# Patient Record
Sex: FEMALE | Race: WHITE | NOT HISPANIC OR LATINO | URBAN - METROPOLITAN AREA
[De-identification: names, ages, dates, MRNs, and addresses within clinical notes are randomized per-mention and may not be internally consistent; named-entity substitution may affect disease eponyms.]

---

## 2021-09-07 ENCOUNTER — OFFICE (OUTPATIENT)
Dept: URBAN - METROPOLITAN AREA CLINIC 64 | Facility: CLINIC | Age: 86
End: 2021-09-07

## 2021-09-07 VITALS
HEIGHT: 60 IN | DIASTOLIC BLOOD PRESSURE: 89 MMHG | HEART RATE: 64 BPM | WEIGHT: 158 LBS | SYSTOLIC BLOOD PRESSURE: 168 MMHG

## 2021-09-07 DIAGNOSIS — K52.831 COLLAGENOUS COLITIS: ICD-10-CM

## 2021-09-07 DIAGNOSIS — R19.7 DIARRHEA, UNSPECIFIED: ICD-10-CM

## 2021-09-07 DIAGNOSIS — K64.9 UNSPECIFIED HEMORRHOIDS: ICD-10-CM

## 2021-09-07 PROCEDURE — 99203 OFFICE O/P NEW LOW 30 MIN: CPT | Performed by: NURSE PRACTITIONER

## 2021-09-07 RX ORDER — HYDROCORTISONE ACETATE AND PRAMOXINE HYDROCHLORIDE 25; 10 MG/G; MG/G
2.5 CREAM TOPICAL
Qty: 4 | Refills: 6 | Status: COMPLETED
Start: 2021-09-07 | End: 2022-09-09

## 2022-09-09 ENCOUNTER — OFFICE (OUTPATIENT)
Dept: URBAN - METROPOLITAN AREA CLINIC 64 | Facility: CLINIC | Age: 87
End: 2022-09-09

## 2022-09-09 VITALS
SYSTOLIC BLOOD PRESSURE: 127 MMHG | HEIGHT: 60 IN | HEART RATE: 74 BPM | DIASTOLIC BLOOD PRESSURE: 71 MMHG | WEIGHT: 146 LBS

## 2022-09-09 DIAGNOSIS — R19.7 DIARRHEA, UNSPECIFIED: ICD-10-CM

## 2022-09-09 PROCEDURE — 99213 OFFICE O/P EST LOW 20 MIN: CPT | Performed by: NURSE PRACTITIONER

## 2022-09-09 RX ORDER — MESALAMINE 1.2 G/1
TABLET, DELAYED RELEASE ORAL
Qty: 90 | Refills: 5 | Status: COMPLETED
End: 2022-09-14

## 2022-11-11 ENCOUNTER — TRANSCRIBE ORDERS (OUTPATIENT)
Dept: ADMINISTRATIVE | Facility: HOSPITAL | Age: 87
End: 2022-11-11

## 2022-11-11 DIAGNOSIS — I35.0 AORTIC VALVE STENOSIS, UNSPECIFIED ETIOLOGY: Primary | ICD-10-CM

## 2022-11-18 ENCOUNTER — HOSPITAL ENCOUNTER (OUTPATIENT)
Dept: CARDIOLOGY | Facility: HOSPITAL | Age: 87
Discharge: HOME | End: 2022-11-18
Admitting: INTERNAL MEDICINE

## 2022-11-18 DIAGNOSIS — I35.0 AORTIC VALVE STENOSIS, UNSPECIFIED ETIOLOGY: ICD-10-CM

## 2022-11-18 PROCEDURE — 93306 TTE W/DOPPLER COMPLETE: CPT | Performed by: INTERNAL MEDICINE

## 2022-11-18 PROCEDURE — 25010000002 SULFUR HEXAFLUORIDE MICROSPH 60.7-25 MG RECONSTITUTED SUSPENSION: Performed by: INTERNAL MEDICINE

## 2022-11-18 PROCEDURE — 93306 TTE W/DOPPLER COMPLETE: CPT

## 2022-11-18 RX ADMIN — SULFUR HEXAFLUORIDE 3 ML: KIT at 17:06

## 2022-11-20 LAB
BH CV ECHO MEAS - ACS: 1.32 CM
BH CV ECHO MEAS - AI P1/2T: 547.7 MSEC
BH CV ECHO MEAS - AO MAX PG: 21.8 MMHG
BH CV ECHO MEAS - AO MEAN PG: 10.6 MMHG
BH CV ECHO MEAS - AO ROOT DIAM: 3.2 CM
BH CV ECHO MEAS - AO V2 MAX: 233.5 CM/SEC
BH CV ECHO MEAS - AO V2 VTI: 48 CM
BH CV ECHO MEAS - AVA(I,D): 1.06 CM2
BH CV ECHO MEAS - EDV(CUBED): 16.8 ML
BH CV ECHO MEAS - EDV(MOD-SP4): 65 ML
BH CV ECHO MEAS - EF(MOD-BP): 58 %
BH CV ECHO MEAS - EF(MOD-SP4): 57.6 %
BH CV ECHO MEAS - ESV(CUBED): 9.5 ML
BH CV ECHO MEAS - ESV(MOD-SP4): 27.6 ML
BH CV ECHO MEAS - FS: 17.2 %
BH CV ECHO MEAS - IVS/LVPW: 1.46 CM
BH CV ECHO MEAS - IVSD: 1.42 CM
BH CV ECHO MEAS - LA DIMENSION: 3.5 CM
BH CV ECHO MEAS - LV DIASTOLIC VOL/BSA (35-75): 40.9 CM2
BH CV ECHO MEAS - LV MASS(C)D: 87.3 GRAMS
BH CV ECHO MEAS - LV MAX PG: 5.9 MMHG
BH CV ECHO MEAS - LV MEAN PG: 3.1 MMHG
BH CV ECHO MEAS - LV SYSTOLIC VOL/BSA (12-30): 17.4 CM2
BH CV ECHO MEAS - LV V1 MAX: 121.8 CM/SEC
BH CV ECHO MEAS - LV V1 VTI: 25.9 CM
BH CV ECHO MEAS - LVIDD: 2.6 CM
BH CV ECHO MEAS - LVIDS: 2.12 CM
BH CV ECHO MEAS - LVOT AREA: 1.97 CM2
BH CV ECHO MEAS - LVOT DIAM: 1.59 CM
BH CV ECHO MEAS - LVPWD: 0.97 CM
BH CV ECHO MEAS - MV A MAX VEL: 148.4 CM/SEC
BH CV ECHO MEAS - MV DEC SLOPE: 351.5 CM/SEC2
BH CV ECHO MEAS - MV DEC TIME: 0.31 MSEC
BH CV ECHO MEAS - MV E MAX VEL: 108.5 CM/SEC
BH CV ECHO MEAS - MV E/A: 0.73
BH CV ECHO MEAS - MV MAX PG: 10.8 MMHG
BH CV ECHO MEAS - MV MEAN PG: 4.6 MMHG
BH CV ECHO MEAS - MV V2 VTI: 36.3 CM
BH CV ECHO MEAS - MVA(VTI): 1.41 CM2
BH CV ECHO MEAS - PA ACC TIME: 0.08 SEC
BH CV ECHO MEAS - PA PR(ACCEL): 44.7 MMHG
BH CV ECHO MEAS - PA V2 MAX: 123.2 CM/SEC
BH CV ECHO MEAS - PULM A REVS DUR: 0.19 SEC
BH CV ECHO MEAS - PULM A REVS VEL: 25 CM/SEC
BH CV ECHO MEAS - PULM DIAS VEL: 30.7 CM/SEC
BH CV ECHO MEAS - PULM S/D: 1.37
BH CV ECHO MEAS - PULM SYS VEL: 42.1 CM/SEC
BH CV ECHO MEAS - RAP SYSTOLE: 12 MMHG
BH CV ECHO MEAS - RV MAX PG: 3.8 MMHG
BH CV ECHO MEAS - RV V1 MAX: 98 CM/SEC
BH CV ECHO MEAS - RV V1 VTI: 18.7 CM
BH CV ECHO MEAS - RVDD: 3.8 CM
BH CV ECHO MEAS - SI(MOD-SP4): 23.5 ML/M2
BH CV ECHO MEAS - SV(LVOT): 51.1 ML
BH CV ECHO MEAS - SV(MOD-SP4): 37.4 ML
MAXIMAL PREDICTED HEART RATE: 124 BPM
STRESS TARGET HR: 105 BPM

## 2022-12-09 ENCOUNTER — TRANSCRIBE ORDERS (OUTPATIENT)
Dept: ADMINISTRATIVE | Facility: HOSPITAL | Age: 87
End: 2022-12-09

## 2022-12-09 DIAGNOSIS — I63.9 CEREBROVASCULAR ACCIDENT (CVA), UNSPECIFIED MECHANISM: Primary | ICD-10-CM

## 2022-12-12 ENCOUNTER — TRANSCRIBE ORDERS (OUTPATIENT)
Dept: ADMINISTRATIVE | Facility: HOSPITAL | Age: 87
End: 2022-12-12

## 2022-12-12 DIAGNOSIS — R20.9 CVA, OLD, ALTERATIONS OF SENSATIONS: Primary | ICD-10-CM

## 2022-12-12 DIAGNOSIS — I69.398 CVA, OLD, ALTERATIONS OF SENSATIONS: Primary | ICD-10-CM

## 2022-12-14 ENCOUNTER — OFFICE (OUTPATIENT)
Dept: URBAN - METROPOLITAN AREA CLINIC 64 | Facility: CLINIC | Age: 87
End: 2022-12-14

## 2022-12-14 VITALS
WEIGHT: 141 LBS | DIASTOLIC BLOOD PRESSURE: 89 MMHG | HEART RATE: 71 BPM | SYSTOLIC BLOOD PRESSURE: 159 MMHG | HEIGHT: 60 IN

## 2022-12-14 DIAGNOSIS — R19.7 DIARRHEA, UNSPECIFIED: ICD-10-CM

## 2022-12-14 DIAGNOSIS — K52.831 COLLAGENOUS COLITIS: ICD-10-CM

## 2022-12-14 PROCEDURE — 99213 OFFICE O/P EST LOW 20 MIN: CPT | Performed by: NURSE PRACTITIONER

## 2022-12-14 RX ORDER — BALSALAZIDE DISODIUM 750 MG/1
CAPSULE ORAL
Qty: 120 | Refills: 7 | Status: COMPLETED
End: 2023-06-21

## 2022-12-16 ENCOUNTER — APPOINTMENT (OUTPATIENT)
Dept: RESPIRATORY THERAPY | Facility: HOSPITAL | Age: 87
End: 2022-12-16

## 2023-01-09 ENCOUNTER — HOSPITAL ENCOUNTER (OUTPATIENT)
Dept: MRI IMAGING | Facility: HOSPITAL | Age: 88
Discharge: HOME | End: 2023-01-09
Payer: MEDICARE

## 2023-01-09 ENCOUNTER — HOSPITAL ENCOUNTER (OUTPATIENT)
Dept: CARDIOLOGY | Facility: HOSPITAL | Age: 88
Discharge: HOME | End: 2023-01-09
Payer: MEDICARE

## 2023-01-09 DIAGNOSIS — I63.9 CEREBROVASCULAR ACCIDENT (CVA), UNSPECIFIED MECHANISM: ICD-10-CM

## 2023-01-09 LAB
CREAT BLDA-MCNC: 0.8 MG/DL (ref 0.6–1.3)
EGFRCR SERPLBLD CKD-EPI 2021: 67.5 ML/MIN/1.73

## 2023-01-09 PROCEDURE — A9579 GAD-BASE MR CONTRAST NOS,1ML: HCPCS | Performed by: INTERNAL MEDICINE

## 2023-01-09 PROCEDURE — 82565 ASSAY OF CREATININE: CPT

## 2023-01-09 PROCEDURE — 25010000002 GADOTERIDOL PER 1 ML: Performed by: INTERNAL MEDICINE

## 2023-01-09 PROCEDURE — 70553 MRI BRAIN STEM W/O & W/DYE: CPT

## 2023-01-09 RX ADMIN — GADOTERIDOL 13 ML: 279.3 INJECTION, SOLUTION INTRAVENOUS at 16:49

## 2023-01-11 ENCOUNTER — HOSPITAL ENCOUNTER (OUTPATIENT)
Dept: RESPIRATORY THERAPY | Facility: HOSPITAL | Age: 88
Discharge: HOME | End: 2023-01-11
Admitting: INTERNAL MEDICINE
Payer: MEDICARE

## 2023-01-11 DIAGNOSIS — I69.398 CVA, OLD, ALTERATIONS OF SENSATIONS: ICD-10-CM

## 2023-01-11 DIAGNOSIS — R20.9 CVA, OLD, ALTERATIONS OF SENSATIONS: ICD-10-CM

## 2023-01-11 PROCEDURE — 93270 REMOTE 30 DAY ECG REV/REPORT: CPT

## 2023-02-14 PROCEDURE — 93272 ECG/REVIEW INTERPRET ONLY: CPT | Performed by: INTERNAL MEDICINE

## 2023-04-13 ENCOUNTER — LAB (OUTPATIENT)
Dept: LAB | Facility: HOSPITAL | Age: 88
End: 2023-04-13
Payer: MEDICARE

## 2023-04-13 ENCOUNTER — TRANSCRIBE ORDERS (OUTPATIENT)
Dept: ADMINISTRATIVE | Facility: HOSPITAL | Age: 88
End: 2023-04-13
Payer: MEDICARE

## 2023-04-13 DIAGNOSIS — Z01.818 PRE-OP TESTING: Primary | ICD-10-CM

## 2023-04-13 DIAGNOSIS — Z01.818 PRE-OP TESTING: ICD-10-CM

## 2023-04-13 LAB
CREAT SERPL-MCNC: 0.65 MG/DL (ref 0.57–1)
EGFRCR SERPLBLD CKD-EPI 2021: 80.2 ML/MIN/1.73

## 2023-04-13 PROCEDURE — 36415 COLL VENOUS BLD VENIPUNCTURE: CPT

## 2023-04-13 PROCEDURE — 82565 ASSAY OF CREATININE: CPT

## 2023-04-14 ENCOUNTER — TRANSCRIBE ORDERS (OUTPATIENT)
Dept: ADMINISTRATIVE | Facility: HOSPITAL | Age: 88
End: 2023-04-14
Payer: MEDICARE

## 2023-04-14 DIAGNOSIS — H49.02 THIRD NERVE PALSY OF LEFT EYE: Primary | ICD-10-CM

## 2023-06-21 ENCOUNTER — OFFICE (OUTPATIENT)
Dept: URBAN - METROPOLITAN AREA CLINIC 64 | Facility: CLINIC | Age: 88
End: 2023-06-21

## 2023-06-21 VITALS — DIASTOLIC BLOOD PRESSURE: 94 MMHG | HEIGHT: 60 IN | WEIGHT: 129 LBS | SYSTOLIC BLOOD PRESSURE: 161 MMHG

## 2023-06-21 DIAGNOSIS — R19.7 DIARRHEA, UNSPECIFIED: ICD-10-CM

## 2023-06-21 DIAGNOSIS — K52.89 OTHER SPECIFIED NONINFECTIVE GASTROENTERITIS AND COLITIS: ICD-10-CM

## 2023-06-21 DIAGNOSIS — K21.9 GASTRO-ESOPHAGEAL REFLUX DISEASE WITHOUT ESOPHAGITIS: ICD-10-CM

## 2023-06-21 DIAGNOSIS — R13.10 DYSPHAGIA, UNSPECIFIED: ICD-10-CM

## 2023-06-21 PROCEDURE — 99213 OFFICE O/P EST LOW 20 MIN: CPT | Performed by: NURSE PRACTITIONER

## 2023-06-21 RX ORDER — BUDESONIDE 3 MG/1
CAPSULE ORAL
Qty: 90 | Refills: 6 | Status: COMPLETED
End: 2023-11-03

## 2023-06-21 RX ORDER — FAMOTIDINE 40 MG/1
TABLET, FILM COATED ORAL
Qty: 30 | Refills: 12 | Status: ACTIVE

## 2023-06-21 RX ORDER — BALSALAZIDE DISODIUM 750 MG/1
3000 CAPSULE ORAL
Qty: 360 | Refills: 3 | Status: ACTIVE
Start: 2023-06-21

## 2023-08-03 ENCOUNTER — OFFICE (OUTPATIENT)
Dept: URBAN - METROPOLITAN AREA CLINIC 64 | Facility: CLINIC | Age: 88
End: 2023-08-03

## 2023-08-03 VITALS
WEIGHT: 128 LBS | HEART RATE: 64 BPM | DIASTOLIC BLOOD PRESSURE: 86 MMHG | HEIGHT: 60 IN | SYSTOLIC BLOOD PRESSURE: 152 MMHG

## 2023-08-03 DIAGNOSIS — K52.831 COLLAGENOUS COLITIS: ICD-10-CM

## 2023-08-03 DIAGNOSIS — R13.10 DYSPHAGIA, UNSPECIFIED: ICD-10-CM

## 2023-08-03 DIAGNOSIS — R19.7 DIARRHEA, UNSPECIFIED: ICD-10-CM

## 2023-08-03 PROCEDURE — 99213 OFFICE O/P EST LOW 20 MIN: CPT | Performed by: NURSE PRACTITIONER

## 2023-11-03 ENCOUNTER — OFFICE (OUTPATIENT)
Dept: URBAN - METROPOLITAN AREA CLINIC 64 | Facility: CLINIC | Age: 88
End: 2023-11-03

## 2023-11-03 VITALS
SYSTOLIC BLOOD PRESSURE: 114 MMHG | HEIGHT: 60 IN | WEIGHT: 125 LBS | HEART RATE: 74 BPM | DIASTOLIC BLOOD PRESSURE: 63 MMHG

## 2023-11-03 DIAGNOSIS — R19.7 DIARRHEA, UNSPECIFIED: ICD-10-CM

## 2023-11-03 DIAGNOSIS — R13.10 DYSPHAGIA, UNSPECIFIED: ICD-10-CM

## 2023-11-03 DIAGNOSIS — R63.4 ABNORMAL WEIGHT LOSS: ICD-10-CM

## 2023-11-03 PROCEDURE — 99213 OFFICE O/P EST LOW 20 MIN: CPT | Performed by: NURSE PRACTITIONER

## 2023-12-06 ENCOUNTER — TRANSCRIBE ORDERS (OUTPATIENT)
Dept: ADMINISTRATIVE | Facility: HOSPITAL | Age: 88
End: 2023-12-06
Payer: MEDICARE

## 2023-12-06 DIAGNOSIS — R13.10 DYSPHAGIA, UNSPECIFIED TYPE: Primary | ICD-10-CM

## 2024-04-03 ENCOUNTER — OFFICE (OUTPATIENT)
Dept: URBAN - METROPOLITAN AREA CLINIC 64 | Facility: CLINIC | Age: 89
End: 2024-04-03

## 2024-04-03 VITALS
DIASTOLIC BLOOD PRESSURE: 85 MMHG | SYSTOLIC BLOOD PRESSURE: 149 MMHG | SYSTOLIC BLOOD PRESSURE: 156 MMHG | WEIGHT: 118.2 LBS | HEART RATE: 74 BPM | DIASTOLIC BLOOD PRESSURE: 87 MMHG | HEIGHT: 60 IN

## 2024-04-03 DIAGNOSIS — R13.10 DYSPHAGIA, UNSPECIFIED: ICD-10-CM

## 2024-04-03 DIAGNOSIS — N25.9 DISORDER RESULTING FROM IMPAIRED RENAL TUBULAR FUNCTION, UNS: ICD-10-CM

## 2024-04-03 PROCEDURE — 99213 OFFICE O/P EST LOW 20 MIN: CPT | Performed by: NURSE PRACTITIONER

## 2024-04-05 ENCOUNTER — TRANSCRIBE ORDERS (OUTPATIENT)
Dept: ADMINISTRATIVE | Facility: HOSPITAL | Age: 89
End: 2024-04-05
Payer: MEDICARE

## 2024-04-05 DIAGNOSIS — N25.9 DISORDER RESULTING FROM IMPAIRED RENAL FUNCTION: Primary | ICD-10-CM

## 2024-04-05 DIAGNOSIS — R13.10 DYSPHAGIA, UNSPECIFIED TYPE: ICD-10-CM

## 2024-04-23 ENCOUNTER — TRANSCRIBE ORDERS (OUTPATIENT)
Dept: ADMINISTRATIVE | Facility: HOSPITAL | Age: 89
End: 2024-04-23
Payer: MEDICARE

## 2024-04-23 DIAGNOSIS — R63.4 WEIGHT LOSS, UNINTENTIONAL: Primary | ICD-10-CM

## 2024-05-21 ENCOUNTER — HOSPITAL ENCOUNTER (OUTPATIENT)
Dept: CT IMAGING | Facility: HOSPITAL | Age: 89
Discharge: HOME | End: 2024-05-21
Admitting: INTERNAL MEDICINE
Payer: MEDICARE

## 2024-05-21 DIAGNOSIS — R63.4 WEIGHT LOSS, UNINTENTIONAL: ICD-10-CM

## 2024-05-21 PROCEDURE — 74176 CT ABD & PELVIS W/O CONTRAST: CPT

## 2024-06-05 ENCOUNTER — HOSPITAL ENCOUNTER (OUTPATIENT)
Dept: GENERAL RADIOLOGY | Facility: HOSPITAL | Age: 89
Discharge: HOME | End: 2024-06-05
Payer: MEDICARE

## 2024-06-05 DIAGNOSIS — R13.10 DYSPHAGIA, UNSPECIFIED TYPE: ICD-10-CM

## 2024-06-05 DIAGNOSIS — N25.9 DISORDER RESULTING FROM IMPAIRED RENAL FUNCTION: ICD-10-CM

## 2024-06-05 PROCEDURE — 74230 X-RAY XM SWLNG FUNCJ C+: CPT

## 2024-06-05 PROCEDURE — 63710000001 BARIUM SULFATE 40 % SUSPENSION: Performed by: NURSE PRACTITIONER

## 2024-06-05 PROCEDURE — A9270 NON-COVERED ITEM OR SERVICE: HCPCS | Performed by: NURSE PRACTITIONER

## 2024-06-05 PROCEDURE — 92611 MOTION FLUOROSCOPY/SWALLOW: CPT

## 2024-06-05 RX ADMIN — BARIUM SULFATE 50 ML: 400 SUSPENSION ORAL at 11:12

## 2024-06-05 NOTE — MBS/VFSS/FEES
Outpatient Speech Language Pathology   Adult Swallow Initial Evaluation  [facility]     Patient Name: Alexey Trinidad  : 3/21/1926  MRN: [id number]  Today's Date: 2024         Visit Date: 2024     Visit Dx:     ICD-10-CM ICD-9-CM   1. 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 Dysphagia, unspecified type  R13.10 787.20        SLP Adult Swallow Evaluation       Row Name 24 1030       Rehab Evaluation    Document Type evaluation  -PF    Subjective Information no complaints  -PF    Patient Observations alert;cooperative  -PF    Patient Effort good  -PF    Symptoms Noted During/After Treatment none  -PF       General Information    Patient Profile Reviewed yes  -PF    Current Method of Nutrition thin liquids;soft to chew textures  -PF    Precautions/Limitations, Vision WFL;for purposes of eval  -PF    Precautions/Limitations, Hearing WFL;for purposes of eval  -PF    Prior Level of Function-Swallowing no diet consistency restrictions  -PF    Plans/Goals Discussed with patient;family  -PF    Barriers to Rehab none identified  -PF       Oral Motor Structure and Function    Dentition Assessment upper dentures/partial in place;lower dentures/partial in place  -PF    Secretion Management WNL/WFL  -PF    Mucosal Quality moist, healthy  -PF       Oral Musculature and Cranial Nerve Assessment    Oral Motor General Assessment generalized oral motor weakness  -PF       General Eating/Swallowing Observations    Respiratory Support Currently in Use room air  -PF    Eating/Swallowing Skills self-fed  -PF    Positioning During Eating upright 90 degree;upright in chair  -PF    Utensils Used spoon;cup;straw  -PF       MBS/VFSS    Utensils Used spoon;cup;straw  -PF    Consistencies Trialed regular textures;soft to chew textures;mechanical ground textures;mixed consistency;pureed;thin liquids  -PF       MBS/VFSS Interpretation    VFSS Summary VFSS completed this date. The patient was seated up at a 90  degree angle and viewed in the lateral projection. Pt self-fed all trials as provided by SLP respectively: thin barium sequential sip by cup x1, thin barium by cup x 1, applesauce coated in barium x 2, peaches coated in barium x 2, cracker x 1, and thin barium liquid wash, and esophageal scan to complete the study.     Across all trials and consistencies, pt had premature spillage to the valleculae at the initiation of swallow.  A-P transit adequate on thin and puree, and mildly delayed on peaches and cracker  Mastication slow on peaches and more extended on cracker, but functional.  Upon initiation of the swallow she presents with adequate laryngeal elevation, epiglottic deflection on all trials except on thin liquid, and reduced forward hyoid movement.  BOT retraction mildly reduced resulting in trace to minimal residue remains in oral cavity and in the valleculae after the swallow of applesauce, peaches, and cracker.  Transient penetration on thin by cup sequential sip and single sip by cup during the swallow.  Penetrated material ejected out of the airway after the swallow.  Material enters the airway, remains above the vocal folds, and is ejected from the airway (a 2 on the Rosenbeck Penetration-Aspiration Scale).  No penetration or aspiration observed on thin by straw, applesauce, peaches, and regular;  (a 1 on the Rosenbeck Penetration-Aspiration Scale).  Dry swallow was effective in clearing majority of vallecular and residue.  Esophageal scan reveals slow clearing below EUS.      Education provided to patient and family regarding the results and recommendations from this study.  They verbalized understanding.    RECOMMENDATIONS:  It is recommended that the patient continue soft diet w/ thin liquids and utilize extra sauces and gravies. Safe swallow compensations: small bites, small sips, utilize dry/double swallow after a bite of solid, alternate liquid and solid.  She should be up at a full 90 degree angle  for all meals/po.  -PF       SLP Evaluation Clinical Impression    SLP Swallowing Diagnosis mild;oral dysphagia;functional pharyngeal phase  -PF    Functional Impact risk of aspiration/pneumonia  -PF    Rehab Potential/Prognosis, Swallowing good, to achieve stated therapy goals  -PF    Swallow Criteria for Skilled Therapeutic Interventions Met baseline status  -PF       Recommendations    Therapy Frequency (Swallow) evaluation only  -PF    SLP Diet Recommendation soft to chew textures;thin liquids  -PF    Recommended Diagnostics No further SLP services recommended  -PF    Recommended Precautions and Strategies upright posture during/after eating;small bites of food and sips of liquid;multiple swallows per bite of food;multiple swallows per sip of liquid;alternate between small bites of food and sips of liquid  -PF    Oral Care Recommendations Oral Care BID/PRN;Oral Care before breakfast, after meals and PRN  -PF    SLP Rec. for Method of Medication Administration meds whole;with thin liquids;with puree;as tolerated  -PF    Monitor for Signs of Aspiration yes;notify SLP if any concerns  -PF              User Key  (r) = Recorded By, (t) = Taken By, (c) = Cosigned By      Initials Name Provider Type    PF Fakto, [place], SLP Speech and Language Pathologist               CAMRON Biswas  6/5/2024

## 2024-10-03 ENCOUNTER — OFFICE (OUTPATIENT)
Age: 89
End: 2024-10-03

## 2024-10-03 ENCOUNTER — OFFICE (OUTPATIENT)
Dept: URBAN - METROPOLITAN AREA CLINIC 64 | Facility: CLINIC | Age: 89
End: 2024-10-03

## 2024-10-03 VITALS
DIASTOLIC BLOOD PRESSURE: 86 MMHG | SYSTOLIC BLOOD PRESSURE: 144 MMHG | HEART RATE: 63 BPM | SYSTOLIC BLOOD PRESSURE: 146 MMHG | DIASTOLIC BLOOD PRESSURE: 84 MMHG | SYSTOLIC BLOOD PRESSURE: 146 MMHG | SYSTOLIC BLOOD PRESSURE: 144 MMHG | SYSTOLIC BLOOD PRESSURE: 146 MMHG | WEIGHT: 108 LBS | HEIGHT: 60 IN | HEIGHT: 60 IN | WEIGHT: 108 LBS | DIASTOLIC BLOOD PRESSURE: 84 MMHG | DIASTOLIC BLOOD PRESSURE: 84 MMHG | DIASTOLIC BLOOD PRESSURE: 86 MMHG | SYSTOLIC BLOOD PRESSURE: 146 MMHG | DIASTOLIC BLOOD PRESSURE: 84 MMHG | DIASTOLIC BLOOD PRESSURE: 86 MMHG | SYSTOLIC BLOOD PRESSURE: 146 MMHG | SYSTOLIC BLOOD PRESSURE: 144 MMHG | SYSTOLIC BLOOD PRESSURE: 144 MMHG | HEIGHT: 60 IN | HEIGHT: 60 IN | SYSTOLIC BLOOD PRESSURE: 144 MMHG | HEART RATE: 63 BPM | WEIGHT: 108 LBS | WEIGHT: 108 LBS | SYSTOLIC BLOOD PRESSURE: 146 MMHG | SYSTOLIC BLOOD PRESSURE: 144 MMHG | WEIGHT: 108 LBS | HEART RATE: 63 BPM | HEART RATE: 63 BPM | HEART RATE: 63 BPM | DIASTOLIC BLOOD PRESSURE: 86 MMHG | HEIGHT: 60 IN | HEART RATE: 63 BPM | SYSTOLIC BLOOD PRESSURE: 144 MMHG | DIASTOLIC BLOOD PRESSURE: 84 MMHG | SYSTOLIC BLOOD PRESSURE: 146 MMHG | WEIGHT: 108 LBS | DIASTOLIC BLOOD PRESSURE: 86 MMHG | DIASTOLIC BLOOD PRESSURE: 84 MMHG | HEIGHT: 60 IN | DIASTOLIC BLOOD PRESSURE: 86 MMHG | DIASTOLIC BLOOD PRESSURE: 86 MMHG | HEART RATE: 63 BPM | HEIGHT: 60 IN | WEIGHT: 108 LBS | DIASTOLIC BLOOD PRESSURE: 84 MMHG

## 2024-10-03 DIAGNOSIS — N25.9 DISORDER RESULTING FROM IMPAIRED RENAL TUBULAR FUNCTION, UNS: ICD-10-CM

## 2024-10-03 DIAGNOSIS — R19.7 DIARRHEA, UNSPECIFIED: ICD-10-CM

## 2024-10-03 DIAGNOSIS — R13.10 DYSPHAGIA, UNSPECIFIED: ICD-10-CM

## 2024-10-03 PROCEDURE — 99213 OFFICE O/P EST LOW 20 MIN: CPT | Performed by: NURSE PRACTITIONER

## 2025-01-24 ENCOUNTER — HOSPITAL ENCOUNTER (INPATIENT)
Facility: HOSPITAL | Age: OVER 89
LOS: 12 days | Discharge: NURSING, REHAB OR LONG-TERM CARE | End: 2025-02-05
Attending: EMERGENCY MEDICINE
Payer: MEDICARE

## 2025-01-24 ENCOUNTER — APPOINTMENT (OUTPATIENT)
Dept: CT IMAGING | Facility: HOSPITAL | Age: OVER 89
End: 2025-01-24
Payer: MEDICARE

## 2025-01-24 DIAGNOSIS — N39.0 URINARY TRACT INFECTION WITHOUT HEMATURIA, SITE UNSPECIFIED: Primary | ICD-10-CM

## 2025-01-24 DIAGNOSIS — R11.10 VOMITING, UNSPECIFIED VOMITING TYPE, UNSPECIFIED WHETHER NAUSEA PRESENT: ICD-10-CM

## 2025-01-24 DIAGNOSIS — R53.1 GENERAL WEAKNESS: ICD-10-CM

## 2025-01-24 PROBLEM — R10.9 ABDOMINAL PAIN: Status: ACTIVE | Noted: 2025-01-24

## 2025-01-24 LAB
ALBUMIN SERPL-MCNC: 3.9 G/DL (ref 3.5–5.2)
ALBUMIN/GLOB SERPL: 1.6 G/DL
ALP SERPL-CCNC: 42 U/L (ref 39–117)
ALT SERPL W P-5'-P-CCNC: 13 U/L (ref 1–33)
ANION GAP SERPL CALCULATED.3IONS-SCNC: 10.1 MMOL/L (ref 5–15)
AST SERPL-CCNC: 29 U/L (ref 1–32)
BACTERIA UR QL AUTO: ABNORMAL /HPF
BASOPHILS # BLD AUTO: 0.04 10*3/MM3 (ref 0–0.2)
BASOPHILS NFR BLD AUTO: 0.3 % (ref 0–1.5)
BILIRUB SERPL-MCNC: 0.3 MG/DL (ref 0–1.2)
BILIRUB UR QL STRIP: NEGATIVE
BUN SERPL-MCNC: 22 MG/DL (ref 8–23)
BUN/CREAT SERPL: 39.3 (ref 7–25)
CALCIUM SPEC-SCNC: 8.4 MG/DL (ref 8.2–9.6)
CHLORIDE SERPL-SCNC: 108 MMOL/L (ref 98–107)
CLARITY UR: CLEAR
CO2 SERPL-SCNC: 28.9 MMOL/L (ref 22–29)
COLOR UR: ABNORMAL
CREAT SERPL-MCNC: 0.56 MG/DL (ref 0.57–1)
D-LACTATE SERPL-SCNC: 0.7 MMOL/L (ref 0.3–2)
DEPRECATED RDW RBC AUTO: 58.1 FL (ref 37–54)
EGFRCR SERPLBLD CKD-EPI 2021: 82.6 ML/MIN/1.73
EOSINOPHIL # BLD AUTO: 0.05 10*3/MM3 (ref 0–0.4)
EOSINOPHIL NFR BLD AUTO: 0.4 % (ref 0.3–6.2)
ERYTHROCYTE [DISTWIDTH] IN BLOOD BY AUTOMATED COUNT: 15.4 % (ref 12.3–15.4)
GLOBULIN UR ELPH-MCNC: 2.5 GM/DL
GLUCOSE SERPL-MCNC: 129 MG/DL (ref 65–99)
GLUCOSE UR STRIP-MCNC: NEGATIVE MG/DL
HCT VFR BLD AUTO: 44.1 % (ref 34–46.6)
HGB BLD-MCNC: 13.5 G/DL (ref 12–15.9)
HGB UR QL STRIP.AUTO: NEGATIVE
HOLD SPECIMEN: NORMAL
HOLD SPECIMEN: NORMAL
HYALINE CASTS UR QL AUTO: ABNORMAL /LPF
IMM GRANULOCYTES # BLD AUTO: 0.07 10*3/MM3 (ref 0–0.05)
IMM GRANULOCYTES NFR BLD AUTO: 0.5 % (ref 0–0.5)
KETONES UR QL STRIP: ABNORMAL
LEUKOCYTE ESTERASE UR QL STRIP.AUTO: ABNORMAL
LIPASE SERPL-CCNC: 29 U/L (ref 13–60)
LYMPHOCYTES # BLD AUTO: 0.28 10*3/MM3 (ref 0.7–3.1)
LYMPHOCYTES NFR BLD AUTO: 2.1 % (ref 19.6–45.3)
MCH RBC QN AUTO: 31.5 PG (ref 26.6–33)
MCHC RBC AUTO-ENTMCNC: 30.6 G/DL (ref 31.5–35.7)
MCV RBC AUTO: 102.8 FL (ref 79–97)
MONOCYTES # BLD AUTO: 0.46 10*3/MM3 (ref 0.1–0.9)
MONOCYTES NFR BLD AUTO: 3.4 % (ref 5–12)
MUCOUS THREADS URNS QL MICRO: ABNORMAL /HPF
NEUTROPHILS NFR BLD AUTO: 12.63 10*3/MM3 (ref 1.7–7)
NEUTROPHILS NFR BLD AUTO: 93.3 % (ref 42.7–76)
NITRITE UR QL STRIP: NEGATIVE
NRBC BLD AUTO-RTO: 0 /100 WBC (ref 0–0.2)
PH UR STRIP.AUTO: <=5 [PH] (ref 5–8)
PLATELET # BLD AUTO: 310 10*3/MM3 (ref 140–450)
PMV BLD AUTO: 8.7 FL (ref 6–12)
POTASSIUM SERPL-SCNC: 3.7 MMOL/L (ref 3.5–5.2)
PROT SERPL-MCNC: 6.4 G/DL (ref 6–8.5)
PROT UR QL STRIP: ABNORMAL
RBC # BLD AUTO: 4.29 10*6/MM3 (ref 3.77–5.28)
RBC # UR STRIP: ABNORMAL /HPF
REF LAB TEST METHOD: ABNORMAL
SODIUM SERPL-SCNC: 147 MMOL/L (ref 136–145)
SP GR UR STRIP: 1.03 (ref 1–1.03)
SQUAMOUS #/AREA URNS HPF: ABNORMAL /HPF
TRANS CELLS #/AREA URNS HPF: ABNORMAL /HPF
UROBILINOGEN UR QL STRIP: ABNORMAL
WBC # UR STRIP: ABNORMAL /HPF
WBC NRBC COR # BLD AUTO: 13.53 10*3/MM3 (ref 3.4–10.8)
WHOLE BLOOD HOLD COAG: NORMAL
WHOLE BLOOD HOLD SPECIMEN: NORMAL

## 2025-01-24 PROCEDURE — 80053 COMPREHEN METABOLIC PANEL: CPT | Performed by: EMERGENCY MEDICINE

## 2025-01-24 PROCEDURE — P9612 CATHETERIZE FOR URINE SPEC: HCPCS

## 2025-01-24 PROCEDURE — 83605 ASSAY OF LACTIC ACID: CPT | Performed by: EMERGENCY MEDICINE

## 2025-01-24 PROCEDURE — 85025 COMPLETE CBC W/AUTO DIFF WBC: CPT | Performed by: EMERGENCY MEDICINE

## 2025-01-24 PROCEDURE — 25010000002 CEFTRIAXONE PER 250 MG: Performed by: EMERGENCY MEDICINE

## 2025-01-24 PROCEDURE — 87077 CULTURE AEROBIC IDENTIFY: CPT | Performed by: EMERGENCY MEDICINE

## 2025-01-24 PROCEDURE — 25510000001 IOPAMIDOL PER 1 ML: Performed by: EMERGENCY MEDICINE

## 2025-01-24 PROCEDURE — 74177 CT ABD & PELVIS W/CONTRAST: CPT

## 2025-01-24 PROCEDURE — 82746 ASSAY OF FOLIC ACID SERUM: CPT | Performed by: STUDENT IN AN ORGANIZED HEALTH CARE EDUCATION/TRAINING PROGRAM

## 2025-01-24 PROCEDURE — 99285 EMERGENCY DEPT VISIT HI MDM: CPT

## 2025-01-24 PROCEDURE — 82607 VITAMIN B-12: CPT | Performed by: STUDENT IN AN ORGANIZED HEALTH CARE EDUCATION/TRAINING PROGRAM

## 2025-01-24 PROCEDURE — 87086 URINE CULTURE/COLONY COUNT: CPT | Performed by: EMERGENCY MEDICINE

## 2025-01-24 PROCEDURE — 87040 BLOOD CULTURE FOR BACTERIA: CPT | Performed by: EMERGENCY MEDICINE

## 2025-01-24 PROCEDURE — 36415 COLL VENOUS BLD VENIPUNCTURE: CPT

## 2025-01-24 PROCEDURE — 87186 SC STD MICRODIL/AGAR DIL: CPT | Performed by: EMERGENCY MEDICINE

## 2025-01-24 PROCEDURE — 81001 URINALYSIS AUTO W/SCOPE: CPT | Performed by: EMERGENCY MEDICINE

## 2025-01-24 PROCEDURE — 83690 ASSAY OF LIPASE: CPT | Performed by: EMERGENCY MEDICINE

## 2025-01-24 RX ORDER — LEVOTHYROXINE SODIUM 75 UG/1
75 TABLET ORAL
COMMUNITY

## 2025-01-24 RX ORDER — SODIUM CHLORIDE 0.9 % (FLUSH) 0.9 %
10 SYRINGE (ML) INJECTION AS NEEDED
Status: DISCONTINUED | OUTPATIENT
Start: 2025-01-24 | End: 2025-02-05 | Stop reason: HOSPADM

## 2025-01-24 RX ORDER — BALSALAZIDE DISODIUM 750 MG/1
1 CAPSULE ORAL NIGHTLY
COMMUNITY

## 2025-01-24 RX ORDER — BUDESONIDE 3 MG/1
3 CAPSULE, COATED PELLETS ORAL 2 TIMES DAILY
COMMUNITY
End: 2025-02-05 | Stop reason: HOSPADM

## 2025-01-24 RX ORDER — SODIUM CHLORIDE 9 MG/ML
40 INJECTION, SOLUTION INTRAVENOUS AS NEEDED
Status: DISCONTINUED | OUTPATIENT
Start: 2025-01-24 | End: 2025-02-05 | Stop reason: HOSPADM

## 2025-01-24 RX ORDER — IOPAMIDOL 755 MG/ML
100 INJECTION, SOLUTION INTRAVASCULAR
Status: COMPLETED | OUTPATIENT
Start: 2025-01-24 | End: 2025-01-24

## 2025-01-24 RX ORDER — NITROGLYCERIN 0.4 MG/1
0.4 TABLET SUBLINGUAL
Status: DISCONTINUED | OUTPATIENT
Start: 2025-01-24 | End: 2025-01-31

## 2025-01-24 RX ORDER — LOSARTAN POTASSIUM 50 MG/1
50 TABLET ORAL DAILY
COMMUNITY
End: 2025-02-05 | Stop reason: HOSPADM

## 2025-01-24 RX ORDER — ACETAMINOPHEN 650 MG/1
650 SUPPOSITORY RECTAL EVERY 4 HOURS PRN
Status: DISCONTINUED | OUTPATIENT
Start: 2025-01-24 | End: 2025-02-05 | Stop reason: HOSPADM

## 2025-01-24 RX ORDER — ENOXAPARIN SODIUM 100 MG/ML
30 INJECTION SUBCUTANEOUS DAILY
Status: DISCONTINUED | OUTPATIENT
Start: 2025-01-25 | End: 2025-02-05 | Stop reason: HOSPADM

## 2025-01-24 RX ORDER — ACETAMINOPHEN 500 MG
500 TABLET ORAL EVERY MORNING
COMMUNITY

## 2025-01-24 RX ORDER — NALOXONE HCL 0.4 MG/ML
0.4 VIAL (ML) INJECTION
Status: DISCONTINUED | OUTPATIENT
Start: 2025-01-24 | End: 2025-01-29

## 2025-01-24 RX ORDER — MORPHINE SULFATE 2 MG/ML
1 INJECTION, SOLUTION INTRAMUSCULAR; INTRAVENOUS EVERY 4 HOURS PRN
Status: DISCONTINUED | OUTPATIENT
Start: 2025-01-24 | End: 2025-01-29

## 2025-01-24 RX ORDER — AMOXICILLIN 250 MG
2 CAPSULE ORAL 2 TIMES DAILY PRN
Status: DISCONTINUED | OUTPATIENT
Start: 2025-01-24 | End: 2025-02-05 | Stop reason: HOSPADM

## 2025-01-24 RX ORDER — POLYETHYLENE GLYCOL 3350 17 G/17G
17 POWDER, FOR SOLUTION ORAL DAILY PRN
Status: DISCONTINUED | OUTPATIENT
Start: 2025-01-24 | End: 2025-02-05 | Stop reason: HOSPADM

## 2025-01-24 RX ORDER — BISACODYL 5 MG/1
5 TABLET, DELAYED RELEASE ORAL DAILY PRN
Status: DISCONTINUED | OUTPATIENT
Start: 2025-01-24 | End: 2025-02-05 | Stop reason: HOSPADM

## 2025-01-24 RX ORDER — BISACODYL 10 MG
10 SUPPOSITORY, RECTAL RECTAL DAILY PRN
Status: DISCONTINUED | OUTPATIENT
Start: 2025-01-24 | End: 2025-02-05 | Stop reason: HOSPADM

## 2025-01-24 RX ORDER — ACETAMINOPHEN 160 MG/5ML
650 SOLUTION ORAL EVERY 4 HOURS PRN
Status: DISCONTINUED | OUTPATIENT
Start: 2025-01-24 | End: 2025-02-05 | Stop reason: HOSPADM

## 2025-01-24 RX ORDER — ANTIOX #8/OM3/DHA/EPA/LUT/ZEAX 250-2.5 MG
1 CAPSULE ORAL 2 TIMES DAILY
COMMUNITY

## 2025-01-24 RX ORDER — SODIUM CHLORIDE 0.9 % (FLUSH) 0.9 %
10 SYRINGE (ML) INJECTION EVERY 12 HOURS SCHEDULED
Status: DISCONTINUED | OUTPATIENT
Start: 2025-01-25 | End: 2025-02-05 | Stop reason: HOSPADM

## 2025-01-24 RX ORDER — ACETAMINOPHEN 325 MG/1
650 TABLET ORAL EVERY 4 HOURS PRN
Status: DISCONTINUED | OUTPATIENT
Start: 2025-01-24 | End: 2025-02-05 | Stop reason: HOSPADM

## 2025-01-24 RX ADMIN — CEFTRIAXONE SODIUM 1000 MG: 1 INJECTION, POWDER, FOR SOLUTION INTRAMUSCULAR; INTRAVENOUS at 18:23

## 2025-01-24 RX ADMIN — IOPAMIDOL 100 ML: 755 INJECTION, SOLUTION INTRAVENOUS at 17:12

## 2025-01-24 RX ADMIN — Medication 10 ML: at 23:57

## 2025-01-24 NOTE — ED PROVIDER NOTES
"Subjective   History of Present Illness  Chief complaint: General Weakness    89-year-old female presents with general weakness.  Family states symptoms started yesterday and have gotten progressively worse.  She has also developed nausea, vomiting, diarrhea.  She reports some abdominal discomfort.  No documented fever but patient states she is staying chilled.  No known sick contacts.    History provided by:  Patient and relative      Review of Systems   Constitutional:  Positive for chills. Negative for fever.   HENT:  Negative for congestion.    Respiratory:  Negative for cough and shortness of breath.    Cardiovascular:  Negative for chest pain.   Gastrointestinal:  Positive for abdominal pain, diarrhea, nausea and vomiting.   Musculoskeletal:  Negative for back pain.   Neurological:  Negative for headaches.   Psychiatric/Behavioral:  Negative for confusion.        No past medical history on file.    No Known Allergies    No past surgical history on file.    No family history on file.    Social History     Socioeconomic History    Marital status:        /66   Pulse 92   Temp 98.1 °F (36.7 °C) (Oral)   Resp 18   Ht 147.3 cm (58\")   Wt 49.4 kg (109 lb)   SpO2 97%   BMI 22.78 kg/m²       Objective   Physical Exam  Vitals and nursing note reviewed.   Constitutional:       Appearance: Normal appearance.   HENT:      Head: Normocephalic and atraumatic.      Mouth/Throat:      Mouth: Mucous membranes are moist.   Cardiovascular:      Rate and Rhythm: Normal rate and regular rhythm.      Heart sounds: Normal heart sounds.   Pulmonary:      Effort: Pulmonary effort is normal. No respiratory distress.      Breath sounds: Normal breath sounds.   Abdominal:      General: Bowel sounds are normal.      Palpations: Abdomen is soft.      Tenderness: There is generalized abdominal tenderness. There is no guarding or rebound.   Skin:     General: Skin is warm and dry.   Neurological:      Mental Status: She is " alert and oriented to person, place, and time.      Comments: General Weakness with no focal motor or sensory deficit appreciated         Procedures           ED Course      Results for orders placed or performed during the hospital encounter of 01/24/25   Comprehensive Metabolic Panel    Collection Time: 01/24/25  4:09 PM    Specimen: Blood   Result Value Ref Range    Glucose 129 (H) 65 - 99 mg/dL    BUN 22 8 - 23 mg/dL    Creatinine 0.56 (L) 0.57 - 1.00 mg/dL    Sodium 147 (H) 136 - 145 mmol/L    Potassium 3.7 3.5 - 5.2 mmol/L    Chloride 108 (H) 98 - 107 mmol/L    CO2 28.9 22.0 - 29.0 mmol/L    Calcium 8.4 8.2 - 9.6 mg/dL    Total Protein 6.4 6.0 - 8.5 g/dL    Albumin 3.9 3.5 - 5.2 g/dL    ALT (SGPT) 13 1 - 33 U/L    AST (SGOT) 29 1 - 32 U/L    Alkaline Phosphatase 42 39 - 117 U/L    Total Bilirubin 0.3 0.0 - 1.2 mg/dL    Globulin 2.5 gm/dL    A/G Ratio 1.6 g/dL    BUN/Creatinine Ratio 39.3 (H) 7.0 - 25.0    Anion Gap 10.1 5.0 - 15.0 mmol/L    eGFR 82.6 >60.0 mL/min/1.73   Lipase    Collection Time: 01/24/25  4:09 PM    Specimen: Blood   Result Value Ref Range    Lipase 29 13 - 60 U/L   CBC Auto Differential    Collection Time: 01/24/25  4:09 PM    Specimen: Blood   Result Value Ref Range    WBC 13.53 (H) 3.40 - 10.80 10*3/mm3    RBC 4.29 3.77 - 5.28 10*6/mm3    Hemoglobin 13.5 12.0 - 15.9 g/dL    Hematocrit 44.1 34.0 - 46.6 %    .8 (H) 79.0 - 97.0 fL    MCH 31.5 26.6 - 33.0 pg    MCHC 30.6 (L) 31.5 - 35.7 g/dL    RDW 15.4 12.3 - 15.4 %    RDW-SD 58.1 (H) 37.0 - 54.0 fl    MPV 8.7 6.0 - 12.0 fL    Platelets 310 140 - 450 10*3/mm3    Neutrophil % 93.3 (H) 42.7 - 76.0 %    Lymphocyte % 2.1 (L) 19.6 - 45.3 %    Monocyte % 3.4 (L) 5.0 - 12.0 %    Eosinophil % 0.4 0.3 - 6.2 %    Basophil % 0.3 0.0 - 1.5 %    Immature Grans % 0.5 0.0 - 0.5 %    Neutrophils, Absolute 12.63 (H) 1.70 - 7.00 10*3/mm3    Lymphocytes, Absolute 0.28 (L) 0.70 - 3.10 10*3/mm3    Monocytes, Absolute 0.46 0.10 - 0.90 10*3/mm3     Eosinophils, Absolute 0.05 0.00 - 0.40 10*3/mm3    Basophils, Absolute 0.04 0.00 - 0.20 10*3/mm3    Immature Grans, Absolute 0.07 (H) 0.00 - 0.05 10*3/mm3    nRBC 0.0 0.0 - 0.2 /100 WBC   Green Top (Gel)    Collection Time: 01/24/25  4:09 PM   Result Value Ref Range    Extra Tube Hold for add-ons.    Lavender Top    Collection Time: 01/24/25  4:09 PM   Result Value Ref Range    Extra Tube hold for add-on    Gold Top - SST    Collection Time: 01/24/25  4:09 PM   Result Value Ref Range    Extra Tube Hold for add-ons.    Light Blue Top    Collection Time: 01/24/25  4:09 PM   Result Value Ref Range    Extra Tube Hold for add-ons.    Urinalysis With Culture If Indicated - Straight Cath    Collection Time: 01/24/25  4:22 PM    Specimen: Straight Cath; Urine   Result Value Ref Range    Color, UA Dark Yellow (A) Yellow, Straw    Appearance, UA Clear Clear    pH, UA <=5.0 5.0 - 8.0    Specific Gravity, UA 1.026 1.005 - 1.030    Glucose, UA Negative Negative    Ketones, UA 15 mg/dL (1+) (A) Negative    Bilirubin, UA Negative Negative    Blood, UA Negative Negative    Protein, UA 30 mg/dL (1+) (A) Negative    Leuk Esterase, UA Small (1+) (A) Negative    Nitrite, UA Negative Negative    Urobilinogen, UA 0.2 E.U./dL 0.2 - 1.0 E.U./dL   Urinalysis, Microscopic Only - Straight Cath    Collection Time: 01/24/25  4:22 PM    Specimen: Straight Cath; Urine   Result Value Ref Range    RBC, UA None Seen None Seen, 0-2 /HPF    WBC, UA 6-10 (A) None Seen, 0-2 /HPF    Bacteria, UA 1+ (A) None Seen /HPF    Squamous Epithelial Cells, UA 0-2 None Seen, 0-2 /HPF    Transitional Epithelial Cells, UA 0-2 0 - 2 /HPF    Hyaline Casts, UA None Seen None Seen /LPF    Mucus, UA Moderate/2+ (A) None Seen, Trace /HPF    Methodology Manual Light Microscopy    POC Lactate    Collection Time: 01/24/25  5:54 PM    Specimen: Blood   Result Value Ref Range    Lactate 0.7 0.3 - 2.0 mmol/L     CT Abdomen Pelvis With Contrast    Result Date: 1/24/2025  1.Mild  intra and extrahepatic biliary ductal dilatation. 0.4 cm calcification at the tip of the Ampulla of Vater appears new compared with 2024. This may represent a stone within the ampulla. Consider ERCP or MRCP. 2.Colonic diverticulosis without evidence of diverticulitis. 3.Stable left adrenal nodule. 4.Urinary bladder wall thickening suggesting cystitis. Electronically Signed: Jonnathan Kovacs MD  1/24/2025 5:26 PM EST  Workstation ID: [id number]                                                    Medical Decision Making    Patient had the above evaluation.  Results were discussed with the patient and family.  White blood cell count was mildly elevated at 13.53.  Lactic acid was normal.  Blood cultures were obtained.  CMP and lipase are unremarkable.  Urinalysis does show evidence of urinary tract infection with small leukocyte esterase, 6-10 white blood cells, 1+ bacteria.  CT of the abdomen and pelvis shows mild intra and extrahepatic biliary ductal dilatation as well as a small calcification at the tip of the ampulla of vater which could represent a stone.  Patient has remained hemodynamically stable in the emergency room.  She was started on Rocephin for UTI.  I discussed with the provider on-call for the hospitalist and the patient will be admitted for further evaluation and management.      Final diagnoses:   Urinary tract infection without hematuria, site unspecified   General weakness   Vomiting, unspecified vomiting type, unspecified whether nausea present       ED Disposition  ED Disposition       ED Disposition   Decision to Admit    Condition   --    Comment   Level of Care: Med/Surg [1]   Admitting Physician: KATE ALEJANDRE [[id number]]   Attending Physician: KATE ALEJANDRE [[id number]]                 No follow-up provider specified.       Medication List      No changes were made to your prescriptions during this visit.            David Mcgill MD  01/24/25 2103

## 2025-01-25 LAB
ADV 40+41 DNA STL QL NAA+NON-PROBE: NOT DETECTED
ALBUMIN SERPL-MCNC: 3.9 G/DL (ref 3.5–5.2)
ALBUMIN/GLOB SERPL: 1.6 G/DL
ALP SERPL-CCNC: 41 U/L (ref 39–117)
ALT SERPL W P-5'-P-CCNC: 12 U/L (ref 1–33)
ANION GAP SERPL CALCULATED.3IONS-SCNC: 9.9 MMOL/L (ref 5–15)
AST SERPL-CCNC: 22 U/L (ref 1–32)
ASTRO TYP 1-8 RNA STL QL NAA+NON-PROBE: NOT DETECTED
BASOPHILS # BLD AUTO: 0.04 10*3/MM3 (ref 0–0.2)
BASOPHILS NFR BLD AUTO: 0.4 % (ref 0–1.5)
BILIRUB SERPL-MCNC: 0.2 MG/DL (ref 0–1.2)
BUN SERPL-MCNC: 20 MG/DL (ref 8–23)
BUN/CREAT SERPL: 39.2 (ref 7–25)
C CAYETANENSIS DNA STL QL NAA+NON-PROBE: NOT DETECTED
C COLI+JEJ+UPSA DNA STL QL NAA+NON-PROBE: NOT DETECTED
CALCIUM SPEC-SCNC: 8.8 MG/DL (ref 8.2–9.6)
CHLORIDE SERPL-SCNC: 104 MMOL/L (ref 98–107)
CO2 SERPL-SCNC: 33.1 MMOL/L (ref 22–29)
CREAT SERPL-MCNC: 0.51 MG/DL (ref 0.57–1)
CRYPTOSP DNA STL QL NAA+NON-PROBE: NOT DETECTED
DEPRECATED RDW RBC AUTO: 59.5 FL (ref 37–54)
E HISTOLYT DNA STL QL NAA+NON-PROBE: NOT DETECTED
EAEC PAA PLAS AGGR+AATA ST NAA+NON-PRB: NOT DETECTED
EC STX1+STX2 GENES STL QL NAA+NON-PROBE: NOT DETECTED
EGFRCR SERPLBLD CKD-EPI 2021: 84.5 ML/MIN/1.73
EOSINOPHIL # BLD AUTO: 0.02 10*3/MM3 (ref 0–0.4)
EOSINOPHIL NFR BLD AUTO: 0.2 % (ref 0.3–6.2)
EPEC EAE GENE STL QL NAA+NON-PROBE: NOT DETECTED
ERYTHROCYTE [DISTWIDTH] IN BLOOD BY AUTOMATED COUNT: 15.7 % (ref 12.3–15.4)
ETEC LTA+ST1A+ST1B TOX ST NAA+NON-PROBE: NOT DETECTED
G LAMBLIA DNA STL QL NAA+NON-PROBE: NOT DETECTED
GLOBULIN UR ELPH-MCNC: 2.5 GM/DL
GLUCOSE SERPL-MCNC: 119 MG/DL (ref 65–99)
HCT VFR BLD AUTO: 43.5 % (ref 34–46.6)
HGB BLD-MCNC: 13 G/DL (ref 12–15.9)
IMM GRANULOCYTES # BLD AUTO: 0.04 10*3/MM3 (ref 0–0.05)
IMM GRANULOCYTES NFR BLD AUTO: 0.4 % (ref 0–0.5)
LYMPHOCYTES # BLD AUTO: 0.24 10*3/MM3 (ref 0.7–3.1)
LYMPHOCYTES NFR BLD AUTO: 2.4 % (ref 19.6–45.3)
MAGNESIUM SERPL-MCNC: 2.2 MG/DL (ref 1.7–2.3)
MCH RBC QN AUTO: 30.5 PG (ref 26.6–33)
MCHC RBC AUTO-ENTMCNC: 29.9 G/DL (ref 31.5–35.7)
MCV RBC AUTO: 102.1 FL (ref 79–97)
MONOCYTES # BLD AUTO: 0.43 10*3/MM3 (ref 0.1–0.9)
MONOCYTES NFR BLD AUTO: 4.2 % (ref 5–12)
NEUTROPHILS NFR BLD AUTO: 9.39 10*3/MM3 (ref 1.7–7)
NEUTROPHILS NFR BLD AUTO: 92.4 % (ref 42.7–76)
NOROVIRUS GI+II RNA STL QL NAA+NON-PROBE: DETECTED
NRBC BLD AUTO-RTO: 0 /100 WBC (ref 0–0.2)
P SHIGELLOIDES DNA STL QL NAA+NON-PROBE: NOT DETECTED
PHOSPHATE SERPL-MCNC: 4 MG/DL (ref 2.5–4.5)
PLATELET # BLD AUTO: 298 10*3/MM3 (ref 140–450)
PMV BLD AUTO: 9 FL (ref 6–12)
POTASSIUM SERPL-SCNC: 3.4 MMOL/L (ref 3.5–5.2)
POTASSIUM SERPL-SCNC: 3.9 MMOL/L (ref 3.5–5.2)
PROT SERPL-MCNC: 6.4 G/DL (ref 6–8.5)
RBC # BLD AUTO: 4.26 10*6/MM3 (ref 3.77–5.28)
RVA RNA STL QL NAA+NON-PROBE: NOT DETECTED
S ENT+BONG DNA STL QL NAA+NON-PROBE: NOT DETECTED
SAPO I+II+IV+V RNA STL QL NAA+NON-PROBE: NOT DETECTED
SHIGELLA SP+EIEC IPAH ST NAA+NON-PROBE: NOT DETECTED
SODIUM SERPL-SCNC: 147 MMOL/L (ref 136–145)
V CHOL+PARA+VUL DNA STL QL NAA+NON-PROBE: NOT DETECTED
V CHOLERAE DNA STL QL NAA+NON-PROBE: NOT DETECTED
WBC NRBC COR # BLD AUTO: 10.16 10*3/MM3 (ref 3.4–10.8)
Y ENTEROCOL DNA STL QL NAA+NON-PROBE: NOT DETECTED

## 2025-01-25 PROCEDURE — 25810000003 SODIUM CHLORIDE 0.9 % SOLUTION

## 2025-01-25 PROCEDURE — 87324 CLOSTRIDIUM AG IA: CPT | Performed by: STUDENT IN AN ORGANIZED HEALTH CARE EDUCATION/TRAINING PROGRAM

## 2025-01-25 PROCEDURE — 84132 ASSAY OF SERUM POTASSIUM: CPT | Performed by: INTERNAL MEDICINE

## 2025-01-25 PROCEDURE — 84100 ASSAY OF PHOSPHORUS: CPT

## 2025-01-25 PROCEDURE — 87449 NOS EACH ORGANISM AG IA: CPT | Performed by: STUDENT IN AN ORGANIZED HEALTH CARE EDUCATION/TRAINING PROGRAM

## 2025-01-25 PROCEDURE — 80053 COMPREHEN METABOLIC PANEL: CPT

## 2025-01-25 PROCEDURE — 87507 IADNA-DNA/RNA PROBE TQ 12-25: CPT | Performed by: STUDENT IN AN ORGANIZED HEALTH CARE EDUCATION/TRAINING PROGRAM

## 2025-01-25 PROCEDURE — 85025 COMPLETE CBC W/AUTO DIFF WBC: CPT

## 2025-01-25 PROCEDURE — 83735 ASSAY OF MAGNESIUM: CPT

## 2025-01-25 PROCEDURE — 25010000002 AMPICILLIN-SULBACTAM PER 1.5 G

## 2025-01-25 RX ORDER — LEVOTHYROXINE SODIUM 75 UG/1
75 TABLET ORAL
Status: DISCONTINUED | OUTPATIENT
Start: 2025-01-26 | End: 2025-02-05 | Stop reason: HOSPADM

## 2025-01-25 RX ORDER — IBUPROFEN 600 MG/1
1 TABLET ORAL
Status: DISCONTINUED | OUTPATIENT
Start: 2025-01-25 | End: 2025-02-05 | Stop reason: HOSPADM

## 2025-01-25 RX ORDER — DEXTROSE MONOHYDRATE 25 G/50ML
25 INJECTION, SOLUTION INTRAVENOUS
Status: DISCONTINUED | OUTPATIENT
Start: 2025-01-25 | End: 2025-02-05 | Stop reason: HOSPADM

## 2025-01-25 RX ORDER — NICOTINE POLACRILEX 4 MG
15 LOZENGE BUCCAL
Status: DISCONTINUED | OUTPATIENT
Start: 2025-01-25 | End: 2025-02-05 | Stop reason: HOSPADM

## 2025-01-25 RX ORDER — SODIUM CHLORIDE 9 MG/ML
100 INJECTION, SOLUTION INTRAVENOUS CONTINUOUS
Status: DISCONTINUED | OUTPATIENT
Start: 2025-01-25 | End: 2025-01-26

## 2025-01-25 RX ORDER — POTASSIUM CHLORIDE 1500 MG/1
40 TABLET, EXTENDED RELEASE ORAL EVERY 4 HOURS
Status: COMPLETED | OUTPATIENT
Start: 2025-01-25 | End: 2025-01-25

## 2025-01-25 RX ADMIN — AMPICILLIN SODIUM AND SULBACTAM SODIUM 1.5 G: 1; .5 INJECTION, POWDER, FOR SOLUTION INTRAMUSCULAR; INTRAVENOUS at 07:33

## 2025-01-25 RX ADMIN — POTASSIUM CHLORIDE 40 MEQ: 1500 TABLET, EXTENDED RELEASE ORAL at 03:55

## 2025-01-25 RX ADMIN — AMPICILLIN SODIUM AND SULBACTAM SODIUM 1.5 G: 1; .5 INJECTION, POWDER, FOR SOLUTION INTRAMUSCULAR; INTRAVENOUS at 14:08

## 2025-01-25 RX ADMIN — AMPICILLIN SODIUM AND SULBACTAM SODIUM 1.5 G: 1; .5 INJECTION, POWDER, FOR SOLUTION INTRAMUSCULAR; INTRAVENOUS at 01:08

## 2025-01-25 RX ADMIN — AMPICILLIN SODIUM AND SULBACTAM SODIUM 1.5 G: 1; .5 INJECTION, POWDER, FOR SOLUTION INTRAMUSCULAR; INTRAVENOUS at 19:56

## 2025-01-25 RX ADMIN — SODIUM CHLORIDE 100 ML/HR: 9 INJECTION, SOLUTION INTRAVENOUS at 18:05

## 2025-01-25 RX ADMIN — POTASSIUM CHLORIDE 40 MEQ: 1500 TABLET, EXTENDED RELEASE ORAL at 09:34

## 2025-01-25 RX ADMIN — Medication 10 ML: at 19:56

## 2025-01-25 RX ADMIN — SODIUM CHLORIDE 100 ML/HR: 9 INJECTION, SOLUTION INTRAVENOUS at 05:51

## 2025-01-25 RX ADMIN — Medication 10 ML: at 07:41

## 2025-01-25 NOTE — ED NOTES
Nursing report ED to floor  Alexey Trinidad  89 y.o.  female    HPI:   Chief Complaint   Patient presents with    Weakness - Generalized       Admitting doctor:   Isa Talbot MD    Admitting diagnosis:   The primary encounter diagnosis was Urinary tract infection without hematuria, site unspecified. Diagnoses of General weakness and Vomiting, unspecified vomiting type, unspecified whether nausea present were also pertinent to this visit.    Code status:   Current Code Status       Date Active Code Status Order ID Comments User Context       Not on file            Allergies:   Patient has no known allergies.    Isolation:  No active isolations     Fall Risk:  Fall Risk Assessment was completed, and patient is at moderate risk for falls.   Predictive Model Details         9 (Low) Factor Value    Calculated 1/24/2025 19:21 Age 89    Risk of Fall Model Active Peripheral IV Present     Imaging order in this encounter Present     Magnesium not on file     Diastolic BP 63     Number of Distinct Medication Classes administered 3     Drug Use Not Asked     Dima Scale not on file     Calcium 8.4 mg/dL     Chloride 108 mmol/L     Albumin 3.9 g/dL     Creatinine 0.56 mg/dL     Days after Admission 0.148     Tobacco Use Not Asked     Total Bilirubin 0.3 mg/dL     Respiratory Rate 15     Potassium 3.7 mmol/L     ALT 13 U/L         Weight:       01/24/25  1614   Weight: 49.4 kg (109 lb)       Intake and Output    Intake/Output Summary (Last 24 hours) at 1/24/2025 1921  Last data filed at 1/24/2025 1854  Gross per 24 hour   Intake 100 ml   Output --   Net 100 ml       Diet:        Most recent vitals:   Vitals:    01/24/25 1800 01/24/25 1830 01/24/25 1845 01/24/25 1900   BP: 120/66 117/61 108/64 118/63   BP Location:       Patient Position:       Pulse: 92 89 88 89   Resp: 18 18  15   Temp:       TempSrc:       SpO2: 97% 96% 98% 97%   Weight:       Height:           Active LDAs/IV Access:   Lines, Drains & Airways       Active LDAs        Name Placement date Placement time Site Days    Peripheral IV 01/24/25 1605 Left Antecubital 01/24/25  1605  Antecubital  less than 1    Peripheral IV 01/24/25 1745 Posterior;Right Forearm 01/24/25  1745  Forearm  less than 1    External Urinary Catheter 01/24/25  1622  --  less than 1                    Skin Condition:   Skin Assessments (last day)       None             Labs (abnormal labs have a star):   Labs Reviewed   COMPREHENSIVE METABOLIC PANEL - Abnormal; Notable for the following components:       Result Value    Glucose 129 (*)     Creatinine 0.56 (*)     Sodium 147 (*)     Chloride 108 (*)     BUN/Creatinine Ratio 39.3 (*)     All other components within normal limits    Narrative:     GFR Categories in Chronic Kidney Disease (CKD)      GFR Category          GFR (mL/min/1.73)    Interpretation  G1                     90 or greater         Normal or high (1)  G2                      60-89                Mild decrease (1)  G3a                   45-59                Mild to moderate decrease  G3b                   30-44                Moderate to severe decrease  G4                    15-29                Severe decrease  G5                    14 or less           Kidney failure          (1)In the absence of evidence of kidney disease, neither GFR category G1 or G2 fulfill the criteria for CKD.    eGFR calculation 2021 CKD-EPI creatinine equation, which does not include race as a factor   URINALYSIS W/ CULTURE IF INDICATED - Abnormal; Notable for the following components:    Color, UA Dark Yellow (*)     Ketones, UA 15 mg/dL (1+) (*)     Protein, UA 30 mg/dL (1+) (*)     Leuk Esterase, UA Small (1+) (*)     All other components within normal limits    Narrative:     In absence of clinical symptoms, the presence of pyuria, bacteria, and/or nitrites on the urinalysis result does not correlate with infection.   CBC WITH AUTO DIFFERENTIAL - Abnormal; Notable for the following components:    WBC 13.53 (*)      .8 (*)     MCHC 30.6 (*)     RDW-SD 58.1 (*)     Neutrophil % 93.3 (*)     Lymphocyte % 2.1 (*)     Monocyte % 3.4 (*)     Neutrophils, Absolute 12.63 (*)     Lymphocytes, Absolute 0.28 (*)     Immature Grans, Absolute 0.07 (*)     All other components within normal limits   URINALYSIS, MICROSCOPIC ONLY - Abnormal; Notable for the following components:    WBC, UA 6-10 (*)     Bacteria, UA 1+ (*)     Mucus, UA Moderate/2+ (*)     All other components within normal limits   LIPASE - Normal   POC LACTATE - Normal   URINE CULTURE   BLOOD CULTURE   BLOOD CULTURE   RAINBOW DRAW    Narrative:     The following orders were created for panel order [facility]e Draw.  Procedure                               Abnormality         Status                     ---------                               -----------         ------                     Green Top (Gel)[[id number]]                                  Final result               Lavender Top[[id number]]                                     Final result               Gold Top - SST[[id number]]                                   Final result               Light Blue Top[[id number]]                                   Final result                 Please view results for these tests on the individual orders.   GREEN TOP   LAVENDER TOP   GOLD TOP - SST   LIGHT BLUE TOP   CBC AND DIFFERENTIAL    Narrative:     The following orders were created for panel order CBC & Differential.  Procedure                               Abnormality         Status                     ---------                               -----------         ------                     CBC Auto Differential[[id number]]        Abnormal            Final result                 Please view results for these tests on the individual orders.       LOC: Person and Place    Telemetry:  Telemetry    Cardiac Monitoring Ordered: no    EKG:   No orders to display       Medications Given in the ED:   Medications   sodium chloride 0.9 %  flush 10 mL (has no administration in time range)   iopamidol (ISOVUE-370) 76 % injection 100 mL (100 mL Intravenous Given 1/24/25 1712)   cefTRIAXone (ROCEPHIN) 1,000 mg in sodium chloride 0.9 % 100 mL MBP (0 mg Intravenous Stopped 1/24/25 1544)       Imaging results:  CT Abdomen Pelvis With Contrast    Result Date: 1/24/2025  1.Mild intra and extrahepatic biliary ductal dilatation. 0.4 cm calcification at the tip of the Ampulla of Vater appears new compared with 2024. This may represent a stone within the ampulla. Consider ERCP or MRCP. 2.Colonic diverticulosis without evidence of diverticulitis. 3.Stable left adrenal nodule. 4.Urinary bladder wall thickening suggesting cystitis. Electronically Signed: Jonnathan Kovacs MD  1/24/2025 5:26 PM EST  Workstation ID: [id number]     Social issues:   Social History     Socioeconomic History    Marital status:        NIH Stroke Scale:  Interval: (not recorded)  1a. Level of Consciousness: (not recorded)  1b. LOC Questions: (not recorded)  1c. LOC Commands: (not recorded)  2. Best Gaze: (not recorded)  3. Visual: (not recorded)  4. Facial Palsy: (not recorded)  5a. Motor Arm, Left: (not recorded)  5b. Motor Arm, Right: (not recorded)  6a. Motor Leg, Left: (not recorded)  6b. Motor Leg, Right: (not recorded)  7. Limb Ataxia: (not recorded)  8. Sensory: (not recorded)  9. Best Language: (not recorded)  10. Dysarthria: (not recorded)  11. Extinction and Inattention (formerly Neglect): (not recorded)    Total (NIH Stroke Scale): (not recorded)     Additional notable assessment information:       Nursing report ED to floor:  Veronika Gant RN   01/24/25 19:21 EST

## 2025-01-25 NOTE — PLAN OF CARE
Continue to monitor and assess pain.  Patient is resting in bed and did not sleep much overnight.  Problem: Adult Inpatient Plan of Care  Goal: Plan of Care Review  Outcome: Progressing  Flowsheets (Taken 1/25/2025 0709)  Progress: improving  Plan of Care Reviewed With: patient  Goal: Patient-Specific Goal (Individualized)  Outcome: Progressing  Goal: Absence of Hospital-Acquired Illness or Injury  Outcome: Progressing  Intervention: Identify and Manage Fall Risk  Flowsheets (Taken 1/25/2025 0709)  Safety Promotion/Fall Prevention:   assistive device/personal items within reach   clutter free environment maintained   fall prevention program maintained   safety round/check completed  Intervention: Prevent Skin Injury  Flowsheets  Taken 1/25/2025 0709 by Lia Vasquez RN  Body Position:   position changed independently   neutral body alignment  Taken 1/24/2025 1954 by Ashlee Butler, RN  Skin Protection:   incontinence pads utilized   silicone foam dressing in place  Intervention: Prevent and Manage VTE (Venous Thromboembolism) Risk  Flowsheets (Taken 1/25/2025 0709)  VTE Prevention/Management:   patient refused intervention   SCDs (sequential compression devices) off  Intervention: Prevent Infection  Flowsheets (Taken 1/25/2025 0709)  Infection Prevention:   hand hygiene promoted   personal protective equipment utilized   rest/sleep promoted   single patient room provided  Goal: Optimal Comfort and Wellbeing  Outcome: Progressing  Intervention: Monitor Pain and Promote Comfort  Flowsheets (Taken 1/25/2025 0709)  Pain Management Interventions:   relaxation techniques promoted   quiet environment facilitated  Intervention: Provide Person-Centered Care  Flowsheets (Taken 1/24/2025 1954 by Ashlee Butler RN)  Trust Relationship/Rapport:   care explained   reassurance provided  Goal: Readiness for Transition of Care  Outcome: Progressing  Intervention: Mutually Develop Transition Plan  Flowsheets  Taken 1/24/2025 1953  by Ashlee Butler RN  Transportation Anticipated: family or friend will provide  Patient/Family Anticipated Services at Transition: none  Patient/Family Anticipates Transition to: (Lives with grandson at the moment) home with family  Taken 1/24/2025 1949 by Ashlee Butler, RN  Equipment Currently Used at Home:   cane, straight   shower chair     Problem: UTI (Urinary Tract Infection)  Goal: Improved Infection Symptoms  Outcome: Progressing  Intervention: Facilitate Optimal Urinary Elimination  Flowsheets (Taken 1/25/2025 0709)  Urinary Elimination Promotion:   toileting scheduled   voiding relaxation promoted     Problem: Violence Risk or Actual  Goal: Anger and Impulse Control  Outcome: Progressing  Intervention: Minimize Safety Risk  Flowsheets (Taken 1/25/2025 0600 by Natalia Mojica, RN)  Enhanced Safety Measures: bed alarm set  Intervention: Promote Self-Control  Flowsheets (Taken 1/25/2025 0709)  Supportive Measures: active listening utilized  Environmental Support: calm environment promoted     Problem: Fall Injury Risk  Goal: Absence of Fall and Fall-Related Injury  Outcome: Progressing  Intervention: Identify and Manage Contributors  Flowsheets (Taken 1/25/2025 0709)  Medication Review/Management: medications reviewed  Intervention: Promote Injury-Free Environment  Flowsheets (Taken 1/25/2025 0709)  Safety Promotion/Fall Prevention:   assistive device/personal items within reach   clutter free environment maintained   fall prevention program maintained   safety round/check completed     Problem: Comorbidity Management  Goal: Blood Pressure in Desired Range  Outcome: Progressing  Intervention: Maintain Blood Pressure Management  Flowsheets (Taken 1/25/2025 0709)  Medication Review/Management: medications reviewed     Problem: Sepsis/Septic Shock  Goal: Optimal Coping  Outcome: Progressing  Intervention: Support Patient and Family Response  Flowsheets (Taken 1/25/2025 0709)  Supportive Measures: active  listening utilized  Goal: Absence of Bleeding  Outcome: Progressing  Goal: Blood Glucose Level Within Target Range  Outcome: Progressing  Goal: Absence of Infection Signs and Symptoms  Outcome: Progressing  Intervention: Initiate Sepsis Management  Flowsheets (Taken 1/25/2025 0709)  Infection Prevention:   hand hygiene promoted   personal protective equipment utilized   rest/sleep promoted   single patient room provided  Intervention: Promote Recovery  Flowsheets (Taken 1/24/2025 1954 by Ashlee Butler, RN)  Activity Management: activity encouraged  Goal: Optimal Nutrition Delivery  Outcome: Progressing   Goal Outcome Evaluation:  Plan of Care Reviewed With: patient        Progress: improving

## 2025-01-25 NOTE — CONSULTS
GI CONSULT  NOTE:    Referring Provider:  Dr Brunner     Chief complaint: weakness    Subjective .     History of present illness: Alexey Trinidad is a 89 y.o. female with history of hypothyroidism, COPD, hysterectomy, cholecystectomy, collagenous colitis presented to the hospital with complaints of weakness.  She is alert and oriented, but somewhat of a poor historian.  Bedside RN reports that she has been very sleepy.  Reports that family states she gets confused at nighttime.  She denies abdominal pain.  She does complain of soreness during abdominal exam.  She states her appetite has been decreased.    Endo History:  5/2010 colonoscopy (Dr. Saul) - diverticulosis, grade 2 internal and external hemorrhoids, random biopsies positive for collagenous colitis    Past Medical History:  Past Medical History:   Diagnosis Date    Disease of thyroid gland     Hypertension        Past Surgical History:  Past Surgical History:   Procedure Laterality Date    BACK SURGERY      kyphoplasty    BREAST SURGERY      benign cyst removed    HYSTERECTOMY      LAPAROSCOPIC CHOLECYSTECTOMY         Social History:  Social History     Tobacco Use    Smoking status: Never    Smokeless tobacco: Never   Vaping Use    Vaping status: Never Used   Substance Use Topics    Alcohol use: Never    Drug use: Never       Family History:  History reviewed. No pertinent family history.    Medications:  Medications Prior to Admission   Medication Sig Dispense Refill Last Dose/Taking    acetaminophen (TYLENOL) 500 MG tablet Take 1 tablet by mouth Every Morning.   Taking    Bacillus Coagulans-Inulin (ALIGN PREBIOTIC-PROBIOTIC PO) Take 1 capsule by mouth Daily.   Taking    balsalazide (COLAZAL) 750 MG capsule Take 1 capsule by mouth Every Night.   Taking    Budesonide (ENTOCORT EC) 3 MG 24 hr capsule Take 1 capsule by mouth 2 (Two) Times a Day.   Taking    Cyanocobalamin (VITAMIN B-12 PO) Take 1 tablet by mouth Daily.   Taking    levothyroxine  (SYNTHROID, LEVOTHROID) 75 MCG tablet Take 1 tablet by mouth Every Morning.   Taking    losartan (COZAAR) 50 MG tablet Take 1 tablet by mouth Daily.   Taking    multivitamins-minerals (PRESERVISION AREDS 2) capsule capsule Take 1 capsule by mouth 2 (Two) Times a Day.   Taking    Wheat Dextrin (BENEFIBER DRINK MIX PO) Take 1 package by mouth Daily.   Taking       Scheduled Meds:ampicillin-sulbactam, 1.5 g, Intravenous, Q6H  enoxaparin, 30 mg, Subcutaneous, Daily  sodium chloride, 10 mL, Intravenous, Q12H      Continuous Infusions:sodium chloride, 100 mL/hr, Last Rate: 100 mL/hr (01/25/25 0575)      PRN Meds:.  acetaminophen **OR** acetaminophen **OR** acetaminophen    senna-docusate sodium **AND** polyethylene glycol **AND** bisacodyl **AND** bisacodyl    Calcium Replacement - Follow Nurse / BPA Driven Protocol    dextrose    dextrose    glucagon (human recombinant)    Magnesium Standard Dose Replacement - Follow Nurse / BPA Driven Protocol    Morphine **AND** naloxone    nitroglycerin    Phosphorus Replacement - Follow Nurse / BPA Driven Protocol    Potassium Replacement - Follow Nurse / BPA Driven Protocol    [COMPLETED] Insert Peripheral IV **AND** sodium chloride    sodium chloride    sodium chloride    ALLERGIES:  Contrast dye (echo or unknown ct/mr) and Sulfa antibiotics    ROS:  The following systems were reviewed and negative;   Constitution:  No fevers, chills, no unintentional weight loss  Skin: no rash, no jaundice  Eyes:  No blurry vision, no eye pain  HENT:  No change in hearing or smell  Resp:  No dyspnea or cough  CV:  No chest pain or palpitations  :  No dysuria, hematuria  Musculoskeletal:  No leg cramps or arthralgias  Neuro:  No tremor, no numbness  Psych:  No depression or confusion    Objective     Vital Signs:   Vitals:    01/24/25 2006 01/25/25 0440 01/25/25 0720 01/25/25 1122   BP: 118/61 118/56 124/59 103/56   BP Location: Right arm Right arm Right arm Left arm   Patient Position: Lying  "Lying Lying Lying   Pulse: 89 75 80 75   Resp: 18 16 17 16   Temp: 98.7 °F (37.1 °C) 98 °F (36.7 °C) 98.2 °F (36.8 °C) 98.5 °F (36.9 °C)   TempSrc: Oral Axillary Axillary Axillary   SpO2: 93% 100% 100% 99%   Weight: 47.4 kg (104 lb 8 oz)      Height: 147.3 cm (58\")          Physical Exam:     General Appearance:    Awake and alert, in no acute distress   Head:    Normocephalic, without obvious abnormality, atraumatic   Throat:   No oral lesions, no thrush, oral mucosa moist   Lungs:     Respirations regular, even and unlabored   Chest Wall:    No abnormalities observed   Abdomen:     Soft, mild discomfort with epigastric palpation no rebound or guarding, non-distended, no hepatosplenomegaly   Rectal:     Deferred   Extremities:   Moves all extremities, no edema, no cyanosis   Pulses:   Pulses palpable and equal bilaterally   Skin:   No rash, no jaundice, normal palpation       Neurologic:   Cranial nerves 2 - 12 grossly intact, no asterixis       Results Review:   I reviewed the patient's labs and imaging.  Results from last 7 days   Lab Units 01/25/25  0104 01/24/25  1609   WBC 10*3/mm3 10.16 13.53*   HEMOGLOBIN g/dL 13.0 13.5   PLATELETS 10*3/mm3 298 310     Results from last 7 days   Lab Units 01/25/25  0104 01/24/25  1609   SODIUM mmol/L 147* 147*   POTASSIUM mmol/L 3.4* 3.7   CHLORIDE mmol/L 104 108*   CO2 mmol/L 33.1* 28.9   BUN mg/dL 20 22   CREATININE mg/dL 0.51* 0.56*   GLUCOSE mg/dL 119* 129*   ALBUMIN g/dL 3.9 3.9   BILIRUBIN mg/dL 0.2 0.3   ALK PHOS U/L 41 42   AST (SGOT) U/L 22 29   ALT (SGPT) U/L 12 13   MAGNESIUM mg/dL 2.2  --    PHOSPHORUS mg/dL 4.0  --    LIPASE U/L  --  29     Estimated Creatinine Clearance: 46.1 mL/min (A) (by C-G formula based on SCr of 0.51 mg/dL (L)).  No results found for: \"HGBA1C\"      Infection     UA    Results from last 7 days   Lab Units 01/24/25  1622   NITRITE UA  Negative   WBC UA /HPF 6-10*   BACTERIA UA /HPF 1+*   SQUAM EPITHEL UA /HPF 0-2     Microbiology Results " (last 10 days)       ** No results found for the last 240 hours. **          Imaging Results (Last 72 Hours)       Procedure Component Value Units Date/Time    CT Abdomen Pelvis With Contrast [[id number]] Collected: 01/24/25 1716     Updated: 01/24/25 1728    Narrative:      CT ABDOMEN PELVIS W CONTRAST    Date of Exam: 1/24/2025 4:46 PM EST    Indication: abdominal pain, vomiting, diarrhea.    Comparison: 5/21/2024 .    Technique: Axial CT images were obtained of the abdomen and pelvis following the uneventful intravenous administration of iodinated contrast. Sagittal and coronal reconstructions were performed.  Automated exposure control and iterative reconstruction   methods were used.    FINDINGS:    Lung bases: Atheromatous disease of the coronary vessels and aortic arch. Mild bibasilar atelectatic changes.    Liver: Mild intra and extrahepatic biliary ductal dilatation. 0.7 cm subcapsular left hepatic cyst.    Spleen:No masses. No perisplenic hematoma.    Pancreas:No pancreatic masses. No evidence of pancreatitis.    Gallbladder and common bile duct: Intra and extrahepatic biliary ductal dilatation. 0.4 cm calcification at the tip of the ampulla of Vater appears new compared with 2024.    Adrenal glands: Stable left adrenal nodule.    Kidneys and ureters:No kidney stones. 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    Small bowel:Normal caliber small bowel. Noninflamed duodenal diverticulum.    Large bowel: Colonic diverticulosis without evidence of diverticulitis.    Appendix: Not seen however there is no evidence of appendicitis.    GENITOURINARY: Prior hysterectomy.    Ascites or pneumoperitoneum:None.    Adenopathy:None present    Osseous structures: The proximal femurs are intact. The pubic bones are intact. Degenerative changes of the hips. The sacrum and sacroiliac joints are normal. Degenerative changes of the lumbar spine. No rib  fractures.    Other findings: Atheromatous disease of the abdominal aorta and visualized branches.      Impression:      1.Mild intra and extrahepatic biliary ductal dilatation. 0.4 cm calcification at the tip of the Ampulla of Vater appears new compared with 2024. This may represent a stone within the ampulla. Consider ERCP or MRCP.  2.Colonic diverticulosis without evidence of diverticulitis.  3.Stable left adrenal nodule.  4.Urinary bladder wall thickening suggesting cystitis.        Electronically Signed: Jonnathan Kovacs MD    1/24/2025 5:26 PM EST    Workstation ID: [id number]            ASSESSMENT AND PLAN:    89-year-old female presents to the hospital with generalized weakness.  Found to have a UTI and CT showing intra/extrahepatic biliary dilatation with a new 0.4 cm calcification at the tip of the ampulla.    -Abnormal CT with biliary dilatation and calcification near the ampulla  -Generalized weakness  -Acute UTI  -COPD  -Hypothyroidism  -History of collagenous colitis  -History of cholecystectomy, appendectomy    Plan  Patient is not complaining of much abdominal pain and her liver enzymes are completely normal.  Therefore, we will plan to advance her diet with close monitoring of liver enzymes.  If they elevate, we will consider MRCP.  She is high risk for sedation due to her age.  Continue antibiotics for UTI.  Encourage nutrition.  Continue supportive care.    I discussed the patients findings and my recommendations with the patient.    We appreciate the referral    Electronically signed by MAXIMINO Farah, 01/25/25, 12:56 PM EST.

## 2025-01-25 NOTE — ED NOTES
Family reports that pt has difficulty with swallowing pills. Pt also does not have dentures in at this time. Pt does okay with soft foods without dentures in.

## 2025-01-25 NOTE — PROGRESS NOTES
[facility] MEDICINE SERVICE  DAILY PROGRESS NOTE    NAME: Alexey Trinidad  : 3/21/1926  MRN: [id number]      LOS: 1 day     PROVIDER OF SERVICE: Chidi Brunner MD    Chief Complaint: UTI (urinary tract infection)    Subjective:     Interval History:  History taken from: patient  No acute events overnight, patient is awake, alert but confused.     Review of Systems:   Review of Systems    Objective:     Vital Signs  Temp:  [98 °F (36.7 °C)-98.7 °F (37.1 °C)] 98.4 °F (36.9 °C)  Heart Rate:  [75-89] 83  Resp:  [15-18] 16  BP: (103-124)/(56-64) 109/59  Flow (L/min) (Oxygen Therapy):  [2-3] 3   Body mass index is 21.84 kg/m².    Physical Exam  General Appearance:  Awake, disoriented   Head:  Atraumatic normocephalic   Eyes:        No sclera icterus   Neck: Normal rom   Pulm: Clear to auscultation   Cardio: Systolic murmur, HR normal, rhythm regular   Extremities: No remarkable edema on ble   Abdomen: Soft, non distended                           Scheduled Meds   ampicillin-sulbactam, 1.5 g, Intravenous, Q6H  enoxaparin, 30 mg, Subcutaneous, Daily  [START ON 2025] levothyroxine, 75 mcg, Oral, Q AM  sodium chloride, 10 mL, Intravenous, Q12H       PRN Meds     acetaminophen **OR** acetaminophen **OR** acetaminophen    senna-docusate sodium **AND** polyethylene glycol **AND** bisacodyl **AND** bisacodyl    Calcium Replacement - Follow Nurse / BPA Driven Protocol    dextrose    dextrose    glucagon (human recombinant)    Magnesium Standard Dose Replacement - Follow Nurse / BPA Driven Protocol    Morphine **AND** naloxone    nitroglycerin    Phosphorus Replacement - Follow Nurse / BPA Driven Protocol    Potassium Replacement - Follow Nurse / BPA Driven Protocol    [COMPLETED] Insert Peripheral IV **AND** sodium chloride    sodium chloride    sodium chloride   Infusions  sodium chloride, 100 mL/hr, Last Rate: 100 mL/hr (25 1805)          Diagnostic Data    Results from last 7 days   Lab Units  01/25/25  1229 01/25/25  0104   WBC 10*3/mm3  --  10.16   HEMOGLOBIN g/dL  --  13.0   HEMATOCRIT %  --  43.5   PLATELETS 10*3/mm3  --  298   GLUCOSE mg/dL  --  119*   CREATININE mg/dL  --  0.51*   BUN mg/dL  --  20   SODIUM mmol/L  --  147*   POTASSIUM mmol/L 3.9 3.4*   AST (SGOT) U/L  --  22   ALT (SGPT) U/L  --  12   ALK PHOS U/L  --  41   BILIRUBIN mg/dL  --  0.2   ANION GAP mmol/L  --  9.9       CT Abdomen Pelvis With Contrast    Result Date: 1/24/2025  1.Mild intra and extrahepatic biliary ductal dilatation. 0.4 cm calcification at the tip of the Ampulla of Vater appears new compared with 2024. This may represent a stone within the ampulla. Consider ERCP or MRCP. 2.Colonic diverticulosis without evidence of diverticulitis. 3.Stable left adrenal nodule. 4.Urinary bladder wall thickening suggesting cystitis. Electronically Signed: Jonnathan Kovacs MD  1/24/2025 5:26 PM EST  Workstation ID: [id number]       I reviewed the patient's new clinical results.    Assessment/Plan:   Alexey Trinidad is a 89 y.o. female with a CMH of hypertension, hypothyroidism, COPD, who presented to [facility] on 1/24/2025 with generalized weakness.  Patient is alert and oriented x 2 at this time therefore collateral information was obtained from chart checks.  Per chart, family reported patient having generalized weakness and progressively got worse in the past 2 days.  Associated with abdominal pain, chills, nausea vomiting and diarrhea.  No reported fever.  In the ED, sodium 147, WBC 13.5 K, .  LFTs with unremarkable urinalysis suggestive of UTI, CT abdomen pelvis with contrast was notable for a mild intra and extrahepatic biliary ductal dilation, a 0.4 cm calcification of the tip of the ampulla of Vater which may represent a stone.  Recommends ERCP or MRCP.  Stable left adrenal nodule, urinary bladder wall thickening suggestive of cystitis.     Abdominal pain, nausea, vomiting likely due to biliary dilatation/Stone at the  ampulla of vater  -CT abdomen pelvis reviewed, recommends follow-up with ERCP or MRCP  -NS at 100 cc/h  -Received Rocephin in the ED  -Start on Unasyn  -Clear liquid diet  -Hypoglycemia protocol  -GI consult    ?Diarrhea  -Obtain stool PCR, c.diff r/o , will restart her meds after gi infection ruled out        UTI  Leukocytosis likely secondary to above  Altered mental status likely secondary to infection versus chronic  -Start on on Unasyn  -NS at 100 cc/h  -Follow urine and blood cultures  -Uncertain about patient's baseline if confusion is chronic or acute  -Reassess in the a.m. follow-up with family  -CBC daily     Physical deconditioning  -PT OT eval  -Falls precaution     Mild hypernatremia due to dehydration  -NS at 100 cc/h and reassess in the a.m.     Left adrenal nodule  -Stable     Hypertension  -Losartan 50 mg daily     Hypothyroidism  -Levothyroxine 75 mcg daily     COPD (not in exacerbation)  -Bronchodilators standing and as needed    Check       VTE Prophylaxis:  Pharmacologic VTE prophylaxis orders are present.             Code status is   Code Status and Medical Interventions: CPR (Attempt to Resuscitate); Full Support   Ordered at: 01/24/25 5903     Code Status (Patient has no pulse and is not breathing):    CPR (Attempt to Resuscitate)     Medical Interventions (Patient has pulse or is breathing):    Full Support       Plan for disposition:GI reccs    Time: 30 minutes    Part of this note may be an electronic transcription/translation of spoken language to printed text using the Dragon Dictation System.    Signature: Electronically signed by Chidi Brunner MD, 01/25/25, 18:09 EST.  [facility]ist Team

## 2025-01-25 NOTE — H&P
[facility] Medicine Services  History & Physical    Patient Name: Alexey Trinidad  : 3/21/1926  MRN: [id number]  Primary Care Physician:  Rui Cole MD  Date of admission: 2025  Date and Time of Service: 2025 at 2100    Subjective      Chief Complaint: Generalized weakness, abdominal pain    History of Present Illness: Alexey Trinidad is a 89 y.o. female with a CMH of hypertension, hypothyroidism, COPD, who presented to [facility] on 2025 with generalized weakness.  Patient is alert and oriented x 2 at this time therefore collateral information was obtained from chart checks.  Per chart, family reported patient having generalized weakness and progressively got worse in the past 2 days.  Associated with abdominal pain, chills, nausea vomiting and diarrhea.  No reported fever.  In the ED, sodium 147, WBC 13.5 K, .  LFTs with unremarkable urinalysis suggestive of UTI, CT abdomen pelvis with contrast was notable for a mild intra and extrahepatic biliary ductal dilation, a 0.4 cm calcification of the tip of the ampulla of Vater which may represent a stone.  Recommends ERCP or MRCP.  Stable left adrenal nodule, urinary bladder wall thickening suggestive of cystitis.      Review of Systems  Limited as patient is alert and oriented x 2    Personal History     Past Medical History:   Diagnosis Date    Disease of thyroid gland     Hypertension        Past Surgical History:   Procedure Laterality Date    BACK SURGERY      kyphoplasty    BREAST SURGERY      benign cyst removed    HYSTERECTOMY      LAPAROSCOPIC CHOLECYSTECTOMY         Family History: family history is not on file. Otherwise pertinent FHx was reviewed and not pertinent to current issue.    Social History:  reports that she has never smoked. She has never used smokeless tobacco. She reports that she does not drink alcohol and does not use drugs.    Home Medications:  Prior to Admission Medications        Prescriptions Last Dose Informant Patient Reported? Taking?    acetaminophen (TYLENOL) 500 MG tablet   Yes Yes    Take 1 tablet by mouth Every Morning.    Bacillus Coagulans-Inulin (ALIGN PREBIOTIC-PROBIOTIC PO)   Yes Yes    Take 1 capsule by mouth Daily.    balsalazide (COLAZAL) 750 MG capsule   Yes Yes    Take 1 capsule by mouth Every Night.    Budesonide (ENTOCORT EC) 3 MG 24 hr capsule   Yes Yes    Take 1 capsule by mouth 2 (Two) Times a Day.    Cyanocobalamin (VITAMIN B-12 PO)   Yes Yes    Take 1 tablet by mouth Daily.    levothyroxine (SYNTHROID, LEVOTHROID) 75 MCG tablet   Yes Yes    Take 1 tablet by mouth Every Morning.    losartan (COZAAR) 50 MG tablet   Yes Yes    Take 1 tablet by mouth Daily.    multivitamins-minerals (PRESERVISION AREDS 2) capsule capsule   Yes Yes    Take 1 capsule by mouth 2 (Two) Times a Day.    Wheat Dextrin (BENEFIBER DRINK MIX PO)   Yes Yes    Take 1 package by mouth Daily.              Allergies:  Allergies   Allergen Reactions    Contrast Dye (Echo Or Unknown Ct/Mr) Unknown - High Severity    Sulfa Antibiotics Unknown - High Severity       Objective      Vitals:   Temp:  [98.1 °F (36.7 °C)-98.7 °F (37.1 °C)] 98.7 °F (37.1 °C)  Heart Rate:  [84-93] 89  Resp:  [15-19] 18  BP: (108-136)/() 118/61  Body mass index is 21.84 kg/m².  Physical Exam  Constitutional:       Appearance: Normal appearance.   Cardiovascular:      Rate and Rhythm: Normal rate and regular rhythm.      Pulses: Normal pulses.      Heart sounds: Normal heart sounds. No murmur heard.  Pulmonary:      Effort: Pulmonary effort is normal. No respiratory distress.      Breath sounds: Normal breath sounds. No wheezing or rales.   Abdominal:      General: Bowel sounds are normal. There is no distension.      Tenderness: There is abdominal tenderness. There is no guarding or rebound.   Musculoskeletal:         General: No swelling, tenderness, deformity or signs of injury.      Cervical back: Normal range of motion.    Neurological:      General: No focal deficit present.      Mental Status: She is alert and oriented to person, place, and time. Mental status is at baseline.      Cranial Nerves: No cranial nerve deficit.      Sensory: No sensory deficit.      Motor: No weakness.      Coordination: Coordination normal.         Diagnostic Data:  Lab Results (last 24 hours)       Procedure Component Value Units Date/Time    Blood Culture - Blood, Arm, Right [[id number]] Collected: 01/24/25 1810    Specimen: Blood from Arm, Right Updated: 01/24/25 1832    Blood Culture - Blood, Arm, Right [[id number]] Collected: 01/24/25 1802    Specimen: Blood from Arm, Right Updated: 01/24/25 1807    POC Lactate [[id number]]  (Normal) Collected: 01/24/25 1754    Specimen: Blood Updated: 01/24/25 1757     Lactate 0.7 mmol/L      Comment: Serial Number: [id number]:  [id number]       Urinalysis, Microscopic Only - Straight Cath [[id number]]  (Abnormal) Collected: 01/24/25 1622    Specimen: Urine from Straight Cath Updated: 01/24/25 1643     RBC, UA None Seen /HPF      WBC, UA 6-10 /HPF      Bacteria, UA 1+ /HPF      Squamous Epithelial Cells, UA 0-2 /HPF      Transitional Epithelial Cells, UA 0-2 /HPF      Hyaline Casts, UA None Seen /LPF      Mucus, UA Moderate/2+ /HPF      Methodology Manual Light Microscopy    Urine Culture - Urine, Straight Cath [[id number]] Collected: 01/24/25 1622    Specimen: Urine from Straight Cath Updated: 01/24/25 1643    Comprehensive Metabolic Panel [[id number]]  (Abnormal) Collected: 01/24/25 1609    Specimen: Blood Updated: 01/24/25 1634     Glucose 129 mg/dL      BUN 22 mg/dL      Creatinine 0.56 mg/dL      Sodium 147 mmol/L      Potassium 3.7 mmol/L      Comment: Slight hemolysis detected by analyzer. Result may be falsely elevated.        Chloride 108 mmol/L      CO2 28.9 mmol/L      Calcium 8.4 mg/dL      Total Protein 6.4 g/dL      Albumin 3.9 g/dL      ALT (SGPT) 13 U/L      AST (SGOT) 29 U/L      Comment: Slight  hemolysis detected by analyzer. Result may be falsely elevated.        Alkaline Phosphatase 42 U/L      Total Bilirubin 0.3 mg/dL      Globulin 2.5 gm/dL      A/G Ratio 1.6 g/dL      BUN/Creatinine Ratio 39.3     Anion Gap 10.1 mmol/L      eGFR 82.6 mL/min/1.73     Narrative:      GFR Categories in Chronic Kidney Disease (CKD)      GFR Category          GFR (mL/min/1.73)    Interpretation  G1                     90 or greater         Normal or high (1)  G2                      60-89                Mild decrease (1)  G3a                   45-59                Mild to moderate decrease  G3b                   30-44                Moderate to severe decrease  G4                    15-29                Severe decrease  G5                    14 or less           Kidney failure          (1)In the absence of evidence of kidney disease, neither GFR category G1 or G2 fulfill the criteria for CKD.    eGFR calculation 2021 CKD-EPI creatinine equation, which does not include race as a factor    Lipase [[id number]]  (Normal) Collected: 01/24/25 1609    Specimen: Blood Updated: 01/24/25 1634     Lipase 29 U/L     Urinalysis With Culture If Indicated - Straight Cath [[id number]]  (Abnormal) Collected: 01/24/25 1622    Specimen: Urine from Straight Cath Updated: 01/24/25 1632     Color, UA Dark Yellow     Appearance, UA Clear     pH, UA <=5.0     Specific Gravity, UA 1.026     Glucose, UA Negative     Ketones, UA 15 mg/dL (1+)     Bilirubin, UA Negative     Blood, UA Negative     Protein, UA 30 mg/dL (1+)     Leuk Esterase, UA Small (1+)     Nitrite, UA Negative     Urobilinogen, UA 0.2 E.U./dL    Narrative:      In absence of clinical symptoms, the presence of pyuria, bacteria, and/or nitrites on the urinalysis result does not correlate with infection.    CBC & Differential [[id number]]  (Abnormal) Collected: 01/24/25 1609    Specimen: Blood Updated: 01/24/25 1615    Narrative:      The following orders were created for panel order  CBC & Differential.  Procedure                               Abnormality         Status                     ---------                               -----------         ------                     CBC Auto Differential[[id number]]        Abnormal            Final result                 Please view results for these tests on the individual orders.    CBC Auto Differential [[id number]]  (Abnormal) Collected: 01/24/25 1609    Specimen: Blood Updated: 01/24/25 1615     WBC 13.53 10*3/mm3      RBC 4.29 10*6/mm3      Hemoglobin 13.5 g/dL      Hematocrit 44.1 %      .8 fL      MCH 31.5 pg      MCHC 30.6 g/dL      RDW 15.4 %      RDW-SD 58.1 fl      MPV 8.7 fL      Platelets 310 10*3/mm3      Neutrophil % 93.3 %      Lymphocyte % 2.1 %      Monocyte % 3.4 %      Eosinophil % 0.4 %      Basophil % 0.3 %      Immature Grans % 0.5 %      Neutrophils, Absolute 12.63 10*3/mm3      Lymphocytes, Absolute 0.28 10*3/mm3      Monocytes, Absolute 0.46 10*3/mm3      Eosinophils, Absolute 0.05 10*3/mm3      Basophils, Absolute 0.04 10*3/mm3      Immature Grans, Absolute 0.07 10*3/mm3      nRBC 0.0 /100 WBC     [place] Draw [[id number]] Collected: 01/24/25 1609    Specimen: Blood Updated: 01/24/25 1615    Narrative:      The following orders were created for panel order [place] Draw.  Procedure                               Abnormality         Status                     ---------                               -----------         ------                     Green Top (Gel)[[id number]]                                  Final result               Lavender Top[[id number]]                                     Final result               Gold Top - SST[[id number]]                                   Final result               Light Blue Top[[id number]]                                   Final result                 Please view results for these tests on the individual orders.    Green Top (Gel) [[id number]] Collected: 01/24/25 1609    Specimen: Blood  Updated: 01/24/25 1615     Extra Tube Hold for add-ons.     Comment: Auto resulted.       Lavender Top [[id number]] Collected: 01/24/25 1609    Specimen: Blood Updated: 01/24/25 1615     Extra Tube hold for add-on     Comment: Auto resulted       Gold Top - SST [[id number]] Collected: 01/24/25 1609    Specimen: Blood Updated: 01/24/25 1615     Extra Tube Hold for add-ons.     Comment: Auto resulted.       Light Blue Top [[id number]] Collected: 01/24/25 1609    Specimen: Blood Updated: 01/24/25 1615     Extra Tube Hold for add-ons.     Comment: Auto resulted                Imaging Results (Last 24 Hours)       Procedure Component Value Units Date/Time    CT Abdomen Pelvis With Contrast [[id number]] Collected: 01/24/25 1716     Updated: 01/24/25 1728    Narrative:      CT ABDOMEN PELVIS W CONTRAST    Date of Exam: 1/24/2025 4:46 PM EST    Indication: abdominal pain, vomiting, diarrhea.    Comparison: 5/21/2024 .    Technique: Axial CT images were obtained of the abdomen and pelvis following the uneventful intravenous administration of iodinated contrast. Sagittal and coronal reconstructions were performed.  Automated exposure control and iterative reconstruction   methods were used.    FINDINGS:    Lung bases: Atheromatous disease of the coronary vessels and aortic arch. Mild bibasilar atelectatic changes.    Liver: Mild intra and extrahepatic biliary ductal dilatation. 0.7 cm subcapsular left hepatic cyst.    Spleen:No masses. No perisplenic hematoma.    Pancreas:No pancreatic masses. No evidence of pancreatitis.    Gallbladder and common bile duct: Intra and extrahepatic biliary ductal dilatation. 0.4 cm calcification at the tip of the ampulla of Vater appears new compared with 2024.    Adrenal glands: Stable left adrenal nodule.    Kidneys and ureters:No kidney stones. 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    Small bowel:Normal caliber small  bowel. Noninflamed duodenal diverticulum.    Large bowel: Colonic diverticulosis without evidence of diverticulitis.    Appendix: Not seen however there is no evidence of appendicitis.    GENITOURINARY: Prior hysterectomy.    Ascites or pneumoperitoneum:None.    Adenopathy:None present    Osseous structures: The proximal femurs are intact. The pubic bones are intact. Degenerative changes of the hips. The sacrum and sacroiliac joints are normal. Degenerative changes of the lumbar spine. No rib fractures.    Other findings: Atheromatous disease of the abdominal aorta and visualized branches.      Impression:      1.Mild intra and extrahepatic biliary ductal dilatation. 0.4 cm calcification at the tip of the Ampulla of Vater appears new compared with 2024. This may represent a stone within the ampulla. Consider ERCP or MRCP.  2.Colonic diverticulosis without evidence of diverticulitis.  3.Stable left adrenal nodule.  4.Urinary bladder wall thickening suggesting cystitis.        Electronically Signed: Jonnathan Kovacs MD    1/24/2025 5:26 PM EST    Workstation ID: [id number]              Assessment & Plan        This is a 89 y.o. female with:    Active and Resolved Problems  Active Hospital Problems    Diagnosis  POA    **UTI (urinary tract infection) [N39.0]  Yes    Abdominal pain [R10.9]  Yes      Resolved Hospital Problems   No resolved problems to display.       Abdominal pain, nausea, vomiting likely due to biliary dilatation/Stone at the ampulla of vater  -CT abdomen pelvis reviewed, recommends follow-up with ERCP or MRCP  -NS at 100 cc/h  -Received Rocephin in the ED  -Start on Unasyn  -Clear liquid diet  -Hypoglycemia protocol  -GI consult      UTI  Leukocytosis likely secondary to above  Altered mental status likely secondary to infection versus chronic  -Start on on Unasyn  -NS at 100 cc/h  -Follow urine and blood cultures  -Uncertain about patient's baseline if confusion is chronic or acute  -Reassess in the a.m.  follow-up with family  -CBC daily    Physical deconditioning  -PT OT eval  -Falls precaution    Mild hypernatremia due to dehydration  -NS at 100 cc/h and reassess in the a.m.    Left adrenal nodule  -Stable    Hypertension  -Losartan 50 mg daily    Hypothyroidism  -Levothyroxine 75 mcg daily    COPD (not in exacerbation)  -Bronchodilators standing and as needed    Full code  DVT prophylaxis with Lovenox  Dispo: Pending clinical course    VTE Prophylaxis:  Pharmacologic VTE prophylaxis orders are present.        The patient desires to be as follows:    CODE STATUS:    Code Status (Patient has no pulse and is not breathing): CPR (Attempt to Resuscitate)  Medical Interventions (Patient has pulse or is breathing): Full Support        Butch Aislinn, who can be contacted at [phone], is the designated person to make medical decisions on the patient's behalf if She is incapable of doing so. This was clarified with patient and/or next of kin on 1/24/2025 during the course of this H&P.    Admission Status:  I believe this patient meets inpatient status.    Expected Length of Stay: greater than 2 nights    PDMP and Medication Dispenses via Sidebar reviewed and consistent with patient reported medications.    I discussed the patient's findings and my recommendations with patient.      Signature:     This document has been electronically signed by Gray Yee MD on January 25, 2025 00:01 [facility] Hospitalist Team

## 2025-01-25 NOTE — PLAN OF CARE
Goal Outcome Evaluation:         Potassium 3.4, replacement protocol initiate. Patient without c/o pain since arriving to unit. Patient stable at this time.

## 2025-01-26 ENCOUNTER — APPOINTMENT (OUTPATIENT)
Dept: GENERAL RADIOLOGY | Facility: HOSPITAL | Age: OVER 89
End: 2025-01-26
Payer: MEDICARE

## 2025-01-26 ENCOUNTER — INPATIENT HOSPITAL (OUTPATIENT)
Dept: URBAN - METROPOLITAN AREA HOSPITAL 84 | Facility: HOSPITAL | Age: OVER 89
End: 2025-01-26
Payer: MEDICARE

## 2025-01-26 DIAGNOSIS — Z90.49 ACQUIRED ABSENCE OF OTHER SPECIFIED PARTS OF DIGESTIVE TRACT: ICD-10-CM

## 2025-01-26 DIAGNOSIS — R19.7 DIARRHEA, UNSPECIFIED: ICD-10-CM

## 2025-01-26 DIAGNOSIS — Z87.19 PERSONAL HISTORY OF OTHER DISEASES OF THE DIGESTIVE SYSTEM: ICD-10-CM

## 2025-01-26 DIAGNOSIS — R93.2 ABNORMAL FINDINGS ON DIAGNOSTIC IMAGING OF LIVER AND BILIARY: ICD-10-CM

## 2025-01-26 DIAGNOSIS — K83.8 OTHER SPECIFIED DISEASES OF BILIARY TRACT: ICD-10-CM

## 2025-01-26 LAB
ALBUMIN SERPL-MCNC: 3.4 G/DL (ref 3.5–5.2)
ALBUMIN/GLOB SERPL: 1.5 G/DL
ALP SERPL-CCNC: 35 U/L (ref 39–117)
ALT SERPL W P-5'-P-CCNC: 11 U/L (ref 1–33)
AMMONIA BLD-SCNC: 17 UMOL/L (ref 11–51)
ANION GAP SERPL CALCULATED.3IONS-SCNC: 6.6 MMOL/L (ref 5–15)
AST SERPL-CCNC: 23 U/L (ref 1–32)
BACTERIA SPEC AEROBE CULT: ABNORMAL
BASOPHILS # BLD AUTO: 0.02 10*3/MM3 (ref 0–0.2)
BASOPHILS NFR BLD AUTO: 0.3 % (ref 0–1.5)
BILIRUB SERPL-MCNC: <0.2 MG/DL (ref 0–1.2)
BUN SERPL-MCNC: 13 MG/DL (ref 8–23)
BUN/CREAT SERPL: 29.5 (ref 7–25)
C DIFF GDH + TOXINS A+B STL QL IA.RAPID: NEGATIVE
C DIFF GDH + TOXINS A+B STL QL IA.RAPID: NEGATIVE
CALCIUM SPEC-SCNC: 8 MG/DL (ref 8.2–9.6)
CHLORIDE SERPL-SCNC: 110 MMOL/L (ref 98–107)
CO2 SERPL-SCNC: 29.4 MMOL/L (ref 22–29)
CREAT SERPL-MCNC: 0.44 MG/DL (ref 0.57–1)
DEPRECATED RDW RBC AUTO: 60.9 FL (ref 37–54)
EGFRCR SERPLBLD CKD-EPI 2021: 87.6 ML/MIN/1.73
EOSINOPHIL # BLD AUTO: 0.17 10*3/MM3 (ref 0–0.4)
EOSINOPHIL NFR BLD AUTO: 2.8 % (ref 0.3–6.2)
ERYTHROCYTE [DISTWIDTH] IN BLOOD BY AUTOMATED COUNT: 15.7 % (ref 12.3–15.4)
FOLATE SERPL-MCNC: >20 NG/ML (ref 4.78–24.2)
GLOBULIN UR ELPH-MCNC: 2.3 GM/DL
GLUCOSE SERPL-MCNC: 103 MG/DL (ref 65–99)
HCT VFR BLD AUTO: 39.8 % (ref 34–46.6)
HGB BLD-MCNC: 11.9 G/DL (ref 12–15.9)
IMM GRANULOCYTES # BLD AUTO: 0.02 10*3/MM3 (ref 0–0.05)
IMM GRANULOCYTES NFR BLD AUTO: 0.3 % (ref 0–0.5)
LYMPHOCYTES # BLD AUTO: 0.77 10*3/MM3 (ref 0.7–3.1)
LYMPHOCYTES NFR BLD AUTO: 12.8 % (ref 19.6–45.3)
MAGNESIUM SERPL-MCNC: 2 MG/DL (ref 1.7–2.3)
MCH RBC QN AUTO: 31.4 PG (ref 26.6–33)
MCHC RBC AUTO-ENTMCNC: 29.9 G/DL (ref 31.5–35.7)
MCV RBC AUTO: 105 FL (ref 79–97)
MONOCYTES # BLD AUTO: 0.51 10*3/MM3 (ref 0.1–0.9)
MONOCYTES NFR BLD AUTO: 8.5 % (ref 5–12)
NEUTROPHILS NFR BLD AUTO: 4.54 10*3/MM3 (ref 1.7–7)
NEUTROPHILS NFR BLD AUTO: 75.3 % (ref 42.7–76)
NRBC BLD AUTO-RTO: 0 /100 WBC (ref 0–0.2)
PHOSPHATE SERPL-MCNC: 2 MG/DL (ref 2.5–4.5)
PHOSPHATE SERPL-MCNC: 2.3 MG/DL (ref 2.5–4.5)
PLATELET # BLD AUTO: 205 10*3/MM3 (ref 140–450)
PMV BLD AUTO: 9.2 FL (ref 6–12)
POTASSIUM SERPL-SCNC: 4.1 MMOL/L (ref 3.5–5.2)
PROT SERPL-MCNC: 5.7 G/DL (ref 6–8.5)
RBC # BLD AUTO: 3.79 10*6/MM3 (ref 3.77–5.28)
SODIUM SERPL-SCNC: 146 MMOL/L (ref 136–145)
VIT B12 BLD-MCNC: 667 PG/ML (ref 211–946)
WBC NRBC COR # BLD AUTO: 6.03 10*3/MM3 (ref 3.4–10.8)

## 2025-01-26 PROCEDURE — 85025 COMPLETE CBC W/AUTO DIFF WBC: CPT

## 2025-01-26 PROCEDURE — 80053 COMPREHEN METABOLIC PANEL: CPT

## 2025-01-26 PROCEDURE — 25010000002 ONDANSETRON PER 1 MG: Performed by: NURSE PRACTITIONER

## 2025-01-26 PROCEDURE — 25810000003 LACTATED RINGERS PER 1000 ML: Performed by: STUDENT IN AN ORGANIZED HEALTH CARE EDUCATION/TRAINING PROGRAM

## 2025-01-26 PROCEDURE — 84100 ASSAY OF PHOSPHORUS: CPT | Performed by: STUDENT IN AN ORGANIZED HEALTH CARE EDUCATION/TRAINING PROGRAM

## 2025-01-26 PROCEDURE — 25810000003 SODIUM CHLORIDE 0.9 % SOLUTION: Performed by: STUDENT IN AN ORGANIZED HEALTH CARE EDUCATION/TRAINING PROGRAM

## 2025-01-26 PROCEDURE — 83735 ASSAY OF MAGNESIUM: CPT | Performed by: STUDENT IN AN ORGANIZED HEALTH CARE EDUCATION/TRAINING PROGRAM

## 2025-01-26 PROCEDURE — 82140 ASSAY OF AMMONIA: CPT | Performed by: STUDENT IN AN ORGANIZED HEALTH CARE EDUCATION/TRAINING PROGRAM

## 2025-01-26 PROCEDURE — 25010000002 AMPICILLIN-SULBACTAM PER 1.5 G

## 2025-01-26 PROCEDURE — 97162 PT EVAL MOD COMPLEX 30 MIN: CPT

## 2025-01-26 PROCEDURE — 71045 X-RAY EXAM CHEST 1 VIEW: CPT

## 2025-01-26 PROCEDURE — 99232 SBSQ HOSP IP/OBS MODERATE 35: CPT

## 2025-01-26 RX ORDER — MULTIVITAMIN WITH IRON
1000 TABLET ORAL DAILY
Status: DISCONTINUED | OUTPATIENT
Start: 2025-01-26 | End: 2025-02-05 | Stop reason: HOSPADM

## 2025-01-26 RX ORDER — MULTIPLE VITAMINS W/ MINERALS TAB 9MG-400MCG
1 TAB ORAL DAILY
Status: DISCONTINUED | OUTPATIENT
Start: 2025-01-26 | End: 2025-02-05 | Stop reason: HOSPADM

## 2025-01-26 RX ORDER — ONDANSETRON 2 MG/ML
4 INJECTION INTRAMUSCULAR; INTRAVENOUS EVERY 6 HOURS PRN
Status: DISCONTINUED | OUTPATIENT
Start: 2025-01-26 | End: 2025-02-05 | Stop reason: HOSPADM

## 2025-01-26 RX ORDER — BUDESONIDE 3 MG/1
3 CAPSULE, COATED PELLETS ORAL 2 TIMES DAILY
Status: DISCONTINUED | OUTPATIENT
Start: 2025-01-26 | End: 2025-02-05 | Stop reason: HOSPADM

## 2025-01-26 RX ORDER — SODIUM CHLORIDE, SODIUM LACTATE, POTASSIUM CHLORIDE, CALCIUM CHLORIDE 600; 310; 30; 20 MG/100ML; MG/100ML; MG/100ML; MG/100ML
75 INJECTION, SOLUTION INTRAVENOUS CONTINUOUS
Status: DISPENSED | OUTPATIENT
Start: 2025-01-26 | End: 2025-01-26

## 2025-01-26 RX ORDER — BALSALAZIDE DISODIUM 750 MG/1
750 CAPSULE ORAL NIGHTLY
Status: DISCONTINUED | OUTPATIENT
Start: 2025-01-26 | End: 2025-02-05 | Stop reason: HOSPADM

## 2025-01-26 RX ADMIN — AMPICILLIN SODIUM AND SULBACTAM SODIUM 1.5 G: 1; .5 INJECTION, POWDER, FOR SOLUTION INTRAMUSCULAR; INTRAVENOUS at 01:40

## 2025-01-26 RX ADMIN — AMPICILLIN SODIUM AND SULBACTAM SODIUM 1.5 G: 1; .5 INJECTION, POWDER, FOR SOLUTION INTRAMUSCULAR; INTRAVENOUS at 13:31

## 2025-01-26 RX ADMIN — LEVOTHYROXINE SODIUM 75 MCG: 0.07 TABLET ORAL at 05:23

## 2025-01-26 RX ADMIN — BUDESONIDE 3 MG: 3 CAPSULE ORAL at 13:31

## 2025-01-26 RX ADMIN — SODIUM CHLORIDE, POTASSIUM CHLORIDE, SODIUM LACTATE AND CALCIUM CHLORIDE 75 ML/HR: 600; 310; 30; 20 INJECTION, SOLUTION INTRAVENOUS at 11:46

## 2025-01-26 RX ADMIN — BUDESONIDE 3 MG: 3 CAPSULE ORAL at 20:03

## 2025-01-26 RX ADMIN — Medication 1000 MCG: at 13:31

## 2025-01-26 RX ADMIN — SODIUM CHLORIDE 15 MMOL: 9 INJECTION, SOLUTION INTRAVENOUS at 05:23

## 2025-01-26 RX ADMIN — BALSALAZIDE DISODIUM 750 MG: 750 CAPSULE ORAL at 20:03

## 2025-01-26 RX ADMIN — ONDANSETRON 4 MG: 2 INJECTION INTRAMUSCULAR; INTRAVENOUS at 22:05

## 2025-01-26 RX ADMIN — Medication 1 TABLET: at 13:31

## 2025-01-26 RX ADMIN — AMPICILLIN SODIUM AND SULBACTAM SODIUM 1.5 G: 1; .5 INJECTION, POWDER, FOR SOLUTION INTRAMUSCULAR; INTRAVENOUS at 07:53

## 2025-01-26 RX ADMIN — AMPICILLIN SODIUM AND SULBACTAM SODIUM 1.5 G: 1; .5 INJECTION, POWDER, FOR SOLUTION INTRAMUSCULAR; INTRAVENOUS at 20:03

## 2025-01-26 RX ADMIN — Medication 10 ML: at 07:56

## 2025-01-26 NOTE — PROGRESS NOTES
" LOS: 2 days   Patient Care Team:  Rui Cole MD as PCP - General (Internal Medicine)      Subjective     Interval History:     Subjective: Bedside RN reports continued diarrhea.  She takes balsalazide and 3 mg budesonide daily at home for collagenous colitis and would like to resume these.  She feels very tired.  She states she feels \"rotten.\"  She denies abdominal pain.      ROS:   No chest pain, shortness of breath, or cough.        Medication Review:     Current Facility-Administered Medications:     acetaminophen (TYLENOL) tablet 650 mg, 650 mg, Oral, Q4H PRN **OR** acetaminophen (TYLENOL) 160 MG/5ML oral solution 650 mg, 650 mg, Oral, Q4H PRN **OR** acetaminophen (TYLENOL) suppository 650 mg, 650 mg, Rectal, Q4H PRN, Gray Yee MD    ampicillin-sulbactam (UNASYN) 1.5 g in sodium chloride 0.9 % 100 mL MBP, 1.5 g, Intravenous, Q6H, Gray Yee MD, 1.5 g at 01/26/25 0753    balsalazide (COLAZAL) capsule 750 mg, 750 mg, Oral, Nightly, Chidi Brunner MD    sennosides-docusate (PERICOLACE) 8.6-50 MG per tablet 2 tablet, 2 tablet, Oral, BID PRN **AND** polyethylene glycol (MIRALAX) packet 17 g, 17 g, Oral, Daily PRN **AND** bisacodyl (DULCOLAX) EC tablet 5 mg, 5 mg, Oral, Daily PRN **AND** bisacodyl (DULCOLAX) suppository 10 mg, 10 mg, Rectal, Daily PRN, Gray Yee MD    Budesonide (ENTOCORT EC) 24 hr capsule 3 mg, 3 mg, Oral, BID, Chidi Brunner MD    Calcium Replacement - Follow Nurse / BPA Driven Protocol, , Not Applicable, PRN, Gray Yee MD    dextrose (D50W) (25 g/50 mL) IV injection 25 g, 25 g, Intravenous, Q15 Min PRN, Gray Yee MD    dextrose (GLUTOSE) oral gel 15 g, 15 g, Oral, Q15 Min PRN, Gray Yee MD    Enoxaparin Sodium (LOVENOX) syringe 30 mg, 30 mg, Subcutaneous, Daily, Gray Yee MD    glucagon (GLUCAGEN) injection 1 mg, 1 mg, Subcutaneous, Q15 Min PRN, Gray Yee MD    lactated ringers infusion, 75 mL/hr, " Intravenous, Continuous, Chidi Brunner MD, Last Rate: 75 mL/hr at 01/26/25 1146, 75 mL/hr at 01/26/25 1146    levothyroxine (SYNTHROID, LEVOTHROID) tablet 75 mcg, 75 mcg, Oral, Q AM, Chidi Brunner MD, 75 mcg at 01/26/25 0523    Magnesium Standard Dose Replacement - Follow Nurse / BPA Driven Protocol, , Not Applicable, PRN, Gray Yee MD    morphine injection 1 mg, 1 mg, Intravenous, Q4H PRN **AND** naloxone (NARCAN) injection 0.4 mg, 0.4 mg, Intravenous, Q5 Min PRN, Gray Yee MD    multivitamin with minerals 1 tablet, 1 tablet, Oral, Daily, Chidi Brunner MD    nitroglycerin (NITROSTAT) SL tablet 0.4 mg, 0.4 mg, Sublingual, Q5 Min PRN, Gray Yee MD    Phosphorus Replacement - Follow Nurse / BPA Driven Protocol, , Not Applicable, PRN, Gray Yee MD    Potassium Replacement - Follow Nurse / BPA Driven Protocol, , Not Applicable, PRN, Gray Yee MD    [COMPLETED] Insert Peripheral IV, , , Once **AND** sodium chloride 0.9 % flush 10 mL, 10 mL, Intravenous, PRN, David Mcgill MD    sodium chloride 0.9 % flush 10 mL, 10 mL, Intravenous, Q12H, Gray Yee MD, 10 mL at 01/26/25 0756    sodium chloride 0.9 % flush 10 mL, 10 mL, Intravenous, PRN, Gray Yee MD    sodium chloride 0.9 % infusion 40 mL, 40 mL, Intravenous, PRN, Gray Yee MD    vitamin B-12 (CYANOCOBALAMIN) tablet 1,000 mcg, 1,000 mcg, Oral, Daily, Chidi Brunner MD      Objective     Vital Signs  Vitals:    01/26/25 0026 01/26/25 0400 01/26/25 0921 01/26/25 1222   BP: 114/63 131/66 140/72 128/64   BP Location: Right arm Left arm Right arm Left arm   Patient Position: Lying Lying Lying Lying   Pulse: 82 82 77    Resp: 18 23 13 20   Temp: 98.5 °F (36.9 °C) 99.2 °F (37.3 °C) 98.3 °F (36.8 °C) 98.1 °F (36.7 °C)   TempSrc: Oral Oral Oral Oral   SpO2: 97% 99% 98%    Weight:       Height:           Physical Exam:     General Appearance:    Asleep, easy to arouse, in no  "acute distress   Head:    Normocephalic, without obvious abnormality   Eyes:          Conjunctivae normal, anicteric sclera   Throat:   No oral lesions, no thrush, oral mucosa moist   Neck:   No adenopathy, supple, no JVD   Lungs:     respirations regular, even and unlabored   Abdomen:     Soft, non-tender, no rebound or guarding, non-distended, no hepatosplenomegaly   Rectal:     Deferred   Extremities:   No edema, no cyanosis   Skin:   No bruising or rash, no jaundice        Results Review:    Results from last 7 days   Lab Units 01/26/25  0237 01/25/25  0104 01/24/25  1609   WBC 10*3/mm3 6.03 10.16 13.53*   HEMOGLOBIN g/dL 11.9* 13.0 13.5   PLATELETS 10*3/mm3 205 298 310     Results from last 7 days   Lab Units 01/26/25  1145 01/26/25  0237 01/25/25  1229 01/25/25  0104 01/24/25  1609   SODIUM mmol/L  --  146*  --  147* 147*   POTASSIUM mmol/L  --  4.1 3.9 3.4* 3.7   CHLORIDE mmol/L  --  110*  --  104 108*   CO2 mmol/L  --  29.4*  --  33.1* 28.9   BUN mg/dL  --  13  --  20 22   CREATININE mg/dL  --  0.44*  --  0.51* 0.56*   GLUCOSE mg/dL  --  103*  --  119* 129*   ALBUMIN g/dL  --  3.4*  --  3.9 3.9   BILIRUBIN mg/dL  --  <0.2  --  0.2 0.3   ALK PHOS U/L  --  35*  --  41 42   AST (SGOT) U/L  --  23  --  22 29   ALT (SGPT) U/L  --  11  --  12 13   MAGNESIUM mg/dL  --  2.0  --  2.2  --    PHOSPHORUS mg/dL  --  2.0*  --  4.0  --    LIPASE U/L  --   --   --   --  29   AMMONIA umol/L 17  --   --   --   --      Estimated Creatinine Clearance: 53.4 mL/min (A) (by C-G formula based on SCr of 0.44 mg/dL (L)).  No results found for: \"HGBA1C\"      Infection   Results from last 7 days   Lab Units 01/24/25  1810 01/24/25  1802 01/24/25  1622   BLOODCX  No growth at 24 hours No growth at 24 hours  --    URINECX   --   --  >100,000 CFU/mL Proteus mirabilis*     UA    Results from last 7 days   Lab Units 01/24/25  1622   NITRITE UA  Negative   WBC UA /HPF 6-10*   BACTERIA UA /HPF 1+*   SQUAM EPITHEL UA /HPF 0-2   URINECX  " >100,000 CFU/mL Proteus mirabilis*     Microbiology Results (last 10 days)       Procedure Component Value - Date/Time    Clostridioides difficile EIA - Stool, Per Rectum [[id number]]  (Normal) Collected: 01/25/25 1818    Lab Status: Final result Specimen: Stool from Per Rectum Updated: 01/26/25 0725     C Diff GDH Ag Negative     C.diff Toxin Ag Negative    Narrative:      The result indicates the absence of toxigenic C.difficile from stool specimen.    Gastrointestinal Panel, PCR - Stool, Per Rectum [[id number]]  (Abnormal) Collected: 01/25/25 1818    Lab Status: Final result Specimen: Stool from Per Rectum Updated: 01/25/25 2048     Campylobacter Not Detected     Plesiomonas shigelloides Not Detected     Salmonella Not Detected     Vibrio Not Detected     Vibrio cholerae Not Detected     Yersinia enterocolitica Not Detected     Enteroaggregative E. coli (EAEC) Not Detected     Enteropathogenic E. coli (EPEC) Not Detected     Enterotoxigenic E. coli (ETEC) lt/st Not Detected     Shiga-like toxin-producing E. coli (STEC) stx1/stx2 Not Detected     Shigella/Enteroinvasive E. coli (EIEC) Not Detected     Cryptosporidium Not Detected     Cyclospora cayetanensis Not Detected     Entamoeba histolytica Not Detected     Giardia lamblia Not Detected     Adenovirus F40/41 Not Detected     Astrovirus Not Detected     Norovirus GI/GII Detected     Comment: If a positive Norovirus result is inconsistent with clinical presentation, the positive Norovirus result should be confirmed using another method.        Rotavirus A Not Detected     Sapovirus (I, II, IV or V) Not Detected    Blood Culture - Blood, Arm, Right [[id number]]  (Normal) Collected: 01/24/25 1810    Lab Status: Preliminary result Specimen: Blood from Arm, Right Updated: 01/25/25 1845     Blood Culture No growth at 24 hours    Narrative:      Less than seven (7) mL's of blood was collected.  Insufficient quantity may yield false negative results.    Blood Culture -  Blood, Arm, Right [[id number]]  (Normal) Collected: 01/24/25 1802    Lab Status: Preliminary result Specimen: Blood from Arm, Right Updated: 01/25/25 1815     Blood Culture No growth at 24 hours    Narrative:      Less than seven (7) mL's of blood was collected.  Insufficient quantity may yield false negative results.    Urine Culture - Urine, Straight Cath [[id number]]  (Abnormal)  (Susceptibility) Collected: 01/24/25 1622    Lab Status: Final result Specimen: Urine from Straight Cath Updated: 01/26/25 1037     Urine Culture >100,000 CFU/mL Proteus mirabilis    Narrative:      Colonization of the urinary tract without infection is common. Treatment is discouraged unless the patient is symptomatic, pregnant, or undergoing an invasive urologic procedure.    Susceptibility        Proteus mirabilis      NAILA      Amoxicillin + Clavulanate Susceptible      Ampicillin Susceptible      Ampicillin + Sulbactam Susceptible      Cefazolin (Urine) Susceptible      Cefepime Susceptible      Ceftazidime Susceptible      Ceftriaxone Susceptible      Gentamicin Susceptible      Levofloxacin Susceptible      Nitrofurantoin Resistant      Piperacillin + Tazobactam Susceptible      Trimethoprim + Sulfamethoxazole Susceptible                                 Imaging Results (Last 72 Hours)       Procedure Component Value Units Date/Time    XR Chest 1 View [[id number]] Collected: 01/26/25 1221     Updated: 01/26/25 1224    Narrative:      XR CHEST 1 VW    Date of Exam: 1/26/2025 12:16 PM EST    Indication: ON 3L nc, Altered mental status on admission, r/o pna, or acute process    Comparison: None available.    Findings:  Heart size is at the upper limits of normal. The there is pulmonary vascular congestion. There is hazy bilateral airspace disease favored to be secondary to pulmonary edema. Pneumonia could potentially give this appearance. There are small bilateral   pleural effusions. No pneumothorax. Bony structures are unremarkable.       Impression:      Impression:    1. Cardiomegaly and pulmonary vascular congestion.  2. Hazy bilateral airspace disease favored to be secondary to pulmonary edema.      Electronically Signed: Mathew Dillard MD    1/26/2025 12:22 PM EST    Workstation ID: [id number]    CT Abdomen Pelvis With Contrast [[id number]] Collected: 01/24/25 1716     Updated: 01/24/25 1728    Narrative:      CT ABDOMEN PELVIS W CONTRAST    Date of Exam: 1/24/2025 4:46 PM EST    Indication: abdominal pain, vomiting, diarrhea.    Comparison: 5/21/2024 .    Technique: Axial CT images were obtained of the abdomen and pelvis following the uneventful intravenous administration of iodinated contrast. Sagittal and coronal reconstructions were performed.  Automated exposure control and iterative reconstruction   methods were used.    FINDINGS:    Lung bases: Atheromatous disease of the coronary vessels and aortic arch. Mild bibasilar atelectatic changes.    Liver: Mild intra and extrahepatic biliary ductal dilatation. 0.7 cm subcapsular left hepatic cyst.    Spleen:No masses. No perisplenic hematoma.    Pancreas:No pancreatic masses. No evidence of pancreatitis.    Gallbladder and common bile duct: Intra and extrahepatic biliary ductal dilatation. 0.4 cm calcification at the tip of the ampulla of Vater appears new compared with 2024.    Adrenal glands: Stable left adrenal nodule.    Kidneys and ureters:No kidney stones. 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    Small bowel:Normal caliber small bowel. Noninflamed duodenal diverticulum.    Large bowel: Colonic diverticulosis without evidence of diverticulitis.    Appendix: Not seen however there is no evidence of appendicitis.    GENITOURINARY: Prior hysterectomy.    Ascites or pneumoperitoneum:None.    Adenopathy:None present    Osseous structures: The proximal femurs are intact. The pubic bones are intact. Degenerative changes  of the hips. The sacrum and sacroiliac joints are normal. Degenerative changes of the lumbar spine. No rib fractures.    Other findings: Atheromatous disease of the abdominal aorta and visualized branches.      Impression:      1.Mild intra and extrahepatic biliary ductal dilatation. 0.4 cm calcification at the tip of the Ampulla of Vater appears new compared with 2024. This may represent a stone within the ampulla. Consider ERCP or MRCP.  2.Colonic diverticulosis without evidence of diverticulitis.  3.Stable left adrenal nodule.  4.Urinary bladder wall thickening suggesting cystitis.        Electronically Signed: Jonnathan Kovacs MD    1/24/2025 5:26 PM EST    Workstation ID: [id number]            Assessment & Plan       89-year-old female presents to the hospital with generalized weakness.  Found to have a UTI and CT showing intra/extrahepatic biliary dilatation with a new 0.4 cm calcification at the tip of the ampulla.     -Abnormal CT with biliary dilatation and calcification near the ampulla  -Generalized weakness  -Acute UTI  -Norovirus  -Diarrhea  -COPD  -Hypothyroidism  -History of collagenous colitis  -History of cholecystectomy, appendectomy     Plan  Patient is not complaining of much abdominal pain and her liver enzymes are completely normal.  Therefore, we will not plan for intervention with ERCP at this time.  Continue to trend labs.  She is high risk for sedation due to her age.  Continue antibiotics for UTI.  She tested positive for norovirus.  She also has a history of collagenous colitis maintained on balsalazide and budesonide at home.  Okay to resume home medications.  Encourage nutrition.  Continue supportive care.     I discussed the patients findings and my recommendations with the patient.     We appreciate the referral    Electronically signed by MAXIMINO Farah, 01/26/25, 1:17 PM EST.

## 2025-01-26 NOTE — PROGRESS NOTES
[facility] MEDICINE SERVICE  DAILY PROGRESS NOTE    NAME: Alexey Trinidad  : 3/21/1926  MRN: [id number]      LOS: 2 days     PROVIDER OF SERVICE: Chidi Brunner MD    Chief Complaint: UTI (urinary tract infection)    Subjective:     Interval History:  History taken from: patient  No acute events overnight, patient is awake, alert but confused.     Review of Systems:   Review of Systems    Objective:     Vital Signs  Temp:  [97.9 °F (36.6 °C)-99.2 °F (37.3 °C)] 98.1 °F (36.7 °C)  Heart Rate:  [74-83] 77  Resp:  [13-23] 20  BP: (109-140)/(59-72) 128/64  Flow (L/min) (Oxygen Therapy):  [2-3] 3   Body mass index is 21.84 kg/m².    Physical Exam  General Appearance:  Awake, disoriented   Head:  Atraumatic normocephalic   Eyes:        No sclera icterus   Neck: Normal rom   Pulm: Clear to auscultation   Cardio: Systolic murmur, HR normal, rhythm regular   Extremities: No remarkable edema on ble   Abdomen: Soft, non distended                           Scheduled Meds   ampicillin-sulbactam, 1.5 g, Intravenous, Q6H  balsalazide, 750 mg, Oral, Nightly  Budesonide, 3 mg, Oral, BID  enoxaparin, 30 mg, Subcutaneous, Daily  levothyroxine, 75 mcg, Oral, Q AM  multivitamin with minerals, 1 tablet, Oral, Daily  sodium chloride, 10 mL, Intravenous, Q12H  vitamin B-12, 1,000 mcg, Oral, Daily       PRN Meds     acetaminophen **OR** acetaminophen **OR** acetaminophen    senna-docusate sodium **AND** polyethylene glycol **AND** bisacodyl **AND** bisacodyl    Calcium Replacement - Follow Nurse / BPA Driven Protocol    dextrose    dextrose    glucagon (human recombinant)    Magnesium Standard Dose Replacement - Follow Nurse / BPA Driven Protocol    Morphine **AND** naloxone    nitroglycerin    Phosphorus Replacement - Follow Nurse / BPA Driven Protocol    Potassium Replacement - Follow Nurse / BPA Driven Protocol    [COMPLETED] Insert Peripheral IV **AND** sodium chloride    sodium chloride    sodium chloride    Infusions  lactated ringers, 75 mL/hr, Last Rate: 75 mL/hr (01/26/25 1146)          Diagnostic Data    Results from last 7 days   Lab Units 01/26/25  0237   WBC 10*3/mm3 6.03   HEMOGLOBIN g/dL 11.9*   HEMATOCRIT % 39.8   PLATELETS 10*3/mm3 205   GLUCOSE mg/dL 103*   CREATININE mg/dL 0.44*   BUN mg/dL 13   SODIUM mmol/L 146*   POTASSIUM mmol/L 4.1   AST (SGOT) U/L 23   ALT (SGPT) U/L 11   ALK PHOS U/L 35*   BILIRUBIN mg/dL <0.2   ANION GAP mmol/L 6.6       XR Chest 1 View    Result Date: 1/26/2025  Impression: 1. Cardiomegaly and pulmonary vascular congestion. 2. Hazy bilateral airspace disease favored to be secondary to pulmonary edema. Electronically Signed: Mathew Dillard MD  1/26/2025 12:22 PM EST  Workstation ID: [id number]    CT Abdomen Pelvis With Contrast    Result Date: 1/24/2025  1.Mild intra and extrahepatic biliary ductal dilatation. 0.4 cm calcification at the tip of the Ampulla of Vater appears new compared with 2024. This may represent a stone within the ampulla. Consider ERCP or MRCP. 2.Colonic diverticulosis without evidence of diverticulitis. 3.Stable left adrenal nodule. 4.Urinary bladder wall thickening suggesting cystitis. Electronically Signed: Jonnathan Kovacs MD  1/24/2025 5:26 PM EST  Workstation ID: [id number]       I reviewed the patient's new clinical results.    Assessment/Plan:   Alexey Trinidad is a 89 y.o. female with a CMH of hypertension, hypothyroidism, COPD, who presented to [facility] on 1/24/2025 with generalized weakness.  Patient is alert and oriented x 2 at this time therefore collateral information was obtained from chart checks.  Per chart, family reported patient having generalized weakness and progressively got worse in the past 2 days.  Associated with abdominal pain, chills, nausea vomiting and diarrhea.  No reported fever.  In the ED, sodium 147, WBC 13.5 K, .  LFTs with unremarkable urinalysis suggestive of UTI, CT abdomen pelvis with contrast was notable for  a mild intra and extrahepatic biliary ductal dilation, a 0.4 cm calcification of the tip of the ampulla of Vater which may represent a stone.  Recommends ERCP or MRCP.  Stable left adrenal nodule, urinary bladder wall thickening suggestive of cystitis.     Abdominal pain, nausea, vomiting likely due to biliary dilatation/Stone at the ampulla of vater  -CT abdomen pelvis reviewed, recommends follow-up with ERCP or MRCP  -NS at 100 cc/h  -Received Rocephin in the ED  -Start on Unasyn  -Clear liquid diet  -Hypoglycemia protocol  -GI consult    ?Diarrhea  -Obtain stool PCR, c.diff r/o , will restart her meds after gi infection ruled out        UTI  Leukocytosis likely secondary to above  Altered mental status likely secondary to infection versus chronic  -Start on on Unasyn  -NS at 100 cc/h  -Follow urine and blood cultures  -Uncertain about patient's baseline if confusion is chronic or acute  -Reassess in the a.m. follow-up with family  -CBC daily     Physical deconditioning  -PT OT eval  -Falls precaution     Mild hypernatremia due to dehydration  -NS at 100 cc/h and reassess in the a.m.     Left adrenal nodule  -Stable     Hypertension  -Losartan 50 mg daily     Hypothyroidism  -Levothyroxine 75 mcg daily     COPD (not in exacerbation)  -Bronchodilators standing and as needed        1/26:  -Stool PCR shows norovirus positive, continue iv fluids   -Per GI ok to resume her meds for collagenous colitis  -AM labs      VTE Prophylaxis:  Pharmacologic VTE prophylaxis orders are present.             Code status is   Code Status and Medical Interventions: CPR (Attempt to Resuscitate); Full Support   Ordered at: 01/24/25 6363     Code Status (Patient has no pulse and is not breathing):    CPR (Attempt to Resuscitate)     Medical Interventions (Patient has pulse or is breathing):    Full Support       Plan for disposition:GI reccs    Time: 30 minutes    Part of this note may be an electronic transcription/translation of spoken  language to printed text using the Dragon Dictation System.    Signature: Electronically signed by Chidi Brunner MD, 01/26/25, 14:46 EST.  [facility]ist Team

## 2025-01-26 NOTE — THERAPY EVALUATION
Patient Name: Alexey Trinidad  : 3/21/1926    MRN: [id number]                              Today's Date: 2025       Admit Date: 2025    Visit Dx:     ICD-10-CM ICD-9-CM   1. Urinary tract infection without hematuria, site unspecified  N39.0 599.0   2. General weakness  R53.1 780.79   3. Vomiting, unspecified vomiting type, unspecified whether nausea present  R11.10 787.03     Patient Active Problem List   Diagnosis    UTI (urinary tract infection)    Abdominal pain     Past Medical History:   Diagnosis Date    Disease of thyroid gland     Hypertension      Past Surgical History:   Procedure Laterality Date    BACK SURGERY      kyphoplasty    BREAST SURGERY      benign cyst removed    HYSTERECTOMY      LAPAROSCOPIC CHOLECYSTECTOMY        General Information       Row Name 25          Physical Therapy Time and Intention    Document Type evaluation  -EL     Mode of Treatment individual therapy;physical therapy  -       Row Name 25          General Information    Prior Level of Function independent:;all household mobility;ADL's  -       Row Name 25          Living Environment    People in Home other (see comments)  Pt unable to provide accurate PLOF, nursing states she lives with grandchildren  -       Row Name 25          Cognition    Orientation Status (Cognition) oriented to;person;disoriented to;place;situation;time  -       Row Name 25          Safety Issues/Impairments Affecting Functional Mobility    Impairments Affecting Function (Mobility) balance;strength;endurance/activity tolerance  -EL               User Key  (r) = Recorded By, (t) = Taken By, (c) = Cosigned By      Initials Name Provider Type    Juan Harmon, PT Physical Therapist                   Mobility       Row Name 25          Bed Mobility    Bed Mobility supine-sit  -EL     Supine-Sit [place] (Bed Mobility) minimum assist (75% patient effort)  -       Row  Name 01/26/25 1846          Bed-Chair Transfer    Bed-Chair [place] (Transfers) contact guard  -EL     Comment, (Bed-Chair Transfer) HHA  -EL       Row Name 01/26/25 1846          Sit-Stand Transfer    Sit-Stand [place] (Transfers) contact guard  -EL     Comment, (Sit-Stand Transfer) HHA  -EL       Row Name 01/26/25 1846          Gait/Stairs (Locomotion)    [place] Level (Gait) contact guard  -EL     Assistive Device (Gait) --  HHA  -EL     Patient was able to Ambulate yes  -EL     Distance in Feet (Gait) 45  -EL     Comment, (Gait/Stairs) utilizes cane at baseline, didn't have a cane available so provided single HHA  -EL               User Key  (r) = Recorded By, (t) = Taken By, (c) = Cosigned By      Initials Name Provider Type    Juan Harmon, PT Physical Therapist                   Obj/Interventions       Row Name 01/26/25 1847          Range of Motion Comprehensive    General Range of Motion bilateral lower extremity ROM WFL  -EL       Row Name 01/26/25 1847          Strength Comprehensive (MMT)    General Manual Muscle Testing (MMT) Assessment lower extremity strength deficits identified  -EL     Comment, General Manual Muscle Testing (MMT) Assessment BLE 3+/5 gross  -EL       Row Name 01/26/25 1847          Sensory Assessment (Somatosensory)    Sensory Assessment (Somatosensory) sensation intact  -EL               User Key  (r) = Recorded By, (t) = Taken By, (c) = Cosigned By      Initials Name Provider Type    Juan Harmon, PT Physical Therapist                   Goals/Plan       Row Name 01/26/25 1852          Bed Mobility Goal 1 (PT)    Activity/Assistive Device (Bed Mobility Goal 1, PT) bed mobility activities, all  -EL     [place] Level/Cues Needed (Bed Mobility Goal 1, PT) modified independence  -EL     Time Frame (Bed Mobility Goal 1, PT) long term goal (LTG);2 weeks  -EL       Row Name 01/26/25 1852          Transfer Goal 1 (PT)    Activity/Assistive Device (Transfer Goal 1, PT)  transfers, all;cane, straight  -EL     [place] Level/Cues Needed (Transfer Goal 1, PT) modified independence  -EL     Time Frame (Transfer Goal 1, PT) long term goal (LTG);2 weeks  -EL       Row Name 01/26/25 1852          Gait Training Goal 1 (PT)    Activity/Assistive Device (Gait Training Goal 1, PT) gait (walking locomotion);cane, straight  -EL     [place] Level (Gait Training Goal 1, PT) modified independence  -EL     Distance (Gait Training Goal 1, PT) 100  -EL     Time Frame (Gait Training Goal 1, PT) long term goal (LTG);2 weeks  -EL       Row Name 01/26/25 1852          Therapy Assessment/Plan (PT)    Planned Therapy Interventions (PT) neuromuscular re-education;balance training;bed mobility training;transfer training;gait training;patient/family education;strengthening  -EL               User Key  (r) = Recorded By, (t) = Taken By, (c) = Cosigned By      Initials Name Provider Type    EL Juan Bundy, PT Physical Therapist                   Clinical Impression       Row Name 01/26/25 1849          Pain    Pretreatment Pain Rating 0/10 - no pain  -EL     Posttreatment Pain Rating 0/10 - no pain  -EL       Row Name 01/26/25 1849          Plan of Care Review    Plan of Care Reviewed With patient  -EL     Outcome Evaluation Pt is a 89 YO F admitted with UTI, general weakness. Pt with mild confusion this date, able to provide some appropriate answers to questioning, such as AD used but unable to state who she lives with. Per nursing pt lives wtih grandchildren who are currently sick as well. Pt is typically independent with ADLs, and ambualtes with SPC. No cane available this date, so PT provided HHA. Pt demonstrates good mobiltiy, requriing CGA for transfers and ambualtion. Pt appears near functional mobitliy baseline adn recommendation is return home with family assist. Pt may benefit from HHPT at d/c pending progress. PT to follow while admitted.  -EL       Row Name 01/26/25 1849          Therapy  Assessment/Plan (PT)    Rehab Potential (PT) good  -EL     Criteria for Skilled Interventions Met (PT) yes  -EL     Therapy Frequency (PT) 5 times/wk  -EL       Row Name 01/26/25 1849          Vital Signs    O2 Delivery Pre Treatment supplemental O2  -EL     O2 Delivery Intra Treatment supplemental O2  -EL     O2 Delivery Post Treatment supplemental O2  -EL     Pre Patient Position Supine  -EL     Intra Patient Position Standing  -EL     Post Patient Position Sitting  -EL       Row Name 01/26/25 1849          Positioning and Restraints    Pre-Treatment Position in bed  -EL     Post Treatment Position chair  -EL     In Chair notified nsg;reclined;encouraged to call for assist;exit alarm on;with family/caregiver  -EL               User Key  (r) = Recorded By, (t) = Taken By, (c) = Cosigned By      Initials Name Provider Type    Juan Harmon, PAVAN Physical Therapist                   Outcome Measures       Row Name 01/26/25 1853 01/26/25 0814       How much help from another person do you currently need...    Turning from your back to your side while in flat bed without using bedrails? 4  -EL 4  -KI    Moving from lying on back to sitting on the side of a flat bed without bedrails? 3  -EL 4  -KI    Moving to and from a bed to a chair (including a wheelchair)? 3  -EL 3  -KI    Standing up from a chair using your arms (e.g., wheelchair, bedside chair)? 3  -EL 3  -KI    Climbing 3-5 steps with a railing? 2  -EL 3  -KI    To walk in hospital room? 3  -EL 2  -KI    AM-PAC 6 Clicks Score (PT) 18  -EL 19  -KI    Highest Level of Mobility Goal 6 --> Walk 10 steps or more  -EL 6 --> Walk 10 steps or more  -KI      Row Name 01/26/25 1853          Functional Assessment    Outcome Measure Options AM-PAC 6 Clicks Basic Mobility (PT)  -EL               User Key  (r) = Recorded By, (t) = Taken By, (c) = Cosigned By      Initials Name Provider Type    Juan Harmon, PAVAN Physical Therapist    Lia Baron RN Registered Nurse                                  Physical Therapy Education       Title: PT OT SLP Therapies (Done)       Topic: Physical Therapy (Done)       Point: Mobility training (Done)       Learning Progress Summary            Patient Acceptance, E,TB, VU by  at 1/26/2025 1853                      Point: Precautions (Done)       Learning Progress Summary            Patient Acceptance, E,TB, VU by  at 1/26/2025 1853                                      User Key       Initials Effective Dates Name Provider Type Discipline     06/23/20 -  Juan Bundy, PT Physical Therapist PT                  PT Recommendation and Plan  Planned Therapy Interventions (PT): neuromuscular re-education, balance training, bed mobility training, transfer training, gait training, patient/family education, strengthening  Outcome Evaluation: Pt is a 89 YO F admitted with UTI, general weakness. Pt with mild confusion this date, able to provide some appropriate answers to questioning, such as AD used but unable to state who she lives with. Per nursing pt lives wtih grandchildren who are currently sick as well. Pt is typically independent with ADLs, and ambualtes with SPC. No cane available this date, so PT provided HHA. Pt demonstrates good mobiltiy, requriing CGA for transfers and ambualtion. Pt appears near functional mobitliy baseline adn recommendation is return home with family assist. Pt may benefit from HHPT at d/c pending progress. PT to follow while admitted.     Time Calculation:   PT Evaluation Complexity  History, PT Evaluation Complexity: 1-2 personal factors and/or comorbidities  Examination of Body Systems (PT Eval Complexity): total of 3 or more elements  Clinical Presentation (PT Evaluation Complexity): evolving  Clinical Decision Making (PT Evaluation Complexity): moderate complexity  Overall Complexity (PT Evaluation Complexity): moderate complexity     PT Charges       Row Name 01/26/25 1853             Time Calculation    Start Time  1257  -EL      Stop Time 1313  -EL      Time Calculation (min) 16 min  -EL      PT Received On 01/26/25  -EL      PT - Next Appointment 01/27/25  -EL      PT Goal Re-Cert Due Date 02/09/25  -EL                User Key  (r) = Recorded By, (t) = Taken By, (c) = Cosigned By      Initials Name Provider Type    Juan Harmon, PT Physical Therapist                  Therapy Charges for Today       Code Description Service Date Service Provider Modifiers Qty    32725875064 HC PT EVAL MOD COMPLEXITY 3 1/26/2025 Juan Bundy, PT GP 1            PT G-Codes  Outcome Measure Options: AM-PAC 6 Clicks Basic Mobility (PT)  AM-PAC 6 Clicks Score (PT): 18  PT Discharge Summary  Anticipated Discharge Disposition (PT): home with home health, home with assist    Juan Bundy PT  1/26/2025

## 2025-01-26 NOTE — PLAN OF CARE
Continue to monitor and assess pain.  Patient is alert with some confusion at times which is normal for patient.  Problem: Adult Inpatient Plan of Care  Goal: Plan of Care Review  Outcome: Progressing  Flowsheets (Taken 1/26/2025 0722)  Progress: improving  Plan of Care Reviewed With:   patient   caregiver   grandchild(mahnaz)  Goal: Patient-Specific Goal (Individualized)  Outcome: Progressing  Goal: Absence of Hospital-Acquired Illness or Injury  Outcome: Progressing  Intervention: Identify and Manage Fall Risk  Flowsheets  Taken 1/26/2025 0722  Safety Promotion/Fall Prevention:   assistive device/personal items within reach   clutter free environment maintained   fall prevention program maintained   safety round/check completed   gait belt   nonskid shoes/slippers when out of bed  Taken 1/25/2025 1801  Safety Promotion/Fall Prevention:   assistive device/personal items within reach   clutter free environment maintained   safety round/check completed   fall prevention program maintained  Taken 1/25/2025 1758  Safety Promotion/Fall Prevention:   clutter free environment maintained   assistive device/personal items within reach   safety round/check completed   fall prevention program maintained  Intervention: Prevent Skin Injury  Flowsheets  Taken 1/26/2025 0722 by Lia Vasquez RN  Body Position: turned  Taken 1/25/2025 2000 by Natalia Mojica, RN  Skin Protection: incontinence pads utilized  Taken 1/25/2025 1801 by Lia Vasquez RN  Body Position:   turned   upper extremity elevated   neutral body alignment   position changed independently  Intervention: Prevent and Manage VTE (Venous Thromboembolism) Risk  Flowsheets (Taken 1/25/2025 0742)  VTE Prevention/Management:   patient refused intervention   SCDs (sequential compression devices) off  Intervention: Prevent Infection  Flowsheets (Taken 1/26/2025 0722)  Infection Prevention:   hand hygiene promoted   personal protective equipment utilized   rest/sleep  promoted   single patient room provided  Goal: Optimal Comfort and Wellbeing  Outcome: Progressing  Intervention: Monitor Pain and Promote Comfort  Flowsheets (Taken 1/25/2025 0709)  Pain Management Interventions:   relaxation techniques promoted   quiet environment facilitated  Intervention: Provide Person-Centered Care  Flowsheets (Taken 1/25/2025 2000 by Natalia Mojica RN)  Trust Relationship/Rapport:   care explained   choices provided  Goal: Readiness for Transition of Care  Outcome: Progressing  Intervention: Mutually Develop Transition Plan  Flowsheets  Taken 1/24/2025 1953 by Ashlee Butler RN  Transportation Anticipated: family or friend will provide  Patient/Family Anticipated Services at Transition: none  Patient/Family Anticipates Transition to: (Lives with grandson at the moment) home with family  Taken 1/24/2025 1949 by Ashlee Butler RN  Equipment Currently Used at Home:   cane, straight   shower chair     Problem: UTI (Urinary Tract Infection)  Goal: Improved Infection Symptoms  Outcome: Progressing  Intervention: Facilitate Optimal Urinary Elimination  Flowsheets (Taken 1/25/2025 2000 by Natalia Mojica RN)  Urinary Elimination Promotion: absorbent pad/diaper use encouraged     Problem: Violence Risk or Actual  Goal: Anger and Impulse Control  Outcome: Progressing  Intervention: Minimize Safety Risk  Flowsheets  Taken 1/26/2025 0600 by Natalia Mojica RN  Enhanced Safety Measures: bed alarm set  Taken 1/25/2025 1801 by Lia Vasquez RN  Enhanced Safety Measures: bed alarm set  Taken 1/25/2025 0742 by Lia Vasquez RN  Sensory Stimulation Regulation:   lighting decreased   quiet environment promoted  Intervention: Promote Self-Control  Flowsheets (Taken 1/25/2025 0709)  Supportive Measures: active listening utilized  Environmental Support: calm environment promoted     Problem: Fall Injury Risk  Goal: Absence of Fall and Fall-Related Injury  Outcome: Progressing  Intervention: Identify  and Manage Contributors  Flowsheets (Taken 1/25/2025 2000 by Natalia Mojica, RN)  Medication Review/Management: medications reviewed  Intervention: Promote Injury-Free Environment  Flowsheets  Taken 1/26/2025 0722  Safety Promotion/Fall Prevention:   assistive device/personal items within reach   clutter free environment maintained   fall prevention program maintained   safety round/check completed   gait belt   nonskid shoes/slippers when out of bed  Taken 1/25/2025 1801  Safety Promotion/Fall Prevention:   assistive device/personal items within reach   clutter free environment maintained   safety round/check completed   fall prevention program maintained  Taken 1/25/2025 1758  Safety Promotion/Fall Prevention:   clutter free environment maintained   assistive device/personal items within reach   safety round/check completed   fall prevention program maintained     Problem: Comorbidity Management  Goal: Blood Pressure in Desired Range  Outcome: Progressing  Intervention: Maintain Blood Pressure Management  Flowsheets (Taken 1/25/2025 2000 by Natalia Mojica, RN)  Medication Review/Management: medications reviewed     Problem: Sepsis/Septic Shock  Goal: Optimal Coping  Outcome: Progressing  Intervention: Support Patient and Family Response  Flowsheets (Taken 1/25/2025 0709)  Supportive Measures: active listening utilized  Goal: Absence of Bleeding  Outcome: Progressing  Goal: Blood Glucose Level Within Target Range  Outcome: Progressing  Goal: Absence of Infection Signs and Symptoms  Outcome: Progressing  Intervention: Initiate Sepsis Management  Flowsheets  Taken 1/26/2025 0722 by Lia Vasquez RN  Infection Prevention:   hand hygiene promoted   personal protective equipment utilized   rest/sleep promoted   single patient room provided  Taken 1/25/2025 2000 by Natalia Mojica, RN  Isolation Precautions: precautions maintained  Taken 1/25/2025 1758 by Lia Vasquez RN  Isolation Precautions:    precautions initiated   contact   spore  Intervention: Promote Recovery  Flowsheets  Taken 1/25/2025 2223 by Ashlee Butler RN  Activity Management: up to bedside commode  Taken 1/25/2025 0742 by Lia Vasquez RN  Airway/Ventilation Management: airway patency maintained  Goal: Optimal Nutrition Delivery  Outcome: Progressing     Problem: Skin Injury Risk Increased  Goal: Skin Health and Integrity  Outcome: Progressing  Intervention: Optimize Skin Protection  Flowsheets  Taken 1/25/2025 2223 by Ashlee Butler RN  Activity Management: up to bedside commode  Taken 1/25/2025 2000 by Natalia Mojica RN  Pressure Reduction Techniques: frequent weight shift encouraged  Pressure Reduction Devices:   pressure-redistributing mattress utilized   alternating pressure pump (MOIZ)  Skin Protection: incontinence pads utilized  Taken 1/25/2025 1954 by Sophia Thurman PCT  Head of Bed (HOB) Positioning: HOB at 20-30 degrees  Taken 1/25/2025 1801 by Lia Vasquez RN  Head of Bed (HOB) Positioning: HOB at 20-30 degrees   Goal Outcome Evaluation:  Plan of Care Reviewed With: patient, caregiver, grandchild(mahnaz)        Progress: improving

## 2025-01-26 NOTE — PLAN OF CARE
Goal Outcome Evaluation:            GI panel resulted positive for Norovirus, provider notified, no new orders. Patient remains on 2L NC. Patient stable at this time.

## 2025-01-26 NOTE — PLAN OF CARE
Goal Outcome Evaluation:  Plan of Care Reviewed With: patient           Outcome Evaluation: Pt is a 89 YO F admitted with UTI, general weakness. Pt with mild confusion this date, able to provide some appropriate answers to questioning, such as AD used but unable to state who she lives with. Per nursing pt lives wtih grandchildren who are currently sick as well. Pt is typically independent with ADLs, and ambualtes with SPC. No cane available this date, so PT provided HHA. Pt demonstrates good mobiltiy, requriing CGA for transfers and ambualtion. Pt appears near functional mobitliy baseline adn recommendation is return home with family assist. Pt may benefit from HHPT at d/c pending progress. PT to follow while admitted.    Anticipated Discharge Disposition (PT): home with home health, home with assist

## 2025-01-27 ENCOUNTER — APPOINTMENT (OUTPATIENT)
Dept: CARDIOLOGY | Facility: HOSPITAL | Age: OVER 89
End: 2025-01-27
Payer: MEDICARE

## 2025-01-27 ENCOUNTER — APPOINTMENT (OUTPATIENT)
Dept: GENERAL RADIOLOGY | Facility: HOSPITAL | Age: OVER 89
End: 2025-01-27
Payer: MEDICARE

## 2025-01-27 LAB
ALBUMIN SERPL-MCNC: 3.7 G/DL (ref 3.5–5.2)
ALBUMIN/GLOB SERPL: 1.4 G/DL
ALP SERPL-CCNC: 39 U/L (ref 39–117)
ALT SERPL W P-5'-P-CCNC: 14 U/L (ref 1–33)
ANION GAP SERPL CALCULATED.3IONS-SCNC: 7.7 MMOL/L (ref 5–15)
ARTERIAL PATENCY WRIST A: POSITIVE
ARTERIAL PATENCY WRIST A: POSITIVE
AST SERPL-CCNC: 22 U/L (ref 1–32)
ATMOSPHERIC PRESS: ABNORMAL MM[HG]
ATMOSPHERIC PRESS: ABNORMAL MM[HG]
BASE EXCESS BLDA CALC-SCNC: 8.7 MMOL/L (ref 0–3)
BASE EXCESS BLDA CALC-SCNC: 9.3 MMOL/L (ref 0–3)
BASOPHILS # BLD AUTO: 0.03 10*3/MM3 (ref 0–0.2)
BASOPHILS NFR BLD AUTO: 0.3 % (ref 0–1.5)
BDY SITE: ABNORMAL
BDY SITE: ABNORMAL
BILIRUB SERPL-MCNC: 0.2 MG/DL (ref 0–1.2)
BUN SERPL-MCNC: 7 MG/DL (ref 8–23)
BUN/CREAT SERPL: 16.7 (ref 7–25)
CALCIUM SPEC-SCNC: 8.3 MG/DL (ref 8.2–9.6)
CHLORIDE SERPL-SCNC: 103 MMOL/L (ref 98–107)
CO2 BLDA-SCNC: 41.4 MMOL/L (ref 22–29)
CO2 BLDA-SCNC: 43.3 MMOL/L (ref 22–29)
CO2 SERPL-SCNC: 32.3 MMOL/L (ref 22–29)
CREAT SERPL-MCNC: 0.42 MG/DL (ref 0.57–1)
DEPRECATED RDW RBC AUTO: 57.8 FL (ref 37–54)
EGFRCR SERPLBLD CKD-EPI 2021: 88.5 ML/MIN/1.73
EOSINOPHIL # BLD AUTO: 0.04 10*3/MM3 (ref 0–0.4)
EOSINOPHIL NFR BLD AUTO: 0.4 % (ref 0.3–6.2)
ERYTHROCYTE [DISTWIDTH] IN BLOOD BY AUTOMATED COUNT: 15.1 % (ref 12.3–15.4)
GLOBULIN UR ELPH-MCNC: 2.6 GM/DL
GLUCOSE SERPL-MCNC: 130 MG/DL (ref 65–99)
HCO3 BLDA-SCNC: 39 MMOL/L (ref 21–28)
HCO3 BLDA-SCNC: 40.5 MMOL/L (ref 21–28)
HCT VFR BLD AUTO: 44.7 % (ref 34–46.6)
HEMODILUTION: NO
HEMODILUTION: NO
HGB BLD-MCNC: 13.1 G/DL (ref 12–15.9)
IMM GRANULOCYTES # BLD AUTO: 0.05 10*3/MM3 (ref 0–0.05)
IMM GRANULOCYTES NFR BLD AUTO: 0.4 % (ref 0–0.5)
INHALED O2 CONCENTRATION: 100 %
INHALED O2 CONCENTRATION: 36 %
LYMPHOCYTES # BLD AUTO: 1.41 10*3/MM3 (ref 0.7–3.1)
LYMPHOCYTES NFR BLD AUTO: 12.6 % (ref 19.6–45.3)
Lab: ABNORMAL
Lab: ABNORMAL
MAGNESIUM SERPL-MCNC: 2 MG/DL (ref 1.7–2.3)
MCH RBC QN AUTO: 30.5 PG (ref 26.6–33)
MCHC RBC AUTO-ENTMCNC: 29.3 G/DL (ref 31.5–35.7)
MCV RBC AUTO: 104 FL (ref 79–97)
MODALITY: ABNORMAL
MODALITY: ABNORMAL
MONOCYTES # BLD AUTO: 1.12 10*3/MM3 (ref 0.1–0.9)
MONOCYTES NFR BLD AUTO: 10 % (ref 5–12)
MRSA DNA SPEC QL NAA+PROBE: NORMAL
NEUTROPHILS NFR BLD AUTO: 76.3 % (ref 42.7–76)
NEUTROPHILS NFR BLD AUTO: 8.56 10*3/MM3 (ref 1.7–7)
NOTIFIED WHO: ABNORMAL
NOTIFIED WHO: ABNORMAL
NRBC BLD AUTO-RTO: 0 /100 WBC (ref 0–0.2)
PCO2 BLDA: 78.5 MM HG (ref 35–48)
PCO2 BLDA: 93.7 MM HG (ref 35–48)
PH BLDA: 7.24 PH UNITS (ref 7.35–7.45)
PH BLDA: 7.3 PH UNITS (ref 7.35–7.45)
PHOSPHATE SERPL-MCNC: 2.7 MG/DL (ref 2.5–4.5)
PLATELET # BLD AUTO: 235 10*3/MM3 (ref 140–450)
PMV BLD AUTO: 9.2 FL (ref 6–12)
PO2 BLD: 104 MM[HG] (ref 0–500)
PO2 BLD: 169 MM[HG] (ref 0–500)
PO2 BLDA: 103.5 MM HG (ref 83–108)
PO2 BLDA: 60.8 MM HG (ref 83–108)
POTASSIUM SERPL-SCNC: 3.2 MMOL/L (ref 3.5–5.2)
POTASSIUM SERPL-SCNC: 4 MMOL/L (ref 3.5–5.2)
PROT SERPL-MCNC: 6.3 G/DL (ref 6–8.5)
RBC # BLD AUTO: 4.3 10*6/MM3 (ref 3.77–5.28)
READ BACK: ABNORMAL
READ BACK: YES
SAO2 % BLDCOA: 86.7 % (ref 94–98)
SAO2 % BLDCOA: 96.2 % (ref 94–98)
SODIUM SERPL-SCNC: 143 MMOL/L (ref 136–145)
WBC NRBC COR # BLD AUTO: 11.21 10*3/MM3 (ref 3.4–10.8)

## 2025-01-27 PROCEDURE — 82803 BLOOD GASES ANY COMBINATION: CPT | Performed by: STUDENT IN AN ORGANIZED HEALTH CARE EDUCATION/TRAINING PROGRAM

## 2025-01-27 PROCEDURE — 87641 MR-STAPH DNA AMP PROBE: CPT | Performed by: STUDENT IN AN ORGANIZED HEALTH CARE EDUCATION/TRAINING PROGRAM

## 2025-01-27 PROCEDURE — 25010000002 MORPHINE PER 10 MG

## 2025-01-27 PROCEDURE — 25010000002 METHYLPREDNISOLONE PER 40 MG: Performed by: STUDENT IN AN ORGANIZED HEALTH CARE EDUCATION/TRAINING PROGRAM

## 2025-01-27 PROCEDURE — 25010000002 ENOXAPARIN PER 10 MG

## 2025-01-27 PROCEDURE — 83735 ASSAY OF MAGNESIUM: CPT | Performed by: STUDENT IN AN ORGANIZED HEALTH CARE EDUCATION/TRAINING PROGRAM

## 2025-01-27 PROCEDURE — 25010000002 FUROSEMIDE PER 20 MG: Performed by: STUDENT IN AN ORGANIZED HEALTH CARE EDUCATION/TRAINING PROGRAM

## 2025-01-27 PROCEDURE — 71045 X-RAY EXAM CHEST 1 VIEW: CPT

## 2025-01-27 PROCEDURE — 84100 ASSAY OF PHOSPHORUS: CPT | Performed by: STUDENT IN AN ORGANIZED HEALTH CARE EDUCATION/TRAINING PROGRAM

## 2025-01-27 PROCEDURE — 80053 COMPREHEN METABOLIC PANEL: CPT

## 2025-01-27 PROCEDURE — 25010000002 PIPERACILLIN SOD-TAZOBACTAM PER 1 G: Performed by: STUDENT IN AN ORGANIZED HEALTH CARE EDUCATION/TRAINING PROGRAM

## 2025-01-27 PROCEDURE — 94640 AIRWAY INHALATION TREATMENT: CPT

## 2025-01-27 PROCEDURE — 87040 BLOOD CULTURE FOR BACTERIA: CPT | Performed by: STUDENT IN AN ORGANIZED HEALTH CARE EDUCATION/TRAINING PROGRAM

## 2025-01-27 PROCEDURE — 94761 N-INVAS EAR/PLS OXIMETRY MLT: CPT

## 2025-01-27 PROCEDURE — 94799 UNLISTED PULMONARY SVC/PX: CPT

## 2025-01-27 PROCEDURE — 84132 ASSAY OF SERUM POTASSIUM: CPT | Performed by: STUDENT IN AN ORGANIZED HEALTH CARE EDUCATION/TRAINING PROGRAM

## 2025-01-27 PROCEDURE — 93306 TTE W/DOPPLER COMPLETE: CPT

## 2025-01-27 PROCEDURE — 25010000002 AMPICILLIN-SULBACTAM PER 1.5 G

## 2025-01-27 PROCEDURE — 85025 COMPLETE CBC W/AUTO DIFF WBC: CPT

## 2025-01-27 PROCEDURE — 92610 EVALUATE SWALLOWING FUNCTION: CPT

## 2025-01-27 PROCEDURE — 36600 WITHDRAWAL OF ARTERIAL BLOOD: CPT | Performed by: STUDENT IN AN ORGANIZED HEALTH CARE EDUCATION/TRAINING PROGRAM

## 2025-01-27 PROCEDURE — 93306 TTE W/DOPPLER COMPLETE: CPT | Performed by: INTERNAL MEDICINE

## 2025-01-27 RX ORDER — METHYLPREDNISOLONE SODIUM SUCCINATE 40 MG/ML
40 INJECTION, POWDER, LYOPHILIZED, FOR SOLUTION INTRAMUSCULAR; INTRAVENOUS EVERY 12 HOURS SCHEDULED
Status: DISCONTINUED | OUTPATIENT
Start: 2025-01-27 | End: 2025-01-31

## 2025-01-27 RX ORDER — FUROSEMIDE 10 MG/ML
80 INJECTION INTRAMUSCULAR; INTRAVENOUS ONCE
Status: COMPLETED | OUTPATIENT
Start: 2025-01-27 | End: 2025-01-27

## 2025-01-27 RX ORDER — IPRATROPIUM BROMIDE AND ALBUTEROL SULFATE 2.5; .5 MG/3ML; MG/3ML
3 SOLUTION RESPIRATORY (INHALATION)
Status: DISCONTINUED | OUTPATIENT
Start: 2025-01-27 | End: 2025-02-04

## 2025-01-27 RX ORDER — POTASSIUM CHLORIDE 1500 MG/1
40 TABLET, EXTENDED RELEASE ORAL EVERY 4 HOURS
Status: COMPLETED | OUTPATIENT
Start: 2025-01-27 | End: 2025-01-27

## 2025-01-27 RX ORDER — BUDESONIDE 0.5 MG/2ML
0.5 INHALANT ORAL
Status: DISCONTINUED | OUTPATIENT
Start: 2025-01-27 | End: 2025-02-05 | Stop reason: HOSPADM

## 2025-01-27 RX ORDER — FUROSEMIDE 10 MG/ML
40 INJECTION INTRAMUSCULAR; INTRAVENOUS EVERY 12 HOURS
Status: DISCONTINUED | OUTPATIENT
Start: 2025-01-28 | End: 2025-01-30

## 2025-01-27 RX ADMIN — AMOXICILLIN AND CLAVULANATE POTASSIUM 1 TABLET: 875; 125 TABLET, FILM COATED ORAL at 12:02

## 2025-01-27 RX ADMIN — ENOXAPARIN SODIUM 30 MG: 100 INJECTION SUBCUTANEOUS at 17:53

## 2025-01-27 RX ADMIN — PIPERACILLIN AND TAZOBACTAM 3.38 G: 3; .375 INJECTION, POWDER, FOR SOLUTION INTRAVENOUS at 17:52

## 2025-01-27 RX ADMIN — PIPERACILLIN AND TAZOBACTAM 3.38 G: 3; .375 INJECTION, POWDER, LYOPHILIZED, FOR SOLUTION INTRAVENOUS at 23:05

## 2025-01-27 RX ADMIN — Medication 10 ML: at 08:41

## 2025-01-27 RX ADMIN — POTASSIUM CHLORIDE 40 MEQ: 1500 TABLET, EXTENDED RELEASE ORAL at 08:41

## 2025-01-27 RX ADMIN — Medication 1 TABLET: at 08:41

## 2025-01-27 RX ADMIN — AMPICILLIN SODIUM AND SULBACTAM SODIUM 1.5 G: 1; .5 INJECTION, POWDER, FOR SOLUTION INTRAMUSCULAR; INTRAVENOUS at 00:58

## 2025-01-27 RX ADMIN — Medication 10 ML: at 20:54

## 2025-01-27 RX ADMIN — IPRATROPIUM BROMIDE AND ALBUTEROL SULFATE 3 ML: .5; 3 SOLUTION RESPIRATORY (INHALATION) at 18:55

## 2025-01-27 RX ADMIN — LEVOTHYROXINE SODIUM 75 MCG: 0.07 TABLET ORAL at 06:14

## 2025-01-27 RX ADMIN — FUROSEMIDE 80 MG: 10 INJECTION, SOLUTION INTRAVENOUS at 13:00

## 2025-01-27 RX ADMIN — Medication 1000 MCG: at 08:41

## 2025-01-27 RX ADMIN — AMPICILLIN SODIUM AND SULBACTAM SODIUM 1.5 G: 1; .5 INJECTION, POWDER, FOR SOLUTION INTRAMUSCULAR; INTRAVENOUS at 06:15

## 2025-01-27 RX ADMIN — BUDESONIDE 3 MG: 3 CAPSULE ORAL at 08:41

## 2025-01-27 RX ADMIN — MORPHINE SULFATE 1 MG: 2 INJECTION, SOLUTION INTRAMUSCULAR; INTRAVENOUS at 02:12

## 2025-01-27 RX ADMIN — POTASSIUM CHLORIDE 40 MEQ: 1500 TABLET, EXTENDED RELEASE ORAL at 12:02

## 2025-01-27 RX ADMIN — BUDESONIDE 0.5 MG: 0.5 INHALANT RESPIRATORY (INHALATION) at 18:55

## 2025-01-27 RX ADMIN — METHYLPREDNISOLONE SODIUM SUCCINATE 40 MG: 40 INJECTION, POWDER, FOR SOLUTION INTRAMUSCULAR; INTRAVENOUS at 17:53

## 2025-01-27 NOTE — PLAN OF CARE
Goal Outcome Evaluation:     Clinical swallow evaluation completed. Pt appeared very lethargic, not able to follow commands, confused. No verbalizations noted. Pt had eyes closed most of the evaluation. Upon room entry, pt was lying in bed asleep. No family at bedside. SLP adjusted pt to sitting at 90 degrees for swallow evaluation. Pt was NPO at the time of evaluation. Difficult to assess dentition due to poor command following. Pt is poor historian, therefore SLP not aware of pt's baseline diet. VFSS completed by ST department 6 months ago in which we recommended [facility] diet and thin liquids. Oral motor exam revealed overall generalized oral motor weakness. Trials assessed:  thin by cup x1, puree x2. Pt had poor bolus control of all trials, all trials given via SLP. No labial seal noted w/straw given MAX cues. Anterior loss w/ cup. Oral transit appeared delayed w/ oral holding. SLP cued pt x3 to swallow during thin liquid and puree trials which was unsuccessful. SLP followed w/ liquid wash in which pt demonstrated swallow and delayed cough response x1. No further trials given due to safety. Nursing staff made aware of recommending NPO. Per above, pt presents w/ MOD-SEVERE oral stage dysphagia and suspecting pharyngeal phase at this time. It is recommended pt remain NPO w/ FWP. Meds via alternate route. ST will continue to follow for swallow re-evaluation.     SLP Plan & Recommendation:      - NPO w/ exception of FWP- see below   - Water/ice only when pt is fully awake and alert  - Meds: via alternate route  - Safe swallow strategies: HOB at a 90 degree angle, slow rate of intake, small sips  -Oral care at least x2 daily     The rationale to recommend water/ice chips when a PO diet cannot appropriately/functionally sustain nutrition is because water is low risk for aspiration pna when compared to aspiration of food or other liquids.       Benefits of a water protocol include but are not limited to:      Oral  gratification   Engagement of oropharyngeal swallow musculature   Decrease likelihood of dehydration       Guidelines for proper implementation include:  Thorough oral care prior to consuming water  Upright at 90 degree hip flexion  Small sips at slow rate     Monitor for any changes in respiratory status and discontinue if distress noted           Anticipated Discharge Disposition (SLP): unknown          SLP Swallowing Diagnosis: oral dysphagia, pharyngeal dysphagia, mod-severe (01/27/25 1200)        Plan for Continued Treatment (SLP): continue treatment per plan of care (01/27/25 1200)

## 2025-01-27 NOTE — PROGRESS NOTES
Continuous BIPAP ordered. Patient not appropriate for BIPAP at this time due to inability to remove mask herself.   Faith Trinidad, CRT

## 2025-01-27 NOTE — DISCHARGE PLACEMENT REQUEST
"Arabella Herrera (89 y.o. Female)       Date of Birth   03/21/1926    Social Security Number       Address   [address] [place] [address] [place] [facility]    Home Phone   [phone]    MRN   [id number]       Congregation   None    Marital Status                               Admission Date   1/24/25    Admission Type   Emergency    Admitting Provider   Gray Yee MD    Attending Provider   Chidi Brunner MD    Department, Room/Bed   [facility] INPATIENT, 363/1       Discharge Date       Discharge Disposition       Discharge Destination                                 Attending Provider: Chidi Brunner MD    Allergies: Contrast Dye (Echo Or Unknown Ct/mr), Sulfa Antibiotics    Isolation: Spore   Infection: Norovirus (01/25/25)   Code Status: CPR    Ht: 147.3 cm (58\")   Wt: 47.4 kg (104 lb 8 oz)    Admission Cmt: None   Principal Problem: UTI (urinary tract infection) [N39.0]                   Active Insurance as of 1/24/2025       Primary Coverage       Payor Plan Insurance Group Employer/Plan Group    HU[employer]NA MEDICARE REPLACEMENT HU[employer]NA MED ADV HMO [id number]       Payor Plan Address Payor Plan Phone Number Payor Plan Fax Number Effective Dates    [address] [address] [phone]  1/1/2025 - None Entered    [facility] [id number]         Subscriber Name Subscriber Birth Date Member ID       ARABELLA HERRERA 3/21/1926 [id number]                     Emergency Contacts        (Rel.) Home Phone Work Phone Mobile Phone    Aislinn Rae (Daughter) -- -- [phone]    Vince Rae (Other) -- -- [phone]          "

## 2025-01-27 NOTE — THERAPY EVALUATION
Acute Care - Speech Language Pathology   Swallow Initial Evaluation  Fred     Patient Name: Alexey Trinidad  : 3/21/1926  MRN: [id number]  Today's Date: 2025               Admit Date: 2025    Visit Dx:     ICD-10-CM ICD-9-CM   1. Urinary tract infection without hematuria, site unspecified  N39.0 599.0   2. General weakness  R53.1 780.79   3. Vomiting, unspecified vomiting type, unspecified whether nausea present  R11.10 787.03     Patient Active Problem List   Diagnosis    UTI (urinary tract infection)    Abdominal pain     Past Medical History:   Diagnosis Date    Disease of thyroid gland     Hypertension      Past Surgical History:   Procedure Laterality Date    BACK SURGERY      kyphoplasty    BREAST SURGERY      benign cyst removed    HYSTERECTOMY      LAPAROSCOPIC CHOLECYSTECTOMY         SLP Recommendation and Plan  SLP Swallowing Diagnosis: oral dysphagia, pharyngeal dysphagia, mod-severe (25 1200)  SLP Diet Recommendation: NPO, ice chips between meals after oral care, with supervision, water between meals after oral care, with supervision (25 1200)  Recommended Precautions and Strategies: general aspiration precautions (25 1200)  SLP Rec. for Method of Medication Administration: meds via alternate route (25 1200)        Recommended Diagnostics: reassess via clinical swallow evaluation (25 1200)  Swallow Criteria for Skilled Therapeutic Interventions Met: demonstrates skilled criteria (25 1200)  Anticipated Discharge Disposition (SLP): unknown (25 1200)  Rehab Potential/Prognosis, Swallowing: good, to achieve stated therapy goals (25 1200)  Therapy Frequency (Swallow): PRN (25 1200)  Predicted Duration Therapy Intervention (Days): until discharge (25 1200)  Oral Care Recommendations: Oral Care before breakfast, after meals and PRN (25 1200)           Plan for Continued Treatment (SLP): continue treatment per plan of care (25  1200)       SWALLOW EVALUATION (Last 72 Hours)       SLP Adult Swallow Evaluation       Row Name 01/27/25 1200       Rehab Evaluation    Document Type evaluation  -MS    Subjective Information no complaints  -MS    Patient Observations decreased LOC;lethargic;obtunded  -MS    Patient/Family/Caregiver Comments/Observations n/a  -MS    Patient Effort poor  -MS    Symptoms Noted During/After Treatment none  -MS       General Information    Patient Profile Reviewed yes  -MS    Pertinent History Of Current Problem  Alexey Trinidad is a 89 y.o. female with a CMH of hypertension, hypothyroidism, COPD, who presented to [facility] on 1/24/2025 with generalized weakness.  Patient is alert and oriented x 2 at this time therefore collateral information was obtained from chart checks.  Per chart, family reported patient having generalized weakness and progressively got worse in the past 2 days.  Associated with abdominal pain, chills, nausea vomiting and diarrhea.  No reported fever.  In the ED, sodium 147, WBC 13.5 K, .  LFTs with unremarkable urinalysis suggestive of UTI, CT abdomen pelvis with contrast was notable for a mild intra and extrahepatic biliary ductal dilation, a 0.4 cm calcification of the tip of the ampulla of Vater which may represent a stone.  Recommends ERCP or MRCP.  Stable left adrenal nodule, urinary bladder wall thickening suggestive of cystitis. ST consulted for dysphagia evaluation.           -MS    Current Method of Nutrition NPO  -MS    Precautions/Limitations, Vision difficult to assess  -MS    Precautions/Limitations, Hearing difficult to assess  -MS    Prior Level of Function-Communication unknown  -MS    Prior Level of Function-Swallowing unknown  -MS    Plans/Goals Discussed with patient  -MS       Oral Motor Structure and Function    Dentition Assessment edentulous  -MS    Secretion Management WNL/WFL  -MS    Mucosal Quality dry  -MS    Volitional Swallow weak;delayed  -MS     Volitional Cough non-productive;weak  -MS       Oral Musculature and Cranial Nerve Assessment    Oral Motor General Assessment generalized oral motor weakness  -MS       General Eating/Swallowing Observations    Respiratory Support Currently in Use nasal cannula  -MS    Eating/Swallowing Skills fed by SLP  -MS    Positioning During Eating upright 90 degree;upright in bed  -MS    Utensils Used spoon;cup;straw  -MS    Consistencies Trialed thin liquids;pureed  -MS       Respiratory    Respiratory Status WFL  -MS       Clinical Swallow Eval    Oral Prep Phase impaired  -MS    Oral Transit impaired  -MS    Oral Residue impaired  -MS    Pharyngeal Phase suspected pharyngeal impairment  -MS    Esophageal Phase unremarkable  -MS    Clinical Swallow Evaluation Summary Clinical swallow evaluation completed. Pt appeared very lethargic, not able to follow commands, confused. No verbalizations noted. Pt had eyes closed most of the evaluation. Upon room entry, pt was lying in bed asleep. No family at bedside. SLP adjusted pt to sitting at 90 degrees for swallow evaluation. Pt was NPO at the time of evaluation. Difficult to assess dentition due to poor command following. Pt is poor historian, therefore SLP not aware of pt's baseline diet. VFSS completed by [facility] 6 months ago in which we recommended [facility] diet and thin liquids. Oral motor exam revealed overall generalized oral motor weakness. Trials assessed:  thin by cup x1, puree x2. Pt had poor bolus control of all trials, all trials given via SLP. No labial seal noted w/straw given MAX cues. Anterior loss w/ cup. Oral transit appeared delayed w/ oral holding. SLP cued pt x3 to swallow during thin liquid and puree trials which was unsuccessful. SLP followed w/ liquid wash in which pt demonstrated swallow and delayed cough response x1. No further trials given due to safety. Nursing staff made aware of recommending NPO. Per above, pt presents w/ MOD-SEVERE oral stage  dysphagia and suspecting pharyngeal phase at this time. It is recommended pt remain NPO w/ FWP. Meds via alternate route. ST will continue to follow for swallow re-evaluation.     SLP Plan & Recommendation:      - NPO w/ exception of FWP- see below   - Water/ice only when pt is fully awake and alert  - Meds: via alternate route  - Safe swallow strategies: HOB at a 90 degree angle, slow rate of intake, small sips  -Oral care at least x2 daily     The rationale to recommend water/ice chips when a PO diet cannot appropriately/functionally sustain nutrition is because water is low risk for aspiration pna when compared to aspiration of food or other liquids.       Benefits of a water protocol include but are not limited to:      Oral gratification   Engagement of oropharyngeal swallow musculature   Decrease likelihood of dehydration       Guidelines for proper implementation include:  Thorough oral care prior to consuming water  Upright at 90 degree hip flexion  Small sips at slow rate     Monitor for any changes in respiratory status and discontinue if distress noted      -MS       Oral Prep Concerns    Oral Prep Concerns oral holding  -MS    Oral Holding thin;pudding  -MS       Oral Transit Concerns    Oral Transit Concerns delayed initiation of bolus transit  -MS    Delayed Intiation of Bolus Transit thin;pudding  -MS       Pharyngeal Phase Concerns    Pharyngeal Phase Concerns cough  -MS    Cough thin  -MS       SLP Evaluation Clinical Impression    SLP Swallowing Diagnosis oral dysphagia;pharyngeal dysphagia;mod-severe  -MS    Functional Impact risk of aspiration/pneumonia;risk of malnutrition;risk of dehydration  -MS    Rehab Potential/Prognosis, Swallowing good, to achieve stated therapy goals  -MS    Swallow Criteria for Skilled Therapeutic Interventions Met demonstrates skilled criteria  -MS       SLP Treatment Clinical Impressions    Barriers to Overall Progress (SLP) Cognitive status;Advanced age  -MS    Plan  for Continued Treatment (SLP) continue treatment per plan of care  -MS    Care Plan Review evaluation/treatment results reviewed  -MS       Recommendations    Therapy Frequency (Swallow) PRN  -MS    Predicted Duration Therapy Intervention (Days) until discharge  -MS    SLP Diet Recommendation NPO;ice chips between meals after oral care, with supervision;water between meals after oral care, with supervision  -MS    Recommended Diagnostics reassess via clinical swallow evaluation  -MS    Recommended Precautions and Strategies general aspiration precautions  -MS    Oral Care Recommendations Oral Care before breakfast, after meals and PRN  -MS    SLP Rec. for Method of Medication Administration meds via alternate route  -MS    Anticipated Discharge Disposition (SLP) unknown  -MS       Swallow Goals (SLP)    Swallow LTGs Patient will demonstrate functional swallow for  -MS    Swallow STGs diet tolerance goal selection (SLP)  -MS    Diet Tolerance Goal Selection (SLP) Patient will tolerate trials of  -MS       (LTG) Patient will demonstrate functional swallow for    Diet Texture (Demonstrate functional swallow) pureed textures  -MS    Liquid viscosity (Demonstrate functional swallow) thin liquids  -MS    [place] (Demonstrate functional swallow) with minimal cues (75-90% accuracy)  -MS    Time Frame (Demonstrate functional swallow) by discharge  -MS    Progress/Outcomes (Demonstrate functional swallow) new goal  -MS       (STG) Patient will tolerate trials of    Consistencies Trialed (Tolerate trials) thin liquids;pureed textures  -MS    Desired Outcome (Tolerate trials) without signs/symptoms of aspiration;without signs of distress;with adequate oral prep/transit/clearance  -MS    [place] (Tolerate trials) with minimal cues (75-90% accuracy)  -MS    Time Frame (Tolerate trials) by discharge  -MS    Progress/Outcomes (Tolerate trials) new goal  -MS              User Key  (r) = Recorded By, (t) = Taken By, (c) =  Cosigned By      Initials Name Effective Dates    Joe Aarno SLP 04/22/24 -                     EDUCATION  The patient has been educated in the following areas:   Dysphagia (Swallowing Impairment) Oral Care/Hydration NPO rationale.        SLP GOALS       Row Name 01/27/25 1200       (LTG) Patient will demonstrate functional swallow for    Diet Texture (Demonstrate functional swallow) pureed textures  -MS    Liquid viscosity (Demonstrate functional swallow) thin liquids  -MS    [place] (Demonstrate functional swallow) with minimal cues (75-90% accuracy)  -MS    Time Frame (Demonstrate functional swallow) by discharge  -MS    Progress/Outcomes (Demonstrate functional swallow) new goal  -MS       (STG) Patient will tolerate trials of    Consistencies Trialed (Tolerate trials) thin liquids;pureed textures  -MS    Desired Outcome (Tolerate trials) without signs/symptoms of aspiration;without signs of distress;with adequate oral prep/transit/clearance  -MS    [place] (Tolerate trials) with minimal cues (75-90% accuracy)  -MS    Time Frame (Tolerate trials) by discharge  -MS    Progress/Outcomes (Tolerate trials) new goal  -MS              User Key  (r) = Recorded By, (t) = Taken By, (c) = Cosigned By      Initials Name Provider Type    Joe Aaron SLP Speech and Language Pathologist                       CAMRON Mast  1/27/2025

## 2025-01-27 NOTE — PLAN OF CARE
Goal Outcome Evaluation:  Plan of Care Reviewed With: patient           Outcome Evaluation: pt oxygen increased throughout shift, O2 at 4L per n/c in use; remains on IV abx; up with assist; continue to monitor

## 2025-01-27 NOTE — PLAN OF CARE
Goal Outcome Evaluation:         Pt arrived to PCU at this time from [facility]. Pt on airvo. VSS at this time.     Problem: Adult Inpatient Plan of Care  Goal: Plan of Care Review  Outcome: Progressing  Goal: Patient-Specific Goal (Individualized)  Outcome: Progressing  Goal: Absence of Hospital-Acquired Illness or Injury  Outcome: Progressing  Goal: Optimal Comfort and Wellbeing  Outcome: Progressing  Goal: Readiness for Transition of Care  Outcome: Progressing

## 2025-01-27 NOTE — PROGRESS NOTES
[facility] MEDICINE SERVICE  DAILY PROGRESS NOTE    NAME: Alexey Trinidad  : 3/21/1926  MRN: [id number]      LOS: 3 days     PROVIDER OF SERVICE: Chidi Brunner MD    Chief Complaint: UTI (urinary tract infection)    Subjective:     Interval History:  History taken from: patient  No acute events overnight, patient is awake, alert but confused.     Review of Systems:   Review of Systems    Objective:     Vital Signs  Temp:  [97.1 °F (36.2 °C)-98.2 °F (36.8 °C)] 98.1 °F (36.7 °C)  Heart Rate:  [] 92  Resp:  [15-23] 23  BP: (121-146)/(64-75) 128/69  Flow (L/min) (Oxygen Therapy):  [3-10] 10   Body mass index is 21.84 kg/m².    Physical Exam  General Appearance:  Awake, disoriented   Head:  Atraumatic normocephalic   Eyes:        No sclera icterus   Neck: Normal rom   Pulm: Ronchi, crackles on, decreased breath sounds   Cardio: Systolic murmur, HR normal, rhythm regular   Extremities: No remarkable edema on ble   Abdomen: Soft, non distended                           Scheduled Meds   amoxicillin-clavulanate, 1 tablet, Oral, Q12H  balsalazide, 750 mg, Oral, Nightly  Budesonide, 3 mg, Oral, BID  enoxaparin, 30 mg, Subcutaneous, Daily  levothyroxine, 75 mcg, Oral, Q AM  multivitamin with minerals, 1 tablet, Oral, Daily  sodium chloride, 10 mL, Intravenous, Q12H  vitamin B-12, 1,000 mcg, Oral, Daily       PRN Meds     acetaminophen **OR** acetaminophen **OR** acetaminophen    senna-docusate sodium **AND** polyethylene glycol **AND** bisacodyl **AND** bisacodyl    Calcium Replacement - Follow Nurse / BPA Driven Protocol    dextrose    dextrose    glucagon (human recombinant)    Magnesium Standard Dose Replacement - Follow Nurse / BPA Driven Protocol    Morphine **AND** naloxone    nitroglycerin    ondansetron    Phosphorus Replacement - Follow Nurse / BPA Driven Protocol    Potassium Replacement - Follow Nurse / BPA Driven Protocol    [COMPLETED] Insert Peripheral IV **AND** sodium chloride    sodium  chloride    sodium chloride   Infusions           Diagnostic Data    Results from last 7 days   Lab Units 01/27/25  0216   WBC 10*3/mm3 11.21*   HEMOGLOBIN g/dL 13.1   HEMATOCRIT % 44.7   PLATELETS 10*3/mm3 235   GLUCOSE mg/dL 130*   CREATININE mg/dL 0.42*   BUN mg/dL 7*   SODIUM mmol/L 143   POTASSIUM mmol/L 3.2*   AST (SGOT) U/L 22   ALT (SGPT) U/L 14   ALK PHOS U/L 39   BILIRUBIN mg/dL 0.2   ANION GAP mmol/L 7.7       XR Chest 1 View    Result Date: 1/26/2025  Impression: 1. Cardiomegaly and pulmonary vascular congestion. 2. Hazy bilateral airspace disease favored to be secondary to pulmonary edema. Electronically Signed: Mathew Dillard MD  1/26/2025 12:22 PM EST  Workstation ID: [id number]       I reviewed the patient's new clinical results.    Assessment/Plan:   Alexey Trinidad is a 89 y.o. female with a CMH of hypertension, hypothyroidism, COPD, who presented to [facility] on 1/24/2025 with generalized weakness.  Patient is alert and oriented x 2 at this time therefore collateral information was obtained from chart checks.  Per chart, family reported patient having generalized weakness and progressively got worse in the past 2 days.  Associated with abdominal pain, chills, nausea vomiting and diarrhea.  No reported fever.  In the ED, sodium 147, WBC 13.5 K, .  LFTs with unremarkable urinalysis suggestive of UTI, CT abdomen pelvis with contrast was notable for a mild intra and extrahepatic biliary ductal dilation, a 0.4 cm calcification of the tip of the ampulla of Vater which may represent a stone.  Recommends ERCP or MRCP.  Stable left adrenal nodule, urinary bladder wall thickening suggestive of cystitis.     #Acute hypoxic respiratory failure, ?aspiration, pulm edema  #Aortic stenosis on exam, echo pending  #UTI  #Abdominal pain, nausea, vomiting likely due to biliary dilatation/Stone at the ampulla of vater, GI Following  #Altered mental status likely secondary to infection versus  chronic  #Physical deconditioning  #Left adrenal nodule, Stable  #Hypertension   #Hypothyroidism      Plan:   -Oxygen demands going up, CXR pending official read, showing pulm edema  -ABG obtained showing CO2 retention, patient not able to tolerate Biapap given confused mental status, Will place patient on Airvo HFNC  -Give lasix 80 once, start on Lasix 40 BID starting in AM  -Monitor Strict I/Os, Start solumedrol 40 BID for possible Early ARDS,  add Zosyn pending Respiratory cultures,, will deescalate if resp culture negative, initially on abx for UTI with last day tomorrow  -NPO strict, SLP consult with aspiration concern, be cautious with fluids  -Obtain echo to eval for LVEF, has Aortic stenosis possible contributing factor as well   -Continue abx for UTI/now possible aspiration PNA,  Urine cultures: proteus - CTAP on admission showing no renal stones  -GI reccs noted, no plan for ERCP given normal enzymes  -hx of colleagenous colitis, Stool PCR Viral Norovirus gastroenteritis, C.diff negative, per GI - ok to resume home meds, including budesonide (will hold given patient started on steroids, and NPO, will resume when able to tolerate PO) pending SLP eval      Addendum:  -Repeat ABG around 6 pm today,  improving, continue Airvo HFC        CODE Status: Per daughter Code status: DNR/DNI, ok to use NGT for nutrition and continue otherwise full medical treatment    VTE Prophylaxis:  Pharmacologic VTE prophylaxis orders are present.             Code status is   Code Status and Medical Interventions: CPR (Attempt to Resuscitate); Full Support   Ordered at: 01/24/25 5472     Code Status (Patient has no pulse and is not breathing):    CPR (Attempt to Resuscitate)     Medical Interventions (Patient has pulse or is breathing):    Full Support       Plan for disposition:GI reccs    Time: 30 minutes    Part of this note may be an electronic transcription/translation of spoken language to printed text using the Dragon  Dictation System.    Signature: Electronically signed by Chidi Brunner MD, 01/27/25, 12:14 EST.  [facility] Hospitalist Team

## 2025-01-27 NOTE — CASE MANAGEMENT/SOCIAL WORK
Discharge Planning Assessment   Fred     Patient Name: Alexey Trinidad  MRN: [id number]  Today's Date: 1/27/2025    Admit Date: 1/24/2025    Plan: Return home with family. Additional [facility] choices pending. Watch for new home O2.   Discharge Needs Assessment       Row Name 01/27/25 1237       Living Environment    People in Home child(mahnaz), adult;grandchild(mahnaz)    Name(s) of People in Home DaughterDc    Unique Family Situation Aislinn currently in rehab. Pt staying with her grandson Vince and his wife Laquita    Current Living Arrangements home    Potentially Unsafe Housing Conditions none    In the past 12 months has the electric, gas, oil, or water company threatened to shut off services in your home? No    Primary Care Provided by self;child(mahnaz)    Provides Primary Care For no one    Family Caregiver if Needed child(mahnaz), adult;grandchild(mahnaz), adult    Family Caregiver Names Katt; Grandson-Vince    Quality of Family Relationships helpful;involved;supportive    Able to Return to Prior Arrangements yes       Resource/Environmental Concerns    Resource/Environmental Concerns none    Transportation Concerns none       Transportation Needs    In the past 12 months, has lack of transportation kept you from medical appointments or from getting medications? no    In the past 12 months, has lack of transportation kept you from meetings, work, or from getting things needed for daily living? No       Food Insecurity    Within the past 12 months, you worried that your food would run out before you got the money to buy more. Never true    Within the past 12 months, the food you bought just didn't last and you didn't have money to get more. Never true       Transition Planning    Patient/Family Anticipates Transition to home with family;home with help/services    Patient/Family Anticipated Services at Transition home health care    Transportation Anticipated family or friend will provide       Discharge Needs  Assessment    Readmission Within the Last 30 Days no previous admission in last 30 days    Equipment Currently Used at Home shower chair;cane, straight;rollator;bp cuff    Concerns to be Addressed discharge planning;care coordination/care conferences    Do you want help finding or keeping work or a job? Patient declined    Do you want help with school or training? For example, starting or completing job training or getting a high school diploma, GED or equivalent No    Anticipated Changes Related to Illness none    Equipment Needed After Discharge none    Outpatient/Agency/Support Group Needs homecare agency    Discharge Facility/Level of Care Needs home with home health    Provided Post Acute Provider List? N/A    Provided Post Acute Provider Quality & Resource List? N/A    Patient's Choice of Community Agency(s) Daughter prefers [facility]                   Discharge Plan       Row Name 01/27/25 1239       Plan    Plan Return home with family. Additional [facility] choices pending. Watch for new home O2.    Patient/Family in Agreement with Plan yes    Plan Comments CM contacted patient's daughter Aislinn by phone to complete assessment. Pt normally lives at home with daughter, but daughter had recent hospitalization and is at SNF until Sat. 02/1. Pt has been staying with kamryn Clarke and his wife Laquita temporarily. Pt does not drive and requires some assistance with ADL's. PCP and pharmacy confirmed- agreeable to use [facility]. Daughter reports high cost for meds: budesonide and balsalazide for her ulcerative colitis. DME includes straight cane, rollator when outside, BP cuff, and shower chair. No current HHC/PT services at this time but daughter requested referral to [facility] as they will also be seeing her. Referral sent in [facility] basket and liawilliam Flores notified, but she reports they cannot accept pt's insurance. Will follow up with daughter for additional choices. Kamryn Clarke to transport  at discharge.                  Continued Care and Services - Admitted Since 1/24/2025       Home Medical Care       Service Provider Request Status Services Address Phone Fax Patient Preferred    [facility] Pending - Request Sent -- [address] [place] [address] [phone]                 Demographic Summary       Row Name 01/27/25 1237       General Information    Admission Type inpatient    Arrived From emergency department    Referral Source admission list    Reason for Consult care coordination/care conference;discharge planning    Preferred Language English       Contact Information    Permission Granted to Share Info With                    Functional Status       Row Name 01/27/25 1237       Functional Status    Usual Activity Tolerance moderate    Current Activity Tolerance moderate       Functional Status, IADL    Medications assistive person    Meal Preparation assistive person    Housekeeping assistive person    Laundry assistive person    Shopping assistive person    If for any reason you need help with day-to-day activities such as bathing, preparing meals, shopping, managing finances, etc., do you get the help you need? I get all the help I need             Megan Naegele, RN     Office Phone: [phone]  Office Cell: [phone]

## 2025-01-28 LAB
ALBUMIN SERPL-MCNC: 3.6 G/DL (ref 3.5–5.2)
ALBUMIN/GLOB SERPL: 1.2 G/DL
ALP SERPL-CCNC: 44 U/L (ref 39–117)
ALT SERPL W P-5'-P-CCNC: 13 U/L (ref 1–33)
ANION GAP SERPL CALCULATED.3IONS-SCNC: 8.2 MMOL/L (ref 5–15)
AORTIC DIMENSIONLESS INDEX: 0.49 (DI)
AST SERPL-CCNC: 24 U/L (ref 1–32)
AV MEAN PRESS GRAD SYS DOP V1V2: 19 MMHG
AV VMAX SYS DOP: 284 CM/SEC
BASOPHILS # BLD AUTO: 0.01 10*3/MM3 (ref 0–0.2)
BASOPHILS NFR BLD AUTO: 0.1 % (ref 0–1.5)
BH CV ECHO MEAS - ACS: 0.8 CM
BH CV ECHO MEAS - AI P1/2T: 359.6 MSEC
BH CV ECHO MEAS - AO MAX PG: 32.3 MMHG
BH CV ECHO MEAS - AO V2 VTI: 53.3 CM
BH CV ECHO MEAS - AVA(I,D): 0.83 CM2
BH CV ECHO MEAS - EDV(CUBED): 22 ML
BH CV ECHO MEAS - EDV(MOD-SP4): 27.3 ML
BH CV ECHO MEAS - EF(MOD-SP4): 70.1 %
BH CV ECHO MEAS - ESV(CUBED): 4.9 ML
BH CV ECHO MEAS - ESV(MOD-SP4): 8.2 ML
BH CV ECHO MEAS - FS: 39.3 %
BH CV ECHO MEAS - IVS/LVPW: 1 CM
BH CV ECHO MEAS - IVSD: 1.2 CM
BH CV ECHO MEAS - LA DIMENSION: 2 CM
BH CV ECHO MEAS - LAT PEAK E' VEL: 5.8 CM/SEC
BH CV ECHO MEAS - LV DIASTOLIC VOL/BSA (35-75): 19.8 CM2
BH CV ECHO MEAS - LV MASS(C)D: 99.3 GRAMS
BH CV ECHO MEAS - LV MAX PG: 7.6 MMHG
BH CV ECHO MEAS - LV MEAN PG: 4 MMHG
BH CV ECHO MEAS - LV SYSTOLIC VOL/BSA (12-30): 5.9 CM2
BH CV ECHO MEAS - LV V1 MAX: 138 CM/SEC
BH CV ECHO MEAS - LV V1 VTI: 22.1 CM
BH CV ECHO MEAS - LVIDD: 2.8 CM
BH CV ECHO MEAS - LVIDS: 1.7 CM
BH CV ECHO MEAS - LVOT AREA: 2.01 CM2
BH CV ECHO MEAS - LVOT DIAM: 1.6 CM
BH CV ECHO MEAS - LVPWD: 1.2 CM
BH CV ECHO MEAS - MED PEAK E' VEL: 5 CM/SEC
BH CV ECHO MEAS - MR MAX PG: 98.4 MMHG
BH CV ECHO MEAS - MR MAX VEL: 496 CM/SEC
BH CV ECHO MEAS - MV A MAX VEL: 137.5 CM/SEC
BH CV ECHO MEAS - MV DEC SLOPE: 616 CM/SEC2
BH CV ECHO MEAS - MV DEC TIME: 0.2 SEC
BH CV ECHO MEAS - MV E MAX VEL: 98.4 CM/SEC
BH CV ECHO MEAS - MV E/A: 0.72
BH CV ECHO MEAS - MV MAX PG: 8.6 MMHG
BH CV ECHO MEAS - MV MEAN PG: 4 MMHG
BH CV ECHO MEAS - MV P1/2T: 49.4 MSEC
BH CV ECHO MEAS - MV V2 VTI: 29.6 CM
BH CV ECHO MEAS - MVA(P1/2T): 4.4 CM2
BH CV ECHO MEAS - MVA(VTI): 1.5 CM2
BH CV ECHO MEAS - PA ACC TIME: 0.03 SEC
BH CV ECHO MEAS - PA V2 MAX: 147 CM/SEC
BH CV ECHO MEAS - PI END-D VEL: 115 CM/SEC
BH CV ECHO MEAS - RAP SYSTOLE: 8 MMHG
BH CV ECHO MEAS - RV MAX PG: 3.5 MMHG
BH CV ECHO MEAS - RV V1 MAX: 93.4 CM/SEC
BH CV ECHO MEAS - RV V1 VTI: 18.1 CM
BH CV ECHO MEAS - RVSP: 64.6 MMHG
BH CV ECHO MEAS - SV(LVOT): 44.4 ML
BH CV ECHO MEAS - SV(MOD-SP4): 19.2 ML
BH CV ECHO MEAS - SVI(LVOT): 32.2 ML/M2
BH CV ECHO MEAS - SVI(MOD-SP4): 13.9 ML/M2
BH CV ECHO MEAS - TAPSE (>1.6): 1.37 CM
BH CV ECHO MEAS - TR MAX PG: 56.6 MMHG
BH CV ECHO MEAS - TR MAX VEL: 376 CM/SEC
BH CV ECHO MEASUREMENTS AVERAGE E/E' RATIO: 18.22
BH CV XLRA - TDI S': 9.7 CM/SEC
BILIRUB SERPL-MCNC: 0.3 MG/DL (ref 0–1.2)
BUN SERPL-MCNC: 10 MG/DL (ref 8–23)
BUN/CREAT SERPL: 15.2 (ref 7–25)
CALCIUM SPEC-SCNC: 8.6 MG/DL (ref 8.2–9.6)
CHLORIDE SERPL-SCNC: 99 MMOL/L (ref 98–107)
CO2 SERPL-SCNC: 36.8 MMOL/L (ref 22–29)
CREAT SERPL-MCNC: 0.66 MG/DL (ref 0.57–1)
DEPRECATED RDW RBC AUTO: 53.9 FL (ref 37–54)
EGFRCR SERPLBLD CKD-EPI 2021: 79.4 ML/MIN/1.73
EOSINOPHIL # BLD AUTO: 0 10*3/MM3 (ref 0–0.4)
EOSINOPHIL NFR BLD AUTO: 0 % (ref 0.3–6.2)
ERYTHROCYTE [DISTWIDTH] IN BLOOD BY AUTOMATED COUNT: 14.1 % (ref 12.3–15.4)
GLOBULIN UR ELPH-MCNC: 2.9 GM/DL
GLUCOSE SERPL-MCNC: 122 MG/DL (ref 65–99)
HCT VFR BLD AUTO: 43.9 % (ref 34–46.6)
HGB BLD-MCNC: 13.2 G/DL (ref 12–15.9)
IMM GRANULOCYTES # BLD AUTO: 0.04 10*3/MM3 (ref 0–0.05)
IMM GRANULOCYTES NFR BLD AUTO: 0.5 % (ref 0–0.5)
LEFT ATRIUM VOLUME INDEX: 28.7 ML/M2
LV EF BIPLANE MOD: 70 %
LYMPHOCYTES # BLD AUTO: 0.92 10*3/MM3 (ref 0.7–3.1)
LYMPHOCYTES NFR BLD AUTO: 11.1 % (ref 19.6–45.3)
MAGNESIUM SERPL-MCNC: 1.9 MG/DL (ref 1.7–2.3)
MCH RBC QN AUTO: 30.8 PG (ref 26.6–33)
MCHC RBC AUTO-ENTMCNC: 30.1 G/DL (ref 31.5–35.7)
MCV RBC AUTO: 102.6 FL (ref 79–97)
MONOCYTES # BLD AUTO: 0.66 10*3/MM3 (ref 0.1–0.9)
MONOCYTES NFR BLD AUTO: 8 % (ref 5–12)
NEUTROPHILS NFR BLD AUTO: 6.65 10*3/MM3 (ref 1.7–7)
NEUTROPHILS NFR BLD AUTO: 80.3 % (ref 42.7–76)
NRBC BLD AUTO-RTO: 0 /100 WBC (ref 0–0.2)
PHOSPHATE SERPL-MCNC: 2.9 MG/DL (ref 2.5–4.5)
PLATELET # BLD AUTO: 232 10*3/MM3 (ref 140–450)
PMV BLD AUTO: 9.4 FL (ref 6–12)
POTASSIUM SERPL-SCNC: 4.4 MMOL/L (ref 3.5–5.2)
PROT SERPL-MCNC: 6.5 G/DL (ref 6–8.5)
RBC # BLD AUTO: 4.28 10*6/MM3 (ref 3.77–5.28)
SINUS: 2.8 CM
SODIUM SERPL-SCNC: 144 MMOL/L (ref 136–145)
TSH SERPL DL<=0.05 MIU/L-ACNC: 2.01 UIU/ML (ref 0.27–4.2)
WBC NRBC COR # BLD AUTO: 8.28 10*3/MM3 (ref 3.4–10.8)

## 2025-01-28 PROCEDURE — 25010000002 FUROSEMIDE PER 20 MG: Performed by: STUDENT IN AN ORGANIZED HEALTH CARE EDUCATION/TRAINING PROGRAM

## 2025-01-28 PROCEDURE — 25010000002 METHYLPREDNISOLONE PER 40 MG: Performed by: STUDENT IN AN ORGANIZED HEALTH CARE EDUCATION/TRAINING PROGRAM

## 2025-01-28 PROCEDURE — 94799 UNLISTED PULMONARY SVC/PX: CPT

## 2025-01-28 PROCEDURE — 83735 ASSAY OF MAGNESIUM: CPT | Performed by: STUDENT IN AN ORGANIZED HEALTH CARE EDUCATION/TRAINING PROGRAM

## 2025-01-28 PROCEDURE — 25010000002 MORPHINE PER 10 MG

## 2025-01-28 PROCEDURE — 94664 DEMO&/EVAL PT USE INHALER: CPT

## 2025-01-28 PROCEDURE — 25010000002 PIPERACILLIN SOD-TAZOBACTAM PER 1 G: Performed by: STUDENT IN AN ORGANIZED HEALTH CARE EDUCATION/TRAINING PROGRAM

## 2025-01-28 PROCEDURE — 80053 COMPREHEN METABOLIC PANEL: CPT | Performed by: STUDENT IN AN ORGANIZED HEALTH CARE EDUCATION/TRAINING PROGRAM

## 2025-01-28 PROCEDURE — 25010000002 ENOXAPARIN PER 10 MG

## 2025-01-28 PROCEDURE — 94761 N-INVAS EAR/PLS OXIMETRY MLT: CPT

## 2025-01-28 PROCEDURE — 84100 ASSAY OF PHOSPHORUS: CPT | Performed by: STUDENT IN AN ORGANIZED HEALTH CARE EDUCATION/TRAINING PROGRAM

## 2025-01-28 PROCEDURE — 97530 THERAPEUTIC ACTIVITIES: CPT

## 2025-01-28 PROCEDURE — 97551 CAREGIVER TRAING EA ADDL 15: CPT

## 2025-01-28 PROCEDURE — 85025 COMPLETE CBC W/AUTO DIFF WBC: CPT | Performed by: STUDENT IN AN ORGANIZED HEALTH CARE EDUCATION/TRAINING PROGRAM

## 2025-01-28 PROCEDURE — 84443 ASSAY THYROID STIM HORMONE: CPT | Performed by: INTERNAL MEDICINE

## 2025-01-28 RX ADMIN — PIPERACILLIN AND TAZOBACTAM 3.38 G: 3; .375 INJECTION, POWDER, LYOPHILIZED, FOR SOLUTION INTRAVENOUS at 09:17

## 2025-01-28 RX ADMIN — BUDESONIDE 0.5 MG: 0.5 INHALANT RESPIRATORY (INHALATION) at 19:45

## 2025-01-28 RX ADMIN — FUROSEMIDE 40 MG: 10 INJECTION, SOLUTION INTRAMUSCULAR; INTRAVENOUS at 17:22

## 2025-01-28 RX ADMIN — PIPERACILLIN AND TAZOBACTAM 3.38 G: 3; .375 INJECTION, POWDER, LYOPHILIZED, FOR SOLUTION INTRAVENOUS at 17:22

## 2025-01-28 RX ADMIN — BALSALAZIDE DISODIUM 750 MG: 750 CAPSULE ORAL at 20:25

## 2025-01-28 RX ADMIN — PIPERACILLIN AND TAZOBACTAM 3.38 G: 3; .375 INJECTION, POWDER, LYOPHILIZED, FOR SOLUTION INTRAVENOUS at 23:02

## 2025-01-28 RX ADMIN — MORPHINE SULFATE 1 MG: 2 INJECTION, SOLUTION INTRAMUSCULAR; INTRAVENOUS at 09:18

## 2025-01-28 RX ADMIN — Medication 10 ML: at 20:25

## 2025-01-28 RX ADMIN — IPRATROPIUM BROMIDE AND ALBUTEROL SULFATE 3 ML: .5; 3 SOLUTION RESPIRATORY (INHALATION) at 06:02

## 2025-01-28 RX ADMIN — ENOXAPARIN SODIUM 30 MG: 100 INJECTION SUBCUTANEOUS at 17:22

## 2025-01-28 RX ADMIN — Medication 10 ML: at 09:18

## 2025-01-28 RX ADMIN — METHYLPREDNISOLONE SODIUM SUCCINATE 40 MG: 40 INJECTION, POWDER, FOR SOLUTION INTRAMUSCULAR; INTRAVENOUS at 09:17

## 2025-01-28 RX ADMIN — BUDESONIDE 0.5 MG: 0.5 INHALANT RESPIRATORY (INHALATION) at 06:08

## 2025-01-28 RX ADMIN — METHYLPREDNISOLONE SODIUM SUCCINATE 40 MG: 40 INJECTION, POWDER, FOR SOLUTION INTRAMUSCULAR; INTRAVENOUS at 20:24

## 2025-01-28 RX ADMIN — IPRATROPIUM BROMIDE AND ALBUTEROL SULFATE 3 ML: .5; 3 SOLUTION RESPIRATORY (INHALATION) at 14:43

## 2025-01-28 RX ADMIN — IPRATROPIUM BROMIDE AND ALBUTEROL SULFATE 3 ML: .5; 3 SOLUTION RESPIRATORY (INHALATION) at 19:41

## 2025-01-28 RX ADMIN — FUROSEMIDE 40 MG: 10 INJECTION, SOLUTION INTRAMUSCULAR; INTRAVENOUS at 05:21

## 2025-01-28 RX ADMIN — IPRATROPIUM BROMIDE AND ALBUTEROL SULFATE 3 ML: .5; 3 SOLUTION RESPIRATORY (INHALATION) at 10:41

## 2025-01-28 NOTE — CONSULTS
[facility]: [facility]         CONSULT NOTE    Patient Identification:  Alexey Trinidad  89 y.o.  female  3/21/1926  [id number]            Requesting physician: Attending physician    Reason for Consultation: Hypoxia      History of Present Illness:  89-year-old female admitted on 1/24/2025 with increasing weakness and disorientation, she tested positive for norovirus urine cultures was positive for Proteus Mirabella's    Assessment:    Acute hypoxic respiratory failure  ABG on 1/27/2025  pH 7.30/pCO2 78/pO2 60/bicarb 39     positive for norovirus     urine cultures was positive for Proteus Mirabella's    Recommendations:    Oxygen titration currently on 60 L high flow    Although the chest x-ray showing worsening pulmonary edema however patient is not volume overloaded on exam for that reason I suspect valvular heart disease versus noncardiogenic pulmonary edema awaiting 2D echo results    Check TSH  proBNP and troponin      Diuresis    Antibiotics currently on Zosyn  Steroids on IV Solu-Medrol 40 mg every 12        Review of Sytems:  Review of Systems   Respiratory:  Positive for cough and shortness of breath. Negative for wheezing and stridor.    Cardiovascular:  Positive for palpitations. Negative for chest pain and leg swelling.       Past Medical History:  Past Medical History:   Diagnosis Date    Disease of thyroid gland     Hypertension        Past Surgical History:  Past Surgical History:   Procedure Laterality Date    BACK SURGERY      kyphoplasty    BREAST SURGERY      benign cyst removed    HYSTERECTOMY      LAPAROSCOPIC CHOLECYSTECTOMY          Home Meds:  Medications Prior to Admission   Medication Sig Dispense Refill Last Dose/Taking    acetaminophen (TYLENOL) 500 MG tablet Take 1 tablet by mouth Every Morning.   Taking    Bacillus Coagulans-Inulin (ALIGN PREBIOTIC-PROBIOTIC PO) Take 1 capsule by mouth Daily.   Taking    balsalazide (COLAZAL) 750 MG capsule Take 1 capsule by mouth Every Night.    "Taking    Budesonide (ENTOCORT EC) 3 MG 24 hr capsule Take 1 capsule by mouth 2 (Two) Times a Day.   Taking    Cyanocobalamin (VITAMIN B-12 PO) Take 1 tablet by mouth Daily.   Taking    levothyroxine (SYNTHROID, LEVOTHROID) 75 MCG tablet Take 1 tablet by mouth Every Morning.   Taking    losartan (COZAAR) 50 MG tablet Take 1 tablet by mouth Daily.   Taking    multivitamins-minerals (PRESERVISION AREDS 2) capsule capsule Take 1 capsule by mouth 2 (Two) Times a Day.   Taking    Wheat Dextrin (BENEFIBER DRINK MIX PO) Take 1 package by mouth Daily.   Taking       Allergies:  Allergies   Allergen Reactions    Contrast Dye (Echo Or Unknown Ct/Mr) Unknown - High Severity    Sulfa Antibiotics Unknown - High Severity       Social History:   Social History     Socioeconomic History    Marital status:    Tobacco Use    Smoking status: Never    Smokeless tobacco: Never   Vaping Use    Vaping status: Never Used   Substance and Sexual Activity    Alcohol use: Never    Drug use: Never    Sexual activity: Never       Family History:  History reviewed. No pertinent family history.    Physical Exam:  /70 (BP Location: Left arm, Patient Position: Lying)   Pulse 79   Temp 98.5 °F (36.9 °C) (Oral)   Resp 18   Ht 147.3 cm (58\")   Wt 47.2 kg (104 lb)   SpO2 95%   BMI 21.74 kg/m²  Body mass index is 21.74 kg/m². 95% 47.2 kg (104 lb)  Physical Exam  Cardiovascular:      Heart sounds: Murmur heard.      No gallop.   Pulmonary:      Effort: No respiratory distress.      Breath sounds: No stridor. Rhonchi and rales present. No wheezing.   Chest:      Chest wall: No tenderness.         LABS:  Lab Results   Component Value Date    CALCIUM 8.6 01/28/2025    PHOS 2.9 01/28/2025     Results from last 7 days   Lab Units 01/28/25  0521 01/27/25  1259 01/27/25  0216 01/26/25  0237   MAGNESIUM mg/dL 1.9  --  2.0 2.0   SODIUM mmol/L 144  --  143 146*   POTASSIUM mmol/L 4.4 4.0 3.2* 4.1   CHLORIDE mmol/L 99  --  103 110*   CO2 mmol/L " "36.8*  --  32.3* 29.4*   BUN mg/dL 10  --  7* 13   CREATININE mg/dL 0.66  --  0.42* 0.44*   GLUCOSE mg/dL 122*  --  130* 103*   CALCIUM mg/dL 8.6  --  8.3 8.0*   WBC 10*3/mm3 8.28  --  11.21* 6.03   HEMOGLOBIN g/dL 13.2  --  13.1 11.9*   PLATELETS 10*3/mm3 232  --  235 205   ALT (SGPT) U/L 13  --  14 11   AST (SGOT) U/L 24  --  22 23     No results found for: \"CKTOTAL\", \"CKMB\", \"CKMBINDEX\", \"TROPONINI\", \"TROPONINT\"      Results from last 7 days   Lab Units 01/24/25 1810 01/24/25  1802 01/24/25  1622   BLOODCX  No growth at 3 days No growth at 3 days  --    URINECX   --   --  >100,000 CFU/mL Proteus mirabilis*     Results from last 7 days   Lab Units 01/24/25  1754   LACTATE mmol/L 0.7     Results from last 7 days   Lab Units 01/27/25  1852 01/27/25  1602   PH, ARTERIAL pH units 7.304* 7.243*   PCO2, ARTERIAL mm Hg 78.5* 93.7*   PO2 ART mm Hg 60.8* 103.5   O2 SATURATION ART % 86.7* 96.2   MODALITY  HFNC HFNC     Results from last 7 days   Lab Units 01/25/25  1818   ADENOVIRUS  Not Detected         Results from last 7 days   Lab Units 01/24/25 1810 01/24/25  1802 01/24/25  1622   BLOODCX  No growth at 3 days No growth at 3 days  --    URINECX   --   --  >100,000 CFU/mL Proteus mirabilis*     Lab Results   Component Value Date    TSH 4.640 (H) 11/03/2021     Estimated Creatinine Clearance: 35.5 mL/min (by C-G formula based on SCr of 0.66 mg/dL).  Results from last 7 days   Lab Units 01/24/25  1622   NITRITE UA  Negative   WBC UA /HPF 6-10*   BACTERIA UA /HPF 1+*   SQUAM EPITHEL UA /HPF 0-2   URINECX  >100,000 CFU/mL Proteus mirabilis*        Imaging:  Imaging Results (Last 24 Hours)       Procedure Component Value Units Date/Time    XR Chest 1 View [[id number]] Collected: 01/27/25 1301     Updated: 01/27/25 1306    Narrative:      XR CHEST 1 VW    Date of Exam: 1/27/2025 12:05 PM EST    Indication: increased O2 requirements    Comparison: 1/26/2025    Findings:  Cardiomediastinal silhouette is prominent. The aorta is " atherosclerotic and tortuous. There is pulmonary vascular congestion with bilateral interstitial and airspace opacities. There are small to moderate bilateral pleural effusions. There are   degenerative changes of the shoulders.      Impression:      Impression:  Prominent cardiac silhouette with pulmonary vascular congestion, bilateral mixed interstitial and airspace opacities and bilateral pleural effusions. Findings are again favored to represent CHF/pulmonary edema.      Electronically Signed: Jasmina Armando MD    1/27/2025 1:04 PM EST    Workstation ID: [id number]              Current Meds:   SCHEDULE  balsalazide, 750 mg, Oral, Nightly  [Held by provider] Budesonide, 3 mg, Oral, BID  budesonide, 0.5 mg, Nebulization, BID - RT  enoxaparin, 30 mg, Subcutaneous, Daily  furosemide, 40 mg, Intravenous, Q12H  ipratropium-albuterol, 3 mL, Nebulization, 4x Daily - RT  levothyroxine, 75 mcg, Oral, Q AM  methylPREDNISolone sodium succinate, 40 mg, Intravenous, Q12H  multivitamin with minerals, 1 tablet, Oral, Daily  piperacillin-tazobactam, 3.375 g, Intravenous, Q8H  sodium chloride, 10 mL, Intravenous, Q12H  vitamin B-12, 1,000 mcg, Oral, Daily      Infusions     PRNs    acetaminophen **OR** acetaminophen **OR** acetaminophen    senna-docusate sodium **AND** polyethylene glycol **AND** bisacodyl **AND** bisacodyl    Calcium Replacement - Follow Nurse / BPA Driven Protocol    dextrose    dextrose    glucagon (human recombinant)    Magnesium Standard Dose Replacement - Follow Nurse / BPA Driven Protocol    Morphine **AND** naloxone    nitroglycerin    ondansetron    Phosphorus Replacement - Follow Nurse / BPA Driven Protocol    Potassium Replacement - Follow Nurse / BPA Driven Protocol    [COMPLETED] Insert Peripheral IV **AND** sodium chloride    sodium chloride    sodium chloride        Kristi Nguyen MD  1/28/2025  09:11 EST      Much of this encounter note is an electronic transcription/translation of spoken language to  printed text using Dragon Software.

## 2025-01-28 NOTE — PLAN OF CARE
Goal Outcome Evaluation:                              Problem: Adult Inpatient Plan of Care  Goal: Plan of Care Review  Outcome: Progressing  Goal: Patient-Specific Goal (Individualized)  Outcome: Progressing  Goal: Absence of Hospital-Acquired Illness or Injury  Outcome: Progressing  Intervention: Identify and Manage Fall Risk  Recent Flowsheet Documentation  Taken 1/28/2025 1620 by Denisse Carlos RN  Safety Promotion/Fall Prevention:   activity supervised   assistive device/personal items within reach   clutter free environment maintained   fall prevention program maintained   nonskid shoes/slippers when out of bed   room organization consistent   safety round/check completed  Taken 1/28/2025 1435 by Denisse Carlos RN  Safety Promotion/Fall Prevention:   activity supervised   assistive device/personal items within reach   clutter free environment maintained   fall prevention program maintained   nonskid shoes/slippers when out of bed   safety round/check completed   room organization consistent  Taken 1/28/2025 1240 by Denisse Carlos RN  Safety Promotion/Fall Prevention:   activity supervised   assistive device/personal items within reach   clutter free environment maintained   fall prevention program maintained   nonskid shoes/slippers when out of bed   room organization consistent   safety round/check completed  Taken 1/28/2025 1000 by Denisse Carlos RN  Safety Promotion/Fall Prevention:   activity supervised   assistive device/personal items within reach   clutter free environment maintained   fall prevention program maintained   nonskid shoes/slippers when out of bed   room organization consistent   safety round/check completed  Taken 1/28/2025 0840 by Denisse Carlos RN  Safety Promotion/Fall Prevention:   activity supervised   assistive device/personal items within reach   clutter free environment maintained   fall prevention program maintained   nonskid shoes/slippers when out of bed   safety  round/check completed   room organization consistent  Intervention: Prevent Skin Injury  Recent Flowsheet Documentation  Taken 1/28/2025 1620 by Denisse Carlos RN  Body Position:   turned   right  Taken 1/28/2025 1240 by Denisse Carlos RN  Body Position:   turned   left  Skin Protection: incontinence pads utilized  Taken 1/28/2025 0840 by Denisse Carlos RN  Body Position: weight shifting  Skin Protection: incontinence pads utilized  Intervention: Prevent and Manage VTE (Venous Thromboembolism) Risk  Recent Flowsheet Documentation  Taken 1/28/2025 1620 by Denisse Carlos RN  VTE Prevention/Management:   bilateral   SCDs (sequential compression devices) off  Taken 1/28/2025 0840 by Denisse Carlos RN  VTE Prevention/Management:   bilateral   SCDs (sequential compression devices) off  Intervention: Prevent Infection  Recent Flowsheet Documentation  Taken 1/28/2025 1620 by Denisse Carlos RN  Infection Prevention:   hand hygiene promoted   personal protective equipment utilized   rest/sleep promoted   single patient room provided   environmental surveillance performed  Taken 1/28/2025 1435 by Denisse Carlos RN  Infection Prevention:   hand hygiene promoted   personal protective equipment utilized   rest/sleep promoted   single patient room provided   environmental surveillance performed  Taken 1/28/2025 1240 by Denisse Carlos RN  Infection Prevention:   hand hygiene promoted   personal protective equipment utilized   rest/sleep promoted   single patient room provided   environmental surveillance performed  Taken 1/28/2025 1000 by Denisse Carlos RN  Infection Prevention:   hand hygiene promoted   personal protective equipment utilized   rest/sleep promoted   single patient room provided   environmental surveillance performed  Taken 1/28/2025 0840 by Denisse Carlos RN  Infection Prevention:   hand hygiene promoted   personal protective equipment utilized   rest/sleep promoted   single patient room  provided   environmental surveillance performed  Goal: Optimal Comfort and Wellbeing  Outcome: Progressing  Intervention: Monitor Pain and Promote Comfort  Recent Flowsheet Documentation  Taken 1/28/2025 0917 by Denisse Carlos RN  Pain Management Interventions: pain medication given  Intervention: Provide Person-Centered Care  Recent Flowsheet Documentation  Taken 1/28/2025 1620 by Denisse Carlos RN  Trust Relationship/Rapport:   care explained   choices provided  Taken 1/28/2025 1240 by Denisse Carlos RN  Trust Relationship/Rapport:   choices provided   care explained  Taken 1/28/2025 0840 by Denisse Carlos RN  Trust Relationship/Rapport:   care explained   choices provided  Goal: Readiness for Transition of Care  Outcome: Progressing     Problem: Fall Injury Risk  Goal: Absence of Fall and Fall-Related Injury  Outcome: Progressing  Intervention: Identify and Manage Contributors  Recent Flowsheet Documentation  Taken 1/28/2025 1620 by Denisse Carlos RN  Medication Review/Management: medications reviewed  Self-Care Promotion:   independence encouraged   BADL personal objects within reach  Taken 1/28/2025 1435 by Denisse Carlos RN  Medication Review/Management: medications reviewed  Taken 1/28/2025 1240 by Denisse Carlos RN  Medication Review/Management: medications reviewed  Self-Care Promotion:   independence encouraged   BADL personal objects within reach  Taken 1/28/2025 1000 by Denisse Carlos RN  Medication Review/Management: medications reviewed  Taken 1/28/2025 0840 by Denisse Carlos RN  Medication Review/Management: medications reviewed  Self-Care Promotion:   BADL personal objects within reach   independence encouraged  Intervention: Promote Injury-Free Environment  Recent Flowsheet Documentation  Taken 1/28/2025 1620 by Denisse Carlos RN  Safety Promotion/Fall Prevention:   activity supervised   assistive device/personal items within reach   clutter free environment maintained   fall  prevention program maintained   nonskid shoes/slippers when out of bed   room organization consistent   safety round/check completed  Taken 1/28/2025 1435 by Denisse Carlos RN  Safety Promotion/Fall Prevention:   activity supervised   assistive device/personal items within reach   clutter free environment maintained   fall prevention program maintained   nonskid shoes/slippers when out of bed   safety round/check completed   room organization consistent  Taken 1/28/2025 1240 by Denisse Carlos RN  Safety Promotion/Fall Prevention:   activity supervised   assistive device/personal items within reach   clutter free environment maintained   fall prevention program maintained   nonskid shoes/slippers when out of bed   room organization consistent   safety round/check completed  Taken 1/28/2025 1000 by Denisse Carlos RN  Safety Promotion/Fall Prevention:   activity supervised   assistive device/personal items within reach   clutter free environment maintained   fall prevention program maintained   nonskid shoes/slippers when out of bed   room organization consistent   safety round/check completed  Taken 1/28/2025 0840 by Denisse Carlos RN  Safety Promotion/Fall Prevention:   activity supervised   assistive device/personal items within reach   clutter free environment maintained   fall prevention program maintained   nonskid shoes/slippers when out of bed   safety round/check completed   room organization consistent

## 2025-01-28 NOTE — PROGRESS NOTES
[facility] MEDICINE SERVICE  DAILY PROGRESS NOTE    NAME: Alexey Trinidad  : 3/21/1926  MRN: [id number]      LOS: 4 days     PROVIDER OF SERVICE: Leena Dominguez MD    Chief Complaint: UTI (urinary tract infection)    Subjective:     Interval History:    Patient seen and evaluated at bedside. Patient intermittently confused but reports feeling much better today. Still short of breath with cough.     Treatment plan discussed with patient. All questions addressed.     Review of Systems:   Denies fevers, chills  Denies chest pain, edema  +shortness of breath, +cough  Denies nausea, vomiting, diarrhea  Denies dysuria, hematuria    Objective:     Vital Signs  Temp:  [96.5 °F (35.8 °C)-98.5 °F (36.9 °C)] 97.9 °F (36.6 °C)  Heart Rate:  [64-95] 78  Resp:  [16-24] 18  BP: (106-137)/(60-73) 137/70  Flow (L/min) (Oxygen Therapy):  [6-60] 60   Body mass index is 21.74 kg/m².    Physical Exam   General: No acute distress, alert but intermittently confused  CV: RRR, no peripheral edema  Pulm: Coarse breath sounds with diffuse wheezes, no increased work of breathing  Abd: Soft, nontender, nondistended  Skin: No rashes or lesions on exposed skin  Psych: Appropriate mood and affect    Scheduled Meds   balsalazide, 750 mg, Oral, Nightly  [Held by provider] Budesonide, 3 mg, Oral, BID  budesonide, 0.5 mg, Nebulization, BID - RT  enoxaparin, 30 mg, Subcutaneous, Daily  furosemide, 40 mg, Intravenous, Q12H  ipratropium-albuterol, 3 mL, Nebulization, 4x Daily - RT  levothyroxine, 75 mcg, Oral, Q AM  methylPREDNISolone sodium succinate, 40 mg, Intravenous, Q12H  multivitamin with minerals, 1 tablet, Oral, Daily  piperacillin-tazobactam, 3.375 g, Intravenous, Q8H  sodium chloride, 10 mL, Intravenous, Q12H  vitamin B-12, 1,000 mcg, Oral, Daily       PRN Meds     acetaminophen **OR** acetaminophen **OR** acetaminophen    senna-docusate sodium **AND** polyethylene glycol **AND** bisacodyl **AND** bisacodyl    Calcium Replacement -  Follow Nurse / BPA Driven Protocol    dextrose    dextrose    glucagon (human recombinant)    Magnesium Standard Dose Replacement - Follow Nurse / BPA Driven Protocol    Morphine **AND** naloxone    nitroglycerin    ondansetron    Phosphorus Replacement - Follow Nurse / BPA Driven Protocol    Potassium Replacement - Follow Nurse / BPA Driven Protocol    [COMPLETED] Insert Peripheral IV **AND** sodium chloride    sodium chloride    sodium chloride   Infusions         Diagnostic Data    Results from last 7 days   Lab Units 01/28/25  0521   WBC 10*3/mm3 8.28   HEMOGLOBIN g/dL 13.2   HEMATOCRIT % 43.9   PLATELETS 10*3/mm3 232   GLUCOSE mg/dL 122*   CREATININE mg/dL 0.66   BUN mg/dL 10   SODIUM mmol/L 144   POTASSIUM mmol/L 4.4   AST (SGOT) U/L 24   ALT (SGPT) U/L 13   ALK PHOS U/L 44   BILIRUBIN mg/dL 0.3   ANION GAP mmol/L 8.2       XR Chest 1 View    Result Date: 1/27/2025  Impression: Prominent cardiac silhouette with pulmonary vascular congestion, bilateral mixed interstitial and airspace opacities and bilateral pleural effusions. Findings are again favored to represent CHF/pulmonary edema. Electronically Signed: Jasmina Armando MD  1/27/2025 1:04 PM EST  Workstation ID: [id number]    XR Chest 1 View    Result Date: 1/26/2025  Impression: 1. Cardiomegaly and pulmonary vascular congestion. 2. Hazy bilateral airspace disease favored to be secondary to pulmonary edema. Electronically Signed: Mathew Dillard MD  1/26/2025 12:22 PM EST  Workstation ID: [id number]     Interval results reviewed.    Assessment/Plan:     Acute hypoxic respiratory failure  Aspiration pneumonia, unknown organism  Pulmonary edema  Acute urinary tract infection  Abdominal pain with nausea and vomiting  Biliary dilation w/ stone at ampulla of vater  History of collagenous colitis  Acute metabolic encephalopathy, likely multifactorial  Physical deconditioning  Left adrenal nodule, stable  Hypertension  Hypothyroidism    - Pulmonology consulted given  increased oxygen requirements, appreciate recs  - Echo pending with high suspicion of valvular heart disease  - Continue diuresis  - Continue zosyn, steroids  - Wean oxygen as tolerated  - Zosyn as above for treatment of UTI  - GI consulted in setting of abdominal symptoms; okay to resume home meds as tolerated  - Maintain NPO status until SLP can follow up evaluation; may need to discuss initiation of tube feeds if not cleared for diet, daughter agreeable to DHT placement for nutrition    Treatment plan discussed with nursing staff, case management and pharmacy.     VTE Prophylaxis:  Pharmacologic VTE prophylaxis orders are present.    Code status is   Code Status and Medical Interventions: No CPR (Do Not Attempt to Resuscitate); Limited Support; No intubation (DNI); DNR/DNI   Ordered at: 01/27/25 1918     Medical Intervention Limits:    No intubation (DNI)     Code Status (Patient has no pulse and is not breathing):    No CPR (Do Not Attempt to Resuscitate)     Medical Interventions (Patient has pulse or is breathing):    Limited Support     Comments:    DNR/DNI       Plan for disposition: Pending clinical course    Barriers to discharge: Oxygen requirements, IV abx, NPO    Time: 35+ minutes     Signature: Electronically signed by Leena Dominguez MD, 01/28/25, 08:41 EST.  [facility] Hospitalist Team

## 2025-01-28 NOTE — PLAN OF CARE
"Goal Outcome Evaluation:                         Alexey Trinidad presents with functional mobility impairments which indicate the need for skilled intervention. Pt with decreased functional strength and activity tolerance on this date. Pt required max encouragement to sit eob on this date. Pt able to sit eob ~2 minutes before requesting to return supine due to dizziness and fatigue. If pt continues to display decreased functional strength and mobility pt could benefit from RASHARD. PT will continues to monitor. Tolerating session today without incident. Will continue to follow and progress as tolerated.      Plan/Recommendations:   If medically appropriate, Low Intensity Therapy recommended post-acute care - This is recommended as therapy feels this patient would require 2-3 visits per week. OP or HH would be the best option depending on patient's home bound status. Consider, if the patient has other  \"skilled\" needs such as wounds, IV antibiotics, etc. Combined with \"low intensity\" could also equate to a SNF. If patient is medically complex, consider LTAC. Pt requires no DME at discharge.                    "

## 2025-01-28 NOTE — PLAN OF CARE
"    1/28/2025   OTHER   Discipline Speech-Language Pathologist   Rehab Time/Intention   Session Not Performed Unable to treat ; Pt currently on 60L, not appropriate for re-evaluation at this time, see below. ST will re-attempt on next date of service.     \"The nutrition management of patients with acute respiratory failure on non-invasive ventilation or high flow nasal cannula requires a truly multidisciplinary approach.\"    Research finds that a high-flow nasal cannula flow rate of greater than 40L/min was associated with decreased swallowing function in healthy subjects.     Research also found that 50% of patients demonstrated silent aspiration on video swallow studies with 02 flow rate from 35-50 liters, with 60% of this sample being cognitive intact/appropriate.     Swallow Function during High-Flow Nasal Cannula Therapy, 2015,  Initiation of Oral Intake in Patients Using High-Flow Nasal Cannula: A Retrospective Analysis, June 2019    Per research, (Angela, 2019), \"5/10 pt presented with silent penetration/aspiration on VFSS with O2 flow rate ranges from 35-50 liters. Of note, 6/10 pt cognitively appropriate.\" (Nickolas, 2015), determines \"A high flow nasal cannula flow rate of greater than 40L/min was associated with decreased swallow function in HEALTHY subjects.\"      "

## 2025-01-28 NOTE — CASE MANAGEMENT/SOCIAL WORK
Continued Stay Note  [facility]     Patient Name: Alexey Trinidad  MRN: [id number]  Today's Date: 1/28/2025    Admit Date: 1/24/2025    Plan: Return home with family. [facility] (accepted, need order). Watch for new home O2 needs   Discharge Plan       Row Name 01/28/25 1259       Plan    Plan Return home with family. [facility] (accepted, need order). Watch for new home O2 needs    Plan Comments Sonali, Servando, [facility] denied due to payor. A HHC accepted per liaison Nadege.             Carolina Dixon RN      [facility]  Office: [phone]  Cell: [phone]  Fax # [phone]

## 2025-01-28 NOTE — PLAN OF CARE
Goal Outcome Evaluation:              Outcome Evaluation: Patient currently resting abed, no distress noted. Patient wakes and is orientated to self. Patient yells out at times and appears unsure of staff. Patient redirected and reorientated to how she is in the hospital and everyone is trying to help her.

## 2025-01-28 NOTE — THERAPY TREATMENT NOTE
"Subjective: Pt agreeable to therapeutic plan of care.     Objective:     Precautions - desaturates with activity    Bed mobility - Min-A  Transfers - N/A or Not attempted.  Ambulation -  N/A or Not attempted.    Vitals: Desaturates    Pain: Pt did not rate pain but displayed discomfort throughout tx session  Intervention for pain: Repositioned, Increased Activity, and Therapeutic Presence    Education: Provided education on the importance of mobility in the acute care setting, Verbal/Tactile Cues, Transfer Training, Energy conservation strategies, and HEP    Assessment: Alexey Trinidad presents with functional mobility impairments which indicate the need for skilled intervention. Pt with decreased functional strength and activity tolerance on this date. Pt required max encouragement to sit eob on this date. Pt able to sit eob ~2 minutes before requesting to return supine due to dizziness and fatigue. If pt continues to display decreased functional strength and mobility pt could benefit from RASHARD. PT will continues to monitor. Tolerating session today without incident. Will continue to follow and progress as tolerated.     Plan/Recommendations:   If medically appropriate, Low Intensity Therapy recommended post-acute care - This is recommended as therapy feels this patient would require 2-3 visits per week. OP or HH would be the best option depending on patient's home bound status. Consider, if the patient has other  \"skilled\" needs such as wounds, IV antibiotics, etc. Combined with \"low intensity\" could also equate to a SNF. If patient is medically complex, consider LTAC. Pt requires no DME at discharge.     Pt desires Home with family assist and Home Health at discharge. Pt cooperative; agreeable to therapeutic recommendations and plan of care.         Basic Mobility 6-click:  Rollin = Total, A lot = 2, A little = 3; 4 = None  Supine>Sit:   1 = Total, A lot = 2, A little = 3; 4 = None   Sit>Stand with arms: "  1 = Total, A lot = 2, A little = 3; 4 = None  Bed>Chair:   1 = Total, A lot = 2, A little = 3; 4 = None  Ambulate in room:  1 = Total, A lot = 2, A little = 3; 4 = None  3-5 Steps with railin = Total, A lot = 2, A little = 3; 4 = None  Score: 11    Modified [place]: N/A = No pre-op stroke/TIA    Post-Tx Position: Supine with HOB Elevated, Alarms activated, and Call light and personal items within reach  PPE: gloves, surgical mask, and gown    Therapy Charges for Today       Code Description Service Date Service Provider Modifiers Qty    79116887235 HC PT THERAPEUTIC ACT EA 15 MIN 2025 Carlyle Catherine PTA GP 1    [id number]  CAREGIVER TRAINING STRATEGIES &TQ EA ADDL 15 MIN 2025 Carlyle Catherine PTA  1           PT Charges       Row Name 25 1409             Time Calculation    Start Time 1333  -GE      Stop Time 1349  -GE      Time Calculation (min) 16 min  -GE      PT Received On 25  -GE      PT - Next Appointment 25  -GE         Time Calculation- PT    Total Timed Code Minutes- PT 16 minute(s)  -GE                User Key  (r) = Recorded By, (t) = Taken By, (c) = Cosigned By      Initials Name Provider Type    Carlyle Henderson PTA Physical Therapist Assistant

## 2025-01-29 ENCOUNTER — APPOINTMENT (OUTPATIENT)
Dept: GENERAL RADIOLOGY | Facility: HOSPITAL | Age: OVER 89
End: 2025-01-29
Payer: MEDICARE

## 2025-01-29 LAB
ANION GAP SERPL CALCULATED.3IONS-SCNC: 13.3 MMOL/L (ref 5–15)
BACTERIA SPEC AEROBE CULT: NORMAL
BACTERIA SPEC AEROBE CULT: NORMAL
BUN SERPL-MCNC: 17 MG/DL (ref 8–23)
BUN/CREAT SERPL: 25.8 (ref 7–25)
CALCIUM SPEC-SCNC: 8.7 MG/DL (ref 8.2–9.6)
CHLORIDE SERPL-SCNC: 90 MMOL/L (ref 98–107)
CO2 SERPL-SCNC: 40.7 MMOL/L (ref 22–29)
CREAT SERPL-MCNC: 0.66 MG/DL (ref 0.57–1)
DEPRECATED RDW RBC AUTO: 49.5 FL (ref 37–54)
EGFRCR SERPLBLD CKD-EPI 2021: 79.4 ML/MIN/1.73
ERYTHROCYTE [DISTWIDTH] IN BLOOD BY AUTOMATED COUNT: 13.7 % (ref 12.3–15.4)
GLUCOSE BLDC GLUCOMTR-MCNC: 147 MG/DL (ref 70–105)
GLUCOSE SERPL-MCNC: 125 MG/DL (ref 65–99)
HCT VFR BLD AUTO: 43.2 % (ref 34–46.6)
HGB BLD-MCNC: 13.5 G/DL (ref 12–15.9)
MCH RBC QN AUTO: 30.7 PG (ref 26.6–33)
MCHC RBC AUTO-ENTMCNC: 31.3 G/DL (ref 31.5–35.7)
MCV RBC AUTO: 98.2 FL (ref 79–97)
NT-PROBNP SERPL-MCNC: 1413 PG/ML (ref 0–1800)
PLATELET # BLD AUTO: 255 10*3/MM3 (ref 140–450)
PMV BLD AUTO: 8.9 FL (ref 6–12)
POTASSIUM SERPL-SCNC: 3 MMOL/L (ref 3.5–5.2)
RBC # BLD AUTO: 4.4 10*6/MM3 (ref 3.77–5.28)
SODIUM SERPL-SCNC: 144 MMOL/L (ref 136–145)
WBC NRBC COR # BLD AUTO: 9.16 10*3/MM3 (ref 3.4–10.8)

## 2025-01-29 PROCEDURE — 83880 ASSAY OF NATRIURETIC PEPTIDE: CPT | Performed by: INTERNAL MEDICINE

## 2025-01-29 PROCEDURE — 25010000002 PIPERACILLIN SOD-TAZOBACTAM PER 1 G: Performed by: STUDENT IN AN ORGANIZED HEALTH CARE EDUCATION/TRAINING PROGRAM

## 2025-01-29 PROCEDURE — 94761 N-INVAS EAR/PLS OXIMETRY MLT: CPT

## 2025-01-29 PROCEDURE — 25010000002 METHYLPREDNISOLONE PER 40 MG: Performed by: STUDENT IN AN ORGANIZED HEALTH CARE EDUCATION/TRAINING PROGRAM

## 2025-01-29 PROCEDURE — 94799 UNLISTED PULMONARY SVC/PX: CPT

## 2025-01-29 PROCEDURE — 25010000002 POTASSIUM CHLORIDE 10 MEQ/100ML SOLUTION: Performed by: STUDENT IN AN ORGANIZED HEALTH CARE EDUCATION/TRAINING PROGRAM

## 2025-01-29 PROCEDURE — 99222 1ST HOSP IP/OBS MODERATE 55: CPT | Performed by: INTERNAL MEDICINE

## 2025-01-29 PROCEDURE — 25010000002 FUROSEMIDE PER 20 MG: Performed by: STUDENT IN AN ORGANIZED HEALTH CARE EDUCATION/TRAINING PROGRAM

## 2025-01-29 PROCEDURE — 25010000002 ENOXAPARIN PER 10 MG

## 2025-01-29 PROCEDURE — 74018 RADEX ABDOMEN 1 VIEW: CPT

## 2025-01-29 PROCEDURE — 94664 DEMO&/EVAL PT USE INHALER: CPT

## 2025-01-29 PROCEDURE — 80048 BASIC METABOLIC PNL TOTAL CA: CPT | Performed by: STUDENT IN AN ORGANIZED HEALTH CARE EDUCATION/TRAINING PROGRAM

## 2025-01-29 PROCEDURE — 85027 COMPLETE CBC AUTOMATED: CPT | Performed by: STUDENT IN AN ORGANIZED HEALTH CARE EDUCATION/TRAINING PROGRAM

## 2025-01-29 PROCEDURE — 92526 ORAL FUNCTION THERAPY: CPT

## 2025-01-29 PROCEDURE — 82948 REAGENT STRIP/BLOOD GLUCOSE: CPT

## 2025-01-29 RX ORDER — POTASSIUM CHLORIDE 29.8 MG/ML
20 INJECTION INTRAVENOUS
Status: DISCONTINUED | OUTPATIENT
Start: 2025-01-29 | End: 2025-01-29

## 2025-01-29 RX ORDER — POTASSIUM CHLORIDE 7.45 MG/ML
10 INJECTION INTRAVENOUS
Status: COMPLETED | OUTPATIENT
Start: 2025-01-29 | End: 2025-01-29

## 2025-01-29 RX ADMIN — BALSALAZIDE DISODIUM 750 MG: 750 CAPSULE ORAL at 20:02

## 2025-01-29 RX ADMIN — METHYLPREDNISOLONE SODIUM SUCCINATE 40 MG: 40 INJECTION, POWDER, FOR SOLUTION INTRAMUSCULAR; INTRAVENOUS at 08:13

## 2025-01-29 RX ADMIN — PIPERACILLIN AND TAZOBACTAM 3.38 G: 3; .375 INJECTION, POWDER, LYOPHILIZED, FOR SOLUTION INTRAVENOUS at 22:41

## 2025-01-29 RX ADMIN — Medication 10 ML: at 09:03

## 2025-01-29 RX ADMIN — Medication 10 ML: at 05:07

## 2025-01-29 RX ADMIN — LEVOTHYROXINE SODIUM 75 MCG: 0.07 TABLET ORAL at 05:07

## 2025-01-29 RX ADMIN — METHYLPREDNISOLONE SODIUM SUCCINATE 40 MG: 40 INJECTION, POWDER, FOR SOLUTION INTRAMUSCULAR; INTRAVENOUS at 20:02

## 2025-01-29 RX ADMIN — IPRATROPIUM BROMIDE AND ALBUTEROL SULFATE 3 ML: .5; 3 SOLUTION RESPIRATORY (INHALATION) at 06:09

## 2025-01-29 RX ADMIN — POTASSIUM CHLORIDE 10 MEQ: 10 INJECTION, SOLUTION INTRAVENOUS at 17:21

## 2025-01-29 RX ADMIN — POTASSIUM CHLORIDE 10 MEQ: 10 INJECTION, SOLUTION INTRAVENOUS at 14:59

## 2025-01-29 RX ADMIN — POTASSIUM CHLORIDE 10 MEQ: 10 INJECTION, SOLUTION INTRAVENOUS at 12:41

## 2025-01-29 RX ADMIN — BUDESONIDE 0.5 MG: 0.5 INHALANT RESPIRATORY (INHALATION) at 18:15

## 2025-01-29 RX ADMIN — IPRATROPIUM BROMIDE AND ALBUTEROL SULFATE 3 ML: .5; 3 SOLUTION RESPIRATORY (INHALATION) at 11:42

## 2025-01-29 RX ADMIN — Medication 10 ML: at 20:03

## 2025-01-29 RX ADMIN — FUROSEMIDE 40 MG: 10 INJECTION, SOLUTION INTRAMUSCULAR; INTRAVENOUS at 05:07

## 2025-01-29 RX ADMIN — POTASSIUM CHLORIDE 10 MEQ: 10 INJECTION, SOLUTION INTRAVENOUS at 19:06

## 2025-01-29 RX ADMIN — Medication 10 ML: at 08:14

## 2025-01-29 RX ADMIN — POTASSIUM CHLORIDE 10 MEQ: 10 INJECTION, SOLUTION INTRAVENOUS at 13:47

## 2025-01-29 RX ADMIN — POTASSIUM CHLORIDE 10 MEQ: 10 INJECTION, SOLUTION INTRAVENOUS at 20:04

## 2025-01-29 RX ADMIN — FUROSEMIDE 40 MG: 10 INJECTION, SOLUTION INTRAMUSCULAR; INTRAVENOUS at 17:22

## 2025-01-29 RX ADMIN — ENOXAPARIN SODIUM 30 MG: 100 INJECTION SUBCUTANEOUS at 17:22

## 2025-01-29 RX ADMIN — PIPERACILLIN AND TAZOBACTAM 3.38 G: 3; .375 INJECTION, POWDER, LYOPHILIZED, FOR SOLUTION INTRAVENOUS at 17:22

## 2025-01-29 RX ADMIN — IPRATROPIUM BROMIDE AND ALBUTEROL SULFATE 3 ML: .5; 3 SOLUTION RESPIRATORY (INHALATION) at 18:21

## 2025-01-29 RX ADMIN — BUDESONIDE 0.5 MG: 0.5 INHALANT RESPIRATORY (INHALATION) at 06:06

## 2025-01-29 RX ADMIN — PIPERACILLIN AND TAZOBACTAM 3.38 G: 3; .375 INJECTION, POWDER, LYOPHILIZED, FOR SOLUTION INTRAVENOUS at 08:14

## 2025-01-29 NOTE — PROGRESS NOTES
Daily Progress Note        UTI (urinary tract infection)    Abdominal pain    Assessment:     Acute hypoxic respiratory failure  ABG on 1/27/2025  pH 7.30/pCO2 78/pO2 60/bicarb 39      positive for norovirus      urine cultures was positive for Proteus Mirabella's    Pulmonary edema  proBNP 1400    Severe pulmonary hypertension most likely group 2   2D echo 1/27/2025    Left ventricular systolic function is normal. Calculated left ventricular EF = 70%    Left ventricular wall thickness is consistent with moderate to severe concentric hypertrophy.    Left ventricular diastolic function is consistent with (grade I) impaired relaxation.  moderate aortic valve regurgitation and stenosis.  Mean/peak gradient of 19/32 mmHg.  Planimetry LAZARO >1cm2.    Estimated right ventricular systolic pressure from tricuspid regurgitation is markedly elevated (>55 mmHg).    TSH 2.0     Recommendations:     Oxygen titration currently on 30 L high flow     Diuresis     Antibiotics currently on Zosyn    Steroids on IV Solu-Medrol 40 mg every 12          LOS: 5 days     Subjective         Objective     Vital signs for last 24 hours:  Vitals:    01/29/25 0608 01/29/25 0609 01/29/25 0613 01/29/25 0810   BP:    131/85   BP Location:    Left arm   Patient Position:    Lying   Pulse: 74 73 79 87   Resp: 18 18 18 18   Temp:    98.5 °F (36.9 °C)   TempSrc:    Oral   SpO2: 91% 98% 98% 95%   Weight:       Height:           Intake/Output last 3 shifts:  I/O last 3 completed shifts:  In: 200 [IV Piggyback:200]  Out: 2925 [Urine:2925]  Intake/Output this shift:  No intake/output data recorded.      Radiology  Imaging Results (Last 24 Hours)       ** No results found for the last 24 hours. **            Labs:  Results from last 7 days   Lab Units 01/29/25  0506   WBC 10*3/mm3 9.16   HEMOGLOBIN g/dL 13.5   HEMATOCRIT % 43.2   PLATELETS 10*3/mm3 255     Results from last 7 days   Lab Units 01/29/25  0506 01/28/25  0521   SODIUM mmol/L 144 144   POTASSIUM  mmol/L 3.0* 4.4   CHLORIDE mmol/L 90* 99   CO2 mmol/L 40.7* 36.8*   BUN mg/dL 17 10   CREATININE mg/dL 0.66 0.66   CALCIUM mg/dL 8.7 8.6   BILIRUBIN mg/dL  --  0.3   ALK PHOS U/L  --  44   ALT (SGPT) U/L  --  13   AST (SGOT) U/L  --  24   GLUCOSE mg/dL 125* 122*     Results from last 7 days   Lab Units 01/27/25  1852   PH, ARTERIAL pH units 7.304*   PO2 ART mm Hg 60.8*   PCO2, ARTERIAL mm Hg 78.5*   HCO3 ART mmol/L 39.0*     Results from last 7 days   Lab Units 01/28/25  0521 01/27/25  0216 01/26/25  0237   ALBUMIN g/dL 3.6 3.7 3.4*             Results from last 7 days   Lab Units 01/28/25  0521   MAGNESIUM mg/dL 1.9         Results from last 7 days   Lab Units 01/28/25  0521   TSH uIU/mL 2.010           Meds:   SCHEDULE  balsalazide, 750 mg, Oral, Nightly  [Held by provider] Budesonide, 3 mg, Oral, BID  budesonide, 0.5 mg, Nebulization, BID - RT  enoxaparin, 30 mg, Subcutaneous, Daily  furosemide, 40 mg, Intravenous, Q12H  ipratropium-albuterol, 3 mL, Nebulization, 4x Daily - RT  levothyroxine, 75 mcg, Oral, Q AM  methylPREDNISolone sodium succinate, 40 mg, Intravenous, Q12H  multivitamin with minerals, 1 tablet, Oral, Daily  piperacillin-tazobactam, 3.375 g, Intravenous, Q8H  sodium chloride, 10 mL, Intravenous, Q12H  vitamin B-12, 1,000 mcg, Oral, Daily      Infusions     PRNs    acetaminophen **OR** acetaminophen **OR** acetaminophen    senna-docusate sodium **AND** polyethylene glycol **AND** bisacodyl **AND** bisacodyl    Calcium Replacement - Follow Nurse / BPA Driven Protocol    dextrose    dextrose    glucagon (human recombinant)    Magnesium Standard Dose Replacement - Follow Nurse / BPA Driven Protocol    nitroglycerin    ondansetron    Phosphorus Replacement - Follow Nurse / BPA Driven Protocol    Potassium Replacement - Follow Nurse / BPA Driven Protocol    [COMPLETED] Insert Peripheral IV **AND** sodium chloride    sodium chloride    sodium chloride    Physical Exam:  Physical Exam  Cardiovascular:       Heart sounds: Murmur heard.      No gallop.   Pulmonary:      Effort: No respiratory distress.      Breath sounds: No stridor. Rhonchi and rales present. No wheezing.   Chest:      Chest wall: No tenderness.         ROS  Review of Systems   Respiratory:  Positive for cough and shortness of breath. Negative for wheezing and stridor.    Cardiovascular:  Negative for chest pain, palpitations and leg swelling.             Total time spent with patient greater than: 45 Minutes

## 2025-01-29 NOTE — PROGRESS NOTES
[facility] MEDICINE SERVICE  DAILY PROGRESS NOTE    NAME: Alexey Trinidad  : 3/21/1926  MRN: [id number]      LOS: 5 days     PROVIDER OF SERVICE: Leena Dominguez MD    Chief Complaint: UTI (urinary tract infection)    Subjective:     Interval History:    Patient seen and evaluated at bedside. Patient continues to be confused. Plan for DHT and to begin tube feeds today. Family informed.     Treatment plan discussed with patient. All questions addressed.     Review of Systems:   Denies fevers, chills, +fatigue  Denies chest pain, edema  +shortness of breath, +cough  Denies nausea, vomiting, diarrhea  Denies dysuria, hematuria    Objective:     Vital Signs  Temp:  [97.4 °F (36.3 °C)-98.5 °F (36.9 °C)] 98.3 °F (36.8 °C)  Heart Rate:  [73-91] 79  Resp:  [15-21] 18  BP: (110-144)/(58-74) 142/69  Flow (L/min) (Oxygen Therapy):  [40-60] 40   Body mass index is 22.12 kg/m².    Physical Exam   General: No acute distress, alert but intermittently confused  CV: RRR, no peripheral edema  Pulm: Coarse breath sounds with diffuse wheezes, no increased work of breathing  Abd: Soft, nontender, nondistended  Skin: No rashes or lesions on exposed skin  Psych: Appropriate mood and affect    Scheduled Meds   balsalazide, 750 mg, Oral, Nightly  [Held by provider] Budesonide, 3 mg, Oral, BID  budesonide, 0.5 mg, Nebulization, BID - RT  enoxaparin, 30 mg, Subcutaneous, Daily  furosemide, 40 mg, Intravenous, Q12H  ipratropium-albuterol, 3 mL, Nebulization, 4x Daily - RT  levothyroxine, 75 mcg, Oral, Q AM  methylPREDNISolone sodium succinate, 40 mg, Intravenous, Q12H  multivitamin with minerals, 1 tablet, Oral, Daily  piperacillin-tazobactam, 3.375 g, Intravenous, Q8H  sodium chloride, 10 mL, Intravenous, Q12H  vitamin B-12, 1,000 mcg, Oral, Daily       PRN Meds     acetaminophen **OR** acetaminophen **OR** acetaminophen    senna-docusate sodium **AND** polyethylene glycol **AND** bisacodyl **AND** bisacodyl    Calcium Replacement  - Follow Nurse / BPA Driven Protocol    dextrose    dextrose    glucagon (human recombinant)    Magnesium Standard Dose Replacement - Follow Nurse / BPA Driven Protocol    Morphine **AND** naloxone    nitroglycerin    ondansetron    Phosphorus Replacement - Follow Nurse / BPA Driven Protocol    Potassium Replacement - Follow Nurse / BPA Driven Protocol    [COMPLETED] Insert Peripheral IV **AND** sodium chloride    sodium chloride    sodium chloride   Infusions         Diagnostic Data    Results from last 7 days   Lab Units 01/29/25  0506 01/28/25  0521   WBC 10*3/mm3 9.16 8.28   HEMOGLOBIN g/dL 13.5 13.2   HEMATOCRIT % 43.2 43.9   PLATELETS 10*3/mm3 255 232   GLUCOSE mg/dL 125* 122*   CREATININE mg/dL 0.66 0.66   BUN mg/dL 17 10   SODIUM mmol/L 144 144   POTASSIUM mmol/L 3.0* 4.4   AST (SGOT) U/L  --  24   ALT (SGPT) U/L  --  13   ALK PHOS U/L  --  44   BILIRUBIN mg/dL  --  0.3   ANION GAP mmol/L 13.3 8.2       XR Chest 1 View    Result Date: 1/27/2025  Impression: Prominent cardiac silhouette with pulmonary vascular congestion, bilateral mixed interstitial and airspace opacities and bilateral pleural effusions. Findings are again favored to represent CHF/pulmonary edema. Electronically Signed: Jasmina Armando MD  1/27/2025 1:04 PM EST  Workstation ID: [id number]     Interval results reviewed.    Assessment/Plan:     Acute hypoxic respiratory failure  Aspiration pneumonia, unknown organism  Pulmonary edema  Moderate aortic valve regurgitation  Moderate aortic valve stenosis  Moderate tricuspid valve regurgitation  Severe pulmonary hypertension  Acute urinary tract infection  Abdominal pain with nausea and vomiting  Biliary dilation w/ stone at ampulla of vater  History of collagenous colitis  Acute metabolic encephalopathy, likely multifactorial  Physical deconditioning  Left adrenal nodule, stable  Hypertension  Hypothyroidism    - Pulmonology consulted given increased oxygen requirements, appreciate recs  - Echo  noted; cardiology consulted in setting of valvular heart disease, appreciate recs  - Continue diuresis  - Continue zosyn, steroids  - Wean oxygen as tolerated  - Zosyn as above for treatment of UTI  - DHT placement, consult to dietician with plans to start TF today    Treatment plan discussed with nursing staff, case management and pharmacy.     VTE Prophylaxis:  Pharmacologic VTE prophylaxis orders are present.    Code status is   Code Status and Medical Interventions: No CPR (Do Not Attempt to Resuscitate); Limited Support; No intubation (DNI); DNR/DNI   Ordered at: 01/27/25 9828     Medical Intervention Limits:    No intubation (DNI)     Code Status (Patient has no pulse and is not breathing):    No CPR (Do Not Attempt to Resuscitate)     Medical Interventions (Patient has pulse or is breathing):    Limited Support     Comments:    DNR/DNI       Plan for disposition: Pending clinical course    Barriers to discharge: Oxygen requirements, IV abx, NPO, cardiology consult pending    Time: 35+ minutes     Signature: Electronically signed by Leena Dominguez MD, 01/29/25, 07:26 [facility].  [facility] Hospitalist Team

## 2025-01-29 NOTE — THERAPY TREATMENT NOTE
Acute Care - Speech Language Pathology Treatment Note    [facility]     Patient Name: Alexey Trinidad  : 3/21/1926  MRN: [id number]    Today's Date: 2025                   Admit Date: 2025       Visit Dx:      ICD-10-CM ICD-9-CM   1. Urinary tract infection without hematuria, site unspecified  N39.0 599.0   2. General weakness  R53.1 780.79   3. Vomiting, unspecified vomiting type, unspecified whether nausea present  R11.10 787.03       Patient Active Problem List   Diagnosis    UTI (urinary tract infection)    Abdominal pain       Past Medical History:   Diagnosis Date    Disease of thyroid gland     Hypertension        Past Surgical History:   Procedure Laterality Date    BACK SURGERY      kyphoplasty    BREAST SURGERY      benign cyst removed    HYSTERECTOMY      LAPAROSCOPIC CHOLECYSTECTOMY       Skilled ST intervention conducted this date targeting dysphagia in the setting of hypertension, hypothyroidism, COPD, who presented to [facility] on 2025 with generalized weakness .  Pt alert and amenable to treatment, however was very weak and fatigued   Pt on 40 liters/min via nasal cannula via Airvo during session. Pt currently prescribed a Other/NPO with no source of nutrition currently    Treatment narrative/results: The patient was seen bedside today for re-evaluation of swallow. She remains on Airvo however noted decreased to 40 L today. Upon entrance to the patient's room she was reclined in bed and easy to arouse. She was amenable to allow head of bed to be elevated to approximately 90 degrees today. Noted drop in 02 saturation from approximately 99% to 84%. She also stated she was dizzy. Patient allowed to rest/recover, and was amenable to po trials of ice chips and water sips by spoon. She was given 3 trials of ice chips and 2 trials of thin water via spoon. Labial seal is adequate. She is able to adequately manipulate the ice and water, and clears the oral cavity effectively. No  anterior labial spillage noted. With the ice chips she was noted to have a drop in 02 saturations to approximately 92% and following the water sips she had decline to approximately 88% and difficulty recovering afterward. No other trials/consistencies attempted this date due to the above.     SLP Recommendation and Plan:  It is recommended that the patient continue to remain NPO with the exception of ice chips/water sips ONLY as per the [place] Water Protocol (see below)  Water/ice only when pt is fully awake and alert  Meds: via alternate route  Safe swallow strategies: HOB at a 90 degree angle, slow rate of intake, small sips  Oral care at least x2 daily   Consider alternative means of nutrition for now     The rationale to recommend water/ice chips when a PO diet cannot appropriately/functionally sustain nutrition is because water is low risk for aspiration Pneumonia when compared to aspiration of food or other liquids.       Benefits of a water protocol include but are not limited to:      Oral gratification   Engagement of oropharyngeal swallow musculature   Decrease likelihood of dehydration       Guidelines for proper implementation include:  Thorough oral care prior to consuming water  Upright at 90 degree hip flexion  Small sips at slow rate     Monitor for any changes in respiratory status and discontinue if distress noted           EDUCATION    The patient has been educated in the following areas:     Discussed with nurse. She stated nutrition status to be discussed with medical team as she is currently NPO with no source of nutrition. Discussed [place] Water Protocol as well. ST will continue to follow while in-house      Dysphagia (Swallowing Impairment) Oral Care/Hydration NPO rationale.             SLP GOALS       Row Name 01/29/25 1300       (LTG) Patient will demonstrate functional swallow for    Diet Texture (Demonstrate functional swallow) pureed textures  -SM    Liquid viscosity (Demonstrate  functional swallow) thin liquids  -SM    [place] (Demonstrate functional swallow) with minimal cues (75-90% accuracy)  -SM    Time Frame (Demonstrate functional swallow) by discharge  -SM    Progress/Outcomes (Demonstrate functional swallow) goal ongoing  -SM    Comment (Demonstrate functional swallow) See above  -SM       (STG) Patient will tolerate trials of    Consistencies Trialed (Tolerate trials) thin liquids;pureed textures  -SM    Desired Outcome (Tolerate trials) without signs/symptoms of aspiration;without signs of distress;with adequate oral prep/transit/clearance  -SM    [place] (Tolerate trials) with minimal cues (75-90% accuracy)  -SM    Time Frame (Tolerate trials) by discharge  -SM    Progress/Outcomes (Tolerate trials) goal ongoing  -SM    Comment (Tolerate trials) See above  -SM              User Key  (r) = Recorded By, (t) = Taken By, (c) = Cosigned By      Initials Name Provider Type    Sophia Méndez, SLP Speech and Language Pathologist    Joe Aaron, SLP Speech and Language Pathologist                                Time Calculation:                                  CAMRON Lewis  1/29/2025

## 2025-01-29 NOTE — CONSULTS
Referring Provider: Leena Dominguez MD    Reason for Consultation: Valvular heart disease      Patient Care Team:  Rui Cole MD as PCP - General (Internal Medicine)      SUBJECTIVE     Chief Complaint: Generalized weakness, abdominal pain    History of present illness:  Alexey Trinidad is a 89 y.o. female who has been admitted to the hospital since 1/24/2025.  She has had GI, pulmonology consultations for abdominal pain, hypoxemic respiratory failure.  She has been receiving IV diuretics.  She has also been receiving antibiotics for UTI.  Echocardiogram shows aortic and tricuspid valve disease for which cardiology has been consulted.  Patient is currently on Airvo.  She denies any chest pain but does complain of shortness of breath.      Review of systems:    Constitutional: + weakness, fatigue, fever, rigors, chills   Eyes: No vision changes, eye pain   ENT/oropharynx: No difficulty swallowing, sore throat, epistaxis, changes in hearing   Cardiovascular: No chest pain, chest tightness, palpitations, paroxysmal nocturnal dyspnea, orthopnea, diaphoresis, dizziness / syncopal episode   Respiratory: + shortness of breath, dyspnea on exertion, cough, wheezing, hemoptysis   Gastrointestinal: No abdominal pain, nausea, vomiting, diarrhea, bloody stools   Genitourinary: No hematuria, dysuria   Neurological: No headache, tremors, numbness, one-sided weakness    Musculoskeletal: No cramps, myalgias, joint pain, joint swelling   Integument: No rash, edema        Personal History:      Past Medical History:   Diagnosis Date    Disease of thyroid gland     Hypertension        Past Surgical History:   Procedure Laterality Date    BACK SURGERY      kyphoplasty    BREAST SURGERY      benign cyst removed    HYSTERECTOMY      LAPAROSCOPIC CHOLECYSTECTOMY         History reviewed. No pertinent family history.    Social History     Tobacco Use    Smoking status: Never    Smokeless tobacco: Never   Vaping Use    Vaping  status: Never Used   Substance Use Topics    Alcohol use: Never    Drug use: Never        Home meds:  Prior to Admission medications    Medication Sig Start Date End Date Taking? Authorizing Provider   acetaminophen (TYLENOL) 500 MG tablet Take 1 tablet by mouth Every Morning.   Yes Gerald Costa MD   Bacillus Coagulans-Inulin (ALIGN PREBIOTIC-PROBIOTIC PO) Take 1 capsule by mouth Daily.   Yes Gerald Costa MD   balsalazide (COLAZAL) 750 MG capsule Take 1 capsule by mouth Every Night.   Yes Gerald Costa MD   Budesonide (ENTOCORT EC) 3 MG 24 hr capsule Take 1 capsule by mouth 2 (Two) Times a Day.   Yes Gerald Costa MD   Cyanocobalamin (VITAMIN B-12 PO) Take 1 tablet by mouth Daily.   Yes Gerald Costa MD   levothyroxine (SYNTHROID, LEVOTHROID) 75 MCG tablet Take 1 tablet by mouth Every Morning.   Yes Gerald Costa MD   losartan (COZAAR) 50 MG tablet Take 1 tablet by mouth Daily.   Yes Gerald Costa MD   multivitamins-minerals (PRESERVISION AREDS 2) capsule capsule Take 1 capsule by mouth 2 (Two) Times a Day.   Yes Gerald Costa MD   Wheat Dextrin (BENEFIBER DRINK MIX PO) Take 1 package by mouth Daily.   Yes Gerald Costa MD       Allergies:     Contrast dye (echo or unknown ct/mr) and Sulfa antibiotics    Scheduled Meds:balsalazide, 750 mg, Oral, Nightly  [Held by provider] Budesonide, 3 mg, Oral, BID  budesonide, 0.5 mg, Nebulization, BID - RT  enoxaparin, 30 mg, Subcutaneous, Daily  furosemide, 40 mg, Intravenous, Q12H  ipratropium-albuterol, 3 mL, Nebulization, 4x Daily - RT  levothyroxine, 75 mcg, Oral, Q AM  methylPREDNISolone sodium succinate, 40 mg, Intravenous, Q12H  multivitamin with minerals, 1 tablet, Oral, Daily  piperacillin-tazobactam, 3.375 g, Intravenous, Q8H  sodium chloride, 10 mL, Intravenous, Q12H  vitamin B-12, 1,000 mcg, Oral, Daily      Continuous Infusions:   PRN Meds:  acetaminophen **OR** acetaminophen **OR**  "acetaminophen    senna-docusate sodium **AND** polyethylene glycol **AND** bisacodyl **AND** bisacodyl    Calcium Replacement - Follow Nurse / BPA Driven Protocol    dextrose    dextrose    glucagon (human recombinant)    Magnesium Standard Dose Replacement - Follow Nurse / BPA Driven Protocol    nitroglycerin    ondansetron    Phosphorus Replacement - Follow Nurse / BPA Driven Protocol    Potassium Replacement - Follow Nurse / BPA Driven Protocol    [COMPLETED] Insert Peripheral IV **AND** sodium chloride    sodium chloride    sodium chloride      OBJECTIVE    Vital Signs  Vitals:    01/29/25 0606 01/29/25 0608 01/29/25 0609 01/29/25 0613   BP:       BP Location:       Patient Position:       Pulse: 82 74 73 79   Resp: 18 18 18 18   Temp:       TempSrc:       SpO2: 98% 91% 98% 98%   Weight:       Height:           Flowsheet Rows      Flowsheet Row First Filed Value   Admission Height 147.3 cm (58\") Documented at 01/24/2025 1614   Admission Weight 49.4 kg (109 lb) Documented at 01/24/2025 1614              Intake/Output Summary (Last 24 hours) at 1/29/2025 0802  Last data filed at 1/29/2025 0531  Gross per 24 hour   Intake 100 ml   Output 2325 ml   Net -2225 ml        Telemetry: Sinus rhythm     Physical Exam:  The patient is alert, oriented and in no distress.  On Airvo  Elderly/frail  Vital signs as noted above.  Head and neck revealed no carotid bruits or jugular venous distention.  No thyromegaly or lymphadenopathy is present  Distant and diminished breath sounds  Heart: Normal first and second heart sounds.  Systolic ejection murmur.  No precordial rub is present.  No gallop is present.  Abdomen: Soft and nontender.  No organomegaly is present.  Extremities with good peripheral pulses without any pedal edema.  Skin: Warm and dry.  Musculoskeletal system is grossly normal.  CNS grossly normal.       Results Review:  I have personally reviewed the results from the time of this admission to 1/29/2025 08:02 EST and " agree with these findings:  []  Laboratory  []  Microbiology  []  Radiology  []  EKG/Telemetry   []  Cardiology/Vascular   []  Pathology  []  Old records  []  Other:    Most notable findings include:     Lab Results (last 24 hours)       Procedure Component Value Units Date/Time    Basic Metabolic Panel [[id number]]  (Abnormal) Collected: 01/29/25 0506    Specimen: Blood Updated: 01/29/25 0538     Glucose 125 mg/dL      BUN 17 mg/dL      Creatinine 0.66 mg/dL      Sodium 144 mmol/L      Potassium 3.0 mmol/L      Chloride 90 mmol/L      CO2 40.7 mmol/L      Calcium 8.7 mg/dL      BUN/Creatinine Ratio 25.8     Anion Gap 13.3 mmol/L      eGFR 79.4 mL/min/1.73     Narrative:      GFR Categories in Chronic Kidney Disease (CKD)      GFR Category          GFR (mL/min/1.73)    Interpretation  G1                     90 or greater         Normal or high (1)  G2                      60-89                Mild decrease (1)  G3a                   45-59                Mild to moderate decrease  G3b                   30-44                Moderate to severe decrease  G4                    15-29                Severe decrease  G5                    14 or less           Kidney failure          (1)In the absence of evidence of kidney disease, neither GFR category G1 or G2 fulfill the criteria for CKD.    eGFR calculation 2021 CKD-EPI creatinine equation, which does not include race as a factor    CBC (No Diff) [[id number]]  (Abnormal) Collected: 01/29/25 0506    Specimen: Blood Updated: 01/29/25 0515     WBC 9.16 10*3/mm3      RBC 4.40 10*6/mm3      Hemoglobin 13.5 g/dL      Hematocrit 43.2 %      MCV 98.2 fL      MCH 30.7 pg      MCHC 31.3 g/dL      RDW 13.7 %      RDW-SD 49.5 fl      MPV 8.9 fL      Platelets 255 10*3/mm3     Blood Culture - Blood, Arm, Left [[id number]]  (Normal) Collected: 01/27/25 2115    Specimen: Blood from Arm, Left Updated: 01/28/25 2131     Blood Culture No growth at 24 hours    Blood Culture - Blood, Arm, Right  [[id number]]  (Normal) Collected: 01/24/25 1810    Specimen: Blood from Arm, Right Updated: 01/28/25 1845     Blood Culture No growth at 4 days    Narrative:      Less than seven (7) mL's of blood was collected.  Insufficient quantity may yield false negative results.    Blood Culture - Blood, Arm, Right [[id number]]  (Normal) Collected: 01/24/25 1802    Specimen: Blood from Arm, Right Updated: 01/28/25 1815     Blood Culture No growth at 4 days    Narrative:      Less than seven (7) mL's of blood was collected.  Insufficient quantity may yield false negative results.    TSH [[id number]]  (Normal) Collected: 01/28/25 0521    Specimen: Blood Updated: 01/28/25 1552     TSH 2.010 uIU/mL             Imaging Results (Last 24 Hours)       ** No results found for the last 24 hours. **            LAB RESULTS (LAST 7 DAYS)    CBC  Results from last 7 days   Lab Units 01/29/25  0506 01/28/25  0521 01/27/25  0216 01/26/25  0237 01/25/25  0104 01/24/25  1609   WBC 10*3/mm3 9.16 8.28 11.21* 6.03 10.16 13.53*   RBC 10*6/mm3 4.40 4.28 4.30 3.79 4.26 4.29   HEMOGLOBIN g/dL 13.5 13.2 13.1 11.9* 13.0 13.5   HEMATOCRIT % 43.2 43.9 44.7 39.8 43.5 44.1   MCV fL 98.2* 102.6* 104.0* 105.0* 102.1* 102.8*   PLATELETS 10*3/mm3 255 232 235 205 298 310       BMP  Results from last 7 days   Lab Units 01/29/25  0506 01/28/25  0521 01/27/25  1259 01/27/25  0216 01/26/25  1330 01/26/25  0237 01/25/25  1229 01/25/25  0104 01/24/25  1609   SODIUM mmol/L 144 144  --  143  --  146*  --  147* 147*   POTASSIUM mmol/L 3.0* 4.4 4.0 3.2*  --  4.1 3.9 3.4* 3.7   CHLORIDE mmol/L 90* 99  --  103  --  110*  --  104 108*   CO2 mmol/L 40.7* 36.8*  --  32.3*  --  29.4*  --  33.1* 28.9   BUN mg/dL 17 10  --  7*  --  13  --  20 22   CREATININE mg/dL 0.66 0.66  --  0.42*  --  0.44*  --  0.51* 0.56*   GLUCOSE mg/dL 125* 122*  --  130*  --  103*  --  119* 129*   MAGNESIUM mg/dL  --  1.9  --  2.0  --  2.0  --  2.2  --    PHOSPHORUS mg/dL  --  2.9  --  2.7 2.3* 2.0*  --   4.0  --        CMP   Results from last 7 days   Lab Units 01/29/25  0506 01/28/25  0521 01/27/25  1259 01/27/25  0216 01/26/25  1145 01/26/25  0237 01/25/25  1229 01/25/25  0104 01/24/25  1609   SODIUM mmol/L 144 144  --  143  --  146*  --  147* 147*   POTASSIUM mmol/L 3.0* 4.4 4.0 3.2*  --  4.1 3.9 3.4* 3.7   CHLORIDE mmol/L 90* 99  --  103  --  110*  --  104 108*   CO2 mmol/L 40.7* 36.8*  --  32.3*  --  29.4*  --  33.1* 28.9   BUN mg/dL 17 10  --  7*  --  13  --  20 22   CREATININE mg/dL 0.66 0.66  --  0.42*  --  0.44*  --  0.51* 0.56*   GLUCOSE mg/dL 125* 122*  --  130*  --  103*  --  119* 129*   ALBUMIN g/dL  --  3.6  --  3.7  --  3.4*  --  3.9 3.9   BILIRUBIN mg/dL  --  0.3  --  0.2  --  <0.2  --  0.2 0.3   ALK PHOS U/L  --  44  --  39  --  35*  --  41 42   AST (SGOT) U/L  --  24  --  22  --  23  --  22 29   ALT (SGPT) U/L  --  13  --  14  --  11  --  12 13   LIPASE U/L  --   --   --   --   --   --   --   --  29   AMMONIA umol/L  --   --   --   --  17  --   --   --   --        BNP        TROPONIN        CoAg        Creatinine Clearance  Estimated Creatinine Clearance: 36.1 mL/min (by C-G formula based on SCr of 0.66 mg/dL).    ABG  Results from last 7 days   Lab Units 01/27/25  1852 01/27/25  1602   PH, ARTERIAL pH units 7.304* 7.243*   PCO2, ARTERIAL mm Hg 78.5* 93.7*   PO2 ART mm Hg 60.8* 103.5   O2 SATURATION ART % 86.7* 96.2   BASE EXCESS ART mmol/L 9.3* 8.7*         Radiology  XR Chest 1 View    Result Date: 1/27/2025  Impression: Prominent cardiac silhouette with pulmonary vascular congestion, bilateral mixed interstitial and airspace opacities and bilateral pleural effusions. Findings are again favored to represent CHF/pulmonary edema. Electronically Signed: Jasmina Armando MD  1/27/2025 1:04 PM EST  Workstation ID: [id number]       EKG  I personally viewed and interpreted the patient's EKG/Telemetry data:  Telemetry Scan   Final Result      Telemetry Scan   Final Result      Telemetry Scan   Final Result       Telemetry Scan   Final Result      Telemetry Scan   Final Result      Telemetry Scan   Final Result      Telemetry Scan   Final Result      Telemetry Scan   Final Result      Telemetry Scan   Final Result      Telemetry Scan   Final Result      Telemetry Scan   Final Result      Telemetry Scan   Final Result      Telemetry Scan   Final Result      Telemetry Scan   Final Result      Telemetry Scan   Final Result            Echocardiogram:    Results for orders placed during the hospital encounter of 01/24/25    Adult Transthoracic Echo Complete W/ Cont if Necessary Per Protocol    Interpretation Summary    Left ventricular systolic function is normal. Calculated left ventricular EF = 70%    Left ventricular wall thickness is consistent with moderate to severe concentric hypertrophy.    Left ventricular diastolic function is consistent with (grade I) impaired relaxation.    The left atrial cavity is mildly dilated.    The right atrial cavity is mildly  dilated.    Moderate aortic valve regurgitation is present.    Moderate to severe aortic valve stenosis is present.    Moderate tricuspid valve regurgitation is present.    Estimated right ventricular systolic pressure from tricuspid regurgitation is markedly elevated (>55 mmHg).    Transthoracic echocardiography reveals moderate to severe LVH with severely calcified aortic valve with moderate aortic regurgitation and moderate to severe aortic stenosis.  No effusion.  Moderate TR with severe pulm hypertension.  No effusion    Electronically signed by Kt Goins MD, 01/28/25, 5:51 PM EST.        Stress Test:        Cardiac Catheterization:  No results found for this or any previous visit.        Other:      ASSESSMENT & PLAN:    Principal Problem:    UTI (urinary tract infection)  Active Problems:    Abdominal pain    Valvular heart disease  Patient has moderate aortic valve regurgitation and stenosis.  Mean/peak gradient of 19/32 mmHg.  Planimetry LAZARO  >1cm2.  Moderate tricuspid valve regurgitation with severe pulmonary hypertension.  Currently does not meet criteria for aortic valve replacement or tricuspid valve repair.  Recommend diuretics and medical management of valvular heart disease with blood pressure and heart rate control  Monitor I's and O's and replace electrolytes as needed.  Check proBNP to establish a baseline.  Would like to follow-up as outpatient for progression of disease and to discuss transcatheter options.  Given advanced age and poor functional status: I am inclined towards medical management only with diuretics.    Respiratory failure  Hypoxemic respiratory failure  Severe pulmonary hypertension  Currently on Airvo 40 L  Agree with antibiotics and steroids    UTI  She will complete a course of antibiotics    GI symptoms  No indication for colonoscopy or EGD   Pending swallow study  May require NG tube for nutrition support    Elderly/frail    CODE STATUS is DNI DNR        Oscar Wesley MD  01/29/25  08:02 EST

## 2025-01-29 NOTE — PLAN OF CARE
Goal Outcome Evaluation:              Outcome Evaluation: Patient resting abed, no distress noted. Patient remains on Airvo but has been titrated down. Patient wakes at times and appears more confused but after reassurance and redirection is able to calm.

## 2025-01-29 NOTE — CASE MANAGEMENT/SOCIAL WORK
Continued Stay Note  [facility]     Patient Name: Alexey Trinidad  MRN: [id number]  Today's Date: 1/29/2025    Admit Date: 1/24/2025    Plan: Return home with family. [facility] (accepted, need order). Watch for new home O2 needs   Discharge Plan       Row Name 01/29/25 0853       Plan    Plan Comments DC Barriers: Airvo 40L, IV Lasix/steroids, IV Zosyn, poss NG need, pending swallow study once O2 is titrated down             Carolina Dixon RN     [facility]  Office: [phone]  Cell: [phone]  Fax # [phone]

## 2025-01-29 NOTE — CONSULTS
Nutrition Services    Patient Name: Alexey Trinidad  YOB: 1926  MRN: [id number]  Admission date: 1/24/2025    Comment:    Moderate chronic disease related malnutrition related to prolonged suboptimal intake and suspected hypermetabolism in the setting of multiple chronic conditions as evidenced by po intake meeting < 75% est energy requirement for > 1 month and evidence of moderate muscle and fat wasting per NFPE. (See MSA below).     Nutren 1.5 at 20 mL/hr + 20 mL/hr water flush     CLINICAL NUTRITION ASSESSMENT      Reason for Assessment 1/29: TF consult     H&P      Past Medical History:   Diagnosis Date    Disease of thyroid gland     Hypertension        Past Surgical History:   Procedure Laterality Date    BACK SURGERY      kyphoplasty    BREAST SURGERY      benign cyst removed    HYSTERECTOMY      LAPAROSCOPIC CHOLECYSTECTOMY          Current Problems   Abdominal pain, nausea, vomiting likely due to biliary dilatation/Stone at the ampulla of vater  -GI following      UTI  Leukocytosis likely secondary to above  Altered mental status likely secondary to infection versus chronic  -Uncertain about patient's baseline if confusion is chronic or acute     Physical deconditioning     Mild hypernatremia due to dehydration     Left adrenal nodule     Hypertension     Hypothyroidism     COPD     Dysphagia  -TF consult received     Encounter Information        Trending Narrative     1/29: Pt presented to ED on 1/24 with reports of progressively worsening general weakness and some abdominal discomfort. Family reports that pt is having difficulty swallowing pills but reported that pt does ok tolerating soft food. SLP evaluated pt on 1/27 and recommends pt remain NPO. Pt has been NPO x last 2 days. Pt remains confused and now with increased supplemental O2 needs (remains on airvo). RD visited pt at bedside. Pt sitting up in bed at time of visit with 3 family members visiting. Pt has NGT in place with EN  "infusing at 20 mL/hr per pump. Pt did not respond verbally during visit. Family answered questions on her behalf. Family reports that pt does not eat much on a regular basis. NFPE completed, consistent with nutrition diagnosis of malnutrition using AND/ASPEN criteria. See MSA below.        Anthropometrics        Current Height, Weight Height: 147.3 cm (58\")  Weight: 48 kg (105 lb 13.1 oz) (01/29/25 0531)       Usual Body Weight (UBW) Unable to obtain from patient        Trending Weight Hx     This admission: 1/29: 105#             PTA: 1/29: No weight history documented     Wt Readings from Last 30 Encounters:   01/29/25 0531 48 kg (105 lb 13.1 oz)   01/27/25 1757 47.2 kg (104 lb)   01/24/25 2006 47.4 kg (104 lb 8 oz)   01/24/25 1614 49.4 kg (109 lb)      BMI kg/m2 Body mass index is 22.12 kg/m².       Labs        Pertinent Labs Hypokalemia - replacements scheduled today    Results from last 7 days   Lab Units 01/29/25  0506 01/28/25  0521 01/27/25  1259 01/27/25  0216 01/26/25  0237   SODIUM mmol/L 144 144  --  143 146*   POTASSIUM mmol/L 3.0* 4.4 4.0 3.2* 4.1   CHLORIDE mmol/L 90* 99  --  103 110*   CO2 mmol/L 40.7* 36.8*  --  32.3* 29.4*   BUN mg/dL 17 10  --  7* 13   CREATININE mg/dL 0.66 0.66  --  0.42* 0.44*   CALCIUM mg/dL 8.7 8.6  --  8.3 8.0*   BILIRUBIN mg/dL  --  0.3  --  0.2 <0.2   ALK PHOS U/L  --  44  --  39 35*   ALT (SGPT) U/L  --  13  --  14 11   AST (SGOT) U/L  --  24  --  22 23   GLUCOSE mg/dL 125* 122*  --  130* 103*     Results from last 7 days   Lab Units 01/29/25  0506 01/28/25  0521 01/27/25  0216 01/26/25  1330 01/26/25  0237   MAGNESIUM mg/dL  --  1.9 2.0  --  2.0   PHOSPHORUS mg/dL  --  2.9 2.7   < > 2.0*   HEMOGLOBIN g/dL 13.5 13.2 13.1  --  11.9*   HEMATOCRIT % 43.2 43.9 44.7  --  39.8    < > = values in this interval not displayed.     No results found for: \"HGBA1C\"     Medications    Scheduled Medications balsalazide, 750 mg, Oral, Nightly  [Held by provider] Budesonide, 3 mg, Oral, " BID  budesonide, 0.5 mg, Nebulization, BID - RT  enoxaparin, 30 mg, Subcutaneous, Daily  furosemide, 40 mg, Intravenous, Q12H  ipratropium-albuterol, 3 mL, Nebulization, 4x Daily - RT  levothyroxine, 75 mcg, Oral, Q AM  methylPREDNISolone sodium succinate, 40 mg, Intravenous, Q12H  multivitamin with minerals, 1 tablet, Oral, Daily  piperacillin-tazobactam, 3.375 g, Intravenous, Q8H  potassium chloride, 10 mEq, Intravenous, Q1H  sodium chloride, 10 mL, Intravenous, Q12H  vitamin B-12, 1,000 mcg, Oral, Daily        Infusions      PRN Medications   acetaminophen **OR** acetaminophen **OR** acetaminophen    senna-docusate sodium **AND** polyethylene glycol **AND** bisacodyl **AND** bisacodyl    Calcium Replacement - Follow Nurse / BPA Driven Protocol    dextrose    dextrose    glucagon (human recombinant)    Magnesium Standard Dose Replacement - Follow Nurse / BPA Driven Protocol    nitroglycerin    ondansetron    Phosphorus Replacement - Follow Nurse / BPA Driven Protocol    Potassium Replacement - Follow Nurse / BPA Driven Protocol    [COMPLETED] Insert Peripheral IV **AND** sodium chloride    sodium chloride    sodium chloride     Physical Findings        Trending Physical   Appearance, NFPE 1/29: NFPE completed, consistent with nutrition diagnosis of malnutrition using AND/ASPEN criteria. See MSA below.      --  Edema  No edema documented      Bowel Function Last documented BM 1/28     Tubes No feeding tube in place - plan for DHT for EN     Chewing/Swallowing NPO - SLP following     Skin No PI documented        Estimated/Assessed Needs    Estimated 1/29/25   Energy Requirements    EST Needs, Method, Wt used 5310-7658 kcals (25-30 kcals/kg CBW 48 kg)       Protein Requirements    EST Needs, Method, Wt used 58-72 g protein (1.2-1.5 g/kg CBW 48 kg)       Fluid Requirements     Estimated Needs (mL/day) 1 mL/kcal or per hydration status     Fluid Deficit    Current Na Level (mEq/L)    Desired Na Level (mEq/L)     Estimated Fluid Deficit (L)       Current Nutrition Orders & Evaluation of Intake       Oral Nutrition     Food Allergies NKFA   Current PO Diet NPO Diet NPO Type: Sips with Meds   Supplement None    PO Evaluation     Trending % PO Intake 1/29: NPO     Enteral Nutrition    Enteral Route    Order, Modulars, Flushes    Tolerance    TF Observation         Parenteral Nutrition     TPN Route    Total # Days on TPN    TPN Order, Lipid Details    MVI & Trace Element Freq    TPN Observation       Nutritional Risk Screening        NRS-2002 Score          Nutrition Diagnosis         Nutrition Dx Problem 1 Inadequate po intake related to dysphagia as evidenced by EN needed to meet est needs.       Nutrition Dx Problem 2 Moderate chronic disease related malnutrition related to prolonged suboptimal intake and suspected hypermetabolism in the setting of multiple chronic conditions as evidenced by po intake meeting < 75% est energy requirement for > 1 month and evidence of moderate muscle and fat wasting per NFPE. (See MSA below).        Intervention Goal         Intervention Goal(s) Begin and tolerate EN  Maintain weight       Nutrition Intervention        RD Action Initiate EN    NFPE completed      Nutrition Prescription          Diet Prescription NPO   Supplement Prescription      Enteral Prescription Initial Goal:  *initial goal conservative d/t risk of RFS     Nutren 1.5 at 20 mL/hr + 20 mL/hr water flush      End Goal:    Nutren 1.5 at 40 mL/hr + water flush per clinical picture     Calories  1320 kcals (100%)    Protein  60 g (100%)    Free water  669 mL   Flushes  Will monitor per hydration status     The above end goal rate is for 22 hrs/day to assume interruptions for ADLs. Water flushes adjusted based on clinical picture + Rx flushes/IV fluids          TPN Prescription      Monitor/Evaluation        Monitor Per protocol, I&O, Pertinent labs, EN delivery/tolerance, Weight, Skin status, GI status, Symptoms, Swallow  function, Hemodynamic stability     Malnutrition Severity Assessment      Patient meets criteria for : Moderate (non-severe) Malnutrition  Malnutrition Type (Last 8 Hours)       Malnutrition Severity Assessment       Row Name 01/29/25 1506       Malnutrition Severity Assessment    Malnutrition Type Chronic Disease - Related Malnutrition      Row Name 01/29/25 1506       Insufficient Energy Intake     Insufficient Energy Intake Findings Moderate    Insufficient Energy Intake  <75% of est. energy requirement for > or equal to 1 month      Row Name 01/29/25 1506       Muscle Loss    Loss of Muscle Mass Findings Moderate    Clavicle Bone Region Moderate - some protrusion in females, visible in males    Acromion Bone Region Moderate - acromion may slightly protrude    Dorsal Hand Region Moderate - slight depression      Row Name 01/29/25 1506       Fat Loss    Subcutaneous Fat Loss Findings Moderate    Orbital Region  Moderate -  somewhat hollowness, slightly dark circles    Upper Arm Region Moderate - some fat tissue, not ample      Row Name 01/29/25 1506       Criteria Met (Must meet criteria for severity in at least 2 of these categories: M Wasting, Fat Loss, Fluid, Secondary Signs, Wt. Status, Intake)    Patient meets criteria for  Moderate (non-severe) Malnutrition                           Electronically signed by:  Cassandra Taveras RD  01/29/25 11:04 EST

## 2025-01-29 NOTE — PLAN OF CARE
Goal Outcome Evaluation:                        Problem: Adult Inpatient Plan of Care  Goal: Plan of Care Review  Outcome: Progressing  Goal: Patient-Specific Goal (Individualized)  Outcome: Progressing  Goal: Absence of Hospital-Acquired Illness or Injury  Outcome: Progressing  Intervention: Identify and Manage Fall Risk  Recent Flowsheet Documentation  Taken 1/29/2025 1650 by Denisse Carlos RN  Safety Promotion/Fall Prevention:   assistive device/personal items within reach   activity supervised   clutter free environment maintained   fall prevention program maintained   nonskid shoes/slippers when out of bed   room organization consistent   safety round/check completed  Taken 1/29/2025 1445 by Denisse Carlos RN  Safety Promotion/Fall Prevention:   assistive device/personal items within reach   activity supervised   clutter free environment maintained   fall prevention program maintained   nonskid shoes/slippers when out of bed   room organization consistent   safety round/check completed  Taken 1/29/2025 1215 by Denisse Carlos RN  Safety Promotion/Fall Prevention:   activity supervised   clutter free environment maintained   assistive device/personal items within reach   fall prevention program maintained   nonskid shoes/slippers when out of bed   room organization consistent   safety round/check completed  Taken 1/29/2025 1033 by Denisse Carlos RN  Safety Promotion/Fall Prevention:   assistive device/personal items within reach   activity supervised   clutter free environment maintained   fall prevention program maintained   nonskid shoes/slippers when out of bed   room organization consistent   safety round/check completed  Taken 1/29/2025 0830 by Denisse Carlos RN  Safety Promotion/Fall Prevention:   activity supervised   assistive device/personal items within reach   clutter free environment maintained   fall prevention program maintained   nonskid shoes/slippers when out of bed   safety round/check  completed   room organization consistent  Intervention: Prevent Skin Injury  Recent Flowsheet Documentation  Taken 1/29/2025 1650 by Denisse Carlos RN  Body Position: position changed independently  Skin Protection: incontinence pads utilized  Taken 1/29/2025 1215 by Denisse Carlos RN  Body Position: position changed independently  Skin Protection: incontinence pads utilized  Taken 1/29/2025 0830 by Denisse Carlos RN  Body Position: position changed independently  Skin Protection: incontinence pads utilized  Intervention: Prevent and Manage VTE (Venous Thromboembolism) Risk  Recent Flowsheet Documentation  Taken 1/29/2025 1650 by Denisse Carlos RN  VTE Prevention/Management:   bilateral   SCDs (sequential compression devices) on  Taken 1/29/2025 1215 by Denisse Carlos RN  VTE Prevention/Management:   bilateral   SCDs (sequential compression devices) off  Taken 1/29/2025 0830 by Denisse Carlos RN  VTE Prevention/Management:   bilateral   SCDs (sequential compression devices) off  Intervention: Prevent Infection  Recent Flowsheet Documentation  Taken 1/29/2025 1650 by Denisse Carlos RN  Infection Prevention:   hand hygiene promoted   personal protective equipment utilized   rest/sleep promoted   single patient room provided   environmental surveillance performed  Taken 1/29/2025 1445 by Denisse Carlos RN  Infection Prevention:   hand hygiene promoted   personal protective equipment utilized   rest/sleep promoted   single patient room provided   environmental surveillance performed  Taken 1/29/2025 1215 by Denisse Carols RN  Infection Prevention:   hand hygiene promoted   personal protective equipment utilized   rest/sleep promoted   single patient room provided   environmental surveillance performed  Taken 1/29/2025 1033 by Denisse Carlos RN  Infection Prevention:   hand hygiene promoted   personal protective equipment utilized   rest/sleep promoted   single patient room provided   environmental  surveillance performed  Taken 1/29/2025 0830 by Denisse Carlos RN  Infection Prevention:   hand hygiene promoted   personal protective equipment utilized   rest/sleep promoted   single patient room provided   environmental surveillance performed  Goal: Optimal Comfort and Wellbeing  Outcome: Progressing  Intervention: Provide Person-Centered Care  Recent Flowsheet Documentation  Taken 1/29/2025 1650 by Denisse Carlos RN  Trust Relationship/Rapport:   care explained   choices provided  Taken 1/29/2025 1215 by Denisse Carlos RN  Trust Relationship/Rapport:   choices provided   care explained  Taken 1/29/2025 0830 by Denisse Carlos RN  Trust Relationship/Rapport:   care explained   choices provided  Goal: Readiness for Transition of Care  Outcome: Progressing

## 2025-01-30 ENCOUNTER — APPOINTMENT (OUTPATIENT)
Dept: GENERAL RADIOLOGY | Facility: HOSPITAL | Age: OVER 89
End: 2025-01-30
Payer: MEDICARE

## 2025-01-30 LAB
ANION GAP SERPL CALCULATED.3IONS-SCNC: 8.5 MMOL/L (ref 5–15)
BUN SERPL-MCNC: 26 MG/DL (ref 8–23)
BUN/CREAT SERPL: 43.3 (ref 7–25)
CALCIUM SPEC-SCNC: 9.3 MG/DL (ref 8.2–9.6)
CHLORIDE SERPL-SCNC: 90 MMOL/L (ref 98–107)
CO2 SERPL-SCNC: 43.5 MMOL/L (ref 22–29)
CREAT SERPL-MCNC: 0.6 MG/DL (ref 0.57–1)
DEPRECATED RDW RBC AUTO: 49.9 FL (ref 37–54)
EGFRCR SERPLBLD CKD-EPI 2021: 81.2 ML/MIN/1.73
ERYTHROCYTE [DISTWIDTH] IN BLOOD BY AUTOMATED COUNT: 13.7 % (ref 12.3–15.4)
GLUCOSE BLDC GLUCOMTR-MCNC: 152 MG/DL (ref 70–105)
GLUCOSE BLDC GLUCOMTR-MCNC: 152 MG/DL (ref 70–105)
GLUCOSE BLDC GLUCOMTR-MCNC: 160 MG/DL (ref 70–105)
GLUCOSE SERPL-MCNC: 156 MG/DL (ref 65–99)
HCT VFR BLD AUTO: 42.2 % (ref 34–46.6)
HGB BLD-MCNC: 13.6 G/DL (ref 12–15.9)
MAGNESIUM SERPL-MCNC: 2.1 MG/DL (ref 1.7–2.3)
MCH RBC QN AUTO: 31.4 PG (ref 26.6–33)
MCHC RBC AUTO-ENTMCNC: 32.2 G/DL (ref 31.5–35.7)
MCV RBC AUTO: 97.5 FL (ref 79–97)
PHOSPHATE SERPL-MCNC: 2.2 MG/DL (ref 2.5–4.5)
PLATELET # BLD AUTO: 299 10*3/MM3 (ref 140–450)
PMV BLD AUTO: 9.3 FL (ref 6–12)
POTASSIUM SERPL-SCNC: 3.6 MMOL/L (ref 3.5–5.2)
POTASSIUM SERPL-SCNC: 4.1 MMOL/L (ref 3.5–5.2)
RBC # BLD AUTO: 4.33 10*6/MM3 (ref 3.77–5.28)
SODIUM SERPL-SCNC: 142 MMOL/L (ref 136–145)
WBC NRBC COR # BLD AUTO: 9.43 10*3/MM3 (ref 3.4–10.8)

## 2025-01-30 PROCEDURE — 97112 NEUROMUSCULAR REEDUCATION: CPT

## 2025-01-30 PROCEDURE — 94799 UNLISTED PULMONARY SVC/PX: CPT

## 2025-01-30 PROCEDURE — 80048 BASIC METABOLIC PNL TOTAL CA: CPT | Performed by: STUDENT IN AN ORGANIZED HEALTH CARE EDUCATION/TRAINING PROGRAM

## 2025-01-30 PROCEDURE — 97530 THERAPEUTIC ACTIVITIES: CPT

## 2025-01-30 PROCEDURE — 71045 X-RAY EXAM CHEST 1 VIEW: CPT

## 2025-01-30 PROCEDURE — 83735 ASSAY OF MAGNESIUM: CPT

## 2025-01-30 PROCEDURE — 84132 ASSAY OF SERUM POTASSIUM: CPT | Performed by: STUDENT IN AN ORGANIZED HEALTH CARE EDUCATION/TRAINING PROGRAM

## 2025-01-30 PROCEDURE — 94761 N-INVAS EAR/PLS OXIMETRY MLT: CPT

## 2025-01-30 PROCEDURE — 99232 SBSQ HOSP IP/OBS MODERATE 35: CPT | Performed by: INTERNAL MEDICINE

## 2025-01-30 PROCEDURE — 25010000002 ENOXAPARIN PER 10 MG

## 2025-01-30 PROCEDURE — 84100 ASSAY OF PHOSPHORUS: CPT

## 2025-01-30 PROCEDURE — 82948 REAGENT STRIP/BLOOD GLUCOSE: CPT

## 2025-01-30 PROCEDURE — 85027 COMPLETE CBC AUTOMATED: CPT | Performed by: STUDENT IN AN ORGANIZED HEALTH CARE EDUCATION/TRAINING PROGRAM

## 2025-01-30 PROCEDURE — 25010000002 PIPERACILLIN SOD-TAZOBACTAM PER 1 G: Performed by: STUDENT IN AN ORGANIZED HEALTH CARE EDUCATION/TRAINING PROGRAM

## 2025-01-30 PROCEDURE — 25010000002 METHYLPREDNISOLONE PER 40 MG: Performed by: STUDENT IN AN ORGANIZED HEALTH CARE EDUCATION/TRAINING PROGRAM

## 2025-01-30 PROCEDURE — 97110 THERAPEUTIC EXERCISES: CPT

## 2025-01-30 PROCEDURE — 94664 DEMO&/EVAL PT USE INHALER: CPT

## 2025-01-30 PROCEDURE — 25010000002 FUROSEMIDE PER 20 MG: Performed by: STUDENT IN AN ORGANIZED HEALTH CARE EDUCATION/TRAINING PROGRAM

## 2025-01-30 RX ORDER — POTASSIUM CHLORIDE 1.5 G/1.58G
40 POWDER, FOR SOLUTION ORAL EVERY 4 HOURS
Status: COMPLETED | OUTPATIENT
Start: 2025-01-30 | End: 2025-01-30

## 2025-01-30 RX ORDER — FUROSEMIDE 40 MG/1
40 TABLET ORAL
Status: DISCONTINUED | OUTPATIENT
Start: 2025-01-30 | End: 2025-02-04

## 2025-01-30 RX ADMIN — BALSALAZIDE DISODIUM 750 MG: 750 CAPSULE ORAL at 22:22

## 2025-01-30 RX ADMIN — IPRATROPIUM BROMIDE AND ALBUTEROL SULFATE 3 ML: .5; 3 SOLUTION RESPIRATORY (INHALATION) at 20:33

## 2025-01-30 RX ADMIN — BUDESONIDE 0.5 MG: 0.5 INHALANT RESPIRATORY (INHALATION) at 06:37

## 2025-01-30 RX ADMIN — METHYLPREDNISOLONE SODIUM SUCCINATE 40 MG: 40 INJECTION, POWDER, FOR SOLUTION INTRAMUSCULAR; INTRAVENOUS at 09:19

## 2025-01-30 RX ADMIN — LEVOTHYROXINE SODIUM 75 MCG: 0.07 TABLET ORAL at 05:23

## 2025-01-30 RX ADMIN — PIPERACILLIN AND TAZOBACTAM 3.38 G: 3; .375 INJECTION, POWDER, LYOPHILIZED, FOR SOLUTION INTRAVENOUS at 23:19

## 2025-01-30 RX ADMIN — IPRATROPIUM BROMIDE AND ALBUTEROL SULFATE 3 ML: .5; 3 SOLUTION RESPIRATORY (INHALATION) at 15:09

## 2025-01-30 RX ADMIN — BUDESONIDE 0.5 MG: 0.5 INHALANT RESPIRATORY (INHALATION) at 20:39

## 2025-01-30 RX ADMIN — IPRATROPIUM BROMIDE AND ALBUTEROL SULFATE 3 ML: .5; 3 SOLUTION RESPIRATORY (INHALATION) at 06:37

## 2025-01-30 RX ADMIN — Medication 1000 MCG: at 09:18

## 2025-01-30 RX ADMIN — ENOXAPARIN SODIUM 30 MG: 100 INJECTION SUBCUTANEOUS at 16:15

## 2025-01-30 RX ADMIN — FUROSEMIDE 40 MG: 10 INJECTION, SOLUTION INTRAMUSCULAR; INTRAVENOUS at 05:23

## 2025-01-30 RX ADMIN — POTASSIUM CHLORIDE 40 MEQ: 1.5 POWDER, FOR SOLUTION ORAL at 09:19

## 2025-01-30 RX ADMIN — Medication 1 TABLET: at 09:19

## 2025-01-30 RX ADMIN — PIPERACILLIN AND TAZOBACTAM 3.38 G: 3; .375 INJECTION, POWDER, LYOPHILIZED, FOR SOLUTION INTRAVENOUS at 16:15

## 2025-01-30 RX ADMIN — METHYLPREDNISOLONE SODIUM SUCCINATE 40 MG: 40 INJECTION, POWDER, FOR SOLUTION INTRAMUSCULAR; INTRAVENOUS at 22:23

## 2025-01-30 RX ADMIN — Medication 10 ML: at 22:22

## 2025-01-30 RX ADMIN — IPRATROPIUM BROMIDE AND ALBUTEROL SULFATE 3 ML: .5; 3 SOLUTION RESPIRATORY (INHALATION) at 11:01

## 2025-01-30 RX ADMIN — POTASSIUM CHLORIDE 40 MEQ: 1.5 POWDER, FOR SOLUTION ORAL at 05:23

## 2025-01-30 RX ADMIN — Medication 10 ML: at 09:19

## 2025-01-30 RX ADMIN — PIPERACILLIN AND TAZOBACTAM 3.38 G: 3; .375 INJECTION, POWDER, LYOPHILIZED, FOR SOLUTION INTRAVENOUS at 09:18

## 2025-01-30 RX ADMIN — FUROSEMIDE 40 MG: 40 TABLET ORAL at 17:17

## 2025-01-30 RX ADMIN — AVOBENZONE, HOMOSALATE, OCTISALATE, OCTOCRYLENE, AND OXYBENZONE 1 PACKET: 29.4; 147; 49; 25.4; 58.8 LOTION TOPICAL at 13:40

## 2025-01-30 NOTE — PROGRESS NOTES
Nutrition Services  Patient Name: Alexey Trinidad  YOB: 1926  MRN: [id number]  Admission date: 1/24/2025    PROGRESS NOTE      Nutrition Intervention Updates: Continue TF at current rate and will continue to monitor for s/s of refeeding. Will likely advance rate tomorrow.     Addendum: Added metamucil r/t nursing request d/t loose stools.        Encounter Information: Checking in to monitor TF tolerance. TF started yesterday at initial rate. No tolerance issues noted at this time. Pt continues on airvo at 30 L today. Phos dropped overnight. Will continue TF at current rate and continue to monitor s/s of refeeding prior to advancing rate.        PO Diet: NPO Diet NPO Type: Tube Feeding   PO Supplements: None    PO Intake:  NPO       Current nutrition support: Nutren 1.5 at 20 mL/hr + 20 mL/hr water flush    Nutrition support review: Documented as ordered        Labs (reviewed below): Hypophosphatemia - replacements available       GI Function:  Last documented BM 1/30        Brief weight review   Wt Readings from Last 10 Encounters:   01/30/25 0528 47.7 kg (105 lb 2.6 oz)   01/29/25 0531 48 kg (105 lb 13.1 oz)   01/27/25 1757 47.2 kg (104 lb)   01/24/25 2006 47.4 kg (104 lb 8 oz)   01/24/25 1614 49.4 kg (109 lb)        Results from last 7 days   Lab Units 01/30/25  0046 01/29/25  0506 01/28/25  0521 01/27/25  1259 01/27/25  0216 01/26/25  0237   SODIUM mmol/L 142 144 144  --  143 146*   POTASSIUM mmol/L 3.6 3.0* 4.4   < > 3.2* 4.1   CHLORIDE mmol/L 90* 90* 99  --  103 110*   CO2 mmol/L 43.5* 40.7* 36.8*  --  32.3* 29.4*   BUN mg/dL 26* 17 10  --  7* 13   CREATININE mg/dL 0.60 0.66 0.66  --  0.42* 0.44*   CALCIUM mg/dL 9.3 8.7 8.6  --  8.3 8.0*   BILIRUBIN mg/dL  --   --  0.3  --  0.2 <0.2   ALK PHOS U/L  --   --  44  --  39 35*   ALT (SGPT) U/L  --   --  13  --  14 11   AST (SGOT) U/L  --   --  24  --  22 23   GLUCOSE mg/dL 156* 125* 122*  --  130* 103*    < > = values in this interval not displayed.      Results from last 7 days   Lab Units 01/30/25  0046 01/29/25  0506 01/28/25  0521 01/27/25  0216   MAGNESIUM mg/dL 2.1  --  1.9 2.0   PHOSPHORUS mg/dL 2.2*  --  2.9 2.7   HEMOGLOBIN g/dL 13.6   < > 13.2 13.1   HEMATOCRIT % 42.2   < > 43.9 44.7    < > = values in this interval not displayed.           RD to follow up per protocol.    Electronically signed by:  Cassandra Taveras RD  01/30/25 08:57 EST

## 2025-01-30 NOTE — THERAPY TREATMENT NOTE
"Subjective: Pt agreeable to therapeutic plan of care.    Objective:     Bed mobility - CGA and Min-A  Transfers - CGA and Min-A    Sitting/standing activities - while EOB, reaching. In standing step forwards    Therapeutic Exercise - 10 Reps Bilaterally AAROM lying supine, supported sitting / chair, and unsupported sitting / EOB    Vitals: WNL on Airvo at 30L    Pain: 0 VAS   Location: na    Education: Provided education on the importance of mobility in the acute care setting, Verbal/Tactile Cues, and Transfer Training    Assessment: Alexey Trinidad presents with functional mobility impairments which indicate the need for skilled intervention. Patient continues to present with gen weakness and now iwht limited line tubing thus unable to attempt gait. Did well with mobility once fully awake. Tolerating session today without incident. Will continue to follow and progress as tolerated. Depending on how patient improves medically patient may need short rehab prior to return home.     Plan/Recommendations:   If medically appropriate, Low Intensity Therapy recommended post-acute care - This is recommended as therapy feels this patient would require 2-3 visits per week. OP or HH would be the best option depending on patient's home bound status. Consider, if the patient has other  \"skilled\" needs such as wounds, IV antibiotics, etc. Combined with \"low intensity\" could also equate to a SNF. If patient is medically complex, consider LTAC. Pt requires no DME at discharge.     Pt desires Home with family assist and Home Health at discharge. Pt cooperative; agreeable to therapeutic recommendations and plan of care.         Basic Mobility 6-click:  Rollin = Total, A lot = 2, A little = 3; 4 = None  Supine>Sit:   1 = Total, A lot = 2, A little = 3; 4 = None   Sit>Stand with arms:  1 = Total, A lot = 2, A little = 3; 4 = None  Bed>Chair:   1 = Total, A lot = 2, A little = 3; 4 = None  Ambulate in room:  1 = Total, A lot = " 2, A little = 3; 4 = None  3-5 Steps with railin = Total, A lot = 2, A little = 3; 4 = None  Score: 19    Post-Tx Position: Up in Chair, Alarms activated, and Call light and personal items within reach  PPE: gloves, surgical mask, and gown    Therapy Charges for Today       Code Description Service Date Service Provider Modifiers Qty    69798554198 HC PT THER PROC EA 15 MIN 2025 Reyes, Carmela, PT GP 1    02593264243 HC PT THERAPEUTIC ACT EA 15 MIN 2025 Reyes, Carmela, PT GP 1    61846694197 HC PT NEUROMUSC RE EDUCATION EA 15 MIN 2025 Reyes, Carmela, PT GP 1           PT Charges       Row Name 25 1209             Time Calculation    Start Time 0830  -CR      Stop Time 0854  -CR      Time Calculation (min) 24 min  -CR      PT Received On 25  -CR      PT - Next Appointment 25  -CR         Time Calculation- PT    Total Timed Code Minutes- PT 24 minute(s)  -CR                User Key  (r) = Recorded By, (t) = Taken By, (c) = Cosigned By      Initials Name Provider Type    CR Reyes, Carmela, PT Physical Therapist

## 2025-01-30 NOTE — PLAN OF CARE
Goal Outcome Evaluation:      Patient remains on Airvo at 60L at this time. ST will f/u once patient no longer is requiring as much oxygen and is off Airvo.

## 2025-01-30 NOTE — PROGRESS NOTES
Referring Provider: Leena Dominguez MD    Reason for follow-up: Valvular heart disease     Patient Care Team:  Rui Cole MD as PCP - General (Internal Medicine)      SUBJECTIVE  Resting comfortably in bed.  On Airvo 30 L.     ROS  Review of all systems negative except as indicated.    Since I have last seen, the patient has been without any chest discomfort, shortness of breath, palpitations, dizziness or syncope.  Denies having any headache, abdominal pain, nausea, vomiting, diarrhea, constipation, loss of weight or loss of appetite.  Denies having any excessive bruising, hematuria or blood in the stool.        Personal History:    Past Medical History:   Diagnosis Date    Disease of thyroid gland     Hypertension        Past Surgical History:   Procedure Laterality Date    BACK SURGERY      kyphoplasty    BREAST SURGERY      benign cyst removed    HYSTERECTOMY      LAPAROSCOPIC CHOLECYSTECTOMY         History reviewed. No pertinent family history.    Social History     Tobacco Use    Smoking status: Never    Smokeless tobacco: Never   Vaping Use    Vaping status: Never Used   Substance Use Topics    Alcohol use: Never    Drug use: Never        Home meds:  Prior to Admission medications    Medication Sig Start Date End Date Taking? Authorizing Provider   acetaminophen (TYLENOL) 500 MG tablet Take 1 tablet by mouth Every Morning.   Yes Gerald Costa MD   Bacillus Coagulans-Inulin (ALIGN PREBIOTIC-PROBIOTIC PO) Take 1 capsule by mouth Daily.   Yes Gerald Costa MD   balsalazide (COLAZAL) 750 MG capsule Take 1 capsule by mouth Every Night.   Yes Gerald Costa MD   Budesonide (ENTOCORT EC) 3 MG 24 hr capsule Take 1 capsule by mouth 2 (Two) Times a Day.   Yes Gerald Costa MD   Cyanocobalamin (VITAMIN B-12 PO) Take 1 tablet by mouth Daily.   Yes Gerald Costa MD   levothyroxine (SYNTHROID, LEVOTHROID) 75 MCG tablet Take 1 tablet by mouth Every Morning.   Yes Igor  MD Gerald   losartan (COZAAR) 50 MG tablet Take 1 tablet by mouth Daily.   Yes Provider, MD Gerald   multivitamins-minerals (PRESERVISION AREDS 2) capsule capsule Take 1 capsule by mouth 2 (Two) Times a Day.   Yes Provider, MD Gerald   Wheat Dextrin (BENEFIBER DRINK MIX PO) Take 1 package by mouth Daily.   Yes Provider, MD Gerald       Allergies:  Contrast dye (echo or unknown ct/mr) and Sulfa antibiotics    Scheduled Meds:balsalazide, 750 mg, Oral, Nightly  [Held by provider] Budesonide, 3 mg, Oral, BID  budesonide, 0.5 mg, Nebulization, BID - RT  enoxaparin, 30 mg, Subcutaneous, Daily  furosemide, 40 mg, Intravenous, Q12H  ipratropium-albuterol, 3 mL, Nebulization, 4x Daily - RT  levothyroxine, 75 mcg, Oral, Q AM  methylPREDNISolone sodium succinate, 40 mg, Intravenous, Q12H  multivitamin with minerals, 1 tablet, Oral, Daily  piperacillin-tazobactam, 3.375 g, Intravenous, Q8H  potassium chloride, 40 mEq, Oral, Q4H  sodium chloride, 10 mL, Intravenous, Q12H  vitamin B-12, 1,000 mcg, Oral, Daily      Continuous Infusions:   PRN Meds:.  acetaminophen **OR** acetaminophen **OR** acetaminophen    senna-docusate sodium **AND** polyethylene glycol **AND** bisacodyl **AND** bisacodyl    Calcium Replacement - Follow Nurse / BPA Driven Protocol    dextrose    dextrose    glucagon (human recombinant)    Magnesium Standard Dose Replacement - Follow Nurse / BPA Driven Protocol    nitroglycerin    ondansetron    Phosphorus Replacement - Follow Nurse / BPA Driven Protocol    Potassium Replacement - Follow Nurse / BPA Driven Protocol    [COMPLETED] Insert Peripheral IV **AND** sodium chloride    sodium chloride    sodium chloride      OBJECTIVE    Vital Signs  Vitals:    01/30/25 0637 01/30/25 0641 01/30/25 0642 01/30/25 0646   BP:       BP Location:       Patient Position:       Pulse: 74 75 69 66   Resp: 18 16 16 18   Temp:       TempSrc:       SpO2: 97% 97% 97% 97%   Weight:       Height:      "      Flowsheet Rows      Flowsheet Row First Filed Value   Admission Height 147.3 cm (58\") Documented at 01/24/2025 1614   Admission Weight 49.4 kg (109 lb) Documented at 01/24/2025 1614              Intake/Output Summary (Last 24 hours) at 1/30/2025 0730  Last data filed at 1/30/2025 0528  Gross per 24 hour   Intake 563 ml   Output --   Net 563 ml          Telemetry: Sinus rhythm    Physical Exam:  The patient is alert, oriented and in no distress.  Elderly/frail  Vital signs as noted above.  Head and neck revealed no carotid bruits or jugular venous distention.  No thyromegaly or lymphadenopathy is present  Lungs clear.  No wheezing.  Breath sounds are normal bilaterally.  Heart normal first and second heart sounds.  No murmur. No precordial rub is present.  No gallop is present.  Abdomen soft and nontender.  No organomegaly is present.  Extremities with good peripheral pulses without any pedal edema.  Skin warm and dry.  Musculoskeletal system is grossly normal.  CNS grossly normal.       Results Review:  I have personally reviewed the results from the time of this admission to 1/30/2025 07:30 EST and agree with these findings:  []  Laboratory  []  Microbiology  []  Radiology  []  EKG/Telemetry   []  Cardiology/Vascular   []  Pathology  []  Old records  []  Other:    Most notable findings include:    Lab Results (last 24 hours)       Procedure Component Value Units Date/Time    POC Glucose Q6H [[id number]]  (Abnormal) Collected: 01/30/25 0604    Specimen: Blood Updated: 01/30/25 0606     Glucose 152 mg/dL      Comment: Serial Number: [id number]:  [id number]       Basic Metabolic Panel [[id number]]  (Abnormal) Collected: 01/30/25 0046    Specimen: Blood Updated: 01/30/25 0125     Glucose 156 mg/dL      BUN 26 mg/dL      Creatinine 0.60 mg/dL      Sodium 142 mmol/L      Potassium 3.6 mmol/L      Chloride 90 mmol/L      CO2 43.5 mmol/L      Calcium 9.3 mg/dL      BUN/Creatinine Ratio 43.3     Anion Gap 8.5 " mmol/L      eGFR 81.2 mL/min/1.73     Narrative:      GFR Categories in Chronic Kidney Disease (CKD)      GFR Category          GFR (mL/min/1.73)    Interpretation  G1                     90 or greater         Normal or high (1)  G2                      60-89                Mild decrease (1)  G3a                   45-59                Mild to moderate decrease  G3b                   30-44                Moderate to severe decrease  G4                    15-29                Severe decrease  G5                    14 or less           Kidney failure          (1)In the absence of evidence of kidney disease, neither GFR category G1 or G2 fulfill the criteria for CKD.    eGFR calculation 2021 CKD-EPI creatinine equation, which does not include race as a factor    CBC (No Diff) [[id number]]  (Abnormal) Collected: 01/30/25 0046    Specimen: Blood Updated: 01/30/25 0053     WBC 9.43 10*3/mm3      RBC 4.33 10*6/mm3      Hemoglobin 13.6 g/dL      Hematocrit 42.2 %      MCV 97.5 fL      MCH 31.4 pg      MCHC 32.2 g/dL      RDW 13.7 %      RDW-SD 49.9 fl      MPV 9.3 fL      Platelets 299 10*3/mm3     Blood Culture - Blood, Arm, Left [[id number]]  (Normal) Collected: 01/27/25 2115    Specimen: Blood from Arm, Left Updated: 01/29/25 2131     Blood Culture No growth at 2 days    Blood Culture - Blood, Arm, Right [[id number]]  (Normal) Collected: 01/24/25 1810    Specimen: Blood from Arm, Right Updated: 01/29/25 1845     Blood Culture No growth at 5 days    Narrative:      Less than seven (7) mL's of blood was collected.  Insufficient quantity may yield false negative results.    Blood Culture - Blood, Arm, Right [[id number]]  (Normal) Collected: 01/24/25 1802    Specimen: Blood from Arm, Right Updated: 01/29/25 1815     Blood Culture No growth at 5 days    Narrative:      Less than seven (7) mL's of blood was collected.  Insufficient quantity may yield false negative results.    POC Glucose Once [[id number]]  (Abnormal) Collected:  01/29/25 1659    Specimen: Blood Updated: 01/29/25 1702     Glucose 147 mg/dL      Comment: Serial Number: [id number]:  [id number]       BNP [[id number]]  (Normal) Collected: 01/29/25 0506    Specimen: Blood Updated: 01/29/25 0907     proBNP 1,413.0 pg/mL     Narrative:      This assay is used as an aid in the diagnosis of individuals suspected of having heart failure. It can be used as an aid in the diagnosis of acute decompensated heart failure (ADHF) in patients presenting with signs and symptoms of ADHF to the emergency department (ED). In addition, NT-proBNP of <300 pg/mL indicates ADHF is not likely.    Age Range Result Interpretation  NT-proBNP Concentration (pg/mL:      <50             Positive            >450                   Gray                 300-450                    Negative             <300    50-75           Positive            >900                  Gray                300-900                  Negative            <300      >75             Positive            >1800                  Gray                300-1800                  Negative            <300            Imaging Results (Last 24 Hours)       Procedure Component Value Units Date/Time    XR Abdomen KUB [[id number]] Collected: 01/29/25 1223     Updated: 01/29/25 1226    Narrative:        XR ABDOMEN KUB    Date of Exam: 1/29/2025 11:52 AM EST    Indication: Dobhoff placement    Comparison: CT January 24, 2025    Findings:  Enteric tube terminates in the left abdomen, with tip likely in the mid stomach.    Bibasilar opacities with probable small pleural effusions. Status post vertebral augmentation in the lumbar spine. Visualized bowel gas pattern appears unremarkable.      Impression:      Impression:  Enteric tube terminates in the left abdomen, with tip likely in the mid stomach.        Electronically Signed: Edouard Shaver    1/29/2025 12:24 PM EST    Workstation ID: [id number]            LAB RESULTS (LAST 7 DAYS)    CBC  Results from last 7  days   Lab Units 01/30/25  0046 01/29/25  0506 01/28/25  0521 01/27/25  0216 01/26/25  0237 01/25/25  0104 01/24/25  1609   WBC 10*3/mm3 9.43 9.16 8.28 11.21* 6.03 10.16 13.53*   RBC 10*6/mm3 4.33 4.40 4.28 4.30 3.79 4.26 4.29   HEMOGLOBIN g/dL 13.6 13.5 13.2 13.1 11.9* 13.0 13.5   HEMATOCRIT % 42.2 43.2 43.9 44.7 39.8 43.5 44.1   MCV fL 97.5* 98.2* 102.6* 104.0* 105.0* 102.1* 102.8*   PLATELETS 10*3/mm3 299 255 232 235 205 298 310       BMP  Results from last 7 days   Lab Units 01/30/25  0046 01/29/25  0506 01/28/25  0521 01/27/25  1259 01/27/25  0216 01/26/25  1330 01/26/25  0237 01/25/25  1229 01/25/25  0104 01/24/25  1609   SODIUM mmol/L 142 144 144  --  143  --  146*  --  147* 147*   POTASSIUM mmol/L 3.6 3.0* 4.4 4.0 3.2*  --  4.1 3.9 3.4* 3.7   CHLORIDE mmol/L 90* 90* 99  --  103  --  110*  --  104 108*   CO2 mmol/L 43.5* 40.7* 36.8*  --  32.3*  --  29.4*  --  33.1* 28.9   BUN mg/dL 26* 17 10  --  7*  --  13  --  20 22   CREATININE mg/dL 0.60 0.66 0.66  --  0.42*  --  0.44*  --  0.51* 0.56*   GLUCOSE mg/dL 156* 125* 122*  --  130*  --  103*  --  119* 129*   MAGNESIUM mg/dL  --   --  1.9  --  2.0  --  2.0  --  2.2  --    PHOSPHORUS mg/dL  --   --  2.9  --  2.7 2.3* 2.0*  --  4.0  --        CMP   Results from last 7 days   Lab Units 01/30/25  0046 01/29/25  0506 01/28/25  0521 01/27/25  1259 01/27/25  0216 01/26/25  1145 01/26/25  0237 01/25/25  1229 01/25/25  0104 01/24/25  1609   SODIUM mmol/L 142 144 144  --  143  --  146*  --  147* 147*   POTASSIUM mmol/L 3.6 3.0* 4.4 4.0 3.2*  --  4.1 3.9 3.4* 3.7   CHLORIDE mmol/L 90* 90* 99  --  103  --  110*  --  104 108*   CO2 mmol/L 43.5* 40.7* 36.8*  --  32.3*  --  29.4*  --  33.1* 28.9   BUN mg/dL 26* 17 10  --  7*  --  13  --  20 22   CREATININE mg/dL 0.60 0.66 0.66  --  0.42*  --  0.44*  --  0.51* 0.56*   GLUCOSE mg/dL 156* 125* 122*  --  130*  --  103*  --  119* 129*   ALBUMIN g/dL  --   --  3.6  --  3.7  --  3.4*  --  3.9 3.9   BILIRUBIN mg/dL  --   --  0.3  --   0.2  --  <0.2  --  0.2 0.3   ALK PHOS U/L  --   --  44  --  39  --  35*  --  41 42   AST (SGOT) U/L  --   --  24  --  22  --  23  --  22 29   ALT (SGPT) U/L  --   --  13  --  14  --  11  --  12 13   LIPASE U/L  --   --   --   --   --   --   --   --   --  29   AMMONIA umol/L  --   --   --   --   --  17  --   --   --   --        BNP        TROPONIN        CoAg        Creatinine Clearance  Estimated Creatinine Clearance: 39.4 mL/min (by C-G formula based on SCr of 0.6 mg/dL).    ABG  Results from last 7 days   Lab Units 01/27/25  1852 01/27/25  1602   PH, ARTERIAL pH units 7.304* 7.243*   PCO2, ARTERIAL mm Hg 78.5* 93.7*   PO2 ART mm Hg 60.8* 103.5   O2 SATURATION ART % 86.7* 96.2   BASE EXCESS ART mmol/L 9.3* 8.7*       Radiology  XR Abdomen KUB    Result Date: 1/29/2025  Impression: Enteric tube terminates in the left abdomen, with tip likely in the mid stomach. Electronically Signed: Edouard Shaver  1/29/2025 12:24 PM EST  Workstation ID: [id number]       EKG  I personally viewed and interpreted the patient's EKG/Telemetry data:  Telemetry Scan   Final Result      Telemetry Scan   Final Result      Telemetry Scan   Final Result      Telemetry Scan   Final Result      Telemetry Scan   Final Result      Telemetry Scan   Final Result      Telemetry Scan   Final Result      Telemetry Scan   Final Result      Telemetry Scan   Final Result      Telemetry Scan   Final Result      Telemetry Scan   Final Result      Telemetry Scan   Final Result      Telemetry Scan   Final Result      Telemetry Scan   Final Result      Telemetry Scan   Final Result      Telemetry Scan   Final Result      Telemetry Scan   Final Result      Telemetry Scan   Final Result      Telemetry Scan   Final Result      Telemetry Scan   Final Result      Telemetry Scan   Final Result            Echocardiogram:    Results for orders placed during the hospital encounter of 01/24/25    Adult Transthoracic Echo Complete W/ Cont if Necessary Per  Protocol    Interpretation Summary    Left ventricular systolic function is normal. Calculated left ventricular EF = 70%    Left ventricular wall thickness is consistent with moderate to severe concentric hypertrophy.    Left ventricular diastolic function is consistent with (grade I) impaired relaxation.    The left atrial cavity is mildly dilated.    The right atrial cavity is mildly  dilated.    Moderate aortic valve regurgitation is present.    Moderate to severe aortic valve stenosis is present.    Moderate tricuspid valve regurgitation is present.    Estimated right ventricular systolic pressure from tricuspid regurgitation is markedly elevated (>55 mmHg).    Transthoracic echocardiography reveals moderate to severe LVH with severely calcified aortic valve with moderate aortic regurgitation and moderate to severe aortic stenosis.  No effusion.  Moderate TR with severe pulm hypertension.  No effusion    Electronically signed by Kt Goins MD, 01/28/25, 5:51 PM EST.        Stress Test:         Cardiac Catheterization:  No results found for this or any previous visit.         Other:         ASSESSMENT & PLAN:    Principal Problem:    UTI (urinary tract infection)  Active Problems:    Abdominal pain    Valvular heart disease  Patient has moderate aortic valve regurgitation and stenosis.  Mean/peak gradient of 19/32 mmHg.  Aortic valve area on planimetry is more than 1 cm²  Moderate tricuspid valve regurgitation with severe pulmonary hypertension.  Currently does not meet criteria for transcatheter aortic valve replacement or tricuspid valve repair.  Recommend diuretics and medical management of valvular heart disease with blood pressure and heart rate control.  Can switch to oral maintenance diuretics.  Bicarb is increasing.  proBNP is normal  Monitor I's and O's and replace electrolytes as needed.  Would like to follow-up as outpatient for progression of disease and to discuss transcatheter  options.  Given advanced age and poor functional status: I am inclined towards medical management only with diuretics.     Respiratory failure  Hypoxemic respiratory failure  Severe pulmonary hypertension  Currently on 30 L  Continue antibiotics and steroids  Pulmonology is on board     UTI  She will complete a course of antibiotics     GI symptoms  No indication for colonoscopy or EGD per GI  Starting tube feeds.     Elderly/frail  PT OT     CODE STATUS is DNI DNR      Oscar Wesley MD  01/30/25  07:30 EST

## 2025-01-30 NOTE — PLAN OF CARE
Problem: Adult Inpatient Plan of Care  Goal: Plan of Care Review  Outcome: Progressing  Goal: Patient-Specific Goal (Individualized)  Outcome: Progressing  Goal: Absence of Hospital-Acquired Illness or Injury  Outcome: Progressing  Intervention: Identify and Manage Fall Risk  Recent Flowsheet Documentation  Taken 1/30/2025 0200 by Riri Clark RN  Safety Promotion/Fall Prevention:   safety round/check completed   room organization consistent   nonskid shoes/slippers when out of bed   fall prevention program maintained   clutter free environment maintained   assistive device/personal items within reach  Taken 1/30/2025 0000 by Riri Clark RN  Safety Promotion/Fall Prevention:   safety round/check completed   room organization consistent   nonskid shoes/slippers when out of bed   fall prevention program maintained   clutter free environment maintained   assistive device/personal items within reach  Taken 1/29/2025 2200 by Riri Clark RN  Safety Promotion/Fall Prevention:   safety round/check completed   room organization consistent   nonskid shoes/slippers when out of bed   fall prevention program maintained   clutter free environment maintained   assistive device/personal items within reach  Taken 1/29/2025 2000 by Riri Clark RN  Safety Promotion/Fall Prevention:   safety round/check completed   room organization consistent   nonskid shoes/slippers when out of bed   fall prevention program maintained   clutter free environment maintained   assistive device/personal items within reach  Intervention: Prevent Skin Injury  Recent Flowsheet Documentation  Taken 1/30/2025 0200 by Riri Clark RN  Body Position: supine  Taken 1/30/2025 0000 by Riri Clark RN  Body Position: left  Skin Protection:   incontinence pads utilized   transparent dressing maintained  Taken 1/29/2025 2200 by Riri Clark RN  Body Position: right  Taken 1/29/2025 2000 by Riri Clark RN  Body Position: weight  shifting  Skin Protection:   incontinence pads utilized   transparent dressing maintained  Intervention: Prevent and Manage VTE (Venous Thromboembolism) Risk  Recent Flowsheet Documentation  Taken 1/30/2025 0000 by Riri Clark RN  VTE Prevention/Management: SCDs (sequential compression devices) off  Taken 1/29/2025 2000 by Riri Clark RN  VTE Prevention/Management: SCDs (sequential compression devices) on  Intervention: Prevent Infection  Recent Flowsheet Documentation  Taken 1/30/2025 0200 by Riri Clark RN  Infection Prevention:   single patient room provided   rest/sleep promoted   personal protective equipment utilized   hand hygiene promoted   equipment surfaces disinfected  Taken 1/30/2025 0000 by Riri Clark RN  Infection Prevention:   single patient room provided   rest/sleep promoted   personal protective equipment utilized   hand hygiene promoted   equipment surfaces disinfected  Taken 1/29/2025 2200 by Riri Clark RN  Infection Prevention:   single patient room provided   rest/sleep promoted   personal protective equipment utilized   hand hygiene promoted   equipment surfaces disinfected  Taken 1/29/2025 2000 by Riri Clark RN  Infection Prevention:   single patient room provided   rest/sleep promoted   personal protective equipment utilized   hand hygiene promoted   equipment surfaces disinfected   environmental surveillance performed  Goal: Optimal Comfort and Wellbeing  Outcome: Progressing  Intervention: Provide Person-Centered Care  Recent Flowsheet Documentation  Taken 1/30/2025 0000 by Riri Clark RN  Trust Relationship/Rapport:   care explained   choices provided   questions answered  Taken 1/29/2025 2000 by Riri Clark RN  Trust Relationship/Rapport:   care explained   choices provided   questions answered  Goal: Readiness for Transition of Care  Outcome: Progressing     Problem: UTI (Urinary Tract Infection)  Goal: Improved Infection Symptoms  Outcome:  Progressing     Problem: Violence Risk or Actual  Goal: Anger and Impulse Control  Outcome: Progressing  Intervention: Minimize Safety Risk  Recent Flowsheet Documentation  Taken 1/30/2025 0200 by Riri Clark RN  Enhanced Safety Measures:   bed alarm set   room near unit station  Taken 1/30/2025 0000 by Riri Clark RN  Sensory Stimulation Regulation:   lighting decreased   television on   quiet environment promoted  Enhanced Safety Measures:   bed alarm set   room near unit station  Taken 1/29/2025 2200 by Riri Clark RN  Enhanced Safety Measures:   bed alarm set   room near unit station  Taken 1/29/2025 2000 by Riri Clark RN  Enhanced Safety Measures:   bed alarm set   room near unit station  Intervention: Promote Self-Control  Recent Flowsheet Documentation  Taken 1/30/2025 0000 by Riri Clark RN  Supportive Measures: active listening utilized  Environmental Support: calm environment promoted  Taken 1/29/2025 2000 by Riri Clark RN  Supportive Measures: active listening utilized  Environmental Support: calm environment promoted     Problem: Fall Injury Risk  Goal: Absence of Fall and Fall-Related Injury  Outcome: Progressing  Intervention: Identify and Manage Contributors  Recent Flowsheet Documentation  Taken 1/30/2025 0200 by Riri Clark RN  Medication Review/Management: medications reviewed  Taken 1/30/2025 0000 by Riri Clark RN  Medication Review/Management: medications reviewed  Self-Care Promotion: BADL personal objects within reach  Taken 1/29/2025 2200 by Riri Clark RN  Medication Review/Management: medications reviewed  Taken 1/29/2025 2000 by Riri Clark RN  Medication Review/Management: medications reviewed  Self-Care Promotion:   independence encouraged   BADL personal objects within reach  Intervention: Promote Injury-Free Environment  Recent Flowsheet Documentation  Taken 1/30/2025 0200 by Riri Clark RN  Safety Promotion/Fall Prevention:   safety  round/check completed   room organization consistent   nonskid shoes/slippers when out of bed   fall prevention program maintained   clutter free environment maintained   assistive device/personal items within reach  Taken 1/30/2025 0000 by Riri Clark RN  Safety Promotion/Fall Prevention:   safety round/check completed   room organization consistent   nonskid shoes/slippers when out of bed   fall prevention program maintained   clutter free environment maintained   assistive device/personal items within reach  Taken 1/29/2025 2200 by Riri Clark RN  Safety Promotion/Fall Prevention:   safety round/check completed   room organization consistent   nonskid shoes/slippers when out of bed   fall prevention program maintained   clutter free environment maintained   assistive device/personal items within reach  Taken 1/29/2025 2000 by Riri Clark RN  Safety Promotion/Fall Prevention:   safety round/check completed   room organization consistent   nonskid shoes/slippers when out of bed   fall prevention program maintained   clutter free environment maintained   assistive device/personal items within reach     Problem: Comorbidity Management  Goal: Blood Pressure in Desired Range  Outcome: Progressing  Intervention: Maintain Blood Pressure Management  Recent Flowsheet Documentation  Taken 1/30/2025 0200 by Riri Clark RN  Medication Review/Management: medications reviewed  Taken 1/30/2025 0000 by iRri Clark RN  Medication Review/Management: medications reviewed  Taken 1/29/2025 2200 by Riri Clark RN  Medication Review/Management: medications reviewed  Taken 1/29/2025 2000 by Riri Clark RN  Medication Review/Management: medications reviewed     Problem: Sepsis/Septic Shock  Goal: Optimal Coping  Outcome: Progressing  Intervention: Support Patient and Family Response  Recent Flowsheet Documentation  Taken 1/30/2025 0000 by Riri Clark RN  Supportive Measures: active listening  utilized  Family/Support System Care:   presence promoted   self-care encouraged  Taken 1/29/2025 2000 by Riri Clark RN  Supportive Measures: active listening utilized  Family/Support System Care:   presence promoted   support provided  Goal: Absence of Bleeding  Outcome: Progressing  Goal: Blood Glucose Level Within Target Range  Outcome: Progressing  Intervention: Optimize Glycemic Control  Recent Flowsheet Documentation  Taken 1/30/2025 0000 by Riri Clark RN  Hyperglycemia Management: blood glucose monitored  Hypoglycemia Management: blood glucose monitored  Taken 1/29/2025 2000 by Riri Clark RN  Hyperglycemia Management: blood glucose monitored  Hypoglycemia Management: blood glucose monitored  Goal: Absence of Infection Signs and Symptoms  Outcome: Progressing  Intervention: Initiate Sepsis Management  Recent Flowsheet Documentation  Taken 1/30/2025 0200 by Riri Clark RN  Infection Prevention:   single patient room provided   rest/sleep promoted   personal protective equipment utilized   hand hygiene promoted   equipment surfaces disinfected  Isolation Precautions:   precautions maintained   contact   spore  Taken 1/30/2025 0000 by Riri Clark RN  Infection Prevention:   single patient room provided   rest/sleep promoted   personal protective equipment utilized   hand hygiene promoted   equipment surfaces disinfected  Isolation Precautions:   precautions maintained   contact   spore  Taken 1/29/2025 2200 by Riri Clark RN  Infection Prevention:   single patient room provided   rest/sleep promoted   personal protective equipment utilized   hand hygiene promoted   equipment surfaces disinfected  Isolation Precautions:   precautions maintained   contact   spore  Taken 1/29/2025 2000 by Riri Clark RN  Infection Prevention:   single patient room provided   rest/sleep promoted   personal protective equipment utilized   hand hygiene promoted   equipment surfaces disinfected    environmental surveillance performed  Isolation Precautions:   precautions maintained   contact   spore  Intervention: Promote Recovery  Recent Flowsheet Documentation  Taken 1/30/2025 0000 by Riri Clark RN  Airway/Ventilation Management: airway patency maintained  Sleep/Rest Enhancement: awakenings minimized  Taken 1/29/2025 2000 by Riri Clark RN  Airway/Ventilation Management: airway patency maintained  Sleep/Rest Enhancement:   awakenings minimized   consistent schedule promoted   noise level reduced   room darkened  Goal: Optimal Nutrition Delivery  Outcome: Progressing     Problem: Skin Injury Risk Increased  Goal: Skin Health and Integrity  Outcome: Progressing  Intervention: Optimize Skin Protection  Recent Flowsheet Documentation  Taken 1/30/2025 0200 by Riri Clark RN  Head of Bed ([facility]) Positioning: HOB elevated  Taken 1/30/2025 0000 by Riri Clark RN  Pressure Reduction Techniques:   frequent weight shift encouraged   positioned off wounds   weight shift assistance provided  Head of Bed ([facility]) Positioning: HOB elevated  Pressure Reduction Devices: pressure-redistributing mattress utilized  Skin Protection:   incontinence pads utilized   transparent dressing maintained  Taken 1/29/2025 2200 by Riri Clark RN  Head of Bed ([facility]) Positioning: HOB elevated  Taken 1/29/2025 2000 by Riri Clark RN  Pressure Reduction Techniques:   frequent weight shift encouraged   weight shift assistance provided   heels elevated off bed  Head of Bed ([facility]) Positioning: [facility] elevated  Pressure Reduction Devices:   pressure-redistributing mattress utilized   positioning supports utilized   heel offloading device utilized  Skin Protection:   incontinence pads utilized   transparent dressing maintained     Problem: Noninvasive Ventilation Acute  Goal: Effective Unassisted Ventilation and Oxygenation  Outcome: Progressing  Intervention: Monitor and Manage Noninvasive Ventilation  Recent Flowsheet  Documentation  Taken 1/30/2025 0000 by Riri Clark RN  Airway/Ventilation Management: airway patency maintained  Taken 1/29/2025 2000 by Riri Clark RN  Airway/Ventilation Management: airway patency maintained     Problem: Enteral Nutrition  Goal: Absence of Aspiration Signs and Symptoms  Outcome: Progressing  Intervention: Minimize Aspiration Risk  Recent Flowsheet Documentation  Taken 1/30/2025 0200 by Riri Clark RN  Head of Bed ([facility]) Positioning: HOB elevated  Taken 1/30/2025 0000 by Riri Clark RN  Head of Bed ([facility]) Positioning: HOB elevated  Enteral Feeding Safety: placement checked  Taken 1/29/2025 2200 by Riri Clark RN  Head of Bed ([facility]) Positioning: HOB elevated  Taken 1/29/2025 2000 by Riri Clark RN  Head of Bed ([facility]) Positioning: HOB elevated  Enteral Feeding Safety: placement checked  Goal: Safe, Effective Therapy Delivery  Outcome: Progressing  Intervention: Prevent Feeding-Related Adverse Events  Recent Flowsheet Documentation  Taken 1/30/2025 0000 by Riri Clark RN  Enteral Feeding Safety: placement checked  Taken 1/29/2025 2000 by Riri Clark RN  Enteral Feeding Safety: placement checked  Goal: Feeding Tolerance  Outcome: Progressing   Goal Outcome Evaluation:   Pt resting through night. Pt oriented to place and person only. AM K+ 3.6, replacement ordered. Runny stool twice this evening, blanchable redness/excoriation noted on pt bottom, barrier cream applied and pt states that it feels better. Pt has NG tube in left nare at 60cm, feed is going at 20mL/hr with 20mL flush every hour. No residuals noted through night, no complaints of nausea or abd fullness. Call light within reach.

## 2025-01-30 NOTE — PLAN OF CARE
Assessment: Alexey Trinidad presents with functional mobility impairments which indicate the need for skilled intervention. Patient continues to present with gen weakness and now iwht limited line tubing thus unable to attempt gait. Did well with mobility once fully awake. Tolerating session today without incident. Will continue to follow and progress as tolerated. Depending on how patient improves medically patient may need short rehab prior to return home.

## 2025-01-30 NOTE — PROGRESS NOTES
[facility] MEDICINE SERVICE  DAILY PROGRESS NOTE    NAME: Alexey Trinidad  : 3/21/1926  MRN: [id number]      LOS: 6 days     PROVIDER OF SERVICE: Leena Dominguez MD    Chief Complaint: UTI (urinary tract infection)    Subjective:     Interval History:    Patient seen and evaluated at bedside. Intermittently confused. Per nursing, has had multiple liquid stools since starting tube feeds. Reports feeling generally weak and tired.     Treatment plan discussed with patient. All questions addressed.     Review of Systems:   Denies fevers, chills, +fatigue  Denies chest pain, edema  +shortness of breath, +cough  Denies nausea, vomiting, diarrhea  Denies dysuria, hematuria    Objective:     Vital Signs  Temp:  [97.8 °F (36.6 °C)-98.8 °F (37.1 °C)] 98.3 °F (36.8 °C)  Heart Rate:  [66-90] 66  Resp:  [14-22] 18  BP: (113-137)/(53-85) 130/65  Flow (L/min) (Oxygen Therapy):  [30-40] 30   Body mass index is 21.98 kg/m².    Physical Exam   General: No acute distress, alert but intermittently confused  CV: RRR, trace peripheral edema  Pulm: Coarse breath sounds, no increased work of breathing  Abd: Soft, nontender, nondistended  Skin: No rashes or lesions on exposed skin  Psych: Appropriate mood and affect    Scheduled Meds   balsalazide, 750 mg, Oral, Nightly  [Held by provider] Budesonide, 3 mg, Oral, BID  budesonide, 0.5 mg, Nebulization, BID - RT  enoxaparin, 30 mg, Subcutaneous, Daily  furosemide, 40 mg, Intravenous, Q12H  ipratropium-albuterol, 3 mL, Nebulization, 4x Daily - RT  levothyroxine, 75 mcg, Oral, Q AM  methylPREDNISolone sodium succinate, 40 mg, Intravenous, Q12H  multivitamin with minerals, 1 tablet, Oral, Daily  piperacillin-tazobactam, 3.375 g, Intravenous, Q8H  potassium chloride, 40 mEq, Oral, Q4H  sodium chloride, 10 mL, Intravenous, Q12H  vitamin B-12, 1,000 mcg, Oral, Daily       PRN Meds     acetaminophen **OR** acetaminophen **OR** acetaminophen    senna-docusate sodium **AND** polyethylene  glycol **AND** bisacodyl **AND** bisacodyl    Calcium Replacement - Follow Nurse / BPA Driven Protocol    dextrose    dextrose    glucagon (human recombinant)    Magnesium Standard Dose Replacement - Follow Nurse / BPA Driven Protocol    nitroglycerin    ondansetron    Phosphorus Replacement - Follow Nurse / BPA Driven Protocol    Potassium Replacement - Follow Nurse / BPA Driven Protocol    [COMPLETED] Insert Peripheral IV **AND** sodium chloride    sodium chloride    sodium chloride   Infusions         Diagnostic Data    Results from last 7 days   Lab Units 01/30/25  0046 01/29/25  0506 01/28/25  0521   WBC 10*3/mm3 9.43   < > 8.28   HEMOGLOBIN g/dL 13.6   < > 13.2   HEMATOCRIT % 42.2   < > 43.9   PLATELETS 10*3/mm3 299   < > 232   GLUCOSE mg/dL 156*   < > 122*   CREATININE mg/dL 0.60   < > 0.66   BUN mg/dL 26*   < > 10   SODIUM mmol/L 142   < > 144   POTASSIUM mmol/L 3.6   < > 4.4   AST (SGOT) U/L  --   --  24   ALT (SGPT) U/L  --   --  13   ALK PHOS U/L  --   --  44   BILIRUBIN mg/dL  --   --  0.3   ANION GAP mmol/L 8.5   < > 8.2    < > = values in this interval not displayed.       XR Abdomen KUB    Result Date: 1/29/2025  Impression: Enteric tube terminates in the left abdomen, with tip likely in the mid stomach. Electronically Signed: Edouard Shaver  1/29/2025 12:24 PM EST  Workstation ID: [id number]     Interval results reviewed.    Assessment/Plan:     Acute hypoxic respiratory failure  Aspiration pneumonia, unknown organism  Pulmonary edema  Moderate aortic valve regurgitation  Moderate aortic valve stenosis  Moderate tricuspid valve regurgitation  Severe pulmonary hypertension  Acute urinary tract infection  Abdominal pain with nausea and vomiting  Biliary dilation w/ stone at ampulla of vater  History of collagenous colitis  Acute metabolic encephalopathy, likely multifactorial  Physical deconditioning  Left adrenal nodule, stable  Hypertension  Hypothyroidism    - Pulmonology consulted given increased  oxygen requirements, appreciate recs  - Echo noted; cardiology consulted in setting of valvular heart disease, appreciate recs  - Continue diuresis  - Continue zosyn, steroids  - Wean oxygen as tolerated  - Zosyn as above for treatment of UTI  - Continue TF; will ask dietician to assist with selection of formula best in setting of diarrhea; hold off on c.diff testing as patient without clinical s/sx of c.diff and diarrhea started only after TF initiated  - Will get repeat CXR today    Treatment plan discussed with nursing staff, case management and pharmacy.     VTE Prophylaxis:  Pharmacologic VTE prophylaxis orders are present.    Code status is   Code Status and Medical Interventions: No CPR (Do Not Attempt to Resuscitate); Limited Support; No intubation (DNI); DNR/DNI   Ordered at: 01/27/25 1918     Medical Intervention Limits:    No intubation (DNI)     Code Status (Patient has no pulse and is not breathing):    No CPR (Do Not Attempt to Resuscitate)     Medical Interventions (Patient has pulse or is breathing):    Limited Support     Comments:    DNR/DNI       Plan for disposition: Pending clinical course    Barriers to discharge: Oxygen requirements, IV abx, NPO    Time: 35+ minutes     Signature: Electronically signed by Leena Dominguez MD, 01/30/25, 07:24 [facility].  [facility] Hospitalist Team

## 2025-01-30 NOTE — PROGRESS NOTES
Daily Progress Note        UTI (urinary tract infection)    Abdominal pain    Assessment:     Acute hypoxic respiratory failure  ABG on 1/27/2025  pH 7.30/pCO2 78/pO2 60/bicarb 39      positive for norovirus      urine cultures was positive for Proteus Mirabella's    Pulmonary edema  proBNP 1400    Severe pulmonary hypertension most likely group 2   2D echo 1/27/2025    Left ventricular systolic function is normal. Calculated left ventricular EF = 70%    Left ventricular wall thickness is consistent with moderate to severe concentric hypertrophy.    Left ventricular diastolic function is consistent with (grade I) impaired relaxation.  moderate aortic valve regurgitation and stenosis.  Mean/peak gradient of 19/32 mmHg.  Planimetry LAZARO >1cm2.    Estimated right ventricular systolic pressure from tricuspid regurgitation is markedly elevated (>55 mmHg).    TSH 2.0     Recommendations:     Oxygen titration currently on 30 L high flow     Diuresis     Antibiotics currently on Zosyn    Steroids on IV Solu-Medrol 40 mg every 12          LOS: 6 days     Subjective         Objective     Vital signs for last 24 hours:  Vitals:    01/30/25 0646 01/30/25 0801 01/30/25 1101 01/30/25 1105   BP:  116/70     BP Location:  Left arm     Patient Position:  Lying     Pulse: 66 73 67 65   Resp: 18 18 17 16   Temp:  98.2 °F (36.8 °C)     TempSrc:  Oral     SpO2: 97% 98% 98% 99%   Weight:       Height:           Intake/Output last 3 shifts:  I/O last 3 completed shifts:  In: 663 [Other:282; NG/GT:281; IV Piggyback:100]  Out: 1225 [Urine:1225]  Intake/Output this shift:  No intake/output data recorded.      Radiology  Imaging Results (Last 24 Hours)       Procedure Component Value Units Date/Time    XR Chest 1 View [[id number]] Collected: 01/30/25 0935     Updated: 01/30/25 0939    Narrative:      XR CHEST 1 VW    Date of Exam: 1/30/2025 8:55 AM EST    Indication: continued oxygen requirements    Comparison: Chest radiograph  1/27/2025.    Findings:  Cardiomediastinal silhouette is unchanged with atherosclerotic and tortuous thoracic aorta. Significantly improved/resolved pulmonary vascular congestion and perihilar interstitial/airspace disease. Decreased, small bilateral pleural effusions. No   pneumothorax. Osseous structures are unchanged. Feeding catheter courses over the stomach with tip excluded from the field-of-view.      Impression:      Impression:  Significantly improved/resolved pulmonary edema.     Decreased, small bilateral pleural effusions.    No new or worsening findings.      Electronically Signed: Daniel Veras MD    1/30/2025 9:37 AM EST    Workstation ID: [id number]    XR Abdomen KUB [[id number]] Collected: 01/29/25 1223     Updated: 01/29/25 1226    Narrative:        XR ABDOMEN KUB    Date of Exam: 1/29/2025 11:52 AM EST    Indication: Dobhoff placement    Comparison: CT January 24, 2025    Findings:  Enteric tube terminates in the left abdomen, with tip likely in the mid stomach.    Bibasilar opacities with probable small pleural effusions. Status post vertebral augmentation in the lumbar spine. Visualized bowel gas pattern appears unremarkable.      Impression:      Impression:  Enteric tube terminates in the left abdomen, with tip likely in the mid stomach.        Electronically Signed: Edouard Chhaya    1/29/2025 12:24 PM EST    Workstation ID: [id number]            Labs:  Results from last 7 days   Lab Units 01/30/25  0046   WBC 10*3/mm3 9.43   HEMOGLOBIN g/dL 13.6   HEMATOCRIT % 42.2   PLATELETS 10*3/mm3 299     Results from last 7 days   Lab Units 01/30/25  0046 01/29/25  0506 01/28/25  0521   SODIUM mmol/L 142   < > 144   POTASSIUM mmol/L 3.6   < > 4.4   CHLORIDE mmol/L 90*   < > 99   CO2 mmol/L 43.5*   < > 36.8*   BUN mg/dL 26*   < > 10   CREATININE mg/dL 0.60   < > 0.66   CALCIUM mg/dL 9.3   < > 8.6   BILIRUBIN mg/dL  --   --  0.3   ALK PHOS U/L  --   --  44   ALT (SGPT) U/L  --   --  13   AST (SGOT) U/L  --   --   24   GLUCOSE mg/dL 156*   < > 122*    < > = values in this interval not displayed.     Results from last 7 days   Lab Units 01/27/25  1852   PH, ARTERIAL pH units 7.304*   PO2 ART mm Hg 60.8*   PCO2, ARTERIAL mm Hg 78.5*   HCO3 ART mmol/L 39.0*     Results from last 7 days   Lab Units 01/28/25  0521 01/27/25  0216 01/26/25  0237   ALBUMIN g/dL 3.6 3.7 3.4*             Results from last 7 days   Lab Units 01/30/25  0046   MAGNESIUM mg/dL 2.1         Results from last 7 days   Lab Units 01/28/25  0521   TSH uIU/mL 2.010           Meds:   SCHEDULE  balsalazide, 750 mg, Oral, Nightly  [Held by provider] Budesonide, 3 mg, Oral, BID  budesonide, 0.5 mg, Nebulization, BID - RT  enoxaparin, 30 mg, Subcutaneous, Daily  furosemide, 40 mg, Oral, BID Diuretics  ipratropium-albuterol, 3 mL, Nebulization, 4x Daily - RT  levothyroxine, 75 mcg, Oral, Q AM  methylPREDNISolone sodium succinate, 40 mg, Intravenous, Q12H  multivitamin with minerals, 1 tablet, Oral, Daily  piperacillin-tazobactam, 3.375 g, Intravenous, Q8H  sodium chloride, 10 mL, Intravenous, Q12H  vitamin B-12, 1,000 mcg, Oral, Daily      Infusions     PRNs    acetaminophen **OR** acetaminophen **OR** acetaminophen    senna-docusate sodium **AND** polyethylene glycol **AND** bisacodyl **AND** bisacodyl    Calcium Replacement - Follow Nurse / BPA Driven Protocol    dextrose    dextrose    glucagon (human recombinant)    Magnesium Standard Dose Replacement - Follow Nurse / BPA Driven Protocol    nitroglycerin    ondansetron    Phosphorus Replacement - Follow Nurse / BPA Driven Protocol    Potassium Replacement - Follow Nurse / BPA Driven Protocol    [COMPLETED] Insert Peripheral IV **AND** sodium chloride    sodium chloride    sodium chloride    Physical Exam:  Physical Exam  Cardiovascular:      Heart sounds: Murmur heard.      No gallop.   Pulmonary:      Effort: No respiratory distress.      Breath sounds: No stridor. Rhonchi and rales present. No wheezing.   Chest:       Chest wall: No tenderness.         ROS  Review of Systems   Respiratory:  Positive for cough and shortness of breath. Negative for wheezing and stridor.    Cardiovascular:  Negative for chest pain, palpitations and leg swelling.             Total time spent with patient greater than: 45 Minutes

## 2025-01-30 NOTE — PLAN OF CARE
Goal Outcome Evaluation:  Plan of Care Reviewed With: patient        Progress: improving  Outcome Evaluation: Patient sleeping in between care. Still ongoing TF at 20ml/hr with 20ml flush every hr. Tube level secured with septal tie at 60cm. Still on Airvo at 30% flow. Noted loose bowels 3x, Metamucil given. If loose BM persist, may insert rectal tube. Call light within reach

## 2025-01-31 ENCOUNTER — APPOINTMENT (OUTPATIENT)
Dept: GENERAL RADIOLOGY | Facility: HOSPITAL | Age: OVER 89
End: 2025-01-31
Payer: MEDICARE

## 2025-01-31 LAB
ANION GAP SERPL CALCULATED.3IONS-SCNC: 7 MMOL/L (ref 5–15)
BUN SERPL-MCNC: 33 MG/DL (ref 8–23)
BUN/CREAT SERPL: 50 (ref 7–25)
CALCIUM SPEC-SCNC: 10 MG/DL (ref 8.2–9.6)
CHLORIDE SERPL-SCNC: 89 MMOL/L (ref 98–107)
CO2 SERPL-SCNC: 42 MMOL/L (ref 22–29)
CREAT SERPL-MCNC: 0.66 MG/DL (ref 0.57–1)
DEPRECATED RDW RBC AUTO: 51.6 FL (ref 37–54)
EGFRCR SERPLBLD CKD-EPI 2021: 79.4 ML/MIN/1.73
ERYTHROCYTE [DISTWIDTH] IN BLOOD BY AUTOMATED COUNT: 14.1 % (ref 12.3–15.4)
GLUCOSE BLDC GLUCOMTR-MCNC: 146 MG/DL (ref 70–105)
GLUCOSE BLDC GLUCOMTR-MCNC: 173 MG/DL (ref 70–105)
GLUCOSE BLDC GLUCOMTR-MCNC: 178 MG/DL (ref 70–105)
GLUCOSE SERPL-MCNC: 113 MG/DL (ref 65–99)
HCT VFR BLD AUTO: 46 % (ref 34–46.6)
HGB BLD-MCNC: 14.4 G/DL (ref 12–15.9)
MAGNESIUM SERPL-MCNC: 2.6 MG/DL (ref 1.7–2.3)
MCH RBC QN AUTO: 30.6 PG (ref 26.6–33)
MCHC RBC AUTO-ENTMCNC: 31.3 G/DL (ref 31.5–35.7)
MCV RBC AUTO: 97.9 FL (ref 79–97)
PHOSPHATE SERPL-MCNC: 1.9 MG/DL (ref 2.5–4.5)
PLATELET # BLD AUTO: 346 10*3/MM3 (ref 140–450)
PMV BLD AUTO: 9.6 FL (ref 6–12)
POTASSIUM SERPL-SCNC: 3.9 MMOL/L (ref 3.5–5.2)
RBC # BLD AUTO: 4.7 10*6/MM3 (ref 3.77–5.28)
SODIUM SERPL-SCNC: 138 MMOL/L (ref 136–145)
WBC NRBC COR # BLD AUTO: 11.5 10*3/MM3 (ref 3.4–10.8)

## 2025-01-31 PROCEDURE — 80048 BASIC METABOLIC PNL TOTAL CA: CPT | Performed by: STUDENT IN AN ORGANIZED HEALTH CARE EDUCATION/TRAINING PROGRAM

## 2025-01-31 PROCEDURE — 97110 THERAPEUTIC EXERCISES: CPT

## 2025-01-31 PROCEDURE — 74018 RADEX ABDOMEN 1 VIEW: CPT

## 2025-01-31 PROCEDURE — 25010000002 METHYLPREDNISOLONE PER 40 MG: Performed by: STUDENT IN AN ORGANIZED HEALTH CARE EDUCATION/TRAINING PROGRAM

## 2025-01-31 PROCEDURE — 82948 REAGENT STRIP/BLOOD GLUCOSE: CPT

## 2025-01-31 PROCEDURE — 25810000003 SODIUM CHLORIDE 0.9 % SOLUTION: Performed by: STUDENT IN AN ORGANIZED HEALTH CARE EDUCATION/TRAINING PROGRAM

## 2025-01-31 PROCEDURE — 97530 THERAPEUTIC ACTIVITIES: CPT

## 2025-01-31 PROCEDURE — 94761 N-INVAS EAR/PLS OXIMETRY MLT: CPT

## 2025-01-31 PROCEDURE — 83735 ASSAY OF MAGNESIUM: CPT | Performed by: STUDENT IN AN ORGANIZED HEALTH CARE EDUCATION/TRAINING PROGRAM

## 2025-01-31 PROCEDURE — 94799 UNLISTED PULMONARY SVC/PX: CPT

## 2025-01-31 PROCEDURE — 25010000002 ENOXAPARIN PER 10 MG

## 2025-01-31 PROCEDURE — 94664 DEMO&/EVAL PT USE INHALER: CPT

## 2025-01-31 PROCEDURE — 99232 SBSQ HOSP IP/OBS MODERATE 35: CPT | Performed by: INTERNAL MEDICINE

## 2025-01-31 PROCEDURE — 85027 COMPLETE CBC AUTOMATED: CPT | Performed by: STUDENT IN AN ORGANIZED HEALTH CARE EDUCATION/TRAINING PROGRAM

## 2025-01-31 PROCEDURE — 25010000002 PIPERACILLIN SOD-TAZOBACTAM PER 1 G: Performed by: STUDENT IN AN ORGANIZED HEALTH CARE EDUCATION/TRAINING PROGRAM

## 2025-01-31 PROCEDURE — 84100 ASSAY OF PHOSPHORUS: CPT | Performed by: STUDENT IN AN ORGANIZED HEALTH CARE EDUCATION/TRAINING PROGRAM

## 2025-01-31 RX ORDER — HYDROCORTISONE 10 MG/1
20 TABLET ORAL 2 TIMES DAILY WITH MEALS
Status: DISCONTINUED | OUTPATIENT
Start: 2025-02-01 | End: 2025-02-02

## 2025-01-31 RX ORDER — FENTANYL/ROPIVACAINE/NS/PF 2-625MCG/1
15 PLASTIC BAG, INJECTION (ML) EPIDURAL ONCE
Status: COMPLETED | OUTPATIENT
Start: 2025-01-31 | End: 2025-01-31

## 2025-01-31 RX ORDER — SILDENAFIL CITRATE 20 MG/1
10 TABLET ORAL 3 TIMES DAILY
Status: DISCONTINUED | OUTPATIENT
Start: 2025-01-31 | End: 2025-02-05 | Stop reason: HOSPADM

## 2025-01-31 RX ORDER — PANTOPRAZOLE SODIUM 40 MG/10ML
40 INJECTION, POWDER, LYOPHILIZED, FOR SOLUTION INTRAVENOUS
Status: DISCONTINUED | OUTPATIENT
Start: 2025-01-31 | End: 2025-02-03

## 2025-01-31 RX ADMIN — IPRATROPIUM BROMIDE AND ALBUTEROL SULFATE 3 ML: .5; 3 SOLUTION RESPIRATORY (INHALATION) at 10:42

## 2025-01-31 RX ADMIN — METHYLPREDNISOLONE SODIUM SUCCINATE 40 MG: 40 INJECTION, POWDER, FOR SOLUTION INTRAMUSCULAR; INTRAVENOUS at 20:58

## 2025-01-31 RX ADMIN — BUDESONIDE 0.5 MG: 0.5 INHALANT RESPIRATORY (INHALATION) at 20:30

## 2025-01-31 RX ADMIN — BUDESONIDE 0.5 MG: 0.5 INHALANT RESPIRATORY (INHALATION) at 06:31

## 2025-01-31 RX ADMIN — PIPERACILLIN AND TAZOBACTAM 3.38 G: 3; .375 INJECTION, POWDER, LYOPHILIZED, FOR SOLUTION INTRAVENOUS at 14:52

## 2025-01-31 RX ADMIN — ACETAMINOPHEN 650 MG: 160 SOLUTION ORAL at 09:38

## 2025-01-31 RX ADMIN — FUROSEMIDE 40 MG: 40 TABLET ORAL at 17:48

## 2025-01-31 RX ADMIN — PIPERACILLIN AND TAZOBACTAM 3.38 G: 3; .375 INJECTION, POWDER, LYOPHILIZED, FOR SOLUTION INTRAVENOUS at 07:17

## 2025-01-31 RX ADMIN — SILDENAFIL 10 MG: 20 TABLET ORAL at 20:59

## 2025-01-31 RX ADMIN — ENOXAPARIN SODIUM 30 MG: 100 INJECTION SUBCUTANEOUS at 15:00

## 2025-01-31 RX ADMIN — AVOBENZONE, HOMOSALATE, OCTISALATE, OCTOCRYLENE, AND OXYBENZONE 1 PACKET: 29.4; 147; 49; 25.4; 58.8 LOTION TOPICAL at 09:43

## 2025-01-31 RX ADMIN — PANTOPRAZOLE SODIUM 40 MG: 40 INJECTION, POWDER, FOR SOLUTION INTRAVENOUS at 09:42

## 2025-01-31 RX ADMIN — SILDENAFIL 10 MG: 20 TABLET ORAL at 15:01

## 2025-01-31 RX ADMIN — BALSALAZIDE DISODIUM 750 MG: 750 CAPSULE ORAL at 20:57

## 2025-01-31 RX ADMIN — Medication 1000 MCG: at 09:42

## 2025-01-31 RX ADMIN — POTASSIUM PHOSPHATE, MONOBASIC AND POTASSIUM PHOSPHATE, DIBASIC 15 MMOL: 224; 236 INJECTION, SOLUTION, CONCENTRATE INTRAVENOUS at 10:17

## 2025-01-31 RX ADMIN — IPRATROPIUM BROMIDE AND ALBUTEROL SULFATE 3 ML: .5; 3 SOLUTION RESPIRATORY (INHALATION) at 06:31

## 2025-01-31 RX ADMIN — Medication 10 ML: at 09:43

## 2025-01-31 RX ADMIN — IPRATROPIUM BROMIDE AND ALBUTEROL SULFATE 3 ML: .5; 3 SOLUTION RESPIRATORY (INHALATION) at 20:25

## 2025-01-31 RX ADMIN — METHYLPREDNISOLONE SODIUM SUCCINATE 40 MG: 40 INJECTION, POWDER, FOR SOLUTION INTRAMUSCULAR; INTRAVENOUS at 09:42

## 2025-01-31 NOTE — PROGRESS NOTES
Referring Provider: Leena Dominguez MD    Reason for follow-up: Valvular heart disease     Patient Care Team:  Rui Cole MD as PCP - General (Internal Medicine)      SUBJECTIVE  Resting comfortably in bed.  On Airvo 30 L.     ROS  Review of all systems negative except as indicated.    Since I have last seen, the patient has been without any chest discomfort, shortness of breath, palpitations, dizziness or syncope.  Denies having any headache, abdominal pain, nausea, vomiting, diarrhea, constipation, loss of weight or loss of appetite.  Denies having any excessive bruising, hematuria or blood in the stool.        Personal History:    Past Medical History:   Diagnosis Date    Disease of thyroid gland     Hypertension        Past Surgical History:   Procedure Laterality Date    BACK SURGERY      kyphoplasty    BREAST SURGERY      benign cyst removed    HYSTERECTOMY      LAPAROSCOPIC CHOLECYSTECTOMY         History reviewed. No pertinent family history.    Social History     Tobacco Use    Smoking status: Never    Smokeless tobacco: Never   Vaping Use    Vaping status: Never Used   Substance Use Topics    Alcohol use: Never    Drug use: Never        Home meds:  Prior to Admission medications    Medication Sig Start Date End Date Taking? Authorizing Provider   acetaminophen (TYLENOL) 500 MG tablet Take 1 tablet by mouth Every Morning.   Yes Gerald Costa MD   Bacillus Coagulans-Inulin (ALIGN PREBIOTIC-PROBIOTIC PO) Take 1 capsule by mouth Daily.   Yes Gerald Costa MD   balsalazide (COLAZAL) 750 MG capsule Take 1 capsule by mouth Every Night.   Yes Gerald Costa MD   Budesonide (ENTOCORT EC) 3 MG 24 hr capsule Take 1 capsule by mouth 2 (Two) Times a Day.   Yes Gerald Costa MD   Cyanocobalamin (VITAMIN B-12 PO) Take 1 tablet by mouth Daily.   Yes Gerald Costa MD   levothyroxine (SYNTHROID, LEVOTHROID) 75 MCG tablet Take 1 tablet by mouth Every Morning.   Yes Igor  MD Gerald   losartan (COZAAR) 50 MG tablet Take 1 tablet by mouth Daily.   Yes Provider, MD Gerald   multivitamins-minerals (PRESERVISION AREDS 2) capsule capsule Take 1 capsule by mouth 2 (Two) Times a Day.   Yes Provider, MD Gerald   Wheat Dextrin (BENEFIBER DRINK MIX PO) Take 1 package by mouth Daily.   Yes Provider, MD Gerald       Allergies:  Contrast dye (echo or unknown ct/mr) and Sulfa antibiotics    Scheduled Meds:balsalazide, 750 mg, Oral, Nightly  [Held by provider] Budesonide, 3 mg, Oral, BID  budesonide, 0.5 mg, Nebulization, BID - RT  enoxaparin, 30 mg, Subcutaneous, Daily  furosemide, 40 mg, Oral, BID Diuretics  ipratropium-albuterol, 3 mL, Nebulization, 4x Daily - RT  levothyroxine, 75 mcg, Oral, Q AM  methylPREDNISolone sodium succinate, 40 mg, Intravenous, Q12H  multivitamin with minerals, 1 tablet, Oral, Daily  piperacillin-tazobactam, 3.375 g, Intravenous, Q8H  Psyllium, 1 packet, Oral, Daily  sodium chloride, 10 mL, Intravenous, Q12H  vitamin B-12, 1,000 mcg, Oral, Daily      Continuous Infusions:   PRN Meds:.  acetaminophen **OR** acetaminophen **OR** acetaminophen    senna-docusate sodium **AND** polyethylene glycol **AND** bisacodyl **AND** bisacodyl    Calcium Replacement - Follow Nurse / BPA Driven Protocol    dextrose    dextrose    glucagon (human recombinant)    Magnesium Standard Dose Replacement - Follow Nurse / BPA Driven Protocol    nitroglycerin    ondansetron    Phosphorus Replacement - Follow Nurse / BPA Driven Protocol    Potassium Replacement - Follow Nurse / BPA Driven Protocol    [COMPLETED] Insert Peripheral IV **AND** sodium chloride    sodium chloride    sodium chloride      OBJECTIVE    Vital Signs  Vitals:    01/31/25 0631 01/31/25 0635 01/31/25 0636 01/31/25 0639   BP:       BP Location:       Patient Position:       Pulse: 71 73 76 74   Resp: 15 14 17 15   Temp:       TempSrc:       SpO2: 94% 95% 95% 95%   Weight:       Height:           Flowsheet  "Rows      Flowsheet Row First Filed Value   Admission Height 147.3 cm (58\") Documented at 01/24/2025 1614   Admission Weight 49.4 kg (109 lb) Documented at 01/24/2025 1614            No intake or output data in the 24 hours ending 01/31/25 0719         Telemetry: Sinus rhythm    Physical Exam:  The patient is alert, oriented and in no distress.  Elderly/frail  Vital signs as noted above.  Head and neck revealed no carotid bruits or jugular venous distention.  No thyromegaly or lymphadenopathy is present  Lungs clear.  No wheezing.  Breath sounds are normal bilaterally.  Heart normal first and second heart sounds.  No murmur. No precordial rub is present.  No gallop is present.  Abdomen soft and nontender.  No organomegaly is present.  Extremities with good peripheral pulses without any pedal edema.  Skin warm and dry.  Musculoskeletal system is grossly normal.  CNS grossly normal.       Results Review:  I have personally reviewed the results from the time of this admission to 1/31/2025 07:19 EST and agree with these findings:  []  Laboratory  []  Microbiology  []  Radiology  []  EKG/Telemetry   []  Cardiology/Vascular   []  Pathology  []  Old records  []  Other:    Most notable findings include:    Lab Results (last 24 hours)       Procedure Component Value Units Date/Time    POC Glucose Q6H [[id number]]  (Abnormal) Collected: 01/31/25 0608    Specimen: Blood Updated: 01/31/25 0610     Glucose 146 mg/dL      Comment: Serial Number: [id number]:  [id number]       Basic Metabolic Panel [[id number]]  (Abnormal) Collected: 01/31/25 0300    Specimen: Blood Updated: 01/31/25 0601     Glucose 113 mg/dL      BUN 33 mg/dL      Creatinine 0.66 mg/dL      Sodium 138 mmol/L      Potassium 3.9 mmol/L      Comment: Specimen hemolyzed.  Result may be falsely elevated.        Chloride 89 mmol/L      CO2 42.0 mmol/L      Calcium 10.0 mg/dL      BUN/Creatinine Ratio 50.0     Anion Gap 7.0 mmol/L      eGFR 79.4 mL/min/1.73     " Narrative:      GFR Categories in Chronic Kidney Disease (CKD)      GFR Category          GFR (mL/min/1.73)    Interpretation  G1                     90 or greater         Normal or high (1)  G2                      60-89                Mild decrease (1)  G3a                   45-59                Mild to moderate decrease  G3b                   30-44                Moderate to severe decrease  G4                    15-29                Severe decrease  G5                    14 or less           Kidney failure          (1)In the absence of evidence of kidney disease, neither GFR category G1 or G2 fulfill the criteria for CKD.    eGFR calculation 2021 CKD-EPI creatinine equation, which does not include race as a factor    Magnesium [[id number]]  (Abnormal) Collected: 01/31/25 0300    Specimen: Blood Updated: 01/31/25 0601     Magnesium 2.6 mg/dL     Phosphorus [[id number]]  (Abnormal) Collected: 01/31/25 0300    Specimen: Blood Updated: 01/31/25 0600     Phosphorus 1.9 mg/dL     CBC (No Diff) [[id number]]  (Abnormal) Collected: 01/31/25 0300    Specimen: Blood Updated: 01/31/25 0500     WBC 11.50 10*3/mm3      RBC 4.70 10*6/mm3      Hemoglobin 14.4 g/dL      Hematocrit 46.0 %      MCV 97.9 fL      MCH 30.6 pg      MCHC 31.3 g/dL      RDW 14.1 %      RDW-SD 51.6 fl      MPV 9.6 fL      Platelets 346 10*3/mm3     Blood Culture - Blood, Arm, Left [[id number]]  (Normal) Collected: 01/27/25 2115    Specimen: Blood from Arm, Left Updated: 01/30/25 2131     Blood Culture No growth at 3 days    POC Glucose Once [[id number]]  (Abnormal) Collected: 01/30/25 1754    Specimen: Blood Updated: 01/30/25 1756     Glucose 160 mg/dL      Comment: Serial Number: [id number]:  [id number]       Potassium [[id number]]  (Normal) Collected: 01/30/25 1209    Specimen: Blood from Arm, Left Updated: 01/30/25 1252     Potassium 4.1 mmol/L     POC Glucose Once [[id number]]  (Abnormal) Collected: 01/30/25 1155    Specimen: Blood Updated: 01/30/25  1159     Glucose 152 mg/dL      Comment: Serial Number: [id number]:  [id number]       Magnesium [[id number]]  (Normal) Collected: 01/30/25 0046    Specimen: Blood Updated: 01/30/25 1021     Magnesium 2.1 mg/dL     Phosphorus [[id number]]  (Abnormal) Collected: 01/30/25 0046    Specimen: Blood Updated: 01/30/25 1021     Phosphorus 2.2 mg/dL             Imaging Results (Last 24 Hours)       Procedure Component Value Units Date/Time    XR Chest 1 View [[id number]] Collected: 01/30/25 0935     Updated: 01/30/25 0939    Narrative:      XR CHEST 1 VW    Date of Exam: 1/30/2025 8:55 AM EST    Indication: continued oxygen requirements    Comparison: Chest radiograph 1/27/2025.    Findings:  Cardiomediastinal silhouette is unchanged with atherosclerotic and tortuous thoracic aorta. Significantly improved/resolved pulmonary vascular congestion and perihilar interstitial/airspace disease. Decreased, small bilateral pleural effusions. No   pneumothorax. Osseous structures are unchanged. Feeding catheter courses over the stomach with tip excluded from the field-of-view.      Impression:      Impression:  Significantly improved/resolved pulmonary edema.     Decreased, small bilateral pleural effusions.    No new or worsening findings.      Electronically Signed: Daniel Veras MD    1/30/2025 9:37 AM EST    Workstation ID: [id number]            LAB RESULTS (LAST 7 DAYS)    CBC  Results from last 7 days   Lab Units 01/31/25  0300 01/30/25  0046 01/29/25  0506 01/28/25  0521 01/27/25  0216 01/26/25  0237 01/25/25  0104   WBC 10*3/mm3 11.50* 9.43 9.16 8.28 11.21* 6.03 10.16   RBC 10*6/mm3 4.70 4.33 4.40 4.28 4.30 3.79 4.26   HEMOGLOBIN g/dL 14.4 13.6 13.5 13.2 13.1 11.9* 13.0   HEMATOCRIT % 46.0 42.2 43.2 43.9 44.7 39.8 43.5   MCV fL 97.9* 97.5* 98.2* 102.6* 104.0* 105.0* 102.1*   PLATELETS 10*3/mm3 346 299 255 232 235 205 298       BMP  Results from last 7 days   Lab Units 01/31/25  0300 01/30/25  1209 01/30/25  0046 01/29/25  0506  01/28/25  0521 01/27/25  1259 01/27/25  0216 01/26/25  1330 01/26/25  0237 01/25/25  1229 01/25/25  0104   SODIUM mmol/L 138  --  142 144 144  --  143  --  146*  --  147*   POTASSIUM mmol/L 3.9 4.1 3.6 3.0* 4.4 4.0 3.2*  --  4.1   < > 3.4*   CHLORIDE mmol/L 89*  --  90* 90* 99  --  103  --  110*  --  104   CO2 mmol/L 42.0*  --  43.5* 40.7* 36.8*  --  32.3*  --  29.4*  --  33.1*   BUN mg/dL 33*  --  26* 17 10  --  7*  --  13  --  20   CREATININE mg/dL 0.66  --  0.60 0.66 0.66  --  0.42*  --  0.44*  --  0.51*   GLUCOSE mg/dL 113*  --  156* 125* 122*  --  130*  --  103*  --  119*   MAGNESIUM mg/dL 2.6*  --  2.1  --  1.9  --  2.0  --  2.0  --  2.2   PHOSPHORUS mg/dL 1.9*  --  2.2*  --  2.9  --  2.7 2.3* 2.0*  --  4.0    < > = values in this interval not displayed.       CMP   Results from last 7 days   Lab Units 01/31/25  0300 01/30/25  1209 01/30/25  0046 01/29/25  0506 01/28/25  0521 01/27/25  1259 01/27/25  0216 01/26/25  1145 01/26/25  0237 01/25/25  1229 01/25/25  0104 01/24/25  1609   SODIUM mmol/L 138  --  142 144 144  --  143  --  146*  --  147* 147*   POTASSIUM mmol/L 3.9 4.1 3.6 3.0* 4.4 4.0 3.2*  --  4.1   < > 3.4* 3.7   CHLORIDE mmol/L 89*  --  90* 90* 99  --  103  --  110*  --  104 108*   CO2 mmol/L 42.0*  --  43.5* 40.7* 36.8*  --  32.3*  --  29.4*  --  33.1* 28.9   BUN mg/dL 33*  --  26* 17 10  --  7*  --  13  --  20 22   CREATININE mg/dL 0.66  --  0.60 0.66 0.66  --  0.42*  --  0.44*  --  0.51* 0.56*   GLUCOSE mg/dL 113*  --  156* 125* 122*  --  130*  --  103*  --  119* 129*   ALBUMIN g/dL  --   --   --   --  3.6  --  3.7  --  3.4*  --  3.9 3.9   BILIRUBIN mg/dL  --   --   --   --  0.3  --  0.2  --  <0.2  --  0.2 0.3   ALK PHOS U/L  --   --   --   --  44  --  39  --  35*  --  41 42   AST (SGOT) U/L  --   --   --   --  24  --  22  --  23  --  22 29   ALT (SGPT) U/L  --   --   --   --  13  --  14  --  11  --  12 13   LIPASE U/L  --   --   --   --   --   --   --   --   --   --   --  29   AMMONIA umol/L  --    --   --   --   --   --   --  17  --   --   --   --     < > = values in this interval not displayed.       BNP        TROPONIN        CoAg        Creatinine Clearance  Estimated Creatinine Clearance: 34.7 mL/min (by C-G formula based on SCr of 0.66 mg/dL).    ABG  Results from last 7 days   Lab Units 01/27/25  1852 01/27/25  1602   PH, ARTERIAL pH units 7.304* 7.243*   PCO2, ARTERIAL mm Hg 78.5* 93.7*   PO2 ART mm Hg 60.8* 103.5   O2 SATURATION ART % 86.7* 96.2   BASE EXCESS ART mmol/L 9.3* 8.7*       Radiology  XR Chest 1 View    Result Date: 1/30/2025  Impression: Significantly improved/resolved pulmonary edema. Decreased, small bilateral pleural effusions. No new or worsening findings. Electronically Signed: Daniel Veras MD  1/30/2025 9:37 AM EST  Workstation ID: [id number]    XR Abdomen KUB    Result Date: 1/29/2025  Impression: Enteric tube terminates in the left abdomen, with tip likely in the mid stomach. Electronically Signed: Edouard Shaver  1/29/2025 12:24 PM EST  Workstation ID: [id number]       EKG  I personally viewed and interpreted the patient's EKG/Telemetry data:  Telemetry Scan   Final Result      Telemetry Scan   Final Result      Telemetry Scan   Final Result      Telemetry Scan   Final Result      Telemetry Scan   Final Result      Telemetry Scan   Final Result      Telemetry Scan   Final Result      Telemetry Scan   Final Result      Telemetry Scan   Final Result      Telemetry Scan   Final Result      Telemetry Scan   Final Result      Telemetry Scan   Final Result      Telemetry Scan   Final Result      Telemetry Scan   Final Result      Telemetry Scan   Final Result      Telemetry Scan   Final Result      Telemetry Scan   Final Result      Telemetry Scan   Final Result      Telemetry Scan   Final Result      Telemetry Scan   Final Result      Telemetry Scan   Final Result      Telemetry Scan   Final Result      Telemetry Scan   Final Result      Telemetry Scan   Final Result      Telemetry Scan    Final Result      Telemetry Scan   Final Result      Telemetry Scan   Final Result            Echocardiogram:    Results for orders placed during the hospital encounter of 01/24/25    Adult Transthoracic Echo Complete W/ Cont if Necessary Per Protocol    Interpretation Summary    Left ventricular systolic function is normal. Calculated left ventricular EF = 70%    Left ventricular wall thickness is consistent with moderate to severe concentric hypertrophy.    Left ventricular diastolic function is consistent with (grade I) impaired relaxation.    The left atrial cavity is mildly dilated.    The right atrial cavity is mildly  dilated.    Moderate aortic valve regurgitation is present.    Moderate to severe aortic valve stenosis is present.    Moderate tricuspid valve regurgitation is present.    Estimated right ventricular systolic pressure from tricuspid regurgitation is markedly elevated (>55 mmHg).    Transthoracic echocardiography reveals moderate to severe LVH with severely calcified aortic valve with moderate aortic regurgitation and moderate to severe aortic stenosis.  No effusion.  Moderate TR with severe pulm hypertension.  No effusion    Electronically signed by Kt Goins MD, 01/28/25, 5:51 PM EST.        Stress Test:         Cardiac Catheterization:  No results found for this or any previous visit.         Other:         ASSESSMENT & PLAN:    Principal Problem:    UTI (urinary tract infection)  Active Problems:    Abdominal pain    Valvular heart disease  Patient has moderate aortic valve regurgitation and stenosis.  Mean/peak gradient of 19/32 mmHg.  Aortic valve area on planimetry is more than 1 cm²  Moderate tricuspid valve regurgitation with severe pulmonary hypertension.  Currently does not meet criteria for transcatheter aortic valve replacement or tricuspid valve repair.  Recommend diuretics and medical management of valvular heart disease with blood pressure and heart rate control.  Now  on oral Lasix: Continue same dose  proBNP is normal  Monitor I's and O's and replace electrolytes as needed.  Would like to follow-up as outpatient for progression of disease and to discuss transcatheter options.  Given advanced age and poor functional status: I am inclined towards medical management only with diuretics.     Respiratory failure  Hypoxemic respiratory failure  Severe pulmonary hypertension  Remains on 30 L  Continue antibiotics, bronchodilators and steroids  Pulmonology is on board     UTI  She will complete a course of antibiotics     GI symptoms  No indication for colonoscopy or EGD per GI  Starting tube feeds.     Elderly/frail  PT OT     CODE STATUS is DNI DNR      Oscar Wesley MD  01/31/25  07:19 EST

## 2025-01-31 NOTE — CASE MANAGEMENT/SOCIAL WORK
Continued Stay Note  [facility]     Patient Name: Alexey Trinidad  MRN: [id number]  Today's Date: 1/31/2025    Admit Date: 1/24/2025    Plan: Return home with family. [facility] (accepted, need order). Watch for new home O2 needs   Discharge Plan       Row Name 01/31/25 0904       Plan    Plan Comments DC Barriers: Airvo 30L, IV Lasix/steroids, IV Zosyn, NG/TFs             Carolina Dixon RN      [facility]  Office: [phone]  Cell: [phone]  Fax # [phone]

## 2025-01-31 NOTE — PROGRESS NOTES
Nutrition Services  Patient Name: Alexey Trinidad  YOB: 1926  MRN: [id number]  Admission date: 1/24/2025    PROGRESS NOTE      Nutrition Intervention Updates: Continue TF at current rate and will continue to monitor for s/s of refeeding. Will possibly advance rate tomorrow.          Encounter Information: Checking in to monitor TF tolerance. Pt continues on airvo at 30 L today. Phos continues to drop overnight. Will continue TF at current rate and continue to monitor s/s of refeeding prior to advancing rate. Metamucil added  yesterday due to increased loose stools.        PO Diet: NPO Diet NPO Type: Tube Feeding   PO Supplements: None    PO Intake:  NPO       Current nutrition support: Nutren 1.5 at 20 mL/hr + 20 mL/hr water flush    Nutrition support review: Documented as ordered        Labs (reviewed below): Hypophosphatemia - replaced today  Hypermagnesemia - management per attending       GI Function:  Last documented BM 1/30        Brief weight review   Wt Readings from Last 10 Encounters:   01/31/25 0534 46.2 kg (101 lb 13.6 oz)   01/30/25 0528 47.7 kg (105 lb 2.6 oz)   01/29/25 0531 48 kg (105 lb 13.1 oz)   01/27/25 1757 47.2 kg (104 lb)   01/24/25 2006 47.4 kg (104 lb 8 oz)   01/24/25 1614 49.4 kg (109 lb)        Results from last 7 days   Lab Units 01/31/25  0300 01/30/25  1209 01/30/25  0046 01/29/25  0506 01/28/25  0521 01/27/25  1259 01/27/25  0216 01/26/25  0237   SODIUM mmol/L 138  --  142 144 144  --  143 146*   POTASSIUM mmol/L 3.9 4.1 3.6 3.0* 4.4   < > 3.2* 4.1   CHLORIDE mmol/L 89*  --  90* 90* 99  --  103 110*   CO2 mmol/L 42.0*  --  43.5* 40.7* 36.8*  --  32.3* 29.4*   BUN mg/dL 33*  --  26* 17 10  --  7* 13   CREATININE mg/dL 0.66  --  0.60 0.66 0.66  --  0.42* 0.44*   CALCIUM mg/dL 10.0*  --  9.3 8.7 8.6  --  8.3 8.0*   BILIRUBIN mg/dL  --   --   --   --  0.3  --  0.2 <0.2   ALK PHOS U/L  --   --   --   --  44  --  39 35*   ALT (SGPT) U/L  --   --   --   --  13  --  14 11   AST  (SGOT) U/L  --   --   --   --  24  --  22 23   GLUCOSE mg/dL 113*  --  156* 125* 122*  --  130* 103*    < > = values in this interval not displayed.     Results from last 7 days   Lab Units 01/31/25  0300 01/30/25  0046 01/29/25  0506 01/28/25  0521   MAGNESIUM mg/dL 2.6* 2.1  --  1.9   PHOSPHORUS mg/dL 1.9* 2.2*  --  2.9   HEMOGLOBIN g/dL 14.4 13.6   < > 13.2   HEMATOCRIT % 46.0 42.2   < > 43.9    < > = values in this interval not displayed.           RD to follow up per protocol.    Electronically signed by:  Cassandra Taveras RD  01/31/25 10:45 EST

## 2025-01-31 NOTE — PLAN OF CARE
"Assessment: Alexey Trinidad presents with functional mobility impairments which indicate the need for skilled intervention. Pt difficult to arouse; however, was able to awaken more with stimulation and activity. Disoriented to person, place, time, and situation. Family at bedside, but she was unable to recall names correctly. Pt requires Mod A for bed mobility and has limited sitting tolerance at EOB due to weakness and poor activity tolerance. Pt is now far below functional baseline. Updating PT recommendation to SNF at discharge. Will continue to follow and progress as tolerated.     Plan/Recommendations:   If medically appropriate, Moderate Intensity Therapy recommended post-acute care. This is recommended as therapy feels the patient would require 3-4 days per week and wouldn't tolerate \"3 hour daily\" rehab intensity. SNF would be the preferred choice. If the patient does not agree to SNF, arrange HH or OP depending on home bound status. If patient is medically complex, consider LTACH. Pt requires no DME at discharge.     Pt desires Skilled Rehab placement at discharge. Pt cooperative; agreeable to therapeutic recommendations and plan of care.     "

## 2025-01-31 NOTE — PROGRESS NOTES
[facility] MEDICINE SERVICE  DAILY PROGRESS NOTE    NAME: Alexey Trinidad  : 3/21/1926  MRN: [id number]      LOS: 7 days     PROVIDER OF SERVICE: Leena Dominguez MD    Chief Complaint: UTI (urinary tract infection)    Subjective:     Interval History:    Patient seen and evaluated at bedside. Patient more somnolent today. Complaining of abdominal pain though nursing reports only 1 bowel movement this morning, less watery than yesterday.    Treatment plan discussed with patient. All questions addressed.     Review of Systems:   Denies fevers, chills, +fatigue  Denies chest pain, edema  +shortness of breath, +cough  Denies nausea, vomiting, diarrhea, +abd pain  Denies dysuria, hematuria    Objective:     Vital Signs  Temp:  [97.3 °F (36.3 °C)-99 °F (37.2 °C)] 98.2 °F (36.8 °C)  Heart Rate:  [65-81] 74  Resp:  [13-21] 15  BP: (109-137)/(57-72) 109/57  Flow (L/min) (Oxygen Therapy):  [30] 30   Body mass index is 21.29 kg/m².    Physical Exam   General: No acute distress, sleepy and intermittently confused  CV: RRR, trace peripheral edema  Pulm: Coarse breath sounds, no increased work of breathing  Abd: Soft, mildly distended, tender to palpation without rebound or guarding  Skin: No rashes or lesions on exposed skin  Psych: Appropriate mood and affect    Scheduled Meds   balsalazide, 750 mg, Oral, Nightly  [Held by provider] Budesonide, 3 mg, Oral, BID  budesonide, 0.5 mg, Nebulization, BID - RT  enoxaparin, 30 mg, Subcutaneous, Daily  furosemide, 40 mg, Oral, BID Diuretics  ipratropium-albuterol, 3 mL, Nebulization, 4x Daily - RT  levothyroxine, 75 mcg, Oral, Q AM  methylPREDNISolone sodium succinate, 40 mg, Intravenous, Q12H  multivitamin with minerals, 1 tablet, Oral, Daily  piperacillin-tazobactam, 3.375 g, Intravenous, Q8H  Psyllium, 1 packet, Oral, Daily  sodium chloride, 10 mL, Intravenous, Q12H  vitamin B-12, 1,000 mcg, Oral, Daily       PRN Meds     acetaminophen **OR** acetaminophen **OR**  acetaminophen    senna-docusate sodium **AND** polyethylene glycol **AND** bisacodyl **AND** bisacodyl    Calcium Replacement - Follow Nurse / BPA Driven Protocol    dextrose    dextrose    glucagon (human recombinant)    Magnesium Standard Dose Replacement - Follow Nurse / BPA Driven Protocol    nitroglycerin    ondansetron    Phosphorus Replacement - Follow Nurse / BPA Driven Protocol    Potassium Replacement - Follow Nurse / BPA Driven Protocol    [COMPLETED] Insert Peripheral IV **AND** sodium chloride    sodium chloride    sodium chloride   Infusions         Diagnostic Data    Results from last 7 days   Lab Units 01/31/25  0300 01/29/25  0506 01/28/25  0521   WBC 10*3/mm3 11.50*   < > 8.28   HEMOGLOBIN g/dL 14.4   < > 13.2   HEMATOCRIT % 46.0   < > 43.9   PLATELETS 10*3/mm3 346   < > 232   GLUCOSE mg/dL 113*   < > 122*   CREATININE mg/dL 0.66   < > 0.66   BUN mg/dL 33*   < > 10   SODIUM mmol/L 138   < > 144   POTASSIUM mmol/L 3.9   < > 4.4   AST (SGOT) U/L  --   --  24   ALT (SGPT) U/L  --   --  13   ALK PHOS U/L  --   --  44   BILIRUBIN mg/dL  --   --  0.3   ANION GAP mmol/L 7.0   < > 8.2    < > = values in this interval not displayed.       XR Chest 1 View    Result Date: 1/30/2025  Impression: Significantly improved/resolved pulmonary edema. Decreased, small bilateral pleural effusions. No new or worsening findings. Electronically Signed: Daniel Veras MD  1/30/2025 9:37 AM EST  Workstation ID: [id number]    XR Abdomen KUB    Result Date: 1/29/2025  Impression: Enteric tube terminates in the left abdomen, with tip likely in the mid stomach. Electronically Signed: Edouard Shaver  1/29/2025 12:24 PM EST  Workstation ID: [id number]     Interval results reviewed.    Assessment/Plan:     Acute hypoxic respiratory failure  Aspiration pneumonia, unknown organism  Pulmonary edema  Moderate aortic valve regurgitation  Moderate aortic valve stenosis  Moderate tricuspid valve regurgitation  Severe pulmonary  hypertension  Acute urinary tract infection  Abdominal pain with nausea and vomiting  Biliary dilation w/ stone at ampulla of vater  History of collagenous colitis  Acute metabolic encephalopathy, likely multifactorial  Physical deconditioning  Left adrenal nodule, stable  Hypertension  Hypothyroidism    - Pulmonology consulted given increased oxygen requirements, appreciate recs  - Cardiology consulted given valvular heart disease, continue medical management with outpatient follow up  - Continue diuresis  - Continue zosyn, steroids  - Wean oxygen as tolerated  - Continue TF; SLP unable to reassess given oxygen requirements  - KUB given abdominal pain and distention  - Will follow up with Dr. Nguyen re: pulmonology plans    Treatment plan discussed with nursing staff, case management and pharmacy.     VTE Prophylaxis:  Pharmacologic VTE prophylaxis orders are present.    Code status is   Code Status and Medical Interventions: No CPR (Do Not Attempt to Resuscitate); Limited Support; No intubation (DNI); DNR/DNI   Ordered at: 01/27/25 1918     Medical Intervention Limits:    No intubation (DNI)     Code Status (Patient has no pulse and is not breathing):    No CPR (Do Not Attempt to Resuscitate)     Medical Interventions (Patient has pulse or is breathing):    Limited Support     Comments:    DNR/DNI       Plan for disposition: Pending clinical course    Barriers to discharge: Oxygen requirements, IV abx, NPO    Time: 35+ minutes     Signature: Electronically signed by Leena Dominguez MD, 01/31/25, 07:29 EST.  [facility] Hospitalist Team

## 2025-01-31 NOTE — PLAN OF CARE
Problem: Adult Inpatient Plan of Care  Goal: Patient-Specific Goal (Individualized)  Outcome: Progressing     Problem: Adult Inpatient Plan of Care  Goal: Absence of Hospital-Acquired Illness or Injury  Outcome: Progressing  Intervention: Identify and Manage Fall Risk  Recent Flowsheet Documentation  Taken 1/31/2025 1600 by Daisy Wilson RN  Safety Promotion/Fall Prevention:   safety round/check completed   room organization consistent   nonskid shoes/slippers when out of bed   fall prevention program maintained   clutter free environment maintained   assistive device/personal items within reach  Taken 1/31/2025 1400 by Daisy Wilson RN  Safety Promotion/Fall Prevention:   safety round/check completed   nonskid shoes/slippers when out of bed   fall prevention program maintained   clutter free environment maintained   assistive device/personal items within reach  Taken 1/31/2025 1200 by Daisy Wilson RN  Safety Promotion/Fall Prevention:   safety round/check completed   room organization consistent   nonskid shoes/slippers when out of bed   fall prevention program maintained   clutter free environment maintained   assistive device/personal items within reach  Taken 1/31/2025 1000 by Daisy Wilson RN  Safety Promotion/Fall Prevention:   safety round/check completed   room organization consistent   nonskid shoes/slippers when out of bed   fall prevention program maintained   clutter free environment maintained   assistive device/personal items within reach  Taken 1/31/2025 0810 by Daisy Wilson RN  Safety Promotion/Fall Prevention:   safety round/check completed   room organization consistent   nonskid shoes/slippers when out of bed   fall prevention program maintained   clutter free environment maintained   assistive device/personal items within reach  Intervention: Prevent Skin Injury  Recent Flowsheet Documentation  Taken 1/31/2025 1600 by Daisy Wilson RN  Body Position:   side-lying   left  Skin  Protection:   incontinence pads utilized   transparent dressing maintained   skin sealant/moisture barrier applied   silicone foam dressing in place  Taken 1/31/2025 1435 by Daisy Wilson RN  Body Position:   turned   left  Taken 1/31/2025 1200 by Daisy Wilson RN  Body Position:   side-lying   left  Skin Protection:   incontinence pads utilized   transparent dressing maintained  Taken 1/31/2025 1030 by Daisy Wilson RN  Body Position:   turned   left  Taken 1/31/2025 0810 by Daisy Wilson RN  Body Position: supine  Skin Protection:   incontinence pads utilized   transparent dressing maintained  Intervention: Prevent Infection  Recent Flowsheet Documentation  Taken 1/31/2025 1600 by Daisy Wilson RN  Infection Prevention:   environmental surveillance performed   hand hygiene promoted   single patient room provided   personal protective equipment utilized  Taken 1/31/2025 1400 by Daisy Wilson RN  Infection Prevention:   environmental surveillance performed   hand hygiene promoted   single patient room provided  Taken 1/31/2025 1200 by Daisy Wilson RN  Infection Prevention:   environmental surveillance performed   hand hygiene promoted   personal protective equipment utilized   single patient room provided  Taken 1/31/2025 1000 by Daisy Wilson RN  Infection Prevention:   environmental surveillance performed   hand hygiene promoted   personal protective equipment utilized   single patient room provided  Taken 1/31/2025 0810 by Daisy Wilson RN  Infection Prevention:   environmental surveillance performed   hand hygiene promoted   personal protective equipment utilized   single patient room provided     Problem: Adult Inpatient Plan of Care  Goal: Absence of Hospital-Acquired Illness or Injury  Intervention: Identify and Manage Fall Risk  Recent Flowsheet Documentation  Taken 1/31/2025 1600 by Daisy Wilson RN  Safety Promotion/Fall Prevention:   safety round/check completed   room organization  consistent   nonskid shoes/slippers when out of bed   fall prevention program maintained   clutter free environment maintained   assistive device/personal items within reach  Taken 1/31/2025 1400 by Daisy Wilson RN  Safety Promotion/Fall Prevention:   safety round/check completed   nonskid shoes/slippers when out of bed   fall prevention program maintained   clutter free environment maintained   assistive device/personal items within reach  Taken 1/31/2025 1200 by Daisy Wilson RN  Safety Promotion/Fall Prevention:   safety round/check completed   room organization consistent   nonskid shoes/slippers when out of bed   fall prevention program maintained   clutter free environment maintained   assistive device/personal items within reach  Taken 1/31/2025 1000 by Daisy Wilson RN  Safety Promotion/Fall Prevention:   safety round/check completed   room organization consistent   nonskid shoes/slippers when out of bed   fall prevention program maintained   clutter free environment maintained   assistive device/personal items within reach  Taken 1/31/2025 0810 by Daisy Wilson RN  Safety Promotion/Fall Prevention:   safety round/check completed   room organization consistent   nonskid shoes/slippers when out of bed   fall prevention program maintained   clutter free environment maintained   assistive device/personal items within reach   Goal Outcome Evaluation:

## 2025-01-31 NOTE — THERAPY TREATMENT NOTE
"Subjective: Pt supine upon arrival. Grandon and grettater-in-law at bedside. Pt initially lethargic, but able to awaken with continued stimulation. Disoriented to person, place, time, and situation.     Objective:     Bed mobility - Supine<>sitting Mod A. Pt tolerated sitting at EOB ~5 min prior to fatigue and requesting to lay back down. SBA-CGA for sitting balance.     Transfers - N/A or Not attempted.    Ambulation - 0 feet N/A or Not attempted.    Therapeutic Exercise - 20 Reps B LE AAROM lying supine    Vitals: WNL on AirVo    Pain: Pt did not endorse pain this session.     Education: Provided education on the importance of mobility in the acute care setting and Verbal/Tactile Cues    Assessment: Alexey Trinidad presents with functional mobility impairments which indicate the need for skilled intervention. Pt difficult to arouse; however, was able to awaken more with stimulation and activity. Disoriented to person, place, time, and situation. Family at bedside, but she was unable to recall names correctly. Pt requires Mod A for bed mobility and has limited sitting tolerance at EOB due to weakness and poor activity tolerance. Pt is now far below functional baseline. Updating PT recommendation to SNF at discharge. Will continue to follow and progress as tolerated.     Plan/Recommendations:   If medically appropriate, Moderate Intensity Therapy recommended post-acute care. This is recommended as therapy feels the patient would require 3-4 days per week and wouldn't tolerate \"3 hour daily\" rehab intensity. SNF would be the preferred choice. If the patient does not agree to SNF, arrange HH or OP depending on home bound status. If patient is medically complex, consider LTACH. Pt requires no DME at discharge.     Pt desires Skilled Rehab placement at discharge. Pt cooperative; agreeable to therapeutic recommendations and plan of care.         Basic Mobility 6-click:  Rollin = Total, A lot = 2, A little = " 3; 4 = None  Supine>Sit:   1 = Total, A lot = 2, A little = 3; 4 = None   Sit>Stand with arms:  1 = Total, A lot = 2, A little = 3; 4 = None  Bed>Chair:   1 = Total, A lot = 2, A little = 3; 4 = None  Ambulate in room:  1 = Total, A lot = 2, A little = 3; 4 = None  3-5 Steps with railin = Total, A lot = 2, A little = 3; 4 = None  Score: 10    Modified Stopover: N/A = No pre-op stroke/TIA    Post-Tx Position: Supine with HOB Elevated, Alarms activated, and Call light and personal items within reach  PPE: gloves and gown    Therapy Charges for Today       Code Description Service Date Service Provider Modifiers Qty    31453760825 HC PT THERAPEUTIC ACT EA 15 MIN 2025 Deep Saavedra PTA GP 2    23845802921 HC PT THER PROC EA 15 MIN 2025 Deep Saavedra PTA GP 1           PT Charges       Row Name 25 1602             Time Calculation    Start Time 1518  -UN      Stop Time 1549  -UN      Time Calculation (min) 31 min  -UN      PT Received On 25  -UN      PT - Next Appointment 25  -UN         Time Calculation- PT    Total Timed Code Minutes- PT 31 minute(s)  -UN                User Key  (r) = Recorded By, (t) = Taken By, (c) = Cosigned By      Initials Name Provider Type    UN Deep Saavedra PTA Physical Therapist Assistant                  '

## 2025-02-01 LAB
ALBUMIN SERPL-MCNC: 3.7 G/DL (ref 3.5–5.2)
ALBUMIN/GLOB SERPL: 1.3 G/DL
ALP SERPL-CCNC: 38 U/L (ref 39–117)
ALT SERPL W P-5'-P-CCNC: 36 U/L (ref 1–33)
ANION GAP SERPL CALCULATED.3IONS-SCNC: 10.2 MMOL/L (ref 5–15)
AST SERPL-CCNC: 31 U/L (ref 1–32)
BACTERIA SPEC AEROBE CULT: NORMAL
BILIRUB SERPL-MCNC: 0.4 MG/DL (ref 0–1.2)
BUN SERPL-MCNC: 33 MG/DL (ref 8–23)
BUN/CREAT SERPL: 52.4 (ref 7–25)
CALCIUM SPEC-SCNC: 9.6 MG/DL (ref 8.2–9.6)
CHLORIDE SERPL-SCNC: 89 MMOL/L (ref 98–107)
CO2 SERPL-SCNC: 37.8 MMOL/L (ref 22–29)
CREAT SERPL-MCNC: 0.63 MG/DL (ref 0.57–1)
DEPRECATED RDW RBC AUTO: 50.8 FL (ref 37–54)
EGFRCR SERPLBLD CKD-EPI 2021: 80.3 ML/MIN/1.73
ERYTHROCYTE [DISTWIDTH] IN BLOOD BY AUTOMATED COUNT: 14.2 % (ref 12.3–15.4)
GLOBULIN UR ELPH-MCNC: 2.8 GM/DL
GLUCOSE BLDC GLUCOMTR-MCNC: 159 MG/DL (ref 70–105)
GLUCOSE BLDC GLUCOMTR-MCNC: 172 MG/DL (ref 70–105)
GLUCOSE BLDC GLUCOMTR-MCNC: 201 MG/DL (ref 70–105)
GLUCOSE SERPL-MCNC: 168 MG/DL (ref 65–99)
HCT VFR BLD AUTO: 43.2 % (ref 34–46.6)
HGB BLD-MCNC: 13.7 G/DL (ref 12–15.9)
MAGNESIUM SERPL-MCNC: 2.4 MG/DL (ref 1.7–2.3)
MCH RBC QN AUTO: 30.8 PG (ref 26.6–33)
MCHC RBC AUTO-ENTMCNC: 31.7 G/DL (ref 31.5–35.7)
MCV RBC AUTO: 97.1 FL (ref 79–97)
PHOSPHATE SERPL-MCNC: 5.1 MG/DL (ref 2.5–4.5)
PLATELET # BLD AUTO: 321 10*3/MM3 (ref 140–450)
PMV BLD AUTO: 9.1 FL (ref 6–12)
POTASSIUM SERPL-SCNC: 3.7 MMOL/L (ref 3.5–5.2)
PROT SERPL-MCNC: 6.5 G/DL (ref 6–8.5)
RBC # BLD AUTO: 4.45 10*6/MM3 (ref 3.77–5.28)
SODIUM SERPL-SCNC: 137 MMOL/L (ref 136–145)
WBC NRBC COR # BLD AUTO: 8.92 10*3/MM3 (ref 3.4–10.8)

## 2025-02-01 PROCEDURE — 94799 UNLISTED PULMONARY SVC/PX: CPT

## 2025-02-01 PROCEDURE — 82948 REAGENT STRIP/BLOOD GLUCOSE: CPT

## 2025-02-01 PROCEDURE — 94761 N-INVAS EAR/PLS OXIMETRY MLT: CPT

## 2025-02-01 PROCEDURE — 85027 COMPLETE CBC AUTOMATED: CPT | Performed by: STUDENT IN AN ORGANIZED HEALTH CARE EDUCATION/TRAINING PROGRAM

## 2025-02-01 PROCEDURE — 94664 DEMO&/EVAL PT USE INHALER: CPT

## 2025-02-01 PROCEDURE — 84100 ASSAY OF PHOSPHORUS: CPT | Performed by: STUDENT IN AN ORGANIZED HEALTH CARE EDUCATION/TRAINING PROGRAM

## 2025-02-01 PROCEDURE — 80053 COMPREHEN METABOLIC PANEL: CPT | Performed by: STUDENT IN AN ORGANIZED HEALTH CARE EDUCATION/TRAINING PROGRAM

## 2025-02-01 PROCEDURE — 25010000002 PIPERACILLIN SOD-TAZOBACTAM PER 1 G: Performed by: STUDENT IN AN ORGANIZED HEALTH CARE EDUCATION/TRAINING PROGRAM

## 2025-02-01 PROCEDURE — 25010000002 ENOXAPARIN PER 10 MG

## 2025-02-01 PROCEDURE — 83735 ASSAY OF MAGNESIUM: CPT | Performed by: STUDENT IN AN ORGANIZED HEALTH CARE EDUCATION/TRAINING PROGRAM

## 2025-02-01 RX ADMIN — IPRATROPIUM BROMIDE AND ALBUTEROL SULFATE 3 ML: .5; 3 SOLUTION RESPIRATORY (INHALATION) at 19:24

## 2025-02-01 RX ADMIN — BALSALAZIDE DISODIUM 750 MG: 750 CAPSULE ORAL at 20:06

## 2025-02-01 RX ADMIN — FUROSEMIDE 40 MG: 40 TABLET ORAL at 10:22

## 2025-02-01 RX ADMIN — BUDESONIDE 0.5 MG: 0.5 INHALANT RESPIRATORY (INHALATION) at 06:38

## 2025-02-01 RX ADMIN — IPRATROPIUM BROMIDE AND ALBUTEROL SULFATE 3 ML: .5; 3 SOLUTION RESPIRATORY (INHALATION) at 10:55

## 2025-02-01 RX ADMIN — PIPERACILLIN AND TAZOBACTAM 3.38 G: 3; .375 INJECTION, POWDER, LYOPHILIZED, FOR SOLUTION INTRAVENOUS at 17:24

## 2025-02-01 RX ADMIN — IPRATROPIUM BROMIDE AND ALBUTEROL SULFATE 3 ML: .5; 3 SOLUTION RESPIRATORY (INHALATION) at 06:38

## 2025-02-01 RX ADMIN — Medication 1000 MCG: at 10:22

## 2025-02-01 RX ADMIN — BUDESONIDE 0.5 MG: 0.5 INHALANT RESPIRATORY (INHALATION) at 19:29

## 2025-02-01 RX ADMIN — SILDENAFIL 10 MG: 20 TABLET ORAL at 10:22

## 2025-02-01 RX ADMIN — PANTOPRAZOLE SODIUM 40 MG: 40 INJECTION, POWDER, FOR SOLUTION INTRAVENOUS at 05:47

## 2025-02-01 RX ADMIN — Medication 10 ML: at 10:22

## 2025-02-01 RX ADMIN — Medication 10 ML: at 20:27

## 2025-02-01 RX ADMIN — HYDROCORTISONE 20 MG: 10 TABLET ORAL at 10:22

## 2025-02-01 RX ADMIN — PIPERACILLIN AND TAZOBACTAM 3.38 G: 3; .375 INJECTION, POWDER, LYOPHILIZED, FOR SOLUTION INTRAVENOUS at 00:30

## 2025-02-01 RX ADMIN — HYDROCORTISONE 20 MG: 10 TABLET ORAL at 17:24

## 2025-02-01 RX ADMIN — SILDENAFIL 10 MG: 20 TABLET ORAL at 17:23

## 2025-02-01 RX ADMIN — ACETAMINOPHEN 650 MG: 160 SOLUTION ORAL at 20:06

## 2025-02-01 RX ADMIN — ENOXAPARIN SODIUM 30 MG: 100 INJECTION SUBCUTANEOUS at 17:22

## 2025-02-01 RX ADMIN — LEVOTHYROXINE SODIUM 75 MCG: 0.07 TABLET ORAL at 05:47

## 2025-02-01 RX ADMIN — Medication 1 TABLET: at 10:22

## 2025-02-01 RX ADMIN — AVOBENZONE, HOMOSALATE, OCTISALATE, OCTOCRYLENE, AND OXYBENZONE 1 PACKET: 29.4; 147; 49; 25.4; 58.8 LOTION TOPICAL at 10:22

## 2025-02-01 RX ADMIN — FUROSEMIDE 40 MG: 40 TABLET ORAL at 17:24

## 2025-02-01 RX ADMIN — PIPERACILLIN AND TAZOBACTAM 3.38 G: 3; .375 INJECTION, POWDER, LYOPHILIZED, FOR SOLUTION INTRAVENOUS at 10:19

## 2025-02-01 RX ADMIN — IPRATROPIUM BROMIDE AND ALBUTEROL SULFATE 3 ML: .5; 3 SOLUTION RESPIRATORY (INHALATION) at 15:10

## 2025-02-01 NOTE — PROGRESS NOTES
[facility] MEDICINE SERVICE  DAILY PROGRESS NOTE    NAME: Alexey Trinidad  : 3/21/1926  MRN: [id number]      LOS: 8 days     PROVIDER OF SERVICE: Leena Dominguez MD    Chief Complaint: UTI (urinary tract infection)    Subjective:     Interval History:    Patient seen and evaluated at bedside. Still requiring Airvo. Started on sildenafil yesterday after discussion with pulmonology. Continues to have diarrhea but abdominal pain is improved from yesterday.    Treatment plan discussed with patient. All questions addressed.     Review of Systems:   Denies fevers, chills, +fatigue  Denies chest pain, edema  +shortness of breath, +cough  Denies nausea, vomiting, +diarrhea, +abdominal pain, improving  Denies dysuria, hematuria    Objective:     Vital Signs  Temp:  [96.8 °F (36 °C)-97.2 °F (36.2 °C)] 97 °F (36.1 °C)  Heart Rate:  [63-82] 67  Resp:  [14-20] 14  BP: ()/(55-72) 108/60  Flow (L/min) (Oxygen Therapy):  [30] 30   Body mass index is 20.78 kg/m².    Physical Exam   General: No acute distress, alert and intermittently confused  CV: RRR, trace peripheral edema  Pulm: Coarse breath sounds, no increased work of breathing  Abd: Soft, mildly distended, minimally tender to palpation without rebound or guarding  Skin: No rashes or lesions on exposed skin  Psych: Appropriate mood and affect    Scheduled Meds   balsalazide, 750 mg, Oral, Nightly  [Held by provider] Budesonide, 3 mg, Oral, BID  budesonide, 0.5 mg, Nebulization, BID - RT  enoxaparin, 30 mg, Subcutaneous, Daily  furosemide, 40 mg, Oral, BID Diuretics  hydrocortisone, 20 mg, Oral, BID With Meals  ipratropium-albuterol, 3 mL, Nebulization, 4x Daily - RT  levothyroxine, 75 mcg, Oral, Q AM  multivitamin with minerals, 1 tablet, Oral, Daily  pantoprazole, 40 mg, Intravenous, Q AM  piperacillin-tazobactam, 3.375 g, Intravenous, Q8H  Psyllium, 1 packet, Oral, Daily  sildenafil, 10 mg, Oral, TID  sodium chloride, 10 mL, Intravenous, Q12H  vitamin B-12,  1,000 mcg, Oral, Daily       PRN Meds     acetaminophen **OR** acetaminophen **OR** acetaminophen    senna-docusate sodium **AND** polyethylene glycol **AND** bisacodyl **AND** bisacodyl    Calcium Replacement - Follow Nurse / BPA Driven Protocol    dextrose    dextrose    glucagon (human recombinant)    Magnesium Standard Dose Replacement - Follow Nurse / BPA Driven Protocol    ondansetron    Phosphorus Replacement - Follow Nurse / BPA Driven Protocol    Potassium Replacement - Follow Nurse / BPA Driven Protocol    [COMPLETED] Insert Peripheral IV **AND** sodium chloride    sodium chloride    sodium chloride   Infusions         Diagnostic Data    Results from last 7 days   Lab Units 02/01/25  0548   WBC 10*3/mm3 8.92   HEMOGLOBIN g/dL 13.7   HEMATOCRIT % 43.2   PLATELETS 10*3/mm3 321   GLUCOSE mg/dL 168*   CREATININE mg/dL 0.63   BUN mg/dL 33*   SODIUM mmol/L 137   POTASSIUM mmol/L 3.7   AST (SGOT) U/L 31   ALT (SGPT) U/L 36*   ALK PHOS U/L 38*   BILIRUBIN mg/dL 0.4   ANION GAP mmol/L 10.2       XR Abdomen KUB    Result Date: 1/31/2025  Impression: Nonobstructive bowel gas pattern. Electronically Signed: Barbra Ordaz  1/31/2025 3:54 PM EST  Workstation ID: [id number]    XR Chest 1 View    Result Date: 1/30/2025  Impression: Significantly improved/resolved pulmonary edema. Decreased, small bilateral pleural effusions. No new or worsening findings. Electronically Signed: Daniel Veras MD  1/30/2025 9:37 AM EST  Workstation ID: [id number]     Interval results reviewed.    Assessment/Plan:     Acute hypoxic respiratory failure  Aspiration pneumonia, unknown organism  Pulmonary edema  Moderate aortic valve regurgitation  Moderate aortic valve stenosis  Moderate tricuspid valve regurgitation  Severe pulmonary hypertension  Acute urinary tract infection  Abdominal pain with nausea and vomiting  Biliary dilation w/ stone at ampulla of vater  History of collagenous colitis  Acute metabolic encephalopathy, likely  multifactorial  Physical deconditioning  Left adrenal nodule, stable  Hypertension  Hypothyroidism    - Pulmonology consulted given increased oxygen requirements, appreciate recs  - Cardiology consulted given valvular heart disease, continue medical management with outpatient follow up  - Continue diuresis  - Continue zosyn (end date today), steroids  - Continue TF; SLP unable to reassess given oxygen requirements  - Started on sildenafil for severe pulmonary hypertension  - Wean oxygen as tolerated    Treatment plan discussed with nursing staff, case management and pharmacy.     VTE Prophylaxis:  Pharmacologic VTE prophylaxis orders are present.    Code status is   Code Status and Medical Interventions: No CPR (Do Not Attempt to Resuscitate); Limited Support; No intubation (DNI); DNR/DNI   Ordered at: 01/27/25 1918     Medical Intervention Limits:    No intubation (DNI)     Code Status (Patient has no pulse and is not breathing):    No CPR (Do Not Attempt to Resuscitate)     Medical Interventions (Patient has pulse or is breathing):    Limited Support     Comments:    DNR/DNI       Plan for disposition: Pending clinical course    Barriers to discharge: Oxygen requirements, IV abx, IV steroids, NPO    Time: 35+ minutes     Signature: Electronically signed by Leena Dominguez MD, 02/01/25, 07:10 EST.  [facility] Hospitalist Team

## 2025-02-01 NOTE — PLAN OF CARE
Goal Outcome Evaluation:            Mild abdominal discomfort, no sob reported. 1 small bm, green w mucous. Vss, with softer bp per normal. No new overnight complaints. Tube feedings running continuous. Bed alarm set, room near unit station, call light in reach.

## 2025-02-01 NOTE — PROGRESS NOTES
Daily Progress Note        UTI (urinary tract infection)    Abdominal pain    Assessment:     Acute hypoxic respiratory failure  ABG on 1/27/2025  pH 7.30/pCO2 78/pO2 60/bicarb 39      positive for norovirus      urine cultures was positive for Proteus Mirabella's    Pulmonary edema  proBNP 1400    Severe pulmonary hypertension most likely group 2   2D echo 1/27/2025    Left ventricular systolic function is normal. Calculated left ventricular EF = 70%    Left ventricular wall thickness is consistent with moderate to severe concentric hypertrophy.    Left ventricular diastolic function is consistent with (grade I) impaired relaxation.  moderate aortic valve regurgitation and stenosis.  Mean/peak gradient of 19/32 mmHg.  Planimetry LAZARO >1cm2.    Estimated right ventricular systolic pressure from tricuspid regurgitation is markedly elevated (>55 mmHg).    TSH 2.0     Recommendations:     Oxygenation improved from 30 L high flow to 4 L nasal cannula after initiation low dose Sidenafil 10 mg po TID for severe PAH  Monitor BP  No Nitrates     Diuresis     Antibiotics 5 days of Zosyn    Steroids  Po hydrocortison          LOS: 8 days     Subjective         Objective     Vital signs for last 24 hours:  Vitals:    02/01/25 0641 02/01/25 0642 02/01/25 0645 02/01/25 0854   BP:    115/62   BP Location:    Left arm   Patient Position:    Lying   Pulse: 65 68 67 65   Resp: 14 14 14 17   Temp:    98.1 °F (36.7 °C)   TempSrc:    Oral   SpO2: 94% 92% 96% 96%   Weight:       Height:           Intake/Output last 3 shifts:  I/O last 3 completed shifts:  In: 417 [Other:202; NG/GT:215]  Out: -   Intake/Output this shift:  No intake/output data recorded.      Radiology  Imaging Results (Last 24 Hours)       Procedure Component Value Units Date/Time    XR Abdomen KUB [[id number]] Collected: 01/31/25 1552     Updated: 01/31/25 1556    Narrative:      XR ABDOMEN KUB    Date of Exam: 1/31/2025 2:25 PM EST    Indication: abd pain    Comparison:  KUB 1/29/2025, CT abdomen and pelvis 1/24/2025    Findings:  Tip of the weighted enteric tube terminates in the central abdomen slightly to the right of midline, likely in the distal stomach.    No abnormal bowel distention is seen. There are changes of kyphoplasty in the lumbar spine.      Impression:      Impression:  Nonobstructive bowel gas pattern.        Electronically Signed: Barbra Ordaz    1/31/2025 3:54 PM EST    Workstation ID: [id number]            Labs:  Results from last 7 days   Lab Units 02/01/25  0548   WBC 10*3/mm3 8.92   HEMOGLOBIN g/dL 13.7   HEMATOCRIT % 43.2   PLATELETS 10*3/mm3 321     Results from last 7 days   Lab Units 02/01/25  0548   SODIUM mmol/L 137   POTASSIUM mmol/L 3.7   CHLORIDE mmol/L 89*   CO2 mmol/L 37.8*   BUN mg/dL 33*   CREATININE mg/dL 0.63   CALCIUM mg/dL 9.6   BILIRUBIN mg/dL 0.4   ALK PHOS U/L 38*   ALT (SGPT) U/L 36*   AST (SGOT) U/L 31   GLUCOSE mg/dL 168*     Results from last 7 days   Lab Units 01/27/25  1852   PH, ARTERIAL pH units 7.304*   PO2 ART mm Hg 60.8*   PCO2, ARTERIAL mm Hg 78.5*   HCO3 ART mmol/L 39.0*     Results from last 7 days   Lab Units 02/01/25  0548 01/28/25  0521 01/27/25  0216   ALBUMIN g/dL 3.7 3.6 3.7             Results from last 7 days   Lab Units 02/01/25  0548   MAGNESIUM mg/dL 2.4*         Results from last 7 days   Lab Units 01/28/25  0521   TSH uIU/mL 2.010           Meds:   SCHEDULE  balsalazide, 750 mg, Oral, Nightly  [Held by provider] Budesonide, 3 mg, Oral, BID  budesonide, 0.5 mg, Nebulization, BID - RT  enoxaparin, 30 mg, Subcutaneous, Daily  furosemide, 40 mg, Oral, BID Diuretics  hydrocortisone, 20 mg, Oral, BID With Meals  ipratropium-albuterol, 3 mL, Nebulization, 4x Daily - RT  levothyroxine, 75 mcg, Oral, Q AM  multivitamin with minerals, 1 tablet, Oral, Daily  pantoprazole, 40 mg, Intravenous, Q AM  piperacillin-tazobactam, 3.375 g, Intravenous, Q8H  Psyllium, 1 packet, Oral, Daily  sildenafil, 10 mg, Oral, TID  sodium  chloride, 10 mL, Intravenous, Q12H  vitamin B-12, 1,000 mcg, Oral, Daily      Infusions     PRNs    acetaminophen **OR** acetaminophen **OR** acetaminophen    senna-docusate sodium **AND** polyethylene glycol **AND** bisacodyl **AND** bisacodyl    Calcium Replacement - Follow Nurse / BPA Driven Protocol    dextrose    dextrose    glucagon (human recombinant)    Magnesium Standard Dose Replacement - Follow Nurse / BPA Driven Protocol    ondansetron    Phosphorus Replacement - Follow Nurse / BPA Driven Protocol    Potassium Replacement - Follow Nurse / BPA Driven Protocol    [COMPLETED] Insert Peripheral IV **AND** sodium chloride    sodium chloride    sodium chloride    Physical Exam:  Physical Exam  Cardiovascular:      Heart sounds: Murmur heard.      No gallop.   Pulmonary:      Effort: No respiratory distress.      Breath sounds: No stridor. Rhonchi and rales present. No wheezing.   Chest:      Chest wall: No tenderness.         ROS  Review of Systems   Respiratory:  Positive for cough and shortness of breath. Negative for wheezing and stridor.    Cardiovascular:  Negative for chest pain, palpitations and leg swelling.             Total time spent with patient greater than: 45 Minutes

## 2025-02-01 NOTE — PROGRESS NOTES
Daily Progress Note        UTI (urinary tract infection)    Abdominal pain    Assessment:     Acute hypoxic respiratory failure  ABG on 1/27/2025  pH 7.30/pCO2 78/pO2 60/bicarb 39      positive for norovirus      urine cultures was positive for Proteus Mirabella's    Pulmonary edema  proBNP 1400    Severe pulmonary hypertension most likely group 2   2D echo 1/27/2025    Left ventricular systolic function is normal. Calculated left ventricular EF = 70%    Left ventricular wall thickness is consistent with moderate to severe concentric hypertrophy.    Left ventricular diastolic function is consistent with (grade I) impaired relaxation.  moderate aortic valve regurgitation and stenosis.  Mean/peak gradient of 19/32 mmHg.  Planimetry LAZARO >1cm2.    Estimated right ventricular systolic pressure from tricuspid regurgitation is markedly elevated (>55 mmHg).    TSH 2.0     Recommendations:     Due to persistent hypoxia will attempt low dose Sidenafil 10 mg po TID for severe PAH  Monitor BP  No Nitrates  Oxygen titration currently on 30 L high flow     Diuresis     Antibiotics currently on Zosyn    Steroids  DC Solu-Medrol, start Po hydrocortison          LOS: 7 days     Subjective         Objective     Vital signs for last 24 hours:  Vitals:    01/31/25 2029 01/31/25 2030 01/31/25 2036 01/31/25 2120   BP:    98/65   BP Location:    Left arm   Patient Position:    Lying   Pulse: 77 79 78 82   Resp: 16 16 16 18   Temp:    96.8 °F (36 °C)   TempSrc:    Axillary   SpO2: 95% 95% 95% 92%   Weight:       Height:           Intake/Output last 3 shifts:  I/O last 3 completed shifts:  In: 417 [Other:202; NG/GT:215]  Out: -   Intake/Output this shift:  No intake/output data recorded.      Radiology  Imaging Results (Last 24 Hours)       Procedure Component Value Units Date/Time    XR Abdomen KUB [[id number]] Collected: 01/31/25 1552     Updated: 01/31/25 1556    Narrative:      XR ABDOMEN KUB    Date of Exam: 1/31/2025 2:25 PM  EST    Indication: abd pain    Comparison: KUB 1/29/2025, CT abdomen and pelvis 1/24/2025    Findings:  Tip of the weighted enteric tube terminates in the central abdomen slightly to the right of midline, likely in the distal stomach.    No abnormal bowel distention is seen. There are changes of kyphoplasty in the lumbar spine.      Impression:      Impression:  Nonobstructive bowel gas pattern.        Electronically Signed: Barbra Ordaz    1/31/2025 3:54 PM EST    Workstation ID: [id number]            Labs:  Results from last 7 days   Lab Units 01/31/25  0300   WBC 10*3/mm3 11.50*   HEMOGLOBIN g/dL 14.4   HEMATOCRIT % 46.0   PLATELETS 10*3/mm3 346     Results from last 7 days   Lab Units 01/31/25  0300 01/29/25  0506 01/28/25  0521   SODIUM mmol/L 138   < > 144   POTASSIUM mmol/L 3.9   < > 4.4   CHLORIDE mmol/L 89*   < > 99   CO2 mmol/L 42.0*   < > 36.8*   BUN mg/dL 33*   < > 10   CREATININE mg/dL 0.66   < > 0.66   CALCIUM mg/dL 10.0*   < > 8.6   BILIRUBIN mg/dL  --   --  0.3   ALK PHOS U/L  --   --  44   ALT (SGPT) U/L  --   --  13   AST (SGOT) U/L  --   --  24   GLUCOSE mg/dL 113*   < > 122*    < > = values in this interval not displayed.     Results from last 7 days   Lab Units 01/27/25  1852   PH, ARTERIAL pH units 7.304*   PO2 ART mm Hg 60.8*   PCO2, ARTERIAL mm Hg 78.5*   HCO3 ART mmol/L 39.0*     Results from last 7 days   Lab Units 01/28/25  0521 01/27/25  0216 01/26/25  0237   ALBUMIN g/dL 3.6 3.7 3.4*             Results from last 7 days   Lab Units 01/31/25  0300   MAGNESIUM mg/dL 2.6*         Results from last 7 days   Lab Units 01/28/25  0521   TSH uIU/mL 2.010           Meds:   SCHEDULE  balsalazide, 750 mg, Oral, Nightly  [Held by provider] Budesonide, 3 mg, Oral, BID  budesonide, 0.5 mg, Nebulization, BID - RT  enoxaparin, 30 mg, Subcutaneous, Daily  furosemide, 40 mg, Oral, BID Diuretics  ipratropium-albuterol, 3 mL, Nebulization, 4x Daily - RT  levothyroxine, 75 mcg, Oral, Q AM  methylPREDNISolone  sodium succinate, 40 mg, Intravenous, Q12H  multivitamin with minerals, 1 tablet, Oral, Daily  pantoprazole, 40 mg, Intravenous, Q AM  piperacillin-tazobactam, 3.375 g, Intravenous, Q8H  Psyllium, 1 packet, Oral, Daily  sildenafil, 10 mg, Oral, TID  sodium chloride, 10 mL, Intravenous, Q12H  vitamin B-12, 1,000 mcg, Oral, Daily      Infusions     PRNs    acetaminophen **OR** acetaminophen **OR** acetaminophen    senna-docusate sodium **AND** polyethylene glycol **AND** bisacodyl **AND** bisacodyl    Calcium Replacement - Follow Nurse / BPA Driven Protocol    dextrose    dextrose    glucagon (human recombinant)    Magnesium Standard Dose Replacement - Follow Nurse / BPA Driven Protocol    ondansetron    Phosphorus Replacement - Follow Nurse / BPA Driven Protocol    Potassium Replacement - Follow Nurse / BPA Driven Protocol    [COMPLETED] Insert Peripheral IV **AND** sodium chloride    sodium chloride    sodium chloride    Physical Exam:  Physical Exam  Cardiovascular:      Heart sounds: Murmur heard.      No gallop.   Pulmonary:      Effort: No respiratory distress.      Breath sounds: No stridor. Rhonchi and rales present. No wheezing.   Chest:      Chest wall: No tenderness.         ROS  Review of Systems   Respiratory:  Positive for cough and shortness of breath. Negative for wheezing and stridor.    Cardiovascular:  Negative for chest pain, palpitations and leg swelling.             Total time spent with patient greater than: 45 Minutes

## 2025-02-01 NOTE — PLAN OF CARE
Goal Outcome Evaluation:         Pt's O2 improved, was 30L [place], now 4L highflow. Tube feed increased to 40ml/hr from 20ml/hr. Vitals stable. Systolic pressures in the one-teens.

## 2025-02-01 NOTE — PROGRESS NOTES
Nutrition Services  Patient Name: Alexey Trinidad  YOB: 1926  MRN: [id number]  Admission date: 1/24/2025    PROGRESS NOTE      Nutrition Intervention Updates: Advance TF Nutren 1.5 to 40mL/hour today and continue 20mL/hour water flush.         Encounter Information: Checking in to monitor TF. Pt continues with high O2 demands and therefore still requiring enteral nutrition support. Abd pain has improved since yesterday per documentation; still having some loose stools. TF Nutren 1.5 is infusing at 20mL/hour per documentation.        PO Diet: NPO Diet NPO Type: Tube Feeding   PO Supplements: None    PO Intake:  NPO       Current nutrition support: Nutren 1.5 at 20 mL/hr + 20 mL/hr water flush    Nutrition support review: Documented infusing as ordered        Labs (reviewed below): Hyperphosphatemia and hypermagnesemia - likely rebound from prior replacement; will continue to monitor   Sodium and K+ are WNL today        GI Function:  BM 2/1       Brief weight review   Wt Readings from Last 10 Encounters:   02/01/25 0511 45.1 kg (99 lb 6.8 oz)   01/31/25 0534 46.2 kg (101 lb 13.6 oz)   01/30/25 0528 47.7 kg (105 lb 2.6 oz)   01/29/25 0531 48 kg (105 lb 13.1 oz)   01/27/25 1757 47.2 kg (104 lb)   01/24/25 2006 47.4 kg (104 lb 8 oz)   01/24/25 1614 49.4 kg (109 lb)        Results from last 7 days   Lab Units 02/01/25  0548 01/31/25  0300 01/30/25  1209 01/30/25  0046 01/29/25  0506 01/28/25  0521 01/27/25  1259 01/27/25  0216   SODIUM mmol/L 137 138  --  142   < > 144  --  143   POTASSIUM mmol/L 3.7 3.9 4.1 3.6   < > 4.4   < > 3.2*   CHLORIDE mmol/L 89* 89*  --  90*   < > 99  --  103   CO2 mmol/L 37.8* 42.0*  --  43.5*   < > 36.8*  --  32.3*   BUN mg/dL 33* 33*  --  26*   < > 10  --  7*   CREATININE mg/dL 0.63 0.66  --  0.60   < > 0.66  --  0.42*   CALCIUM mg/dL 9.6 10.0*  --  9.3   < > 8.6  --  8.3   BILIRUBIN mg/dL 0.4  --   --   --   --  0.3  --  0.2   ALK PHOS U/L 38*  --   --   --   --  44  --  39   ALT  (SGPT) U/L 36*  --   --   --   --  13  --  14   AST (SGOT) U/L 31  --   --   --   --  24  --  22   GLUCOSE mg/dL 168* 113*  --  156*   < > 122*  --  130*    < > = values in this interval not displayed.     Results from last 7 days   Lab Units 02/01/25  0548 01/31/25  0300 01/30/25  0046   MAGNESIUM mg/dL 2.4* 2.6* 2.1   PHOSPHORUS mg/dL 5.1* 1.9* 2.2*   HEMOGLOBIN g/dL 13.7 14.4 13.6   HEMATOCRIT % 43.2 46.0 42.2           RD to follow up per protocol.    Electronically signed by:  Mariya Harrell RD  02/01/25 15:44 EST

## 2025-02-02 LAB
ANION GAP SERPL CALCULATED.3IONS-SCNC: 8.3 MMOL/L (ref 5–15)
BUN SERPL-MCNC: 29 MG/DL (ref 8–23)
BUN/CREAT SERPL: 39.7 (ref 7–25)
CALCIUM SPEC-SCNC: 9.5 MG/DL (ref 8.2–9.6)
CHLORIDE SERPL-SCNC: 90 MMOL/L (ref 98–107)
CO2 SERPL-SCNC: 40.7 MMOL/L (ref 22–29)
CREAT SERPL-MCNC: 0.73 MG/DL (ref 0.57–1)
DEPRECATED RDW RBC AUTO: 51.5 FL (ref 37–54)
EGFRCR SERPLBLD CKD-EPI 2021: 74.4 ML/MIN/1.73
ERYTHROCYTE [DISTWIDTH] IN BLOOD BY AUTOMATED COUNT: 14.4 % (ref 12.3–15.4)
GLUCOSE BLDC GLUCOMTR-MCNC: 139 MG/DL (ref 70–105)
GLUCOSE BLDC GLUCOMTR-MCNC: 142 MG/DL (ref 70–105)
GLUCOSE BLDC GLUCOMTR-MCNC: 145 MG/DL (ref 70–105)
GLUCOSE BLDC GLUCOMTR-MCNC: 166 MG/DL (ref 70–105)
GLUCOSE SERPL-MCNC: 175 MG/DL (ref 65–99)
HCT VFR BLD AUTO: 43 % (ref 34–46.6)
HGB BLD-MCNC: 13.7 G/DL (ref 12–15.9)
IRON 24H UR-MRATE: 83 MCG/DL (ref 37–145)
IRON SATN MFR SERPL: 25 % (ref 20–50)
MAGNESIUM SERPL-MCNC: 2.5 MG/DL (ref 1.7–2.3)
MCH RBC QN AUTO: 30.8 PG (ref 26.6–33)
MCHC RBC AUTO-ENTMCNC: 31.9 G/DL (ref 31.5–35.7)
MCV RBC AUTO: 96.6 FL (ref 79–97)
PHOSPHATE SERPL-MCNC: 4 MG/DL (ref 2.5–4.5)
PLATELET # BLD AUTO: 369 10*3/MM3 (ref 140–450)
PMV BLD AUTO: 9.4 FL (ref 6–12)
POTASSIUM SERPL-SCNC: 3.3 MMOL/L (ref 3.5–5.2)
POTASSIUM SERPL-SCNC: 4.7 MMOL/L (ref 3.5–5.2)
RBC # BLD AUTO: 4.45 10*6/MM3 (ref 3.77–5.28)
SODIUM SERPL-SCNC: 139 MMOL/L (ref 136–145)
TIBC SERPL-MCNC: 334 MCG/DL (ref 298–536)
TRANSFERRIN SERPL-MCNC: 224 MG/DL (ref 200–360)
WBC NRBC COR # BLD AUTO: 9.47 10*3/MM3 (ref 3.4–10.8)

## 2025-02-02 PROCEDURE — 84132 ASSAY OF SERUM POTASSIUM: CPT | Performed by: STUDENT IN AN ORGANIZED HEALTH CARE EDUCATION/TRAINING PROGRAM

## 2025-02-02 PROCEDURE — 97112 NEUROMUSCULAR REEDUCATION: CPT

## 2025-02-02 PROCEDURE — 83540 ASSAY OF IRON: CPT | Performed by: INTERNAL MEDICINE

## 2025-02-02 PROCEDURE — 82948 REAGENT STRIP/BLOOD GLUCOSE: CPT

## 2025-02-02 PROCEDURE — 94664 DEMO&/EVAL PT USE INHALER: CPT

## 2025-02-02 PROCEDURE — 94799 UNLISTED PULMONARY SVC/PX: CPT

## 2025-02-02 PROCEDURE — 84466 ASSAY OF TRANSFERRIN: CPT | Performed by: INTERNAL MEDICINE

## 2025-02-02 PROCEDURE — 25010000002 ENOXAPARIN PER 10 MG

## 2025-02-02 PROCEDURE — 94761 N-INVAS EAR/PLS OXIMETRY MLT: CPT

## 2025-02-02 PROCEDURE — 97530 THERAPEUTIC ACTIVITIES: CPT

## 2025-02-02 PROCEDURE — 97535 SELF CARE MNGMENT TRAINING: CPT

## 2025-02-02 RX ORDER — POTASSIUM CHLORIDE 1.5 G/1.58G
40 POWDER, FOR SOLUTION ORAL EVERY 4 HOURS
Status: COMPLETED | OUTPATIENT
Start: 2025-02-02 | End: 2025-02-02

## 2025-02-02 RX ORDER — HYDROCORTISONE 10 MG/1
20 TABLET ORAL
Status: DISCONTINUED | OUTPATIENT
Start: 2025-02-03 | End: 2025-02-05 | Stop reason: HOSPADM

## 2025-02-02 RX ADMIN — HYDROCORTISONE 20 MG: 10 TABLET ORAL at 08:40

## 2025-02-02 RX ADMIN — ACETAMINOPHEN 650 MG: 325 TABLET, FILM COATED ORAL at 18:52

## 2025-02-02 RX ADMIN — FUROSEMIDE 40 MG: 40 TABLET ORAL at 17:32

## 2025-02-02 RX ADMIN — SILDENAFIL 10 MG: 20 TABLET ORAL at 20:18

## 2025-02-02 RX ADMIN — Medication 1000 MCG: at 08:40

## 2025-02-02 RX ADMIN — FUROSEMIDE 40 MG: 40 TABLET ORAL at 08:40

## 2025-02-02 RX ADMIN — AVOBENZONE, HOMOSALATE, OCTISALATE, OCTOCRYLENE, AND OXYBENZONE 1 PACKET: 29.4; 147; 49; 25.4; 58.8 LOTION TOPICAL at 08:40

## 2025-02-02 RX ADMIN — LEVOTHYROXINE SODIUM 75 MCG: 0.07 TABLET ORAL at 05:17

## 2025-02-02 RX ADMIN — SILDENAFIL 10 MG: 20 TABLET ORAL at 17:32

## 2025-02-02 RX ADMIN — IPRATROPIUM BROMIDE AND ALBUTEROL SULFATE 3 ML: .5; 3 SOLUTION RESPIRATORY (INHALATION) at 06:18

## 2025-02-02 RX ADMIN — BALSALAZIDE DISODIUM 750 MG: 750 CAPSULE ORAL at 20:18

## 2025-02-02 RX ADMIN — POTASSIUM CHLORIDE 40 MEQ: 1.5 POWDER, FOR SOLUTION ORAL at 02:14

## 2025-02-02 RX ADMIN — BUDESONIDE 0.5 MG: 0.5 INHALANT RESPIRATORY (INHALATION) at 18:18

## 2025-02-02 RX ADMIN — IPRATROPIUM BROMIDE AND ALBUTEROL SULFATE 3 ML: .5; 3 SOLUTION RESPIRATORY (INHALATION) at 18:12

## 2025-02-02 RX ADMIN — IPRATROPIUM BROMIDE AND ALBUTEROL SULFATE 3 ML: .5; 3 SOLUTION RESPIRATORY (INHALATION) at 10:50

## 2025-02-02 RX ADMIN — Medication 10 ML: at 20:18

## 2025-02-02 RX ADMIN — IPRATROPIUM BROMIDE AND ALBUTEROL SULFATE 3 ML: .5; 3 SOLUTION RESPIRATORY (INHALATION) at 14:53

## 2025-02-02 RX ADMIN — Medication 1 TABLET: at 08:40

## 2025-02-02 RX ADMIN — ENOXAPARIN SODIUM 30 MG: 100 INJECTION SUBCUTANEOUS at 17:33

## 2025-02-02 RX ADMIN — PANTOPRAZOLE SODIUM 40 MG: 40 INJECTION, POWDER, FOR SOLUTION INTRAVENOUS at 05:17

## 2025-02-02 RX ADMIN — SILDENAFIL 10 MG: 20 TABLET ORAL at 08:39

## 2025-02-02 RX ADMIN — POTASSIUM CHLORIDE 40 MEQ: 1.5 POWDER, FOR SOLUTION ORAL at 05:17

## 2025-02-02 RX ADMIN — Medication 10 ML: at 08:41

## 2025-02-02 RX ADMIN — BUDESONIDE 0.5 MG: 0.5 INHALANT RESPIRATORY (INHALATION) at 06:18

## 2025-02-02 NOTE — PLAN OF CARE
Problem: Adult Inpatient Plan of Care  Goal: Plan of Care Review  Outcome: Progressing  Goal: Patient-Specific Goal (Individualized)  Outcome: Progressing  Goal: Absence of Hospital-Acquired Illness or Injury  Outcome: Progressing  Intervention: Identify and Manage Fall Risk  Recent Flowsheet Documentation  Taken 2/2/2025 0201 by Riri Clark RN  Safety Promotion/Fall Prevention:   safety round/check completed   room organization consistent   nonskid shoes/slippers when out of bed   fall prevention program maintained   clutter free environment maintained   assistive device/personal items within reach  Taken 2/2/2025 0000 by Riri Clark RN  Safety Promotion/Fall Prevention:   safety round/check completed   room organization consistent   nonskid shoes/slippers when out of bed   fall prevention program maintained   clutter free environment maintained   assistive device/personal items within reach  Taken 2/1/2025 2006 by Riri Clark RN  Safety Promotion/Fall Prevention:   safety round/check completed   room organization consistent   nonskid shoes/slippers when out of bed   fall prevention program maintained   clutter free environment maintained   assistive device/personal items within reach   activity supervised  Intervention: Prevent Skin Injury  Recent Flowsheet Documentation  Taken 2/2/2025 0201 by Riri Clark RN  Body Position: left  Taken 2/2/2025 0000 by Riri Clark RN  Body Position: right  Taken 2/1/2025 2006 by Riri Clark RN  Body Position: weight shifting  Skin Protection:   incontinence pads utilized   transparent dressing maintained   skin sealant/moisture barrier applied  Intervention: Prevent and Manage VTE (Venous Thromboembolism) Risk  Recent Flowsheet Documentation  Taken 2/2/2025 0000 by Riri Clark RN  VTE Prevention/Management: SCDs (sequential compression devices) off  Taken 2/1/2025 2006 by Riri Clark RN  VTE Prevention/Management: SCDs (sequential compression  devices) off  Intervention: Prevent Infection  Recent Flowsheet Documentation  Taken 2/2/2025 0201 by Riri Clark RN  Infection Prevention:   single patient room provided   rest/sleep promoted   personal protective equipment utilized   hand hygiene promoted   equipment surfaces disinfected  Taken 2/2/2025 0000 by Riri Clark RN  Infection Prevention:   single patient room provided   rest/sleep promoted   personal protective equipment utilized   hand hygiene promoted   equipment surfaces disinfected  Taken 2/1/2025 2006 by Riri Clark RN  Infection Prevention:   single patient room provided   rest/sleep promoted   personal protective equipment utilized   hand hygiene promoted   equipment surfaces disinfected   environmental surveillance performed  Goal: Optimal Comfort and Wellbeing  Outcome: Progressing  Intervention: Monitor Pain and Promote Comfort  Recent Flowsheet Documentation  Taken 2/1/2025 2006 by Riri Clark RN  Pain Management Interventions: pain medication given  Intervention: Provide Person-Centered Care  Recent Flowsheet Documentation  Taken 2/2/2025 0000 by Riri Clark RN  Trust Relationship/Rapport:   care explained   choices provided   questions answered  Taken 2/1/2025 2006 by Riri Clark RN  Trust Relationship/Rapport:   care explained   choices provided   questions answered  Goal: Readiness for Transition of Care  Outcome: Progressing     Problem: UTI (Urinary Tract Infection)  Goal: Improved Infection Symptoms  Outcome: Progressing     Problem: Violence Risk or Actual  Goal: Anger and Impulse Control  Outcome: Progressing  Intervention: Minimize Safety Risk  Recent Flowsheet Documentation  Taken 2/2/2025 0201 by Riri Clark RN  Enhanced Safety Measures:   bed alarm set   room near unit station  Taken 2/2/2025 0000 by Riri Clark RN  Sensory Stimulation Regulation: television on  Enhanced Safety Measures:   bed alarm set   room near unit station  Taken 2/1/2025 2006  by Cottone, Riri, RN  Sensory Stimulation Regulation: television on  Enhanced Safety Measures:   bed alarm set   room near unit station  Intervention: Promote Self-Control  Recent Flowsheet Documentation  Taken 2/2/2025 0000 by Riri Clark RN  Supportive Measures: active listening utilized  Environmental Support: calm environment promoted  Taken 2/1/2025 2006 by Riri Clark RN  Supportive Measures: verbalization of feelings encouraged     Problem: Fall Injury Risk  Goal: Absence of Fall and Fall-Related Injury  Outcome: Progressing  Intervention: Identify and Manage Contributors  Recent Flowsheet Documentation  Taken 2/2/2025 0201 by Riri Clark RN  Medication Review/Management: medications reviewed  Taken 2/2/2025 0000 by Riri Clark RN  Medication Review/Management: medications reviewed  Self-Care Promotion: BADL personal objects within reach  Taken 2/1/2025 2006 by Riri Clark RN  Medication Review/Management: medications reviewed  Self-Care Promotion:   independence encouraged   BADL personal objects within reach  Intervention: Promote Injury-Free Environment  Recent Flowsheet Documentation  Taken 2/2/2025 0201 by Riri Clark RN  Safety Promotion/Fall Prevention:   safety round/check completed   room organization consistent   nonskid shoes/slippers when out of bed   fall prevention program maintained   clutter free environment maintained   assistive device/personal items within reach  Taken 2/2/2025 0000 by Riri Clark RN  Safety Promotion/Fall Prevention:   safety round/check completed   room organization consistent   nonskid shoes/slippers when out of bed   fall prevention program maintained   clutter free environment maintained   assistive device/personal items within reach  Taken 2/1/2025 2006 by Riri Clark RN  Safety Promotion/Fall Prevention:   safety round/check completed   room organization consistent   nonskid shoes/slippers when out of bed   fall prevention program  maintained   clutter free environment maintained   assistive device/personal items within reach   activity supervised     Problem: Comorbidity Management  Goal: Blood Pressure in Desired Range  Outcome: Progressing  Intervention: Maintain Blood Pressure Management  Recent Flowsheet Documentation  Taken 2/2/2025 0201 by Riri Clark RN  Medication Review/Management: medications reviewed  Taken 2/2/2025 0000 by Riri Clark RN  Medication Review/Management: medications reviewed  Taken 2/1/2025 2006 by Riri Clark RN  Medication Review/Management: medications reviewed     Problem: Sepsis/Septic Shock  Goal: Optimal Coping  Outcome: Progressing  Intervention: Support Patient and Family Response  Recent Flowsheet Documentation  Taken 2/2/2025 0000 by Riri Clark RN  Supportive Measures: active listening utilized  Family/Support System Care:   presence promoted   support provided  Taken 2/1/2025 2006 by Riri Clark RN  Supportive Measures: verbalization of feelings encouraged  Family/Support System Care:   presence promoted   support provided  Goal: Absence of Bleeding  Outcome: Progressing  Goal: Blood Glucose Level Within Target Range  Outcome: Progressing  Intervention: Optimize Glycemic Control  Recent Flowsheet Documentation  Taken 2/2/2025 0000 by Riri Clark RN  Hyperglycemia Management: blood glucose monitored  Hypoglycemia Management: blood glucose monitored  Taken 2/1/2025 2006 by Riri Clark RN  Hyperglycemia Management: blood glucose monitored  Hypoglycemia Management: blood glucose monitored  Goal: Absence of Infection Signs and Symptoms  Outcome: Progressing  Intervention: Initiate Sepsis Management  Recent Flowsheet Documentation  Taken 2/2/2025 0201 by Riri Clark RN  Infection Prevention:   single patient room provided   rest/sleep promoted   personal protective equipment utilized   hand hygiene promoted   equipment surfaces disinfected  Isolation Precautions:   precautions  maintained   contact   spore  Taken 2/2/2025 0000 by Riri Clark RN  Infection Prevention:   single patient room provided   rest/sleep promoted   personal protective equipment utilized   hand hygiene promoted   equipment surfaces disinfected  Isolation Precautions:   precautions maintained   contact   spore  Taken 2/1/2025 2006 by Riri Clark RN  Infection Prevention:   single patient room provided   rest/sleep promoted   personal protective equipment utilized   hand hygiene promoted   equipment surfaces disinfected   environmental surveillance performed  Isolation Precautions:   precautions maintained   contact   spore  Intervention: Promote Recovery  Recent Flowsheet Documentation  Taken 2/2/2025 0000 by Riri Clark RN  Airway/Ventilation Management: airway patency maintained  Sleep/Rest Enhancement: awakenings minimized  Taken 2/1/2025 2006 by Riri Clark RN  Activity Management: activity minimized  Airway/Ventilation Management: airway patency maintained  Sleep/Rest Enhancement: awakenings minimized  Goal: Optimal Nutrition Delivery  Outcome: Progressing     Problem: Skin Injury Risk Increased  Goal: Skin Health and Integrity  Outcome: Progressing  Intervention: Optimize Skin Protection  Recent Flowsheet Documentation  Taken 2/2/2025 0201 by Riri Clark RN  Head of Bed (HOB) Positioning: HOB elevated  Taken 2/2/2025 0000 by Riri Clark RN  Head of Bed (HOB) Positioning: HOB elevated  Taken 2/1/2025 2006 by Riri Clark RN  Activity Management: activity minimized  Pressure Reduction Techniques:   frequent weight shift encouraged   heels elevated off bed   positioned off wounds   weight shift assistance provided  Head of Bed (HOB) Positioning: HOB elevated  Pressure Reduction Devices: pressure-redistributing mattress utilized  Skin Protection:   incontinence pads utilized   transparent dressing maintained   skin sealant/moisture barrier applied     Problem: Noninvasive Ventilation  "Acute  Goal: Effective Unassisted Ventilation and Oxygenation  Outcome: Progressing  Intervention: Monitor and Manage Noninvasive Ventilation  Recent Flowsheet Documentation  Taken 2/2/2025 0000 by Riri Clark RN  Airway/Ventilation Management: airway patency maintained  Taken 2/1/2025 2006 by Riri Clark RN  Airway/Ventilation Management: airway patency maintained     Problem: Enteral Nutrition  Goal: Absence of Aspiration Signs and Symptoms  Outcome: Progressing  Intervention: Minimize Aspiration Risk  Recent Flowsheet Documentation  Taken 2/2/2025 0201 by Riri Clark RN  Head of Bed (hospitals) Positioning: HOB elevated  Taken 2/2/2025 0000 by Riri Clark RN  Head of Bed (HOB) Positioning: HOB elevated  Enteral Feeding Safety: placement checked  Taken 2/1/2025 2006 by Riri Clark RN  Head of Bed (hospitals) Positioning: HOB elevated  Enteral Feeding Safety: placement checked  Goal: Safe, Effective Therapy Delivery  Outcome: Progressing  Intervention: Prevent Feeding-Related Adverse Events  Recent Flowsheet Documentation  Taken 2/2/2025 0000 by Riri Clark RN  Enteral Feeding Safety: placement checked  Taken 2/1/2025 2006 by Riri Clark RN  Enteral Feeding Safety: placement checked  Goal: Feeding Tolerance  Outcome: Progressing   Goal Outcome Evaluation:   Pt alert and oriented x4 tonight. Complaints of \"all over\" pain that was controlled with PRN tylenol. Pt had 2 liquid BM's, complaints of pain on her bottom, barrier cream applied and pt stated that it had already started to feel better. Pt agreeable to q2 turns. Call light within reach.                                          "

## 2025-02-02 NOTE — THERAPY TREATMENT NOTE
"Subjective: Pt agreeable to therapeutic plan of care.    Objective:     Precautions - falls, contact spore isol, incontinent of B&B, dobb lance    Bed mobility - CGA to roll multiple times using bedrail and min assist for supine<>sit  Transfers - N/A or Not attempted.  Ambulation -  feet N/A or Not attempted.    Vitals: WNL on 2L O2    Pain:  hurts all over  Intervention for pain: Repositioned, RN notified, Increased Activity, and Therapeutic Presence    Education: Provided education on the importance of mobility in the acute care setting, Verbal/Tactile Cues, and ADL training    Assessment: Alexey Trinidad presents with functional mobility impairments which indicate the need for skilled intervention. Tolerating session today without incident. Pt dependent for pericare. Sat EOB x 2 mins and fatigued, did not want to stand or get in chair. Needs rehab at [place].Will continue to follow and progress as tolerated.     Plan/Recommendations:   If medically appropriate, Moderate Intensity Therapy recommended post-acute care. This is recommended as therapy feels the patient would require 3-4 days per week and wouldn't tolerate \"3 hour daily\" rehab intensity. SNF would be the preferred choice. If the patient does not agree to SNF, arrange HH or OP depending on home bound status. If patient is medically complex, consider LTACH. Pt requires no DME at discharge.     Pt desires Home with family assist and Home Health at discharge. Pt cooperative; agreeable to therapeutic recommendations and plan of care.         Basic Mobility 6-click:  Rollin = Total, A lot = 2, A little = 3; 4 = None  Supine>Sit:   1 = Total, A lot = 2, A little = 3; 4 = None   Sit>Stand with arms:  1 = Total, A lot = 2, A little = 3; 4 = None  Bed>Chair:   1 = Total, A lot = 2, A little = 3; 4 = None  Ambulate in room:  1 = Total, A lot = 2, A little = 3; 4 = None  3-5 Steps with railin = Total, A lot = 2, A little = 3; 4 = None  Score: " 10      Post-Tx Position: Supine with HOB Elevated, Alarms activated, and Call light and personal items within reach  PPE: gloves and gown    Therapy Charges for Today       Code Description Service Date Service Provider Modifiers Qty    52287564986 HC PT NEUROMUSC RE EDUCATION EA 15 MIN 2/2/2025 Mei Anand, PTA GP 1    23762453887 HC PT THERAPEUTIC ACT EA 15 MIN 2/2/2025 Mei Anand PTA GP 1    75315688839 HC PT SELF CARE/MGMT/TRAIN EA 15 MIN 2/2/2025 Mei Anand PTA GP 1           PT Charges       Row Name 02/02/25 1246             Time Calculation    Start Time 0715  -      Stop Time 0745  -      Time Calculation (min) 30 min  -      PT Received On 02/02/25  -      PT - Next Appointment 02/03/25  -         Time Calculation- PT    Total Timed Code Minutes- PT 30 minute(s)  -                User Key  (r) = Recorded By, (t) = Taken By, (c) = Cosigned By      Initials Name Provider Type     Mei Anand PTA Physical Therapist Assistant

## 2025-02-02 NOTE — PROGRESS NOTES
[facility] MEDICINE SERVICE  DAILY PROGRESS NOTE    NAME: Alexey Trinidad  : 3/21/1926  MRN: [id number]      LOS: 9 days     PROVIDER OF SERVICE: Ernestine Multani MD    Chief Complaint: UTI (urinary tract infection)    Subjective:     Interval History:  History taken from: patient    Reports breathing is much improved, no chest pain, nausea/vomiting        Review of Systems:   Review of Systems    Objective:     Vital Signs  Temp:  [97 °F (36.1 °C)-97.9 °F (36.6 °C)] 97 °F (36.1 °C)  Heart Rate:  [66-79] 71  Resp:  [15-24] 18  BP: ()/(57-70) 110/60  Flow (L/min) (Oxygen Therapy):  [2-4] 2   Body mass index is 20.87 kg/m².    Physical Exam  AOx3 NAD  RRR S1-S2 audible  Lung b/l coarse crackles   Abdomen soft nontender     Diagnostic Data    Results from last 7 days   Lab Units 25  2325 25  0548   WBC 10*3/mm3 9.47 8.92   HEMOGLOBIN g/dL 13.7 13.7   HEMATOCRIT % 43.0 43.2   PLATELETS 10*3/mm3 369 321   GLUCOSE mg/dL 175* 168*   CREATININE mg/dL 0.73 0.63   BUN mg/dL 29* 33*   SODIUM mmol/L 139 137   POTASSIUM mmol/L 3.3* 3.7   AST (SGOT) U/L  --  31   ALT (SGPT) U/L  --  36*   ALK PHOS U/L  --  38*   BILIRUBIN mg/dL  --  0.4   ANION GAP mmol/L 8.3 10.2       XR Abdomen KUB    Result Date: 2025  Impression: Nonobstructive bowel gas pattern. Electronically Signed: Barbra Ordaz  2025 3:54 PM EST  Workstation ID: [id number]       I reviewed the patient's new clinical results.    Assessment/Plan:     Active and Resolved Problems  Active Hospital Problems    Diagnosis  POA    **UTI (urinary tract infection) [N39.0]  Yes    Abdominal pain [R10.9]  Yes      Resolved Hospital Problems   No resolved problems to display.       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  History of collagenous colitis  Acute metabolic encephalopathy, likely multifactorial  Physical deconditioning  Left adrenal nodule, stable  Hypertension  Hypothyroidism     - Pulmonology consulted given increased oxygen requirements, appreciate recs  - Cardiology consulted given valvular heart disease, continue medical management with outpatient follow up  - Continue diuresis  - completed zosyn (end date 02/01/25),  - po hydrocortisone  - Continue TF; SLP unable to reassess given oxygen requirements  - Started on sildenafil for severe pulmonary hypertension with greatly improved oxygen requirements   - Wean oxygen as tolerated    VTE Prophylaxis:  Pharmacologic VTE prophylaxis orders are present.             Disposition Planning:     Barriers to Discharge:medical stability   Anticipated Date of Discharge: >48 hours   Place of Discharge: [facility]      Time: 36 minutes     Code Status and Medical Interventions: No CPR (Do Not Attempt to Resuscitate); Limited Support; No intubation (DNI); DNR/DNI   Ordered at: 01/27/25 1918     Medical Intervention Limits:    No intubation (DNI)     Code Status (Patient has no pulse and is not breathing):    No CPR (Do Not Attempt to Resuscitate)     Medical Interventions (Patient has pulse or is breathing):    Limited Support     Comments:    DNR/DNI       Signature: Electronically signed by Ernestine Multani MD, 02/02/25, 14:32 EST.  [facility] [place] Hospitalist Team

## 2025-02-02 NOTE — PROGRESS NOTES
Nutrition Services  Patient Name: Alexey Trinidad  YOB: 1926  MRN: [id number]  Admission date: 1/24/2025    PROGRESS NOTE      Nutrition Intervention Updates: Continue current enteral nutrition regimen, which is at goal rate.         Encounter Information: Checking in to monitor TF. Pt continues with high O2 demands and therefore still requiring enteral nutrition support. TF Nutren 1.5 is infusing at 40mL/hour per documentation.        PO Diet: NPO Diet NPO Type: Tube Feeding   PO Supplements: None    PO Intake:  NPO       Current nutrition support: Nutren 1.5 at 40 mL/hr + 20 mL/hr water flush    Nutrition support review: Documented infusing as ordered        Labs (reviewed below): hypermagnesemia - likely rebound from prior replacement; will continue to monitor   Sodium and K+ and phosphorus are WNL today        GI Function:  BM 2/2       Brief weight review   Wt Readings from Last 10 Encounters:   02/02/25 0453 45.3 kg (99 lb 13.9 oz)   02/01/25 0511 45.1 kg (99 lb 6.8 oz)   01/31/25 0534 46.2 kg (101 lb 13.6 oz)   01/30/25 0528 47.7 kg (105 lb 2.6 oz)   01/29/25 0531 48 kg (105 lb 13.1 oz)   01/27/25 1757 47.2 kg (104 lb)   01/24/25 2006 47.4 kg (104 lb 8 oz)   01/24/25 1614 49.4 kg (109 lb)        Results from last 7 days   Lab Units 02/01/25  2325 02/01/25  0548 01/31/25  0300 01/29/25  0506 01/28/25  0521 01/27/25  1259 01/27/25  0216   SODIUM mmol/L 139 137 138   < > 144  --  143   POTASSIUM mmol/L 3.3* 3.7 3.9   < > 4.4   < > 3.2*   CHLORIDE mmol/L 90* 89* 89*   < > 99  --  103   CO2 mmol/L 40.7* 37.8* 42.0*   < > 36.8*  --  32.3*   BUN mg/dL 29* 33* 33*   < > 10  --  7*   CREATININE mg/dL 0.73 0.63 0.66   < > 0.66  --  0.42*   CALCIUM mg/dL 9.5 9.6 10.0*   < > 8.6  --  8.3   BILIRUBIN mg/dL  --  0.4  --   --  0.3  --  0.2   ALK PHOS U/L  --  38*  --   --  44  --  39   ALT (SGPT) U/L  --  36*  --   --  13  --  14   AST (SGOT) U/L  --  31  --   --  24  --  22   GLUCOSE mg/dL 175* 168* 113*   < >  122*  --  130*    < > = values in this interval not displayed.     Results from last 7 days   Lab Units 02/01/25  2325 02/01/25  0548 01/31/25  0300   MAGNESIUM mg/dL 2.5* 2.4* 2.6*   PHOSPHORUS mg/dL 4.0 5.1* 1.9*   HEMOGLOBIN g/dL 13.7 13.7 14.4   HEMATOCRIT % 43.0 43.2 46.0           RD to follow up per protocol.    Electronically signed by:  Mariya Harrell RD  02/02/25 15:15 EST

## 2025-02-02 NOTE — PLAN OF CARE
Assessment: Alexey Trinidad presents with functional mobility impairments which indicate the need for skilled intervention. Tolerating session today without incident. Pt dependent for pericare. Sat EOB x 2 mins and fatigued, did not want to stand or get in chair. Needs rehab at [place].Will continue to follow and progress as tolerated.

## 2025-02-03 ENCOUNTER — APPOINTMENT (OUTPATIENT)
Dept: GENERAL RADIOLOGY | Facility: HOSPITAL | Age: OVER 89
End: 2025-02-03
Payer: MEDICARE

## 2025-02-03 LAB
ALBUMIN SERPL-MCNC: 3.8 G/DL (ref 3.5–5.2)
ALBUMIN/GLOB SERPL: 1.3 G/DL
ALP SERPL-CCNC: 41 U/L (ref 39–117)
ALT SERPL W P-5'-P-CCNC: 36 U/L (ref 1–33)
ANION GAP SERPL CALCULATED.3IONS-SCNC: 6.8 MMOL/L (ref 5–15)
AST SERPL-CCNC: 28 U/L (ref 1–32)
BILIRUB SERPL-MCNC: 0.3 MG/DL (ref 0–1.2)
BUN SERPL-MCNC: 38 MG/DL (ref 8–23)
BUN/CREAT SERPL: 49.4 (ref 7–25)
CALCIUM SPEC-SCNC: 10.5 MG/DL (ref 8.2–9.6)
CHLORIDE SERPL-SCNC: 92 MMOL/L (ref 98–107)
CO2 SERPL-SCNC: 40.2 MMOL/L (ref 22–29)
CREAT SERPL-MCNC: 0.77 MG/DL (ref 0.57–1)
DEPRECATED RDW RBC AUTO: 51.8 FL (ref 37–54)
EGFRCR SERPLBLD CKD-EPI 2021: 69.8 ML/MIN/1.73
ERYTHROCYTE [DISTWIDTH] IN BLOOD BY AUTOMATED COUNT: 14.4 % (ref 12.3–15.4)
GLOBULIN UR ELPH-MCNC: 2.9 GM/DL
GLUCOSE BLDC GLUCOMTR-MCNC: 139 MG/DL (ref 70–105)
GLUCOSE BLDC GLUCOMTR-MCNC: 141 MG/DL (ref 70–105)
GLUCOSE BLDC GLUCOMTR-MCNC: 141 MG/DL (ref 70–105)
GLUCOSE SERPL-MCNC: 149 MG/DL (ref 65–99)
HCT VFR BLD AUTO: 41.4 % (ref 34–46.6)
HGB BLD-MCNC: 13.3 G/DL (ref 12–15.9)
MCH RBC QN AUTO: 31.2 PG (ref 26.6–33)
MCHC RBC AUTO-ENTMCNC: 32.1 G/DL (ref 31.5–35.7)
MCV RBC AUTO: 97.2 FL (ref 79–97)
PLATELET # BLD AUTO: 372 10*3/MM3 (ref 140–450)
PMV BLD AUTO: 9.2 FL (ref 6–12)
POTASSIUM SERPL-SCNC: 4.1 MMOL/L (ref 3.5–5.2)
PROT SERPL-MCNC: 6.7 G/DL (ref 6–8.5)
RBC # BLD AUTO: 4.26 10*6/MM3 (ref 3.77–5.28)
SODIUM SERPL-SCNC: 139 MMOL/L (ref 136–145)
WBC NRBC COR # BLD AUTO: 10.62 10*3/MM3 (ref 3.4–10.8)

## 2025-02-03 PROCEDURE — 82948 REAGENT STRIP/BLOOD GLUCOSE: CPT

## 2025-02-03 PROCEDURE — 25010000002 ENOXAPARIN PER 10 MG

## 2025-02-03 PROCEDURE — 99232 SBSQ HOSP IP/OBS MODERATE 35: CPT | Performed by: INTERNAL MEDICINE

## 2025-02-03 PROCEDURE — 92611 MOTION FLUOROSCOPY/SWALLOW: CPT

## 2025-02-03 PROCEDURE — 97535 SELF CARE MNGMENT TRAINING: CPT

## 2025-02-03 PROCEDURE — 94799 UNLISTED PULMONARY SVC/PX: CPT

## 2025-02-03 PROCEDURE — 97530 THERAPEUTIC ACTIVITIES: CPT

## 2025-02-03 PROCEDURE — 97116 GAIT TRAINING THERAPY: CPT

## 2025-02-03 PROCEDURE — 94664 DEMO&/EVAL PT USE INHALER: CPT

## 2025-02-03 PROCEDURE — 85027 COMPLETE CBC AUTOMATED: CPT

## 2025-02-03 PROCEDURE — 80053 COMPREHEN METABOLIC PANEL: CPT

## 2025-02-03 PROCEDURE — 97110 THERAPEUTIC EXERCISES: CPT

## 2025-02-03 PROCEDURE — 25810000003 LACTATED RINGERS SOLUTION

## 2025-02-03 PROCEDURE — 74230 X-RAY XM SWLNG FUNCJ C+: CPT

## 2025-02-03 PROCEDURE — 92610 EVALUATE SWALLOWING FUNCTION: CPT

## 2025-02-03 PROCEDURE — 97167 OT EVAL HIGH COMPLEX 60 MIN: CPT

## 2025-02-03 PROCEDURE — 94761 N-INVAS EAR/PLS OXIMETRY MLT: CPT

## 2025-02-03 RX ORDER — MIDODRINE HYDROCHLORIDE 5 MG/1
10 TABLET ORAL
Status: DISCONTINUED | OUTPATIENT
Start: 2025-02-04 | End: 2025-02-03

## 2025-02-03 RX ORDER — CHOLESTYRAMINE LIGHT 4 G/5.7G
1 POWDER, FOR SUSPENSION ORAL EVERY 12 HOURS SCHEDULED
Status: DISCONTINUED | OUTPATIENT
Start: 2025-02-03 | End: 2025-02-05 | Stop reason: HOSPADM

## 2025-02-03 RX ORDER — MIDODRINE HYDROCHLORIDE 5 MG/1
10 TABLET ORAL EVERY 8 HOURS SCHEDULED
Status: DISCONTINUED | OUTPATIENT
Start: 2025-02-03 | End: 2025-02-04

## 2025-02-03 RX ADMIN — MIDODRINE HYDROCHLORIDE 10 MG: 5 TABLET ORAL at 23:08

## 2025-02-03 RX ADMIN — IPRATROPIUM BROMIDE AND ALBUTEROL SULFATE 3 ML: .5; 3 SOLUTION RESPIRATORY (INHALATION) at 11:45

## 2025-02-03 RX ADMIN — CHOLESTYRAMINE 4 G: 4 POWDER, FOR SUSPENSION ORAL at 23:09

## 2025-02-03 RX ADMIN — IPRATROPIUM BROMIDE AND ALBUTEROL SULFATE 3 ML: .5; 3 SOLUTION RESPIRATORY (INHALATION) at 18:47

## 2025-02-03 RX ADMIN — SODIUM CHLORIDE, POTASSIUM CHLORIDE, SODIUM LACTATE AND CALCIUM CHLORIDE 1000 ML: 600; 310; 30; 20 INJECTION, SOLUTION INTRAVENOUS at 18:46

## 2025-02-03 RX ADMIN — Medication 10 ML: at 09:53

## 2025-02-03 RX ADMIN — BARIUM SULFATE 50 ML: 400 SUSPENSION ORAL at 12:28

## 2025-02-03 RX ADMIN — Medication 10 ML: at 20:40

## 2025-02-03 RX ADMIN — IPRATROPIUM BROMIDE AND ALBUTEROL SULFATE 3 ML: .5; 3 SOLUTION RESPIRATORY (INHALATION) at 15:15

## 2025-02-03 RX ADMIN — ACETAMINOPHEN 650 MG: 325 TABLET, FILM COATED ORAL at 20:37

## 2025-02-03 RX ADMIN — SILDENAFIL 10 MG: 20 TABLET ORAL at 17:05

## 2025-02-03 RX ADMIN — ACETAMINOPHEN 650 MG: 325 TABLET, FILM COATED ORAL at 16:40

## 2025-02-03 RX ADMIN — ACETAMINOPHEN 650 MG: 160 SOLUTION ORAL at 05:19

## 2025-02-03 RX ADMIN — HYDROCORTISONE 20 MG: 10 TABLET ORAL at 14:03

## 2025-02-03 RX ADMIN — Medication 1000 MCG: at 09:29

## 2025-02-03 RX ADMIN — BALSALAZIDE DISODIUM 750 MG: 750 CAPSULE ORAL at 20:37

## 2025-02-03 RX ADMIN — LEVOTHYROXINE SODIUM 75 MCG: 0.07 TABLET ORAL at 05:19

## 2025-02-03 RX ADMIN — IPRATROPIUM BROMIDE AND ALBUTEROL SULFATE 3 ML: .5; 3 SOLUTION RESPIRATORY (INHALATION) at 06:30

## 2025-02-03 RX ADMIN — PANTOPRAZOLE SODIUM 40 MG: 40 INJECTION, POWDER, FOR SOLUTION INTRAVENOUS at 05:19

## 2025-02-03 RX ADMIN — FUROSEMIDE 40 MG: 40 TABLET ORAL at 09:29

## 2025-02-03 RX ADMIN — BUDESONIDE 0.5 MG: 0.5 INHALANT RESPIRATORY (INHALATION) at 18:51

## 2025-02-03 RX ADMIN — ENOXAPARIN SODIUM 30 MG: 100 INJECTION SUBCUTANEOUS at 16:41

## 2025-02-03 RX ADMIN — Medication 1 TABLET: at 09:30

## 2025-02-03 RX ADMIN — SILDENAFIL 10 MG: 20 TABLET ORAL at 09:29

## 2025-02-03 RX ADMIN — BUDESONIDE 0.5 MG: 0.5 INHALANT RESPIRATORY (INHALATION) at 06:30

## 2025-02-03 RX ADMIN — SILDENAFIL 10 MG: 20 TABLET ORAL at 20:37

## 2025-02-03 NOTE — PROGRESS NOTES
[facility] MEDICINE SERVICE  DAILY PROGRESS NOTE    NAME: Alexey Trinidad  : 3/21/1926  MRN: [id number]      LOS: 10 days     PROVIDER OF SERVICE: Roxanne Lorenz MD    Chief Complaint: UTI (urinary tract infection)    Subjective:     Interval History:  History taken from: patient    Patient seen and examined at bedside this morning.  No acute complaints overnight        Review of Systems: Negative except as described above  Review of Systems    Objective:     Vital Signs  Temp:  [96.6 °F (35.9 °C)-97.4 °F (36.3 °C)] 97.4 °F (36.3 °C)  Heart Rate:  [71-99] 81  Resp:  [14-28] 18  BP: ()/(55-76) 116/64  Flow (L/min) (Oxygen Therapy):  [1-2] 1   Body mass index is 20.87 kg/m².    Physical Exam  Physical Exam  Constitutional:       Comments: NAD    Cardiovascular:      Comments:  RRR, S1 & S2   Pulmonary:      Comments:  Lungs CTA   Abdominal:      Comments:  ABD soft, NT            Diagnostic Data    Results from last 7 days   Lab Units 25  0952   WBC 10*3/mm3 10.62   HEMOGLOBIN g/dL 13.3   HEMATOCRIT % 41.4   PLATELETS 10*3/mm3 372   GLUCOSE mg/dL 149*   CREATININE mg/dL 0.77   BUN mg/dL 38*   SODIUM mmol/L 139   POTASSIUM mmol/L 4.1   AST (SGOT) U/L 28   ALT (SGPT) U/L 36*   ALK PHOS U/L 41   BILIRUBIN mg/dL 0.3   ANION GAP mmol/L 6.8       No radiology results for the last day      I reviewed the patient's new clinical results.    Assessment/Plan:     Active and Resolved Problems  Active Hospital Problems    Diagnosis  POA    **UTI (urinary tract infection) [N39.0]  Yes    Abdominal pain [R10.9]  Yes      Resolved Hospital Problems   No resolved problems to display.       Acute hypoxic respiratory failure  Aspiration pneumonia, unknown organism  Pulmonary edema  Moderate aortic valve regurgitation  Moderate aortic valve stenosis  Moderate tricuspid valve regurgitation  Severe pulmonary hypertension  Norovirus  Acute urinary tract infection  Abdominal pain with nausea and  vomiting  Biliary dilation w/ stone at ampulla of vater  History of collagenous colitis  Acute metabolic encephalopathy, likely multifactorial  Physical deconditioning  Left adrenal nodule, stable  Hypertension  Hypothyroidism     - Pulmonology consulted given increased oxygen requirements, appreciate recs  - Cardiology consulted given valvular heart disease, continue medical management with outpatient follow up  - Continue diuresis  - completed zosyn (end date 02/01/25),  - po hydrocortisone  - SLP assessed today and recommend puree diet and thin liquids  - Started on sildenafil for severe pulmonary hypertension with greatly improved oxygen requirements   -tested positive for norovirus, will consider cholestyramine. Incontinence is improving  - Wean oxygen as tolerated    VTE Prophylaxis:  Pharmacologic VTE prophylaxis orders are present.             Disposition Planning:       Anticipated Date of Discharge: 2/5/25        Time: 35 minutes     Code Status and Medical Interventions: No CPR (Do Not Attempt to Resuscitate); Limited Support; No intubation (DNI); DNR/DNI   Ordered at: 01/27/25 1918     Medical Intervention Limits:    No intubation (DNI)     Code Status (Patient has no pulse and is not breathing):    No CPR (Do Not Attempt to Resuscitate)     Medical Interventions (Patient has pulse or is breathing):    Limited Support     Comments:    DNR/DNI       Signature: Electronically signed by Roxanne Lorenz MD, 02/03/25, 16:57 EST.  [facility] Hospitalist Team

## 2025-02-03 NOTE — PLAN OF CARE
"Goal Outcome Evaluation:  Clinical swallow evaluation completed. Pt awake and alert, confused. Adequate vocal quality. Pt reporting of \"foot pain\". Upon room entry, pt was sitting upright at 90 degrees in recliner. Pt able to follow commands. Room air. NG tube in place. Pt is edentulous, dentures at bedside in which SLP put in pt's mouth however dentures were ill fitting. Pt is poor historian, therefore SLP not aware of pt's baseline diet. VFSS completed by ST department 6 months ago in which we recommended [facility] diet and thin liquids. Oral motor exam revealed overall generalized oral motor weakness. ST completed oral care via toothette. Trials assessed: ice chips x1, thin by cup x1, thin by straw x5, puree x2. Pt had adequate bolus control of all trials, all trials given via SLP. Adequate labial seal w/straw. No anterior loss w/ cup. Oral transit appeared timely. No residue or pocketing during puree. Pt demonstrated cough during thin liquid trials. SLP recommending VFSS at this time. Pt resting comfortably upon SLP exit. Nursing staff made aware of recommendations. Per above, pt presents w/ MILD oral stage dysphagia and suspecting pharyngeal phase at this time. It is recommended pt remain NPO w/ FWP. Meds via alternate route. VFSS pending.      SLP Plan & Recommendation:      - NPO w/ exception of FWP- see below   -VFSS pending  - Water/ice only when pt is fully awake and alert  - Meds: via alternate route  - Safe swallow strategies: HOB at a 90 degree angle, slow rate of intake, small sips  -Oral care at least x2 daily      The rationale to recommend water/ice chips when a PO diet cannot appropriately/functionally sustain nutrition is because water is low risk for aspiration pna when compared to aspiration of food or other liquids.       Benefits of a water protocol include but are not limited to:      Oral gratification   Engagement of oropharyngeal swallow musculature   Decrease likelihood of dehydration     "   Guidelines for proper implementation include:  Thorough oral care prior to consuming water  Upright at 90 degree hip flexion  Small sips at slow rate     Monitor for any changes in respiratory status and discontinue if distress noted       Anticipated Discharge Disposition (SLP): unknown          SLP Swallowing Diagnosis: oral dysphagia, pharyngeal dysphagia, mod-severe (02/03/25 1000)        Plan for Continued Treatment (SLP): continue treatment per plan of care (02/03/25 1000)

## 2025-02-03 NOTE — PLAN OF CARE
Goal Outcome Evaluation:      Pt still stooling, some what less watery but very loose. O2 is down to 2L. Vitals stable.

## 2025-02-03 NOTE — CASE MANAGEMENT/SOCIAL WORK
Continued Stay Note  [facility]     Patient Name: Alexey Trinidad  MRN: [id number]  Today's Date: 2/3/2025    Admit Date: 1/24/2025    Plan: Return home with family. VNA [facility] (accepted, need order). Watch for new home O2 needs   Discharge Plan       Row Name 02/03/25 0922       Plan    Plan Comments DC Barriers: NG/Tfs, loose stools, started Sidenafil, diuresis, bronchodilators             Carolina Dixon RN     [facility]  Office: [phone]  Cell: [phone]  Fax # [phone]

## 2025-02-03 NOTE — THERAPY EVALUATION
Acute Care - Speech Language Pathology   Swallow Initial Evaluation  Fred     Patient Name: Alexey Trinidad  : 3/21/1926  MRN: [id number]  Today's Date: 2/3/2025               Admit Date: 2025    Visit Dx:     ICD-10-CM ICD-9-CM   1. Urinary tract infection without hematuria, site unspecified  N39.0 599.0   2. General weakness  R53.1 780.79   3. Vomiting, unspecified vomiting type, unspecified whether nausea present  R11.10 787.03     Patient Active Problem List   Diagnosis    UTI (urinary tract infection)    Abdominal pain     Past Medical History:   Diagnosis Date    Disease of thyroid gland     Hypertension      Past Surgical History:   Procedure Laterality Date    BACK SURGERY      kyphoplasty    BREAST SURGERY      benign cyst removed    HYSTERECTOMY      LAPAROSCOPIC CHOLECYSTECTOMY         SLP Recommendation and Plan  SLP Swallowing Diagnosis: oral dysphagia, pharyngeal dysphagia, mod-severe (25 1000)  SLP Diet Recommendation: NPO, ice chips between meals after oral care, with supervision, water between meals after oral care, with supervision (25 1000)  Recommended Precautions and Strategies: general aspiration precautions (25 1000)  SLP Rec. for Method of Medication Administration: meds via alternate route (25 1000)        Recommended Diagnostics: VFSS (MBS) (25 1000)     Anticipated Discharge Disposition (SLP): unknown (25 1000)  Rehab Potential/Prognosis, Swallowing: good, to achieve stated therapy goals (25 1000)  Therapy Frequency (Swallow): PRN (25 1000)  Predicted Duration Therapy Intervention (Days): until discharge (25 1000)  Oral Care Recommendations: Oral Care before breakfast, after meals and PRN (25 1000)        Plan for Continued Treatment (SLP): continue treatment per plan of care (25 1000)       SWALLOW EVALUATION (Last 72 Hours)       SLP Adult Swallow Evaluation       Row Name 25       Rehab Evaluation     Document Type evaluation  -MS    Subjective Information complains of  -MS    Patient Observations cooperative;alert;agree to therapy  -MS    Patient/Family/Caregiver Comments/Observations n/a  -MS    Patient Effort adequate  -MS    Symptoms Noted During/After Treatment none  -MS       General Information    Patient Profile Reviewed yes  -MS    Pertinent History Of Current Problem Alexey Trinidad is a 89 y.o. female with a CMH of hypertension, hypothyroidism, COPD, who presented to [facility] on 1/24/2025 with generalized weakness.  Patient is alert and oriented x 2 at this time therefore collateral information was obtained from chart checks.  Per chart, family reported patient having generalized weakness and progressively got worse in the past 2 days.  Associated with abdominal pain, chills, nausea vomiting and diarrhea.  No reported fever.  In the ED, sodium 147, WBC 13.5 K, .  LFTs with unremarkable urinalysis suggestive of UTI, CT abdomen pelvis with contrast was notable for a mild intra and extrahepatic biliary ductal dilation, a 0.4 cm calcification of the tip of the ampulla of Vater which may represent a stone.  Recommends ERCP or MRCP.  Stable left adrenal nodule, urinary bladder wall thickening suggestive of cystitis. ST consulted for dysphagia re-evaluation.                -MS    Current Method of Nutrition NPO;nasogastric feedings  -MS    Precautions/Limitations, Vision WFL  -MS    Precautions/Limitations, Hearing WFL  -MS    Prior Level of Function-Communication WFL  -MS    Prior Level of Function-Swallowing unknown  -MS    Plans/Goals Discussed with patient  -MS       Oral Motor Structure and Function    Dentition Assessment edentulous  -MS    Secretion Management WNL/WFL  -MS    Mucosal Quality dry  -MS    Volitional Swallow weak  -MS    Volitional Cough non-productive;weak  -MS       Oral Musculature and Cranial Nerve Assessment    Oral Motor General Assessment generalized oral motor  "weakness  -MS       General Eating/Swallowing Observations    Respiratory Support Currently in Use room air  -MS    Eating/Swallowing Skills fed by SLP  -MS    Positioning During Eating upright in chair;upright 90 degree  -MS    Utensils Used spoon;cup;straw  -MS    Consistencies Trialed ice chips;pureed;thin liquids  -MS       Respiratory    Respiratory Status WFL  -MS       Clinical Swallow Eval    Oral Prep Phase impaired  -MS    Oral Transit WFL  -MS    Oral Residue WFL  -MS    Pharyngeal Phase suspected pharyngeal impairment  -MS    Esophageal Phase unremarkable  -MS    Clinical Swallow Evaluation Summary Clinical swallow evaluation completed. Pt awake and alert, confused. Adequate vocal quality. Pt reporting of \"foot pain\". Upon room entry, pt was sitting upright at 90 degrees in recliner. Pt able to follow commands. Room air. NG tube in place. Pt is edentulous, dentures at bedside in which SLP put in pt's mouth however dentures were ill fitting. Pt is poor historian, therefore SLP not aware of pt's baseline diet. VFSS completed by  department 6 months ago in which we recommended [facility] diet and thin liquids. Oral motor exam revealed overall generalized oral motor weakness. ST completed oral care via toothette. Trials assessed: ice chips x1, thin by cup x1, thin by straw x5, puree x2. Pt had adequate bolus control of all trials, all trials given via SLP. Adequate labial seal w/straw. No anterior loss w/ cup. Oral transit appeared timely. No residue or pocketing during puree. Pt demonstrated cough during thin liquid trials. SLP recommending VFSS at this time. Pt resting comfortably upon SLP exit. Nursing staff made aware of recommendations. Per above, pt presents w/ MILD oral stage dysphagia and suspecting pharyngeal phase at this time. It is recommended pt remain NPO w/ FWP. Meds via alternate route. VFSS pending.      SLP Plan & Recommendation:      - NPO w/ exception of FWP- see below   -VFSS pending  - " Water/ice only when pt is fully awake and alert  - Meds: via alternate route  - Safe swallow strategies: HOB at a 90 degree angle, slow rate of intake, small sips  -Oral care at least x2 daily      The rationale to recommend water/ice chips when a PO diet cannot appropriately/functionally sustain nutrition is because water is low risk for aspiration pna when compared to aspiration of food or other liquids.       Benefits of a water protocol include but are not limited to:      Oral gratification   Engagement of oropharyngeal swallow musculature   Decrease likelihood of dehydration       Guidelines for proper implementation include:  Thorough oral care prior to consuming water  Upright at 90 degree hip flexion  Small sips at slow rate     Monitor for any changes in respiratory status and discontinue if distress noted         -MS       Pharyngeal Phase Concerns    Pharyngeal Phase Concerns cough  -MS    Cough thin  -MS       SLP Evaluation Clinical Impression    SLP Swallowing Diagnosis oral dysphagia;pharyngeal dysphagia;mod-severe  -MS    Functional Impact risk of aspiration/pneumonia;risk of malnutrition;risk of dehydration  -MS    Rehab Potential/Prognosis, Swallowing good, to achieve stated therapy goals  -MS       SLP Treatment Clinical Impressions    Barriers to Overall Progress (SLP) Advanced age;Cognitive status  -MS    Plan for Continued Treatment (SLP) continue treatment per plan of care  -MS    Care Plan Review evaluation/treatment results reviewed  -MS       Recommendations    Therapy Frequency (Swallow) PRN  -MS    Predicted Duration Therapy Intervention (Days) until discharge  -MS    SLP Diet Recommendation NPO;ice chips between meals after oral care, with supervision;water between meals after oral care, with supervision  -MS    Recommended Diagnostics VFSS (MBS)  -MS    Recommended Precautions and Strategies general aspiration precautions  -MS    Oral Care Recommendations Oral Care before breakfast,  after meals and PRN  -MS    SLP Rec. for Method of Medication Administration meds via alternate route  -MS    Anticipated Discharge Disposition (SLP) unknown  -MS       Swallow Goals (SLP)    Swallow LTGs Patient will demonstrate functional swallow for  -MS    Swallow STGs diet tolerance goal selection (SLP)  -MS    Diet Tolerance Goal Selection (SLP) Patient will tolerate trials of  -MS       (LTG) Patient will demonstrate functional swallow for    Diet Texture (Demonstrate functional swallow) pureed textures  -MS    Liquid viscosity (Demonstrate functional swallow) thin liquids  -MS    [place] (Demonstrate functional swallow) with minimal cues (75-90% accuracy)  -MS    Time Frame (Demonstrate functional swallow) by discharge  -MS    Progress/Outcomes (Demonstrate functional swallow) goal ongoing  -MS       (STG) Patient will tolerate trials of    Consistencies Trialed (Tolerate trials) thin liquids;pureed textures  -MS    Desired Outcome (Tolerate trials) without signs/symptoms of aspiration;without signs of distress;with adequate oral prep/transit/clearance  -MS    [place] (Tolerate trials) with minimal cues (75-90% accuracy)  -MS    Time Frame (Tolerate trials) by discharge  -MS    Progress/Outcomes (Tolerate trials) goal ongoing  -MS              User Key  (r) = Recorded By, (t) = Taken By, (c) = Cosigned By      Initials Name Effective Dates    MS Joe Scanlon, SLP 04/22/24 -                     EDUCATION  The patient has been educated in the following areas:   Dysphagia (Swallowing Impairment) Oral Care/Hydration NPO rationale.        SLP GOALS       Row Name 02/03/25 1000       (LTG) Patient will demonstrate functional swallow for    Diet Texture (Demonstrate functional swallow) pureed textures  -MS    Liquid viscosity (Demonstrate functional swallow) thin liquids  -MS    [place] (Demonstrate functional swallow) with minimal cues (75-90% accuracy)  -MS    Time Frame (Demonstrate functional  swallow) by discharge  -MS    Progress/Outcomes (Demonstrate functional swallow) goal ongoing  -MS       (STG) Patient will tolerate trials of    Consistencies Trialed (Tolerate trials) thin liquids;pureed textures  -MS    Desired Outcome (Tolerate trials) without signs/symptoms of aspiration;without signs of distress;with adequate oral prep/transit/clearance  -MS    [place] (Tolerate trials) with minimal cues (75-90% accuracy)  -MS    Time Frame (Tolerate trials) by discharge  -MS    Progress/Outcomes (Tolerate trials) goal ongoing  -MS              User Key  (r) = Recorded By, (t) = Taken By, (c) = Cosigned By      Initials Name Provider Type    Joe Aaron, SLP Speech and Language Pathologist                  CAMRON Mast  2/3/2025

## 2025-02-03 NOTE — TREATMENT PLAN
Objective:     Precautions - Falls    Bed mobility - CGA  Transfers - CGA  Ambulation - 5 feet CGA single HHA to recliner       Assessment: Alexey Trinidad presents with functional mobility impairments which indicate the need for skilled intervention. Mobilizes w/ Ax1 and HHA this date. Continue to recommend rehab as pt is below baseline and deconditioned although Pt requests home at this time. Tolerating session today without incident. Will continue to follow and progress as tolerated.

## 2025-02-03 NOTE — PROGRESS NOTES
Daily Progress Note        UTI (urinary tract infection)    Abdominal pain    Assessment:     Acute hypoxic respiratory failure  ABG on 1/27/2025  pH 7.30/pCO2 78/pO2 60/bicarb 39      positive for norovirus      urine cultures was positive for Proteus Mirabella's    Pulmonary edema  proBNP 1400    Severe pulmonary hypertension most likely group 2   2D echo 1/27/2025    Left ventricular systolic function is normal. Calculated left ventricular EF = 70%    Left ventricular wall thickness is consistent with moderate to severe concentric hypertrophy.    Left ventricular diastolic function is consistent with (grade I) impaired relaxation.  moderate aortic valve regurgitation and stenosis.  Mean/peak gradient of 19/32 mmHg.  Planimetry LAZARO >1cm2.    Estimated right ventricular systolic pressure from tricuspid regurgitation is markedly elevated (>55 mmHg).    TSH 2.0     Recommendations:     Oxygenation improved from 30 L to 1 L nasal cannula after initiation low dose Sidenafil 10 mg po TID for severe PAH  Monitor BP  No Nitrates     Check iron level    Diuresis     Antibiotics completed 5 days of Zosyn    Steroids  Po hydrocortison    Bronchodilators currently on budesonide and DuoNeb    Prognosis guarded     Chest x-ray 1/30/2025      Chest x-ray 1/26/2025       LOS: 10 days     Subjective         Objective     Vital signs for last 24 hours:  Vitals:    02/03/25 0630 02/03/25 0633 02/03/25 0634 02/03/25 0637   BP:       BP Location:       Patient Position:       Pulse: 72 76 84 79   Resp: 16 16 16 16   Temp:       TempSrc:       SpO2: 92% 93% 96% 91%   Weight:       Height:           Intake/Output last 3 shifts:  I/O last 3 completed shifts:  In: 1220 [Other:504; NG/GT:716]  Out: -   Intake/Output this shift:  No intake/output data recorded.      Radiology  Imaging Results (Last 24 Hours)       ** No results found for the last 24 hours. **            Labs:  Results from last 7 days   Lab Units 02/01/25  0575   WBC  10*3/mm3 9.47   HEMOGLOBIN g/dL 13.7   HEMATOCRIT % 43.0   PLATELETS 10*3/mm3 369     Results from last 7 days   Lab Units 02/02/25  1556 02/01/25  2325 02/01/25  0548   SODIUM mmol/L  --  139 137   POTASSIUM mmol/L 4.7 3.3* 3.7   CHLORIDE mmol/L  --  90* 89*   CO2 mmol/L  --  40.7* 37.8*   BUN mg/dL  --  29* 33*   CREATININE mg/dL  --  0.73 0.63   CALCIUM mg/dL  --  9.5 9.6   BILIRUBIN mg/dL  --   --  0.4   ALK PHOS U/L  --   --  38*   ALT (SGPT) U/L  --   --  36*   AST (SGOT) U/L  --   --  31   GLUCOSE mg/dL  --  175* 168*     Results from last 7 days   Lab Units 01/27/25  1852   PH, ARTERIAL pH units 7.304*   PO2 ART mm Hg 60.8*   PCO2, ARTERIAL mm Hg 78.5*   HCO3 ART mmol/L 39.0*     Results from last 7 days   Lab Units 02/01/25  0548 01/28/25  0521   ALBUMIN g/dL 3.7 3.6             Results from last 7 days   Lab Units 02/01/25  2325   MAGNESIUM mg/dL 2.5*         Results from last 7 days   Lab Units 01/28/25  0521   TSH uIU/mL 2.010           Meds:   SCHEDULE  balsalazide, 750 mg, Oral, Nightly  [Held by provider] Budesonide, 3 mg, Oral, BID  budesonide, 0.5 mg, Nebulization, BID - RT  enoxaparin, 30 mg, Subcutaneous, Daily  furosemide, 40 mg, Oral, BID Diuretics  hydrocortisone, 20 mg, Oral, Daily With Lunch  ipratropium-albuterol, 3 mL, Nebulization, 4x Daily - RT  levothyroxine, 75 mcg, Oral, Q AM  multivitamin with minerals, 1 tablet, Oral, Daily  pantoprazole, 40 mg, Intravenous, Q AM  sildenafil, 10 mg, Oral, TID  sodium chloride, 10 mL, Intravenous, Q12H  vitamin B-12, 1,000 mcg, Oral, Daily      Infusions     PRNs    acetaminophen **OR** acetaminophen **OR** acetaminophen    senna-docusate sodium **AND** polyethylene glycol **AND** bisacodyl **AND** bisacodyl    Calcium Replacement - Follow Nurse / BPA Driven Protocol    dextrose    dextrose    glucagon (human recombinant)    Magnesium Standard Dose Replacement - Follow Nurse / BPA Driven Protocol    ondansetron    Phosphorus Replacement - Follow Nurse  / BPA Driven Protocol    Potassium Replacement - Follow Nurse / BPA Driven Protocol    Psyllium    [COMPLETED] Insert Peripheral IV **AND** sodium chloride    sodium chloride    sodium chloride    Physical Exam:  Physical Exam  Cardiovascular:      Heart sounds: Murmur heard.      No gallop.   Pulmonary:      Effort: No respiratory distress.      Breath sounds: No stridor. Rhonchi and rales present. No wheezing.   Chest:      Chest wall: No tenderness.         ROS  Review of Systems   Respiratory:  Positive for cough and shortness of breath. Negative for wheezing and stridor.    Cardiovascular:  Negative for chest pain, palpitations and leg swelling.             Total time spent with patient greater than: 45 Minutes

## 2025-02-03 NOTE — MBS/VFSS/FEES
Acute Care - Speech Language Pathology   Swallow Initial Evaluation [facility]     Patient Name: Alexey Trinidad  : 3/21/1926  MRN: [id number]  Today's Date: 2/3/2025               Admit Date: 2025    Visit Dx:     ICD-10-CM ICD-9-CM   1. Urinary tract infection without hematuria, site unspecified  N39.0 599.0   2. General weakness  R53.1 780.79   3. Vomiting, unspecified vomiting type, unspecified whether nausea present  R11.10 787.03     Patient Active Problem List   Diagnosis    UTI (urinary tract infection)    Abdominal pain     Past Medical History:   Diagnosis Date    Disease of thyroid gland     Hypertension      Past Surgical History:   Procedure Laterality Date    BACK SURGERY      kyphoplasty    BREAST SURGERY      benign cyst removed    HYSTERECTOMY      LAPAROSCOPIC CHOLECYSTECTOMY         SLP Recommendation and Plan  SLP Swallowing Diagnosis: mild-moderate, oral dysphagia, pharyngeal dysphagia (25 1200)  SLP Diet Recommendation: thin liquids, puree (25)  Recommended Precautions and Strategies: no straw, upright posture during/after eating, general aspiration precautions, assist with feeding (25)  SLP Rec. for Method of Medication Administration: with puree, meds crushed (25)        Recommended Diagnostics: VFSS (MBS) (25 1000)  Swallow Criteria for Skilled Therapeutic Interventions Met: demonstrates skilled criteria (25)  Anticipated Discharge Disposition (SLP): unknown (25 1200)  Rehab Potential/Prognosis, Swallowing: good, to achieve stated therapy goals (25)  Therapy Frequency (Swallow): PRN (25)  Predicted Duration Therapy Intervention (Days): until discharge (25)  Oral Care Recommendations: Oral Care before breakfast, after meals and PRN (25 1200)           Plan for Continued Treatment (SLP): continue treatment per plan of care (25)       SWALLOW EVALUATION (Last 72 Hours)       SLP  Adult Swallow Evaluation       Row Name 02/03/25 1200       Rehab Evaluation    Document Type evaluation  -MS    Subjective Information no complaints  -MS    Patient Observations alert;cooperative;agree to therapy  -MS    Patient/Family/Caregiver Comments/Observations n/a  -MS    Patient Effort good  -MS    Symptoms Noted During/After Treatment none  -MS       General Information    Patient Profile Reviewed yes  -MS    Pertinent History Of Current Problem Alexey Trinidad is a 89 y.o. female with a CMH of hypertension, hypothyroidism, COPD, who presented to [facility] on 1/24/2025 with generalized weakness.  Patient is alert and oriented x 2 at this time therefore collateral information was obtained from chart checks.  Per chart, family reported patient having generalized weakness and progressively got worse in the past 2 days.  Associated with abdominal pain, chills, nausea vomiting and diarrhea.  No reported fever.  In the ED, sodium 147, WBC 13.5 K, .  LFTs with unremarkable urinalysis suggestive of UTI, CT abdomen pelvis with contrast was notable for a mild intra and extrahepatic biliary ductal dilation, a 0.4 cm calcification of the tip of the ampulla of Vater which may represent a stone.  Recommends ERCP or MRCP.  Stable left adrenal nodule, urinary bladder wall thickening suggestive of cystitis.VFSS completed.                     -MS    Current Method of Nutrition NPO;nasogastric feedings  -MS    Precautions/Limitations, Vision WFL  -MS    Precautions/Limitations, Hearing WFL  -MS    Prior Level of Function-Communication WFL  -MS    Prior Level of Function-Swallowing unknown  -MS    Plans/Goals Discussed with patient  -MS       Oral Motor Structure and Function    Dentition Assessment edentulous  -MS    Secretion Management --    Mucosal Quality --    Volitional Swallow --    Volitional Cough --       Oral Musculature and Cranial Nerve Assessment    Oral Motor General Assessment generalized oral  motor weakness  -MS       General Eating/Swallowing Observations    Respiratory Support Currently in Use room air  -MS    Eating/Swallowing Skills --    Positioning During Eating --    Utensils Used --    Consistencies Trialed --       Respiratory    Respiratory Status WFL  -MS       Clinical Swallow Eval    Oral Prep Phase --    Oral Transit --    Oral Residue --    Pharyngeal Phase --    Esophageal Phase --    Clinical Swallow Evaluation Summary --       Pharyngeal Phase Concerns    Pharyngeal Phase Concerns --    Cough --       MBS/VFSS    Utensils Used spoon;cup;straw  -MS    Consistencies Trialed thin liquids;nectar/syrup-thick liquids;honey-thick liquids;pureed  -MS       MBS/VFSS Interpretation    Oral Prep Phase impaired oral phase of swallowing  -MS    Oral Transit Phase WFL  -MS    Oral Residue impaired  -MS    VFSS Summary VFSS EXPLANATION/ OUTCOME:     MBSS performed in the lateral projection with the following consistencies respectively: thin by cup x2, thin by straw x1, puree x2,  NTL by cup x2, HTL by cup x2. All trials with adequate barium concentration for fluoroscopy view. All trials administered via pt.      Baseline diet: Regular/ thin liquids  Dentition: Edentulous, dentures ill fitting  NG tube in place     Oral phase: Marked by slightly poor bolus cohesion with premature spillage to the vallecular level. 4-5 second swallow initiation. There is reduced epiglottic deflection. Mechanical soft trial not completed secondary to pt being edentulous and oral motor weakness.     Pharyngeal phase: Aspiration of thin liquids via straw, does not clear from airway despite effort. Transient penetration of thin liquids via cup, clears. Deep penetration of NTL via cup. Transient penetration of HTL with pharyngeal coating that clears after 2nd swallow cue by SLP. Adequate pharyngeal squeeze. Double swallow initiated during trials of puree which cleared pharyngeal coating/residue.      Esophageal view: Not  scanned     Pt demonstrates a MILD-MODERATE swallow impairment posing a moderate risk of aspiration and malnutrition risk. Dysphagia is most prominently characterized by lack of absent airway protective reactions, reduced epiglottic deflection, poor mastication, manipulation, and impulsivity with large sips. This results in open/patent laryngeal vestibule, and penetration of all liquids and eventual aspiration of thin liquids via straw.      Strategies attempted: n/a     Education provided: Nursing staff made aware of diet recommendations.           Rosenbeck's 8- Point Penetration Aspiration Scale  Material does not enter airway  Material enters the airway, remains above the vocal folds and is ejected from the airway  Material enters the airway, remains above the vocal folds, and is not ejected from the airway  Material enters the airway, contacts the vocal folds and is ejected from the airway  Material enters the airway, contacts the vocal folds, and is not ejected from the airway  Material enters the airway, passes below the vocal folds, and is ejected out of the trachea  Material enters the airway, passes below the vocal folds and is not ejected from the trachea despite effort  Material enters the airway, passes below the vocal folds, and no effort is made to eject.        SLP Recommendation and Plan:     - Puree diet and thin liquids, NO STRAW, FEEDING ASSISTANCE  - Only feed when pt is fully awake and alert  - Meds: crushed in puree   - Safe swallow strategies: HOB at a 90 degree angle, slow rate of intake, small bites/sips  -Oral care at least x2 daily   -ST will continue to follow as per current plan of care and with additional goals/recommendations as indicated           -MS       Oral Residue    Impaired Oral Residue lingual residue  -MS    Lingual Residue all consistencies tested  -MS    Response to Oral Residue cleared residue;with cued swallow  -MS       Initiation of Pharyngeal Swallow    Initiation of  Pharyngeal Swallow bolus in valleculae  -MS    Pharyngeal Phase impaired pharyngeal phase of swallowing  -MS       SLP Communication to Radiology    Severity Level of Dysphagia mild-moderate dysphagia  -MS    Consistencies Aspirated/Penetrated penetrated and aspirated;thin liquids;nectar-thick liquids;honey-thick liquids  -MS       SLP Evaluation Clinical Impression    SLP Swallowing Diagnosis mild-moderate;oral dysphagia;pharyngeal dysphagia  -MS    Functional Impact risk of aspiration/pneumonia;risk of malnutrition;risk of dehydration  -MS    Rehab Potential/Prognosis, Swallowing good, to achieve stated therapy goals  -MS    Swallow Criteria for Skilled Therapeutic Interventions Met demonstrates skilled criteria  -MS       SLP Treatment Clinical Impressions    Barriers to Overall Progress (SLP) Cognitive status;Advanced age  -MS    Plan for Continued Treatment (SLP) continue treatment per plan of care  -MS    Care Plan Review evaluation/treatment results reviewed  -MS       Recommendations    Therapy Frequency (Swallow) PRN  -MS    Predicted Duration Therapy Intervention (Days) until discharge  -MS    SLP Diet Recommendation thin liquids;puree  -MS    Recommended Diagnostics --    Recommended Precautions and Strategies no straw;upright posture during/after eating;general aspiration precautions;assist with feeding  -MS    Oral Care Recommendations Oral Care before breakfast, after meals and PRN  -MS    SLP Rec. for Method of Medication Administration with puree;meds crushed  -MS    Anticipated Discharge Disposition (SLP) unknown  -MS       Swallow Goals (SLP)    Swallow LTGs Patient will demonstrate functional swallow for  -MS    Swallow STGs diet tolerance goal selection (SLP)  -MS    Diet Tolerance Goal Selection (SLP) Patient will tolerate trials of  -MS       (LTG) Patient will demonstrate functional swallow for    Diet Texture (Demonstrate functional swallow) mechanical ground textures  -MS    Liquid viscosity  (Demonstrate functional swallow) thin liquids  -MS    [place] (Demonstrate functional swallow) independently (over 90% accuracy)  -MS    Time Frame (Demonstrate functional swallow) by discharge  -MS    Progress/Outcomes (Demonstrate functional swallow) new goal  -MS       (STG) Patient will tolerate trials of    Consistencies Trialed (Tolerate trials) thin liquids;mechanical ground textures  -MS    Desired Outcome (Tolerate trials) without signs/symptoms of aspiration;without signs of distress;with adequate oral prep/transit/clearance  -MS    [place] (Tolerate trials) independently (over 90% accuracy)  -MS    Time Frame (Tolerate trials) by discharge  -MS    [facility]              User Key  (r) = Recorded By, (t) = Taken By, (c) = Cosigned By      Initials Name Effective Dates    Joe Aaron, SLP 04/22/24 -                     EDUCATION  The patient has been educated in the following areas:   Dysphagia (Swallowing Impairment).        SLP GOALS       Row Name 02/03/25 1200       (LTG) Patient will demonstrate functional swallow for    Diet Texture (Demonstrate functional swallow) mechanical ground textures  -MS    Liquid viscosity (Demonstrate functional swallow) thin liquids  -MS    [place] (Demonstrate functional swallow) independently (over 90% accuracy)  -MS    Time Frame (Demonstrate functional swallow) by discharge  -MS    Progress/Outcomes (Demonstrate functional swallow) new goal  -MS       (STG) Patient will tolerate trials of    Consistencies Trialed (Tolerate trials) thin liquids;mechanical ground textures  -MS    Desired Outcome (Tolerate trials) without signs/symptoms of aspiration;without signs of distress;with adequate oral prep/transit/clearance  -MS    [place] (Tolerate trials) independently (over 90% accuracy)  -MS    Time Frame (Tolerate trials) by discharge  -MS    Progress/Outcomes (Tolerate trials) new [facility]               User Key  (r) = Recorded By, (t) = Taken By, (c) = Cosigned By      Initials Name Provider Type    Joe Aaron, SLP Speech and Language Pathologist                    CAMRON Mast  2/3/2025

## 2025-02-03 NOTE — PROGRESS NOTES
Nutrition Services  Patient Name: Alexey Trinidad  YOB: 1926  MRN: [id number]  Admission date: 1/24/2025    PROGRESS NOTE      Nutrition Intervention Updates: Continue current enteral nutrition regimen, which is at goal rate.         Encounter Information: Checking in to monitor TF. Pt continues with supplemental O2 and receiving EN at goal rate. No tolerance issues documented at this time.        PO Diet: NPO Diet NPO Type: Tube Feeding   PO Supplements: None    PO Intake:  NPO       Current nutrition support: Nutren 1.5 at 40 mL/hr + 20 mL/hr water flush    Nutrition support review: Documented infusing as ordered        Labs (reviewed below): Reviewed. Management per attending.        GI Function:  Last documented BM 2/2 - continued loose stools. Somewhat improved       Brief weight review   Wt Readings from Last 10 Encounters:   02/02/25 0453 45.3 kg (99 lb 13.9 oz)   02/01/25 0511 45.1 kg (99 lb 6.8 oz)   01/31/25 0534 46.2 kg (101 lb 13.6 oz)   01/30/25 0528 47.7 kg (105 lb 2.6 oz)   01/29/25 0531 48 kg (105 lb 13.1 oz)   01/27/25 1757 47.2 kg (104 lb)   01/24/25 2006 47.4 kg (104 lb 8 oz)   01/24/25 1614 49.4 kg (109 lb)        Results from last 7 days   Lab Units 02/02/25  1556 02/01/25  2325 02/01/25  0548 01/31/25  0300 01/29/25  0506 01/28/25  0521   SODIUM mmol/L  --  139 137 138   < > 144   POTASSIUM mmol/L 4.7 3.3* 3.7 3.9   < > 4.4   CHLORIDE mmol/L  --  90* 89* 89*   < > 99   CO2 mmol/L  --  40.7* 37.8* 42.0*   < > 36.8*   BUN mg/dL  --  29* 33* 33*   < > 10   CREATININE mg/dL  --  0.73 0.63 0.66   < > 0.66   CALCIUM mg/dL  --  9.5 9.6 10.0*   < > 8.6   BILIRUBIN mg/dL  --   --  0.4  --   --  0.3   ALK PHOS U/L  --   --  38*  --   --  44   ALT (SGPT) U/L  --   --  36*  --   --  13   AST (SGOT) U/L  --   --  31  --   --  24   GLUCOSE mg/dL  --  175* 168* 113*   < > 122*    < > = values in this interval not displayed.     Results from last 7 days   Lab Units 02/01/25  2325 02/01/25  0548  01/31/25  0300   MAGNESIUM mg/dL 2.5* 2.4* 2.6*   PHOSPHORUS mg/dL 4.0 5.1* 1.9*   HEMOGLOBIN g/dL 13.7 13.7 14.4   HEMATOCRIT % 43.0 43.2 46.0           RD to follow up per protocol.    Electronically signed by:  Cassandra Taveras RD  02/03/25 08:06 EST

## 2025-02-03 NOTE — DISCHARGE PLACEMENT REQUEST
"Arabella Herrera (89 y.o. Female)       Date of Birth   03/21/1926    Social Security Number       Address   [address] [place] [address] [place] [facility]    Home Phone   [phone]    MRN   [id number]       Baptism   None    Marital Status                               Admission Date   1/24/25    Admission Type   Emergency    Admitting Provider   Gray Yee MD    Attending Provider   Roxanne Lorenz MD    Department, Room/Bed   [facility], 2101/1       Discharge Date       Discharge Disposition       Discharge Destination                                 Attending Provider: Roxanne Lorenz MD    Allergies: Contrast Dye (Echo Or Unknown Ct/mr), Sulfa Antibiotics    Isolation: Spore   Infection: Norovirus (01/25/25)   Code Status: No CPR    Ht: 147.3 cm (58\")   Wt: 45.3 kg (99 lb 13.9 oz)    Admission Cmt: None   Principal Problem: UTI (urinary tract infection) [N39.0]                   Active Insurance as of 1/24/2025       Primary Coverage       Payor Plan Insurance Group Employer/Plan Group    HUMANA MEDICARE REPLACEMENT HUMANA MED ADV HMO [id number]       Payor Plan Address Payor Plan Phone Number Payor Plan Fax Number Effective Dates    [address] [address] [phone]  1/1/2025 - None Entered    [facility] [id number]         Subscriber Name Subscriber Birth Date Member ID       ARABELLA HERRERA 3/21/1926 [id number]                     Emergency Contacts        (Rel.) Home Phone Work Phone Mobile Phone    Aislinn Rae (Daughter) -- -- [phone]    Vince Rae (Grandchild) -- -- [phone]            "

## 2025-02-03 NOTE — THERAPY EVALUATION
Patient Name: Alexey Trinidad  : 3/21/1926    MRN: [id number]                              Today's Date: 2/3/2025       Admit Date: 2025    Visit Dx:     ICD-10-CM ICD-9-CM   1. Urinary tract infection without hematuria, site unspecified  N39.0 599.0   2. General weakness  R53.1 780.79   3. Vomiting, unspecified vomiting type, unspecified whether nausea present  R11.10 787.03     Patient Active Problem List   Diagnosis    UTI (urinary tract infection)    Abdominal pain     Past Medical History:   Diagnosis Date    Disease of thyroid gland     Hypertension      Past Surgical History:   Procedure Laterality Date    BACK SURGERY      kyphoplasty    BREAST SURGERY      benign cyst removed    HYSTERECTOMY      LAPAROSCOPIC CHOLECYSTECTOMY        General Information       Row Name 25 1537          OT Time and Intention    Subjective Information complains of;weakness;fatigue;pain  -     Patient Effort excellent  -     Symptoms Noted During/After Treatment fatigue;increased pain;significant change in vital signs  -       Row Name 25 1537          General Information    Patient Profile Reviewed yes  -     Prior Level of Function independent:;all household mobility;ADL's  -     Existing Precautions/Restrictions fall  norovirus, contact spore  -     Barriers to Rehab none identified  -       Row Name 25 153          Living Environment    People in Home child(mahnaz), adult;grandchild(mahnaz)  -       Row Name 25 1537          Cognition    Orientation Status (Cognition) oriented to;time;situation;disoriented to;person;place  identified short & long term memory deficit. Unable to give her correct birthdate but able to report the correct month & year, etc.  -       Row Name 25 1537          Safety Issues/Impairments Affecting Functional Mobility    Impairments Affecting Function (Mobility) strength;pain;balance  -               User Key  (r) = Recorded By, (t) = Taken By, (c) =  Cosigned By      Initials Name Provider Type     Ilene Mejias, OT Occupational Therapist                     Mobility/ADL's       Row Name 02/03/25 1541          Bed Mobility    Bed Mobility rolling right;rolling left;supine-sit  -     Rolling Left [place] (Bed Mobility) set up  -     Rolling Right [place] (Bed Mobility) set up  -     Supine-Sit [place] (Bed Mobility) moderate assist (50% patient effort)  -       Row Name 02/03/25 1541          Transfers    Transfers toilet transfer;bed-chair transfer  -       Row Name 02/03/25 1541          Bed-Chair Transfer    Bed-Chair [place] (Transfers) minimum assist (75% patient effort)  -       Row Name 02/03/25 1541          Sit-Stand Transfer    Sit-Stand [place] (Transfers) minimum assist (75% patient effort)  -       Row Name 02/03/25 1541          Toilet Transfer    Type (Toilet Transfer) stand pivot/stand step  -     [place] Level (Toilet Transfer) minimum assist (75% patient effort)  -       Row Name 02/03/25 1541          Functional Mobility    Functional Mobility- Ind. Level minimum assist (75% patient effort)  -     Functional Mobility-Distance (Feet) 3  -     Patient was able to Ambulate yes  -       Row Name 02/03/25 1541          Activities of Daily Living    BADL Assessment/Intervention toileting;grooming;feeding;lower body dressing  -       Row Name 02/03/25 1541          Toileting Assessment/Training    [place] Level (Toileting) toileting skills;dependent (less than 25% patient effort)  -       Row Name 02/03/25 1541          Grooming Assessment/Training    [place] Level (Grooming) grooming skills;maximum assist (25% patient effort)  -       Row Name 02/03/25 1541          Self-Feeding Assessment/Training    [place] Level (Feeding) feeding skills;minimum assist (75% patient effort)  -[facility] Name 02/03/25 1541          Lower Body Dressing Assessment/Training    [place]  Level (Lower Body Dressing) lower body dressing skills;dependent (less than 25% patient effort)  -               User Key  (r) = Recorded By, (t) = Taken By, (c) = Cosigned By      Initials Name Provider Type     Ilene Mejias OT Occupational Therapist                   Obj/Interventions       [facility] Name 02/03/25 1542          Sensory Assessment (Somatosensory)    Sensory Assessment (Somatosensory) sensation intact  -MH       Row Name 02/03/25 1542          Vision Assessment/Intervention    Visual Impairment/Limitations corrective lenses full-time  -MH       Row Name 02/03/25 1542          Range of Motion Comprehensive    General Range of Motion no range of motion deficits identified  -[facility] Name 02/03/25 1542          Strength Comprehensive (MMT)    Comment, General Manual Muscle Testing (MMT) Assessment 3+/5  -               User Key  (r) = Recorded By, (t) = Taken By, (c) = Cosigned By      Initials Name Provider Type     Ilene Mejias OT Occupational Therapist                   Goals/Plan       [facility] Name 02/03/25 1553          Bed Mobility Goal 1 (OT)    Activity/Assistive Device (Bed Mobility Goal 1, OT) bed mobility activities, all  -MH     [place] Level/Cues Needed (Bed Mobility Goal 1, OT) independent  -     Time Frame (Bed Mobility Goal 1, OT) 2 weeks  -[facility] Name 02/03/25 1553          Transfer Goal 1 (OT)    Activity/Assistive Device (Transfer Goal 1, OT) transfers, all  -     [place] Level/Cues Needed (Transfer Goal 1, OT) contact guard required  -     Time Frame (Transfer Goal 1, OT) 2 weeks  -MH       Row Name 02/03/25 1553          Grooming Goal 1 (OT)    Activity/Device (Grooming Goal 1, OT) grooming skills, all  -MH     [place] (Grooming Goal 1, OT) set-up required  -     Time Frame (Grooming Goal 1, OT) 2 weeks  -[facility] Name 02/03/25 1553          Therapy Assessment/Plan (OT)    Planned Therapy Interventions (OT) activity tolerance  training;adaptive equipment training;BADL retraining;cognitive/visual perception retraining;occupation/activity based interventions;passive ROM/stretching;transfer/mobility retraining;patient/caregiver education/training  -               User Key  (r) = Recorded By, (t) = Taken By, (c) = Cosigned By      Initials Name Provider Type     Ilene Mejias, ERIN Occupational Therapist                   Clinical Impression       Row Name 02/03/25 1542          Pain Assessment    Pretreatment Pain Rating 6/10  -     Posttreatment Pain Rating 6/10  hans area & ABD w/ cramping  -       Row Name 02/03/25 1542          Plan of Care Review    Plan of Care Reviewed With patient  -     Progress no change  -     Outcome Evaluation Pt is 89 y/o F admitted with norovirus and has been improving with her O2 needs but still having uncontrolled diarrhea. She is finally being placed on a pureed diet and her NG tube is removed. Pt is (I) for mobility & ADL at home with family. This date despite pt pain & illness she was very eager to get up to the chair and did use a BSC that OT brought in, though her loose stool continued to be a problem throughout eval & treatment session. Pt needed min (A) for transfers, dependent assist for grooming, dressing, min (A) for self feeding. Pt appears to have fair strength & muscle mass despite her age and acute illness. Pt has been hospitalized for a week and pt is not oriented fully to time or to her own birthdate and past history. Unknown baseline orientation status. Short and long term memory deficits noted this date. Pt nonetheless was apporpriate and followed all commands. Appears to be a motivated and good candidate for rehab. OT recommends SNF at d/c.  -       Row Name 02/03/25 1542          Therapy Assessment/Plan (OT)    Rehab Potential (OT) good  -     Criteria for Skilled Therapeutic Interventions Met (OT) skilled treatment is necessary  -     Therapy Frequency (OT) 5 times/wk   -     Predicted Duration of Therapy Intervention (OT) until d/c  -       Row Name 02/03/25 1542          Therapy Plan Review/Discharge Plan (OT)    Anticipated Discharge Disposition (OT) skilled nursing facility  -       Row Name 02/03/25 1542          Vital Signs    O2 Delivery Pre Treatment room air  -     Intra SpO2 (%) 83  pt placed on 2L and returned to 100% in 5 minutes then removed to room air and sats stabilized at 94-95%  -     O2 Delivery Intra Treatment supplemental O2  -     Post SpO2 (%) 94  -MH     O2 Delivery Post Treatment room air  -     Intra Patient Position Standing  -MH     Post Patient Position Sitting  -       Row Name 02/03/25 1542          Positioning and Restraints    Pre-Treatment Position in bed  -     Post Treatment Position chair  -     In Chair notified nsg;reclined;call light within reach;with nsg;exit alarm on;encouraged to call for assist  -               User Key  (r) = Recorded By, (t) = Taken By, (c) = Cosigned By      Initials Name Provider Type     Ilene Mejias, ERIN Occupational Therapist                   Outcome Measures       Row Name 02/03/25 0800          How much help from another person do you currently need...    Turning from your back to your side while in flat bed without using bedrails? 2  -MV     Moving from lying on back to sitting on the side of a flat bed without bedrails? 2  -MV     Moving to and from a bed to a chair (including a wheelchair)? 2  -MV     Standing up from a chair using your arms (e.g., wheelchair, bedside chair)? 2  -MV     Climbing 3-5 steps with a railing? 2  -MV     To walk in hospital room? 2  -MV     AM-PAC 6 Clicks Score (PT) 12  -MV     Highest Level of Mobility Goal 4 --> Transfer to chair/commode  -               User Key  (r) = Recorded By, (t) = Taken By, (c) = Cosigned By      Initials Name Provider Type    Mary Hill, RN Registered Nurse                    Occupational Therapy Education       Title:  PT OT SLP Therapies (In Progress)       Topic: Occupational Therapy (In Progress)       Point: ADL training (Done)       Description:   Instruct learner(s) on proper safety adaptation and remediation techniques during self care or transfers.   Instruct in proper use of assistive devices.                  Learning Progress Summary            Patient Acceptance, E,TB,D, VU,DU,NR by  at 2/3/2025 1554                      Point: Home exercise program (Not Started)       Description:   Instruct learner(s) on appropriate technique for monitoring, assisting and/or progressing therapeutic exercises/activities.                  Learner Progress:  Not documented in this visit.              Point: Precautions (Done)       Description:   Instruct learner(s) on prescribed precautions during self-care and functional transfers.                  Learning Progress Summary            Patient Acceptance, E,TB,D, VU,DU,NR by  at 2/3/2025 1554                      Point: Body mechanics (Done)       Description:   Instruct learner(s) on proper positioning and spine alignment during self-care, functional mobility activities and/or exercises.                  Learning Progress Summary            Patient Acceptance, E,TB,D, VU,DU,NR by  at 2/3/2025 1554                                      User Key       Initials Effective Dates Name Provider Type Discipline     06/16/21 -  Ilene Mejias, OT Occupational Therapist OT                  OT Recommendation and Plan  Planned Therapy Interventions (OT): activity tolerance training, adaptive equipment training, BADL retraining, cognitive/visual perception retraining, occupation/activity based interventions, passive ROM/stretching, transfer/mobility retraining, patient/caregiver education/training  Therapy Frequency (OT): 5 times/wk  Plan of Care Review  Plan of Care Reviewed With: patient  Progress: no change  Outcome Evaluation: Pt is 89 y/o F admitted with norovirus and has been  improving with her O2 needs but still having uncontrolled diarrhea. She is finally being placed on a pureed diet and her NG tube is removed. Pt is (I) for mobility & ADL at home with family. This date despite pt pain & illness she was very eager to get up to the chair and did use a BSC that OT brought in, though her loose stool continued to be a problem throughout eval & treatment session. Pt needed min (A) for transfers, dependent assist for grooming, dressing, min (A) for self feeding. Pt appears to have fair strength & muscle mass despite her age and acute illness. Pt has been hospitalized for a week and pt is not oriented fully to time or to her own birthdate and past history. Unknown baseline orientation status. Short and long term memory deficits noted this date. Pt nonetheless was apporpriate and followed all commands. Appears to be a motivated and good candidate for rehab. OT recommends SNF at d/c.     Time Calculation:         Time Calculation- OT       Row Name 02/03/25 1554             Time Calculation-     OT Start Time 1408  -      OT Stop Time 1450  -      OT Time Calculation (min) 42 min  -      Total Timed Code Minutes- OT 30 minute(s)  -      OT Received On 02/03/25  -      OT - Next Appointment 02/04/25  -      OT Goal Re-Cert Due Date 02/17/25  -                User Key  (r) = Recorded By, (t) = Taken By, (c) = Cosigned By      Initials Name Provider Type     Ilene Mejias OT Occupational Therapist                  Therapy Charges for Today       Code Description Service Date Service Provider Modifiers Qty    29098247180  OT SELF CARE/MGMT/TRAIN EA 15 MIN 2/3/2025 Ilene Mejias OT GO 1    [id number]  OT THERAPEUTIC ACT EA 15 MIN 2/3/2025 Ilene Mejias OT GO 1    [id number]  OT EVAL HIGH COMPLEXITY 3 2/3/2025 Ilene Mejias OT GO 1                 Ilene Mejias OT  2/3/2025

## 2025-02-03 NOTE — THERAPY TREATMENT NOTE
"Subjective: Pt agreeable to therapeutic plan of care.    Objective:     Precautions - Falls    Bed mobility - CGA  Transfers - CGA  Ambulation - 5 feet CGA single HHA to recliner     Therapeutic Exercise - 10 Reps Bilaterally AROM lying supine    Vitals: WNL    Education: Provided education on the importance of mobility in the acute care setting, Verbal/Tactile Cues, Transfer Training, Gait Training, Energy conservation strategies, and HEP    Assessment: Alexey Trinidad presents with functional mobility impairments which indicate the need for skilled intervention. Mobilizes w/ Ax1 and HHA this date. Continue to recommend rehab as pt is below baseline and deconditioned although Pt requests home at this time. Tolerating session today without incident. Will continue to follow and progress as tolerated.     Plan/Recommendations:   If medically appropriate, Moderate Intensity Therapy recommended post-acute care. This is recommended as therapy feels the patient would require 3-4 days per week and wouldn't tolerate \"3 hour daily\" rehab intensity. SNF would be the preferred choice. If the patient does not agree to SNF, arrange HH or OP depending on home bound status. If patient is medically complex, consider LTACH. Pt requires no DME at discharge.     Pt desires Home with family assist and Home Health at discharge. Pt cooperative; agreeable to therapeutic recommendations and plan of care.         Basic Mobility 6-click:  Rollin = Total, A lot = 2, A little = 3; 4 = None  Supine>Sit:   1 = Total, A lot = 2, A little = 3; 4 = None   Sit>Stand with arms:  1 = Total, A lot = 2, A little = 3; 4 = None  Bed>Chair:   1 = Total, A lot = 2, A little = 3; 4 = None  Ambulate in room:  1 = Total, A lot = 2, A little = 3; 4 = None  3-5 Steps with railin = Total, A lot = 2, A little = 3; 4 = None  Score: 16    Modified [place]: N/A = No pre-op stroke/TIA    Post-Tx Position: Up in Chair, Alarms activated, and Call light and " personal items within reach  PPE: gloves, surgical mask, and gown    Therapy Charges for Today       Code Description Service Date Service Provider Modifiers Qty    37967332458 HC PT THER PROC EA 15 MIN 2/3/2025 Vonnie Deng PTA GP 1    11553590378 HC GAIT TRAINING EA 15 MIN 2/3/2025 Vonnie Deng PTA GP 1    54642489058 HC PT THERAPEUTIC ACT EA 15 MIN 2/3/2025 Vonnie Deng PTA GP 1           PT Charges       Row Name 02/03/25 1124             Time Calculation    Start Time 0933  -CC      Stop Time 1004  -CC      Time Calculation (min) 31 min  -CC      PT Received On 02/03/25  -CC      PT - Next Appointment 02/04/25  -CC         Time Calculation- PT    Total Timed Code Minutes- PT 31 minute(s)  -CC                User Key  (r) = Recorded By, (t) = Taken By, (c) = Cosigned By      Initials Name Provider Type    CC Vonnie Deng PTA Physical Therapist Assistant

## 2025-02-03 NOTE — CASE MANAGEMENT/SOCIAL WORK
Continued Stay Note  [facility]     Patient Name: Alexey Trinidad  MRN: [id number]  Today's Date: 2/3/2025    Admit Date: 1/24/2025    Plan: [facility] (skilled, accepted). PASRR needed. Precert required.   Discharge Plan       Row Name 02/03/25 1252       Plan    Plan [facility] (skilled, accepted). PASRR needed. Precert required.    Plan Comments CM spoke to daughter via phone and she requested [facility] for SNF. CM sent inbasket and confirmed with liaison Jazmín they have accepted as long as NG tube is out. Daughter is currently at rehab there as well. Awaiting OT notes to start precert today. MD made aware. Updated pharmacy to Rachael Constantino.             Carolina Dixon RN      [facility]  Office: [phone]  Cell: [phone]  Fax # [phone]

## 2025-02-03 NOTE — CASE MANAGEMENT/SOCIAL WORK
Social Work Assessment  [facility]     Patient Name: Alexey Trinidad  MRN: [id number]  Today's Date: 2/3/2025    Admit Date: 1/24/2025     Discharge Plan       Row Name 02/03/25 1358       Plan    Plan [facility] (skilled, accepted). PASRR approved. Precert required.    Plan Comments CM updated on PASRR status.                  TAY Boyce, LSW, CCM  Lead   Phone: [phone]  Cell: [phone]  Fax: [phone]  [email or web address]

## 2025-02-03 NOTE — PROGRESS NOTES
Referring Provider: Roxanne Lorenz,*    Reason for follow-up: Valvular heart disease     Patient Care Team:  Rui Cole MD as PCP - General (Internal Medicine)  Oscar Wesley MD as Cardiologist (Cardiology)      SUBJECTIVE  Resting comfortably in bed.  Currently on 1 L of oxygen.  Reports feeling better.     ROS  Review of all systems negative except as indicated.    Since I have last seen, the patient has been without any chest discomfort, shortness of breath, palpitations, dizziness or syncope.  Denies having any headache, abdominal pain, nausea, vomiting, diarrhea, constipation, loss of weight or loss of appetite.  Denies having any excessive bruising, hematuria or blood in the stool.        Personal History:    Past Medical History:   Diagnosis Date    Disease of thyroid gland     Hypertension        Past Surgical History:   Procedure Laterality Date    BACK SURGERY      kyphoplasty    BREAST SURGERY      benign cyst removed    HYSTERECTOMY      LAPAROSCOPIC CHOLECYSTECTOMY         History reviewed. No pertinent family history.    Social History     Tobacco Use    Smoking status: Never    Smokeless tobacco: Never   Vaping Use    Vaping status: Never Used   Substance Use Topics    Alcohol use: Never    Drug use: Never        Home meds:  Prior to Admission medications    Medication Sig Start Date End Date Taking? Authorizing Provider   acetaminophen (TYLENOL) 500 MG tablet Take 1 tablet by mouth Every Morning.   Yes Gerald Costa MD   Bacillus Coagulans-Inulin (ALIGN PREBIOTIC-PROBIOTIC PO) Take 1 capsule by mouth Daily.   Yes Gerald Costa MD   balsalazide (COLAZAL) 750 MG capsule Take 1 capsule by mouth Every Night.   Yes Gerald Costa MD   Budesonide (ENTOCORT EC) 3 MG 24 hr capsule Take 1 capsule by mouth 2 (Two) Times a Day.   Yes Gerald Costa MD   Cyanocobalamin (VITAMIN B-12 PO) Take 1 tablet by mouth Daily.   Yes Gerald Costa MD   levothyroxine  (SYNTHROID, LEVOTHROID) 75 MCG tablet Take 1 tablet by mouth Every Morning.   Yes Provider, MD Gerald   losartan (COZAAR) 50 MG tablet Take 1 tablet by mouth Daily.   Yes Provider, MD Gerald   multivitamins-minerals (PRESERVISION AREDS 2) capsule capsule Take 1 capsule by mouth 2 (Two) Times a Day.   Yes Provider, MD Gerald   Wheat Dextrin (BENEFIBER DRINK MIX PO) Take 1 package by mouth Daily.   Yes Provider, MD Gerald       Allergies:  Contrast dye (echo or unknown ct/mr) and Sulfa antibiotics    Scheduled Meds:balsalazide, 750 mg, Oral, Nightly  [Held by provider] Budesonide, 3 mg, Oral, BID  budesonide, 0.5 mg, Nebulization, BID - RT  enoxaparin, 30 mg, Subcutaneous, Daily  furosemide, 40 mg, Oral, BID Diuretics  hydrocortisone, 20 mg, Oral, Daily With Lunch  ipratropium-albuterol, 3 mL, Nebulization, 4x Daily - RT  levothyroxine, 75 mcg, Oral, Q AM  multivitamin with minerals, 1 tablet, Oral, Daily  pantoprazole, 40 mg, Intravenous, Q AM  sildenafil, 10 mg, Oral, TID  sodium chloride, 10 mL, Intravenous, Q12H  vitamin B-12, 1,000 mcg, Oral, Daily      Continuous Infusions:   PRN Meds:.  acetaminophen **OR** acetaminophen **OR** acetaminophen    senna-docusate sodium **AND** polyethylene glycol **AND** bisacodyl **AND** bisacodyl    Calcium Replacement - Follow Nurse / BPA Driven Protocol    dextrose    dextrose    glucagon (human recombinant)    Magnesium Standard Dose Replacement - Follow Nurse / BPA Driven Protocol    ondansetron    Phosphorus Replacement - Follow Nurse / BPA Driven Protocol    Potassium Replacement - Follow Nurse / BPA Driven Protocol    Psyllium    [COMPLETED] Insert Peripheral IV **AND** sodium chloride    sodium chloride    sodium chloride      OBJECTIVE    Vital Signs  Vitals:    02/02/25 2016 02/02/25 2315 02/03/25 0332 02/03/25 0339   BP: 91/55 92/55 104/66    BP Location: Left arm Left arm     Patient Position: Lying Lying     Pulse: 76 99 81    Resp: 16 15 17   "  Temp: 96.6 °F (35.9 °C) 97 °F (36.1 °C) 97.2 °F (36.2 °C) 97.2 °F (36.2 °C)   TempSrc: Axillary Oral Oral    SpO2: 97% 94% 96%    Weight:       Height:           Flowsheet Rows      Flowsheet Row First Filed Value   Admission Height 147.3 cm (58\") Documented at 01/24/2025 1614   Admission Weight 49.4 kg (109 lb) Documented at 01/24/2025 1614            No intake or output data in the 24 hours ending 02/03/25 0644         Telemetry: Sinus rhythm    Physical Exam:  The patient is alert, oriented and in no distress.  Elderly/frail  Vital signs as noted above.  Head and neck revealed no carotid bruits or jugular venous distention.  No thyromegaly or lymphadenopathy is present  Lungs clear.  No wheezing.  Breath sounds are normal bilaterally.  Heart normal first and second heart sounds.  No murmur. No precordial rub is present.  No gallop is present.  Abdomen soft and nontender.  No organomegaly is present.  Extremities with good peripheral pulses without any pedal edema.  Skin warm and dry.  Musculoskeletal system is grossly normal.  CNS grossly normal.       Results Review:  I have personally reviewed the results from the time of this admission to 2/3/2025 06:44 EST and agree with these findings:  []  Laboratory  []  Microbiology  []  Radiology  []  EKG/Telemetry   []  Cardiology/Vascular   []  Pathology  []  Old records  []  Other:    Most notable findings include:    Lab Results (last 24 hours)       Procedure Component Value Units Date/Time    POC Glucose Once [[id number]]  (Abnormal) Collected: 02/03/25 0552    Specimen: Blood Updated: 02/03/25 0554     Glucose 141 mg/dL      Comment: Serial Number: [id number]:  [id number]       POC Glucose Once [[id number]]  (Abnormal) Collected: 02/02/25 2314    Specimen: Blood Updated: 02/02/25 2315     Glucose 166 mg/dL      Comment: Serial Number: [id number]:  [id number]       POC Glucose Q6H [[id number]]  (Abnormal) Collected: 02/02/25 1806    Specimen: Blood " Updated: 02/02/25 1807     Glucose 142 mg/dL      Comment: Serial Number: [id number]:  [id number]       Potassium [[id number]]  (Normal) Collected: 02/02/25 1556    Specimen: Blood from Arm, Left Updated: 02/02/25 1633     Potassium 4.7 mmol/L      Comment: Specimen hemolyzed.  Result may be falsely elevated.  Result checked         Iron Profile [[id number]]  (Normal) Collected: 02/02/25 1556    Specimen: Blood from Arm, Left Updated: 02/02/25 1627     Iron 83 mcg/dL      Iron Saturation (TSAT) 25 %      Transferrin 224 mg/dL      TIBC 334 mcg/dL     POC Glucose Q6H [[id number]]  (Abnormal) Collected: 02/02/25 1305    Specimen: Blood Updated: 02/02/25 1307     Glucose 139 mg/dL      Comment: Serial Number: [id number]:  [id number]               Imaging Results (Last 24 Hours)       ** No results found for the last 24 hours. **            LAB RESULTS (LAST 7 DAYS)    CBC  Results from last 7 days   Lab Units 02/01/25 2325 02/01/25 0548 01/31/25  0300 01/30/25  0046 01/29/25  0506 01/28/25  0521   WBC 10*3/mm3 9.47 8.92 11.50* 9.43 9.16 8.28   RBC 10*6/mm3 4.45 4.45 4.70 4.33 4.40 4.28   HEMOGLOBIN g/dL 13.7 13.7 14.4 13.6 13.5 13.2   HEMATOCRIT % 43.0 43.2 46.0 42.2 43.2 43.9   MCV fL 96.6 97.1* 97.9* 97.5* 98.2* 102.6*   PLATELETS 10*3/mm3 369 321 346 299 255 232       BMP  Results from last 7 days   Lab Units 02/02/25 1556 02/01/25 2325 02/01/25 0548 01/31/25  0300 01/30/25  1209 01/30/25  0046 01/29/25  0506 01/28/25  0521   SODIUM mmol/L  --  139 137 138  --  142 144 144   POTASSIUM mmol/L 4.7 3.3* 3.7 3.9 4.1 3.6 3.0* 4.4   CHLORIDE mmol/L  --  90* 89* 89*  --  90* 90* 99   CO2 mmol/L  --  40.7* 37.8* 42.0*  --  43.5* 40.7* 36.8*   BUN mg/dL  --  29* 33* 33*  --  26* 17 10   CREATININE mg/dL  --  0.73 0.63 0.66  --  0.60 0.66 0.66   GLUCOSE mg/dL  --  175* 168* 113*  --  156* 125* 122*   MAGNESIUM mg/dL  --  2.5* 2.4* 2.6*  --  2.1  --  1.9   PHOSPHORUS mg/dL  --  4.0 5.1* 1.9*  --  2.2*  --  2.9        CMP   Results from last 7 days   Lab Units 02/02/25  1556 02/01/25  2325 02/01/25  0548 01/31/25  0300 01/30/25  1209 01/30/25  0046 01/29/25  0506 01/28/25  0521   SODIUM mmol/L  --  139 137 138  --  142 144 144   POTASSIUM mmol/L 4.7 3.3* 3.7 3.9 4.1 3.6 3.0* 4.4   CHLORIDE mmol/L  --  90* 89* 89*  --  90* 90* 99   CO2 mmol/L  --  40.7* 37.8* 42.0*  --  43.5* 40.7* 36.8*   BUN mg/dL  --  29* 33* 33*  --  26* 17 10   CREATININE mg/dL  --  0.73 0.63 0.66  --  0.60 0.66 0.66   GLUCOSE mg/dL  --  175* 168* 113*  --  156* 125* 122*   ALBUMIN g/dL  --   --  3.7  --   --   --   --  3.6   BILIRUBIN mg/dL  --   --  0.4  --   --   --   --  0.3   ALK PHOS U/L  --   --  38*  --   --   --   --  44   AST (SGOT) U/L  --   --  31  --   --   --   --  24   ALT (SGPT) U/L  --   --  36*  --   --   --   --  13       BNP        TROPONIN        CoAg        Creatinine Clearance  Estimated Creatinine Clearance: 30.8 mL/min (by C-G formula based on SCr of 0.73 mg/dL).    ABG  Results from last 7 days   Lab Units 01/27/25  1852 01/27/25  1602   PH, ARTERIAL pH units 7.304* 7.243*   PCO2, ARTERIAL mm Hg 78.5* 93.7*   PO2 ART mm Hg 60.8* 103.5   O2 SATURATION ART % 86.7* 96.2   BASE EXCESS ART mmol/L 9.3* 8.7*       Radiology  No radiology results for the last day      EKG  I personally viewed and interpreted the patient's EKG/Telemetry data:  Telemetry Scan   Final Result      Telemetry Scan   Final Result      Telemetry Scan   Final Result      Telemetry Scan   Final Result      Telemetry Scan   Final Result      Telemetry Scan   Final Result      Telemetry Scan   Final Result      Telemetry Scan   Final Result      Telemetry Scan   Final Result      Telemetry Scan   Final Result      Telemetry Scan   Final Result      Telemetry Scan   Final Result      Telemetry Scan   Final Result      Telemetry Scan   Final Result      Telemetry Scan   Final Result      Telemetry Scan   Final Result      Telemetry Scan   Final Result      Telemetry  Scan   Final Result      Telemetry Scan   Final Result      Telemetry Scan   Final Result      Telemetry Scan   Final Result      Telemetry Scan   Final Result      Telemetry Scan   Final Result      Telemetry Scan   Final Result      Telemetry Scan   Final Result      Telemetry Scan   Final Result      Telemetry Scan   Final Result      Telemetry Scan   Final Result      Telemetry Scan   Final Result      Telemetry Scan   Final Result      Telemetry Scan   Final Result      Telemetry Scan   Final Result      Telemetry Scan   Final Result            Echocardiogram:    Results for orders placed during the hospital encounter of 01/24/25    Adult Transthoracic Echo Complete W/ Cont if Necessary Per Protocol    Interpretation Summary    Left ventricular systolic function is normal. Calculated left ventricular EF = 70%    Left ventricular wall thickness is consistent with moderate to severe concentric hypertrophy.    Left ventricular diastolic function is consistent with (grade I) impaired relaxation.    The left atrial cavity is mildly dilated.    The right atrial cavity is mildly  dilated.    Moderate aortic valve regurgitation is present.    Moderate to severe aortic valve stenosis is present.    Moderate tricuspid valve regurgitation is present.    Estimated right ventricular systolic pressure from tricuspid regurgitation is markedly elevated (>55 mmHg).    Transthoracic echocardiography reveals moderate to severe LVH with severely calcified aortic valve with moderate aortic regurgitation and moderate to severe aortic stenosis.  No effusion.  Moderate TR with severe pulm hypertension.  No effusion    Electronically signed by Kt Goins MD, 01/28/25, 5:51 PM EST.        Stress Test:         Cardiac Catheterization:  No results found for this or any previous visit.         Other:         ASSESSMENT & PLAN:    Principal Problem:    UTI (urinary tract infection)  Active Problems:    Abdominal pain    Valvular  heart disease  Patient has moderate aortic valve regurgitation and stenosis.  Mean/peak gradient of 19/32 mmHg.  Aortic valve area on planimetry is more than 1 cm²  Moderate tricuspid valve regurgitation with severe pulmonary hypertension.  Currently does not meet criteria for transcatheter aortic valve replacement or tricuspid valve repair.  Recommend diuretics and medical management of valvular heart disease with blood pressure and heart rate control.  Continue Lasix  proBNP is normal  Started on sildenafil  Monitor I's and O's and replace electrolytes as needed.  Would like to follow-up as outpatient for progression of disease and to discuss transcatheter options.  Given advanced age and poor functional status: I am inclined towards medical management only with diuretics.     Respiratory failure  Hypoxemic respiratory failure  Severe pulmonary hypertension  On sildenafil  Oxygenation improved.  Now on 1 L of oxygen  Bronchodilators and steroid per pulmonology     UTI  She will complete a course of antibiotics     Elderly/frail  PT OT  Nutrition/dietitian on board     CODE STATUS is DNI DNR.  Transition to SNF.      Oscar Wesley MD  02/03/25  06:44 EST

## 2025-02-03 NOTE — PLAN OF CARE
Problem: Adult Inpatient Plan of Care  Goal: Absence of Hospital-Acquired Illness or Injury  Intervention: Identify and Manage Fall Risk  Recent Flowsheet Documentation  Taken 2/3/2025 0401 by Corazon Cruz RN  Safety Promotion/Fall Prevention:   assistive device/personal items within reach   clutter free environment maintained   fall prevention program maintained   nonskid shoes/slippers when out of bed   room organization consistent   safety round/check completed  Taken 2/3/2025 0200 by Corazon Cruz RN  Safety Promotion/Fall Prevention:   assistive device/personal items within reach   clutter free environment maintained   fall prevention program maintained   nonskid shoes/slippers when out of bed   room organization consistent   safety round/check completed  Taken 2/3/2025 0001 by Corazon Cruz RN  Safety Promotion/Fall Prevention:   assistive device/personal items within reach   clutter free environment maintained   fall prevention program maintained   nonskid shoes/slippers when out of bed   room organization consistent   safety round/check completed  Taken 2/2/2025 2200 by Corazon Cruz RN  Safety Promotion/Fall Prevention:   assistive device/personal items within reach   clutter free environment maintained   fall prevention program maintained   nonskid shoes/slippers when out of bed   room organization consistent   safety round/check completed  Taken 2/2/2025 2001 by Corazon Cruz RN  Safety Promotion/Fall Prevention:   assistive device/personal items within reach   clutter free environment maintained   fall prevention program maintained   nonskid shoes/slippers when out of bed   room organization consistent   safety round/check completed  Intervention: Prevent Skin Injury  Recent Flowsheet Documentation  Taken 2/2/2025 2100 by Corazon Cruz RN  Body Position:   weight shifting   head facing, left  Taken 2/2/2025 2001 by Corazon Cruz, RN  Skin Protection:   incontinence pads  utilized   silicone foam dressing in place   skin sealant/moisture barrier applied  Taken 2/2/2025 1900 by Corazon Cruz RN  Body Position:   turned   right  Intervention: Prevent and Manage VTE (Venous Thromboembolism) Risk  Recent Flowsheet Documentation  Taken 2/3/2025 0401 by Corazon Cruz RN  VTE Prevention/Management:   SCDs (sequential compression devices) off   patient refused intervention  Taken 2/3/2025 0001 by Corazon Cruz RN  VTE Prevention/Management:   SCDs (sequential compression devices) off   patient refused intervention  Taken 2/2/2025 2001 by Corazon Cruz RN  VTE Prevention/Management:   SCDs (sequential compression devices) off   patient refused intervention  Intervention: Prevent Infection  Recent Flowsheet Documentation  Taken 2/2/2025 2001 by Corazon Cruz RN  Infection Prevention:   environmental surveillance performed   hand hygiene promoted   personal protective equipment utilized   single patient room provided  Goal: Optimal Comfort and Wellbeing  Intervention: Provide Person-Centered Care  Recent Flowsheet Documentation  Taken 2/2/2025 2001 by Corazon Cruz RN  Trust Relationship/Rapport:   care explained   choices provided   empathic listening provided   questions encouraged   questions answered   reassurance provided     Problem: Violence Risk or Actual  Goal: Anger and Impulse Control  Intervention: Minimize Safety Risk  Recent Flowsheet Documentation  Taken 2/3/2025 0401 by Corazon Cruz RN  Enhanced Safety Measures: bed alarm set  Taken 2/3/2025 0200 by Corazon Cruz RN  Enhanced Safety Measures: bed alarm set  Taken 2/3/2025 0001 by Corazon Cruz RN  Enhanced Safety Measures: bed alarm set  Taken 2/2/2025 2200 by Corazon Cruz RN  Enhanced Safety Measures: bed alarm set  Taken 2/2/2025 2001 by Corazon Cruz RN  Enhanced Safety Measures: bed alarm set  Intervention: Promote Self-Control  Recent Flowsheet Documentation  Taken 2/2/2025 2001  by Corazon Cruz RN  Supportive Measures:   active listening utilized   verbalization of feelings encouraged  Environmental Support:   calm environment promoted   distractions minimized     Problem: Fall Injury Risk  Goal: Absence of Fall and Fall-Related Injury  Intervention: Identify and Manage Contributors  Recent Flowsheet Documentation  Taken 2/2/2025 2001 by Corazon Cruz RN  Medication Review/Management: medications reviewed  Intervention: Promote Injury-Free Environment  Recent Flowsheet Documentation  Taken 2/3/2025 0401 by Corazon Cruz RN  Safety Promotion/Fall Prevention:   assistive device/personal items within reach   clutter free environment maintained   fall prevention program maintained   nonskid shoes/slippers when out of bed   room organization consistent   safety round/check completed  Taken 2/3/2025 0200 by Corazon Cruz RN  Safety Promotion/Fall Prevention:   assistive device/personal items within reach   clutter free environment maintained   fall prevention program maintained   nonskid shoes/slippers when out of bed   room organization consistent   safety round/check completed  Taken 2/3/2025 0001 by Corazon Cruz RN  Safety Promotion/Fall Prevention:   assistive device/personal items within reach   clutter free environment maintained   fall prevention program maintained   nonskid shoes/slippers when out of bed   room organization consistent   safety round/check completed  Taken 2/2/2025 2200 by Corazon Cruz RN  Safety Promotion/Fall Prevention:   assistive device/personal items within reach   clutter free environment maintained   fall prevention program maintained   nonskid shoes/slippers when out of bed   room organization consistent   safety round/check completed  Taken 2/2/2025 2001 by Corazon rCuz RN  Safety Promotion/Fall Prevention:   assistive device/personal items within reach   clutter free environment maintained   fall prevention program maintained    nonskid shoes/slippers when out of bed   room organization consistent   safety round/check completed     Problem: Comorbidity Management  Goal: Blood Pressure in Desired Range  Intervention: Maintain Blood Pressure Management  Recent Flowsheet Documentation  Taken 2/2/2025 2001 by Corazon Cruz RN  Medication Review/Management: medications reviewed     Problem: Sepsis/Septic Shock  Goal: Optimal Coping  Intervention: Support Patient and Family Response  Recent Flowsheet Documentation  Taken 2/2/2025 2001 by Corazon Cruz RN  Supportive Measures:   active listening utilized   verbalization of feelings encouraged  Goal: Blood Glucose Level Within Target Range  Intervention: Optimize Glycemic Control  Recent Flowsheet Documentation  Taken 2/2/2025 2001 by Corazon Cruz RN  Hyperglycemia Management: blood glucose monitored  Hypoglycemia Management: blood glucose monitored  Goal: Absence of Infection Signs and Symptoms  Intervention: Initiate Sepsis Management  Recent Flowsheet Documentation  Taken 2/2/2025 2001 by Corazon Cruz RN  Infection Prevention:   environmental surveillance performed   hand hygiene promoted   personal protective equipment utilized   single patient room provided  Isolation Precautions:   contact   spore   precautions maintained  Intervention: Promote Recovery  Recent Flowsheet Documentation  Taken 2/2/2025 2001 by Corazon Cruz RN  Activity Management: activity minimized  Sleep/Rest Enhancement:   awakenings minimized   consistent schedule promoted     Problem: Skin Injury Risk Increased  Goal: Skin Health and Integrity  Intervention: Optimize Skin Protection  Recent Flowsheet Documentation  Taken 2/2/2025 2100 by Corazon Cruz RN  Head of Bed (HOB) Positioning: HOB at 30-45 degrees  Taken 2/2/2025 2001 by Corazon Cruz RN  Activity Management: activity minimized  Pressure Reduction Techniques:   frequent weight shift encouraged   heels elevated off bed   pressure  points protected   weight shift assistance provided  Head of Bed (HOB) Positioning: HOB at 30-45 degrees  Pressure Reduction Devices:   positioning supports utilized   specialty bed utilized   pressure-redistributing mattress utilized   heel offloading device utilized  Skin Protection:   incontinence pads utilized   silicone foam dressing in place   skin sealant/moisture barrier applied     Problem: Enteral Nutrition  Goal: Absence of Aspiration Signs and Symptoms  Intervention: Minimize Aspiration Risk  Recent Flowsheet Documentation  Taken 2/2/2025 2100 by Corazon Cruz RN  Head of Bed (HOB) Positioning: HOB at 30-45 degrees  Taken 2/2/2025 2001 by Corazon Cruz RN  Head of Bed ([facility]) Positioning: HOB at 30-45 degrees  Enteral Feeding Safety: placement checked  Goal: Safe, Effective Therapy Delivery  Intervention: Prevent Feeding-Related Adverse Events  Recent Flowsheet Documentation  Taken 2/2/2025 2001 by Corazon Cruz, RN  Enteral Feeding Safety: placement checked   Goal Outcome Evaluation:

## 2025-02-03 NOTE — DISCHARGE PLACEMENT REQUEST
"Arabella Herrera (89 y.o. Female)       Date of Birth   03/21/1926    Social Security Number       Address   [address] [place] [address] [place] [facility]    Home Phone   [phone]    MRN   [id number]       Islam   None    Marital Status                               Admission Date   1/24/25    Admission Type   Emergency    Admitting Provider   Gray Yee MD    Attending Provider   Roxanne Lorenz MD    Department, Room/Bed   [facility], 2101/1       Discharge Date       Discharge Disposition       Discharge Destination                                 Attending Provider: Roxanne Lorenz MD    Allergies: Contrast Dye (Echo Or Unknown Ct/mr), Sulfa Antibiotics    Isolation: Spore   Infection: Norovirus (01/25/25)   Code Status: No CPR    Ht: 147.3 cm (58\")   Wt: 45.3 kg (99 lb 13.9 oz)    Admission Cmt: None   Principal Problem: UTI (urinary tract infection) [N39.0]                   Active Insurance as of 1/24/2025       Primary Coverage       Payor Plan Insurance Group Employer/Plan Group    HUMANA MEDICARE REPLACEMENT HUMANA MED ADV HMO [id number]       Payor Plan Address Payor Plan Phone Number Payor Plan Fax Number Effective Dates    [address] [address] [phone]  1/1/2025 - None Entered    [facility] [id number]         Subscriber Name Subscriber Birth Date Member ID       ARABELLA HERRERA 3/21/1926 [id number]                     Emergency Contacts        (Rel.) Home Phone Work Phone Mobile Phone    Aislinn Rae (Daughter) -- -- [phone]    Vince Rae (Grandchild) -- -- [phone]              "

## 2025-02-03 NOTE — PLAN OF CARE
Goal Outcome Evaluation:   VFSS EXPLANATION/ OUTCOME:     MBSS performed in the lateral projection with the following consistencies respectively: thin by cup x2, thin by straw x1, puree x2,  NTL by cup x2, HTL by cup x2. All trials with adequate barium concentration for fluoroscopy view. All trials administered via pt.      Baseline diet: Regular/ thin liquids  Dentition: Edentulous, dentures ill fitting  NG tube in place     Oral phase: Marked by slightly poor bolus cohesion with premature spillage to the vallecular level. 4-5 second swallow initiation. There is reduced epiglottic deflection. Mechanical soft trial not completed secondary to pt being edentulous and oral motor weakness.     Pharyngeal phase: Aspiration of thin liquids via straw, does not clear from airway despite effort. Transient penetration of thin liquids via cup, clears. Deep penetration of NTL via cup. Transient penetration of HTL with pharyngeal coating that clears after 2nd swallow cue by SLP. Adequate pharyngeal squeeze. Double swallow initiated during trials of puree which cleared pharyngeal coating/residue.      Esophageal view: Not scanned     Pt demonstrates a MILD-MODERATE swallow impairment posing a moderate risk of aspiration and malnutrition risk. Dysphagia is most prominently characterized by lack of absent airway protective reactions, reduced epiglottic deflection, poor mastication, manipulation, and impulsivity with large sips. This results in open/patent laryngeal vestibule, and penetration of all liquids and eventual aspiration of thin liquids via straw.      Strategies attempted: n/a     Education provided: Nursing staff made aware of diet recommendations.           Rosenbeck's 8- Point Penetration Aspiration Scale  Material does not enter airway  Material enters the airway, remains above the vocal folds and is ejected from the airway  Material enters the airway, remains above the vocal folds, and is not ejected from the  airway  Material enters the airway, contacts the vocal folds and is ejected from the airway  Material enters the airway, contacts the vocal folds, and is not ejected from the airway  Material enters the airway, passes below the vocal folds, and is ejected out of the trachea  Material enters the airway, passes below the vocal folds and is not ejected from the trachea despite effort  Material enters the airway, passes below the vocal folds, and no effort is made to eject.        SLP Recommendation and Plan:     - Puree diet and thin liquids, NO STRAW, FEEDING ASSISTANCE  - Only feed when pt is fully awake and alert  - Meds: crushed in puree   - Safe swallow strategies: HOB at a 90 degree angle, slow rate of intake, small bites/sips  -Oral care at least x2 daily   -ST will continue to follow as per current plan of care and with additional goals/recomme  Anticipated Discharge Disposition (SLP): unknown          SLP Swallowing Diagnosis: mild-moderate, oral dysphagia, pharyngeal dysphagia (02/03/25 1200)        Plan for Continued Treatment (SLP): continue treatment per plan of care (02/03/25 1200)

## 2025-02-03 NOTE — PLAN OF CARE
Goal Outcome Evaluation:  Plan of Care Reviewed With: patient        Progress: no change  Outcome Evaluation: Pt is 89 y/o F admitted with norovirus and has been improving with her O2 needs but still having uncontrolled diarrhea. She is finally being placed on a pureed diet and her NG tube is removed. Pt is (I) for mobility & ADL at home with family. This date despite pt pain & illness she was very eager to get up to the chair and did use a BSC that OT brought in, though her loose stool continued to be a problem throughout eval & treatment session. Pt needed min (A) for transfers, dependent assist for grooming, dressing, min (A) for self feeding. Pt appears to have fair strength & muscle mass despite her age and acute illness. Pt has been hospitalized for a week and pt is not oriented fully to time or to her own birthdate and past history. Unknown baseline orientation status. Short and long term memory deficits noted this date. Pt nonetheless was apporpriate and followed all commands. Appears to be a motivated and good candidate for rehab. OT recommends SNF at d/c.    Anticipated Discharge Disposition (OT): skilled nursing facility

## 2025-02-04 PROBLEM — E44.0 MODERATE PROTEIN-CALORIE MALNUTRITION: Status: ACTIVE | Noted: 2025-02-04

## 2025-02-04 LAB
ALBUMIN SERPL-MCNC: 3.3 G/DL (ref 3.5–5.2)
ALBUMIN/GLOB SERPL: 1.5 G/DL
ALP SERPL-CCNC: 36 U/L (ref 39–117)
ALT SERPL W P-5'-P-CCNC: 30 U/L (ref 1–33)
ANION GAP SERPL CALCULATED.3IONS-SCNC: 9.8 MMOL/L (ref 5–15)
AST SERPL-CCNC: 27 U/L (ref 1–32)
BILIRUB SERPL-MCNC: 0.4 MG/DL (ref 0–1.2)
BUN SERPL-MCNC: 33 MG/DL (ref 8–23)
BUN/CREAT SERPL: 45.2 (ref 7–25)
CALCIUM SPEC-SCNC: 9.3 MG/DL (ref 8.2–9.6)
CHLORIDE SERPL-SCNC: 93 MMOL/L (ref 98–107)
CO2 SERPL-SCNC: 34.2 MMOL/L (ref 22–29)
CREAT SERPL-MCNC: 0.73 MG/DL (ref 0.57–1)
DEPRECATED RDW RBC AUTO: 52.8 FL (ref 37–54)
EGFRCR SERPLBLD CKD-EPI 2021: 74.4 ML/MIN/1.73
ERYTHROCYTE [DISTWIDTH] IN BLOOD BY AUTOMATED COUNT: 14.6 % (ref 12.3–15.4)
GLOBULIN UR ELPH-MCNC: 2.2 GM/DL
GLUCOSE SERPL-MCNC: 103 MG/DL (ref 65–99)
HCT VFR BLD AUTO: 36.4 % (ref 34–46.6)
HGB BLD-MCNC: 11.6 G/DL (ref 12–15.9)
MCH RBC QN AUTO: 31.3 PG (ref 26.6–33)
MCHC RBC AUTO-ENTMCNC: 31.9 G/DL (ref 31.5–35.7)
MCV RBC AUTO: 98.1 FL (ref 79–97)
PLATELET # BLD AUTO: 343 10*3/MM3 (ref 140–450)
PMV BLD AUTO: 9.4 FL (ref 6–12)
POTASSIUM SERPL-SCNC: 3.9 MMOL/L (ref 3.5–5.2)
PROT SERPL-MCNC: 5.5 G/DL (ref 6–8.5)
RBC # BLD AUTO: 3.71 10*6/MM3 (ref 3.77–5.28)
SODIUM SERPL-SCNC: 137 MMOL/L (ref 136–145)
WBC NRBC COR # BLD AUTO: 10.88 10*3/MM3 (ref 3.4–10.8)

## 2025-02-04 PROCEDURE — 99232 SBSQ HOSP IP/OBS MODERATE 35: CPT | Performed by: INTERNAL MEDICINE

## 2025-02-04 PROCEDURE — 25010000002 ONDANSETRON PER 1 MG: Performed by: NURSE PRACTITIONER

## 2025-02-04 PROCEDURE — 80053 COMPREHEN METABOLIC PANEL: CPT

## 2025-02-04 PROCEDURE — 94761 N-INVAS EAR/PLS OXIMETRY MLT: CPT

## 2025-02-04 PROCEDURE — 25010000002 ENOXAPARIN PER 10 MG

## 2025-02-04 PROCEDURE — 85027 COMPLETE CBC AUTOMATED: CPT

## 2025-02-04 PROCEDURE — 97530 THERAPEUTIC ACTIVITIES: CPT

## 2025-02-04 PROCEDURE — 97110 THERAPEUTIC EXERCISES: CPT

## 2025-02-04 PROCEDURE — 94664 DEMO&/EVAL PT USE INHALER: CPT

## 2025-02-04 PROCEDURE — 94799 UNLISTED PULMONARY SVC/PX: CPT

## 2025-02-04 RX ORDER — IPRATROPIUM BROMIDE AND ALBUTEROL SULFATE 2.5; .5 MG/3ML; MG/3ML
3 SOLUTION RESPIRATORY (INHALATION) EVERY 6 HOURS PRN
Status: DISCONTINUED | OUTPATIENT
Start: 2025-02-04 | End: 2025-02-05 | Stop reason: HOSPADM

## 2025-02-04 RX ORDER — FUROSEMIDE 20 MG/1
20 TABLET ORAL
Status: DISCONTINUED | OUTPATIENT
Start: 2025-02-04 | End: 2025-02-05 | Stop reason: HOSPADM

## 2025-02-04 RX ORDER — MIDODRINE HYDROCHLORIDE 5 MG/1
15 TABLET ORAL EVERY 8 HOURS SCHEDULED
Status: DISCONTINUED | OUTPATIENT
Start: 2025-02-04 | End: 2025-02-05 | Stop reason: HOSPADM

## 2025-02-04 RX ADMIN — CHOLESTYRAMINE 4 G: 4 POWDER, FOR SUSPENSION ORAL at 08:31

## 2025-02-04 RX ADMIN — LEVOTHYROXINE SODIUM 75 MCG: 0.07 TABLET ORAL at 05:21

## 2025-02-04 RX ADMIN — ENOXAPARIN SODIUM 30 MG: 100 INJECTION SUBCUTANEOUS at 16:10

## 2025-02-04 RX ADMIN — Medication 1000 MCG: at 08:31

## 2025-02-04 RX ADMIN — Medication 1 TABLET: at 08:31

## 2025-02-04 RX ADMIN — SILDENAFIL 10 MG: 20 TABLET ORAL at 17:52

## 2025-02-04 RX ADMIN — BUDESONIDE 0.5 MG: 0.5 INHALANT RESPIRATORY (INHALATION) at 06:32

## 2025-02-04 RX ADMIN — BUDESONIDE 0.5 MG: 0.5 INHALANT RESPIRATORY (INHALATION) at 18:46

## 2025-02-04 RX ADMIN — IPRATROPIUM BROMIDE AND ALBUTEROL SULFATE 3 ML: .5; 3 SOLUTION RESPIRATORY (INHALATION) at 06:28

## 2025-02-04 RX ADMIN — FUROSEMIDE 20 MG: 20 TABLET ORAL at 17:52

## 2025-02-04 RX ADMIN — MIDODRINE HYDROCHLORIDE 10 MG: 5 TABLET ORAL at 05:21

## 2025-02-04 RX ADMIN — MIDODRINE HYDROCHLORIDE 15 MG: 5 TABLET ORAL at 21:46

## 2025-02-04 RX ADMIN — ACETAMINOPHEN 650 MG: 325 TABLET, FILM COATED ORAL at 14:49

## 2025-02-04 RX ADMIN — ACETAMINOPHEN 650 MG: 160 SOLUTION ORAL at 19:17

## 2025-02-04 RX ADMIN — FUROSEMIDE 20 MG: 20 TABLET ORAL at 09:58

## 2025-02-04 RX ADMIN — CHOLESTYRAMINE 4 G: 4 POWDER, FOR SUSPENSION ORAL at 20:56

## 2025-02-04 RX ADMIN — ONDANSETRON 4 MG: 2 INJECTION INTRAMUSCULAR; INTRAVENOUS at 19:17

## 2025-02-04 RX ADMIN — HYDROCORTISONE 20 MG: 10 TABLET ORAL at 14:22

## 2025-02-04 RX ADMIN — Medication 10 ML: at 10:00

## 2025-02-04 RX ADMIN — SILDENAFIL 10 MG: 20 TABLET ORAL at 20:54

## 2025-02-04 RX ADMIN — MIDODRINE HYDROCHLORIDE 15 MG: 5 TABLET ORAL at 14:22

## 2025-02-04 RX ADMIN — BALSALAZIDE DISODIUM 750 MG: 750 CAPSULE ORAL at 20:54

## 2025-02-04 RX ADMIN — Medication 10 ML: at 20:56

## 2025-02-04 NOTE — CASE MANAGEMENT/SOCIAL WORK
Continued Stay Note  [facility]     Patient Name: Alexey Trinidad  MRN: [id number]  Today's Date: 2/4/2025    Admit Date: 1/24/2025    Plan: [facility] (skilled, accepted). PASRR approved. Precert approved Valid 2/4-2/6   Discharge Plan       Row Name 02/04/25 1254       Plan    Plan [facility] (skilled, accepted). PASRR approved. Precert approved Valid 2/4-2/6             Carolina Dixon RN      [facility]  Office: [phone]  Cell: [phone]  Fax # [phone]

## 2025-02-04 NOTE — PLAN OF CARE
Assessment: Alexey Trinidad presents with functional mobility impairments which indicate the need for skilled intervention. Tolerating session today without incident. Pt still very weak  but motivated to get in chair. Was incontinent of urine during transfer and NA present to get her changed and a bath. Plans on rehab at [place].Will continue to follow and progress as tolerated.

## 2025-02-04 NOTE — PROGRESS NOTES
[facility] MEDICINE SERVICE  DAILY PROGRESS NOTE    NAME: Alexey Trinidad  : 3/21/1926  MRN: [id number]      LOS: 11 days     PROVIDER OF SERVICE: Roxanne Lorenz MD    Chief Complaint: UTI (urinary tract infection)    Subjective:     Interval History:  History taken from: patient    Patient seen and examined at bedside this morning.  No acute complaints overnight.  Only had 1 episode of loose stool overnight        Review of Systems: Negative except for described above  Review of Systems    Objective:     Vital Signs  Temp:  [96.9 °F (36.1 °C)-98.2 °F (36.8 °C)] 97.7 °F (36.5 °C)  Heart Rate:  [] 54  Resp:  [14-28] 17  BP: ()/(43-76) 115/57  Flow (L/min) (Oxygen Therapy):  [1] 1   Body mass index is 21.56 kg/m².    Physical Exam  Physical Exam  Constitutional:       Comments: NAD    Cardiovascular:      Comments:  RRR, S1 & S2   Pulmonary:      Comments:  Lungs CTA   Abdominal:      Comments:  ABD soft, NT            Diagnostic Data    Results from last 7 days   Lab Units 25  0228   WBC 10*3/mm3 10.88*   HEMOGLOBIN g/dL 11.6*   HEMATOCRIT % 36.4   PLATELETS 10*3/mm3 343   GLUCOSE mg/dL 103*   CREATININE mg/dL 0.73   BUN mg/dL 33*   SODIUM mmol/L 137   POTASSIUM mmol/L 3.9   AST (SGOT) U/L 27   ALT (SGPT) U/L 30   ALK PHOS U/L 36*   BILIRUBIN mg/dL 0.4   ANION GAP mmol/L 9.8       No radiology results for the last day      I reviewed the patient's new clinical results.    Assessment/Plan:     Active and Resolved Problems  Active Hospital Problems    Diagnosis  POA    **UTI (urinary tract infection) [N39.0]  Yes    Abdominal pain [R10.9]  Yes      Resolved Hospital Problems   No resolved problems to display.       Acute hypoxic respiratory failure  Aspiration pneumonia, unknown organism  Pulmonary edema  Moderate aortic valve regurgitation  Moderate aortic valve stenosis  Moderate tricuspid valve regurgitation  Severe pulmonary hypertension  Norovirus  Acute urinary tract  infection  Abdominal pain with nausea and vomiting  Biliary dilation w/ stone at ampulla of vater  History of collagenous colitis  Acute metabolic encephalopathy, likely multifactorial  Physical deconditioning  Left adrenal nodule, stable  Hypertension  Hypothyroidism     - Pulmonology consulted given increased oxygen requirements, appreciate recs  - Cardiology consulted given valvular heart disease, continue medical management with outpatient follow up  -Decrease Lasix dose as patient has been hypotensive and will increase midodrine  - completed zosyn (end date 02/01/25),  - po hydrocortisone  - SLP assessed today and recommend puree diet and thin liquids  - Started on sildenafil for severe pulmonary hypertension with greatly improved oxygen requirements   -tested positive for norovirus, will continue cholestyramine. Incontinence is improving  - Wean oxygen as tolerated    VTE Prophylaxis:  Pharmacologic VTE prophylaxis orders are present.             Disposition Planning:        Anticipated Date of Discharge: 2/6/2025         Time: 35 minutes     Code Status and Medical Interventions: No CPR (Do Not Attempt to Resuscitate); Limited Support; No intubation (DNI); DNR/DNI   Ordered at: 01/27/25 1918     Medical Intervention Limits:    No intubation (DNI)     Code Status (Patient has no pulse and is not breathing):    No CPR (Do Not Attempt to Resuscitate)     Medical Interventions (Patient has pulse or is breathing):    Limited Support     Comments:    DNR/DNI       Signature: Electronically signed by Roxanne Lorenz MD, 02/04/25, 08:20 EST.  [facility] Hospitalist Team

## 2025-02-04 NOTE — PROGRESS NOTES
Nutrition Services  Patient Name: Alexey Trinidad  YOB: 1926  MRN: [id number]  Admission date: 1/24/2025    PROGRESS NOTE      Nutrition Intervention Updates: Boost Original BID (Provides 480 kcals, 20 g protein if consumed)    Encourage PO intake          Encounter Information: Checking in to monitor TF. Noted diet was advanced to puree yesterday by SLP. Tube feedings and NGT were removed. Will add ONS.        PO Diet: Diet: Regular/House; No Straw, Feeding Assistance - Nursing; Texture: Pureed (NDD 1); Fluid Consistency: Thin (IDDSI 0)   PO Supplements: None    PO Intake:  No meals documented today.        Current nutrition support: Discontinued    Nutrition support review: Discontinued        Labs (reviewed below): Reviewed. Management per attending.        GI Function:  Last documented BM 2/3 - pt has stool softeners ordered PRN        Brief weight review   Wt Readings from Last 10 Encounters:   02/04/25 0521 46.8 kg (103 lb 2.8 oz)   02/02/25 0453 45.3 kg (99 lb 13.9 oz)   02/01/25 0511 45.1 kg (99 lb 6.8 oz)   01/31/25 0534 46.2 kg (101 lb 13.6 oz)   01/30/25 0528 47.7 kg (105 lb 2.6 oz)   01/29/25 0531 48 kg (105 lb 13.1 oz)   01/27/25 1757 47.2 kg (104 lb)   01/24/25 2006 47.4 kg (104 lb 8 oz)   01/24/25 1614 49.4 kg (109 lb)        Results from last 7 days   Lab Units 02/04/25  0228 02/03/25  0952 02/02/25  1556 02/01/25  2325 02/01/25  0548   SODIUM mmol/L 137 139  --  139 137   POTASSIUM mmol/L 3.9 4.1 4.7 3.3* 3.7   CHLORIDE mmol/L 93* 92*  --  90* 89*   CO2 mmol/L 34.2* 40.2*  --  40.7* 37.8*   BUN mg/dL 33* 38*  --  29* 33*   CREATININE mg/dL 0.73 0.77  --  0.73 0.63   CALCIUM mg/dL 9.3 10.5*  --  9.5 9.6   BILIRUBIN mg/dL 0.4 0.3  --   --  0.4   ALK PHOS U/L 36* 41  --   --  38*   ALT (SGPT) U/L 30 36*  --   --  36*   AST (SGOT) U/L 27 28  --   --  31   GLUCOSE mg/dL 103* 149*  --  175* 168*     Results from last 7 days   Lab Units 02/04/25  0228 02/03/25  0952 02/01/25  0854  02/01/25  0548 01/31/25  0300   MAGNESIUM mg/dL  --   --  2.5* 2.4* 2.6*   PHOSPHORUS mg/dL  --   --  4.0 5.1* 1.9*   HEMOGLOBIN g/dL 11.6*   < > 13.7 13.7 14.4   HEMATOCRIT % 36.4   < > 43.0 43.2 46.0    < > = values in this interval not displayed.           RD to follow up per protocol.    Electronically signed by:  Gabby Ferreira RD  02/04/25 08:17 EST

## 2025-02-04 NOTE — THERAPY TREATMENT NOTE
"Subjective: Pt agreeable to therapeutic plan of care. Pt wanted to get to chair    Objective:     Precautions - falls, contact spore isol, incontinent    Bed mobility - SBA with extra time and use of bedrail  Transfers - Min-A with UE support and extra time  Ambulation -  feet N/A or Not attempted.    Therapeutic Exercise - 10 Reps B LE AROM supported sitting / chair    Vitals: BP supine-98/51, sitting-108/54    Pain:  states she hurts all over  Intervention for pain: Repositioned, RN notified, Increased Activity, and Therapeutic Presence    Education: Provided education on the importance of mobility in the acute care setting, Verbal/Tactile Cues, and Transfer Training    Assessment: Alexey Trinidad presents with functional mobility impairments which indicate the need for skilled intervention. Tolerating session today without incident. Pt still very weak  but motivated to get in chair. Was incontinent of urine during transfer and NA present to get her changed and a bath. Plans on rehab at [place].Will continue to follow and progress as tolerated.     Plan/Recommendations:   If medically appropriate, Moderate Intensity Therapy recommended post-acute care. This is recommended as therapy feels the patient would require 3-4 days per week and wouldn't tolerate \"3 hour daily\" rehab intensity. SNF would be the preferred choice. If the patient does not agree to SNF, arrange HH or OP depending on home bound status. If patient is medically complex, consider LTACH. Pt requires no DME at discharge.     Pt desires Skilled Rehab placement at discharge. Pt cooperative; agreeable to therapeutic recommendations and plan of care.         Basic Mobility 6-click:  Rollin = Total, A lot = 2, A little = 3; 4 = None  Supine>Sit:   1 = Total, A lot = 2, A little = 3; 4 = None   Sit>Stand with arms:  1 = Total, A lot = 2, A little = 3; 4 = None  Bed>Chair:   1 = Total, A lot = 2, A little = 3; 4 = None  Ambulate in room:  1 = Total, A " lot = 2, A little = 3; 4 = None  3-5 Steps with railin = Total, A lot = 2, A little = 3; 4 = None  Score: 16    Post-Tx Position: Up in Chair, Staff Present, Alarms activated, and Call light and personal items within reach  PPE: gloves and gown    Therapy Charges for Today       Code Description Service Date Service Provider Modifiers Qty    66571941208 HC PT THERAPEUTIC ACT EA 15 MIN 2025 Mei Anand PTA GP 1    60121970509 HC PT THER PROC EA 15 MIN 2025 Mei Anand PTA GP 1           PT Charges       Row Name 25 1021             Time Calculation    Start Time 0750  -      Stop Time 0806  -      Time Calculation (min) 16 min  -      PT Received On 25  -      PT - Next Appointment 25  -         Time Calculation- PT    Total Timed Code Minutes- PT 16 minute(s)  -                User Key  (r) = Recorded By, (t) = Taken By, (c) = Cosigned By      Initials Name Provider Type    Mei Ross PTA Physical Therapist Assistant

## 2025-02-04 NOTE — PROGRESS NOTES
Referring Provider: Roxanne Lorenz,*    Reason for follow-up: Valvular heart disease     Patient Care Team:  Rui Cole MD as PCP - General (Internal Medicine)  Oscar Wesley MD as Cardiologist (Cardiology)      SUBJECTIVE  Resting comfortably in bed.  Remains on 1 L of oxygen.  Blood pressure and heart rate are normal and stable.     ROS  Review of all systems negative except as indicated.    Since I have last seen, the patient has been without any chest discomfort, shortness of breath, palpitations, dizziness or syncope.  Denies having any headache, abdominal pain, nausea, vomiting, diarrhea, constipation, loss of weight or loss of appetite.  Denies having any excessive bruising, hematuria or blood in the stool.        Personal History:    Past Medical History:   Diagnosis Date    Disease of thyroid gland     Hypertension        Past Surgical History:   Procedure Laterality Date    BACK SURGERY      kyphoplasty    BREAST SURGERY      benign cyst removed    HYSTERECTOMY      LAPAROSCOPIC CHOLECYSTECTOMY         History reviewed. No pertinent family history.    Social History     Tobacco Use    Smoking status: Never    Smokeless tobacco: Never   Vaping Use    Vaping status: Never Used   Substance Use Topics    Alcohol use: Never    Drug use: Never        Home meds:  Prior to Admission medications    Medication Sig Start Date End Date Taking? Authorizing Provider   acetaminophen (TYLENOL) 500 MG tablet Take 1 tablet by mouth Every Morning.   Yes Gerald Costa MD   Bacillus Coagulans-Inulin (ALIGN PREBIOTIC-PROBIOTIC PO) Take 1 capsule by mouth Daily.   Yes Gerald Costa MD   balsalazide (COLAZAL) 750 MG capsule Take 1 capsule by mouth Every Night.   Yes Gerald Costa MD   Budesonide (ENTOCORT EC) 3 MG 24 hr capsule Take 1 capsule by mouth 2 (Two) Times a Day.   Yes Gerald Costa MD   Cyanocobalamin (VITAMIN B-12 PO) Take 1 tablet by mouth Daily.   Yes Igor  MD Greald   levothyroxine (SYNTHROID, LEVOTHROID) 75 MCG tablet Take 1 tablet by mouth Every Morning.   Yes Provider, MD Gerald   losartan (COZAAR) 50 MG tablet Take 1 tablet by mouth Daily.   Yes ProviderGerald MD   multivitamins-minerals (PRESERVISION AREDS 2) capsule capsule Take 1 capsule by mouth 2 (Two) Times a Day.   Yes ProviderGerald MD   Wheat Dextrin (BENEFIBER DRINK MIX PO) Take 1 package by mouth Daily.   Yes Provider, MD Gerald       Allergies:  Contrast dye (echo or unknown ct/mr) and Sulfa antibiotics    Scheduled Meds:balsalazide, 750 mg, Oral, Nightly  [Held by provider] Budesonide, 3 mg, Oral, BID  budesonide, 0.5 mg, Nebulization, BID - RT  cholestyramine light, 1 packet, Oral, Q12H  enoxaparin, 30 mg, Subcutaneous, Daily  furosemide, 40 mg, Oral, BID Diuretics  hydrocortisone, 20 mg, Oral, Daily With Lunch  levothyroxine, 75 mcg, Oral, Q AM  midodrine, 10 mg, Oral, Q8H  multivitamin with minerals, 1 tablet, Oral, Daily  sildenafil, 10 mg, Oral, TID  sodium chloride, 10 mL, Intravenous, Q12H  vitamin B-12, 1,000 mcg, Oral, Daily      Continuous Infusions:   PRN Meds:.  acetaminophen **OR** acetaminophen **OR** acetaminophen    senna-docusate sodium **AND** polyethylene glycol **AND** bisacodyl **AND** bisacodyl    Calcium Replacement - Follow Nurse / BPA Driven Protocol    dextrose    dextrose    glucagon (human recombinant)    ipratropium-albuterol    Magnesium Standard Dose Replacement - Follow Nurse / BPA Driven Protocol    ondansetron    Phosphorus Replacement - Follow Nurse / BPA Driven Protocol    Potassium Replacement - Follow Nurse / BPA Driven Protocol    Psyllium    [COMPLETED] Insert Peripheral IV **AND** sodium chloride    sodium chloride    sodium chloride      OBJECTIVE    Vital Signs  Vitals:    02/04/25 0631 02/04/25 0632 02/04/25 0635 02/04/25 0700   BP: 125/53   115/57   BP Location:       Patient Position:       Pulse: 52 51 52 54   Resp: 16 16 16  "14   Temp:       TempSrc:       SpO2: 95% 95% 96% 96%   Weight:       Height:           Flowsheet Rows      Flowsheet Row First Filed Value   Admission Height 147.3 cm (58\") Documented at 01/24/2025 1614   Admission Weight 49.4 kg (109 lb) Documented at 01/24/2025 1614              Intake/Output Summary (Last 24 hours) at 2/4/2025 0733  Last data filed at 2/3/2025 2000  Gross per 24 hour   Intake 1208 ml   Output --   Net 1208 ml            Telemetry: Sinus rhythm    Physical Exam:  The patient is alert, oriented and in no distress.  Elderly/frail  Vital signs as noted above.  Head and neck revealed no carotid bruits or jugular venous distention.  No thyromegaly or lymphadenopathy is present  Lungs clear.  No wheezing.  Breath sounds are normal bilaterally.  Heart normal first and second heart sounds.  No murmur. No precordial rub is present.  No gallop is present.  Abdomen soft and nontender.  No organomegaly is present.  Extremities with good peripheral pulses without any pedal edema.  Skin warm and dry.  Musculoskeletal system is grossly normal.  CNS grossly normal.       Results Review:  I have personally reviewed the results from the time of this admission to 2/4/2025 07:33 EST and agree with these findings:  []  Laboratory  []  Microbiology  []  Radiology  []  EKG/Telemetry   []  Cardiology/Vascular   []  Pathology  []  Old records  []  Other:    Most notable findings include:    Lab Results (last 24 hours)       Procedure Component Value Units Date/Time    Comprehensive Metabolic Panel [[id number]]  (Abnormal) Collected: 02/04/25 0228    Specimen: Blood from Hand, Left Updated: 02/04/25 0259     Glucose 103 mg/dL      BUN 33 mg/dL      Creatinine 0.73 mg/dL      Sodium 137 mmol/L      Potassium 3.9 mmol/L      Chloride 93 mmol/L      CO2 34.2 mmol/L      Calcium 9.3 mg/dL      Total Protein 5.5 g/dL      Albumin 3.3 g/dL      ALT (SGPT) 30 U/L      AST (SGOT) 27 U/L      Alkaline Phosphatase 36 U/L      Total " Bilirubin 0.4 mg/dL      Globulin 2.2 gm/dL      A/G Ratio 1.5 g/dL      BUN/Creatinine Ratio 45.2     Anion Gap 9.8 mmol/L      eGFR 74.4 mL/min/1.73     Narrative:      GFR Categories in Chronic Kidney Disease (CKD)      GFR Category          GFR (mL/min/1.73)    Interpretation  G1                     90 or greater         Normal or high (1)  G2                      60-89                Mild decrease (1)  G3a                   45-59                Mild to moderate decrease  G3b                   30-44                Moderate to severe decrease  G4                    15-29                Severe decrease  G5                    14 or less           Kidney failure          (1)In the absence of evidence of kidney disease, neither GFR category G1 or G2 fulfill the criteria for CKD.    eGFR calculation 2021 CKD-EPI creatinine equation, which does not include race as a factor    CBC (No Diff) [[id number]]  (Abnormal) Collected: 02/04/25 0228    Specimen: Blood from Hand, Left Updated: 02/04/25 0241     WBC 10.88 10*3/mm3      RBC 3.71 10*6/mm3      Hemoglobin 11.6 g/dL      Hematocrit 36.4 %      MCV 98.1 fL      MCH 31.3 pg      MCHC 31.9 g/dL      RDW 14.6 %      RDW-SD 52.8 fl      MPV 9.4 fL      Platelets 343 10*3/mm3     POC Glucose Once [[id number]]  (Abnormal) Collected: 02/03/25 1715    Specimen: Blood Updated: 02/03/25 1716     Glucose 141 mg/dL      Comment: Serial Number: [id number]:  [id number]       POC Glucose Once [[id number]]  (Abnormal) Collected: 02/03/25 1150    Specimen: Blood Updated: 02/03/25 1152     Glucose 139 mg/dL      Comment: Serial Number: [id number]:  [id number]       Comprehensive Metabolic Panel [[id number]]  (Abnormal) Collected: 02/03/25 0952    Specimen: Blood from Arm, Right Updated: 02/03/25 1030     Glucose 149 mg/dL      BUN 38 mg/dL      Creatinine 0.77 mg/dL      Sodium 139 mmol/L      Potassium 4.1 mmol/L      Chloride 92 mmol/L      CO2 40.2 mmol/L      Calcium 10.5  mg/dL      Total Protein 6.7 g/dL      Albumin 3.8 g/dL      ALT (SGPT) 36 U/L      AST (SGOT) 28 U/L      Alkaline Phosphatase 41 U/L      Total Bilirubin 0.3 mg/dL      Globulin 2.9 gm/dL      A/G Ratio 1.3 g/dL      BUN/Creatinine Ratio 49.4     Anion Gap 6.8 mmol/L      eGFR 69.8 mL/min/1.73     Narrative:      GFR Categories in Chronic Kidney Disease (CKD)      GFR Category          GFR (mL/min/1.73)    Interpretation  G1                     90 or greater         Normal or high (1)  G2                      60-89                Mild decrease (1)  G3a                   45-59                Mild to moderate decrease  G3b                   30-44                Moderate to severe decrease  G4                    15-29                Severe decrease  G5                    14 or less           Kidney failure          (1)In the absence of evidence of kidney disease, neither GFR category G1 or G2 fulfill the criteria for CKD.    eGFR calculation 2021 CKD-EPI creatinine equation, which does not include race as a factor    CBC (No Diff) [[id number]]  (Abnormal) Collected: 02/03/25 0952    Specimen: Blood from Arm, Right Updated: 02/03/25 1001     WBC 10.62 10*3/mm3      RBC 4.26 10*6/mm3      Hemoglobin 13.3 g/dL      Hematocrit 41.4 %      MCV 97.2 fL      MCH 31.2 pg      MCHC 32.1 g/dL      RDW 14.4 %      RDW-SD 51.8 fl      MPV 9.2 fL      Platelets 372 10*3/mm3             Imaging Results (Last 24 Hours)       Procedure Component Value Units Date/Time    FL Video Swallow With Speech Single Contrast [[id number]] Resulted: 02/03/25 1229     Updated: 02/03/25 1229    Narrative:      This procedure was auto-finalized with no dictation required.            LAB RESULTS (LAST 7 DAYS)    CBC  Results from last 7 days   Lab Units 02/04/25  0228 02/03/25  0952 02/01/25  2325 02/01/25  0548 01/31/25  0300 01/30/25  0046 01/29/25  0506   WBC 10*3/mm3 10.88* 10.62 9.47 8.92 11.50* 9.43 9.16   RBC 10*6/mm3 3.71* 4.26 4.45 4.45 4.70  4.33 4.40   HEMOGLOBIN g/dL 11.6* 13.3 13.7 13.7 14.4 13.6 13.5   HEMATOCRIT % 36.4 41.4 43.0 43.2 46.0 42.2 43.2   MCV fL 98.1* 97.2* 96.6 97.1* 97.9* 97.5* 98.2*   PLATELETS 10*3/mm3 343 372 369 321 346 299 255       BMP  Results from last 7 days   Lab Units 02/04/25 0228 02/03/25 0952 02/02/25 1556 02/01/25 2325 02/01/25 0548 01/31/25 0300 01/30/25 1209 01/30/25 0046 01/29/25  0506   SODIUM mmol/L 137 139  --  139 137 138  --  142 144   POTASSIUM mmol/L 3.9 4.1 4.7 3.3* 3.7 3.9 4.1 3.6 3.0*   CHLORIDE mmol/L 93* 92*  --  90* 89* 89*  --  90* 90*   CO2 mmol/L 34.2* 40.2*  --  40.7* 37.8* 42.0*  --  43.5* 40.7*   BUN mg/dL 33* 38*  --  29* 33* 33*  --  26* 17   CREATININE mg/dL 0.73 0.77  --  0.73 0.63 0.66  --  0.60 0.66   GLUCOSE mg/dL 103* 149*  --  175* 168* 113*  --  156* 125*   MAGNESIUM mg/dL  --   --   --  2.5* 2.4* 2.6*  --  2.1  --    PHOSPHORUS mg/dL  --   --   --  4.0 5.1* 1.9*  --  2.2*  --        CMP   Results from last 7 days   Lab Units 02/04/25 0228 02/03/25 0952 02/02/25 1556 02/01/25 2325 02/01/25 0548 01/31/25 0300 01/30/25 1209 01/30/25 0046 01/29/25  0506   SODIUM mmol/L 137 139  --  139 137 138  --  142 144   POTASSIUM mmol/L 3.9 4.1 4.7 3.3* 3.7 3.9 4.1 3.6 3.0*   CHLORIDE mmol/L 93* 92*  --  90* 89* 89*  --  90* 90*   CO2 mmol/L 34.2* 40.2*  --  40.7* 37.8* 42.0*  --  43.5* 40.7*   BUN mg/dL 33* 38*  --  29* 33* 33*  --  26* 17   CREATININE mg/dL 0.73 0.77  --  0.73 0.63 0.66  --  0.60 0.66   GLUCOSE mg/dL 103* 149*  --  175* 168* 113*  --  156* 125*   ALBUMIN g/dL 3.3* 3.8  --   --  3.7  --   --   --   --    BILIRUBIN mg/dL 0.4 0.3  --   --  0.4  --   --   --   --    ALK PHOS U/L 36* 41  --   --  38*  --   --   --   --    AST (SGOT) U/L 27 28  --   --  31  --   --   --   --    ALT (SGPT) U/L 30 36*  --   --  36*  --   --   --   --        BNP        TROPONIN        CoAg        Creatinine Clearance  Estimated Creatinine Clearance: 31.8 mL/min (by C-G formula based on SCr of  0.73 mg/dL).    ABG          Radiology  No radiology results for the last day      EKG  I personally viewed and interpreted the patient's EKG/Telemetry data:  Telemetry Scan   Final Result      Telemetry Scan   Final Result      Telemetry Scan   Final Result      Telemetry Scan   Final Result      Telemetry Scan   Final Result      Telemetry Scan   Final Result      Telemetry Scan   Final Result      Telemetry Scan   Final Result      Telemetry Scan   Final Result      Telemetry Scan   Final Result      Telemetry Scan   Final Result      Telemetry Scan   Final Result      Telemetry Scan   Final Result      Telemetry Scan   Final Result      Telemetry Scan   Final Result      Telemetry Scan   Final Result      Telemetry Scan   Final Result      Telemetry Scan   Final Result      Telemetry Scan   Final Result      Telemetry Scan   Final Result      Telemetry Scan   Final Result      Telemetry Scan   Final Result      Telemetry Scan   Final Result      Telemetry Scan   Final Result      Telemetry Scan   Final Result      Telemetry Scan   Final Result      Telemetry Scan   Final Result      Telemetry Scan   Final Result      Telemetry Scan   Final Result      Telemetry Scan   Final Result      Telemetry Scan   Final Result      Telemetry Scan   Final Result      Telemetry Scan   Final Result      Telemetry Scan   Final Result      Telemetry Scan   Final Result      Telemetry Scan   Final Result      Telemetry Scan   Final Result      Telemetry Scan   Final Result      Telemetry Scan   Final Result      Telemetry Scan   Final Result      Telemetry Scan   Final Result      Telemetry Scan   Final Result      Telemetry Scan   Final Result      Telemetry Scan   Final Result      Telemetry Scan   Final Result      Telemetry Scan   Final Result      Telemetry Scan   Final Result      Telemetry Scan   Final Result      Telemetry Scan   Final Result      Telemetry Scan   Final Result            Echocardiogram:    Results for  orders placed during the hospital encounter of 01/24/25    Adult Transthoracic Echo Complete W/ Cont if Necessary Per Protocol    Interpretation Summary    Left ventricular systolic function is normal. Calculated left ventricular EF = 70%    Left ventricular wall thickness is consistent with moderate to severe concentric hypertrophy.    Left ventricular diastolic function is consistent with (grade I) impaired relaxation.    The left atrial cavity is mildly dilated.    The right atrial cavity is mildly  dilated.    Moderate aortic valve regurgitation is present.    Moderate to severe aortic valve stenosis is present.    Moderate tricuspid valve regurgitation is present.    Estimated right ventricular systolic pressure from tricuspid regurgitation is markedly elevated (>55 mmHg).    Transthoracic echocardiography reveals moderate to severe LVH with severely calcified aortic valve with moderate aortic regurgitation and moderate to severe aortic stenosis.  No effusion.  Moderate TR with severe pulm hypertension.  No effusion    Electronically signed by Kt Goins MD, 01/28/25, 5:51 PM EST.        Stress Test:         Cardiac Catheterization:  No results found for this or any previous visit.         Other:         ASSESSMENT & PLAN:    Principal Problem:    UTI (urinary tract infection)  Active Problems:    Abdominal pain    Valvular heart disease  Patient has moderate aortic valve regurgitation and stenosis.  Mean/peak gradient of 19/32 mmHg.  Aortic valve area on planimetry is more than 1 cm² suggestive of moderate aortic stenosis.  Moderate tricuspid valve regurgitation with severe pulmonary hypertension.  Currently does not meet criteria for transcatheter aortic valve replacement or tricuspid valve repair.  Continue diuretics and medical management of valvular heart disease with blood pressure and heart rate control.  Continue Lasix  proBNP is normal  Continue sildenafil  Monitor I's and O's and replace  electrolytes as needed.  Would like to follow-up as outpatient for progression of disease and to discuss transcatheter options.  Given advanced age and poor functional status: I am inclined towards medical management only with diuretics.    Hypotension  On sildenafil  Continue midodrine     Respiratory failure  Hypoxemic respiratory failure  Severe pulmonary hypertension  Continue sildenafil  Oxygenation improved.  Now on 1 L of oxygen  Bronchodilators and steroid per pulmonology     UTI  She will complete a course of antibiotics     Elderly/frail  PT OT  Nutrition/dietitian on board  Started puréed diet     CODE STATUS is DNI DNR.  Transition to SNF.      Oscar Wesley MD  02/04/25  07:33 EST

## 2025-02-04 NOTE — SIGNIFICANT NOTE
Case Management/Social Work    Patient Name:  Alexey Trinidad  YOB: 1926  MRN: [id number]  Admit Date:  1/24/2025 02/04/25 1245   Post Acute Pre-Cert Documentation   Date Post Acute Pre-Cert Completed 02/04/25   All Clinicals Submitted? Yes   Response Received from Insurance? Approval   Post Acute Pre-Cert Initiated Comment JORGE verified SNF precert approval via [facility] portal. Portal auth ID [id number], plan auth ID [id number]. Valid 2/4-2/6. CM made aware.           Electronically signed by:  Joe Begum CMA  02/04/25 12:46 EST    Joe Begum  Case Management Associate  [address] [address]  P: [phone]  F: [phone]

## 2025-02-04 NOTE — PROGRESS NOTES
Daily Progress Note        UTI (urinary tract infection)    Abdominal pain    Assessment:     Acute hypoxic respiratory failure  ABG on 1/27/2025  pH 7.30/pCO2 78/pO2 60/bicarb 39      positive for norovirus      urine cultures was positive for Proteus Mirabella's    Pulmonary edema  proBNP 1400    Severe pulmonary hypertension most likely group 2   2D echo 1/27/2025    Left ventricular systolic function is normal. Calculated left ventricular EF = 70%    Left ventricular wall thickness is consistent with moderate to severe concentric hypertrophy.    Left ventricular diastolic function is consistent with (grade I) impaired relaxation.  moderate aortic valve regurgitation and stenosis.  Mean/peak gradient of 19/32 mmHg.  Planimetry LAZARO >1cm2.    Estimated right ventricular systolic pressure from tricuspid regurgitation is markedly elevated (>55 mmHg).    TSH 2.0     Recommendations:     Oxygenation improved from 30 L to 1 L nasal cannula    Cont Sidenafil 10 mg po TID for severe PAH  Monitor BP  No Nitrates     Check iron level    Diuresis     Antibiotics completed 5 days of Zosyn    Steroids  Po hydrocortison    Bronchodilators currently on budesonide and DuoNeb    Prognosis guarded     Chest x-ray 1/30/2025      Chest x-ray 1/26/2025       LOS: 11 days     Subjective         Objective     Vital signs for last 24 hours:  Vitals:    02/04/25 0628 02/04/25 0631 02/04/25 0632 02/04/25 0635   BP:  125/53     BP Location:       Patient Position:       Pulse: 54 52 51 52   Resp: 16 16 16 16   Temp:       TempSrc:       SpO2: 96% 95% 95% 96%   Weight:       Height:           Intake/Output last 3 shifts:  I/O last 3 completed shifts:  In: 1208 [P.O.:220; NG/GT:988]  Out: -   Intake/Output this shift:  No intake/output data recorded.      Radiology  Imaging Results (Last 24 Hours)       Procedure Component Value Units Date/Time    FL Video Swallow With Speech Single Contrast [[id number]] Resulted: 02/03/25 1229     Updated:  02/03/25 1229    Narrative:      This procedure was auto-finalized with no dictation required.            Labs:  Results from last 7 days   Lab Units 02/04/25  0228   WBC 10*3/mm3 10.88*   HEMOGLOBIN g/dL 11.6*   HEMATOCRIT % 36.4   PLATELETS 10*3/mm3 343     Results from last 7 days   Lab Units 02/04/25  0228   SODIUM mmol/L 137   POTASSIUM mmol/L 3.9   CHLORIDE mmol/L 93*   CO2 mmol/L 34.2*   BUN mg/dL 33*   CREATININE mg/dL 0.73   CALCIUM mg/dL 9.3   BILIRUBIN mg/dL 0.4   ALK PHOS U/L 36*   ALT (SGPT) U/L 30   AST (SGOT) U/L 27   GLUCOSE mg/dL 103*           Results from last 7 days   Lab Units 02/04/25  0228 02/03/25  0952 02/01/25  0548   ALBUMIN g/dL 3.3* 3.8 3.7             Results from last 7 days   Lab Units 02/01/25  2325   MAGNESIUM mg/dL 2.5*                     Meds:   SCHEDULE  balsalazide, 750 mg, Oral, Nightly  [Held by provider] Budesonide, 3 mg, Oral, BID  budesonide, 0.5 mg, Nebulization, BID - RT  cholestyramine light, 1 packet, Oral, Q12H  enoxaparin, 30 mg, Subcutaneous, Daily  furosemide, 40 mg, Oral, BID Diuretics  hydrocortisone, 20 mg, Oral, Daily With Lunch  levothyroxine, 75 mcg, Oral, Q AM  midodrine, 10 mg, Oral, Q8H  multivitamin with minerals, 1 tablet, Oral, Daily  sildenafil, 10 mg, Oral, TID  sodium chloride, 10 mL, Intravenous, Q12H  vitamin B-12, 1,000 mcg, Oral, Daily      Infusions     PRNs    acetaminophen **OR** acetaminophen **OR** acetaminophen    senna-docusate sodium **AND** polyethylene glycol **AND** bisacodyl **AND** bisacodyl    Calcium Replacement - Follow Nurse / BPA Driven Protocol    dextrose    dextrose    glucagon (human recombinant)    ipratropium-albuterol    Magnesium Standard Dose Replacement - Follow Nurse / BPA Driven Protocol    ondansetron    Phosphorus Replacement - Follow Nurse / BPA Driven Protocol    Potassium Replacement - Follow Nurse / BPA Driven Protocol    Psyllium    [COMPLETED] Insert Peripheral IV **AND** sodium chloride    sodium chloride     sodium chloride    Physical Exam:  Physical Exam  Cardiovascular:      Heart sounds: Murmur heard.      No gallop.   Pulmonary:      Effort: No respiratory distress.      Breath sounds: No stridor. Rhonchi and rales present. No wheezing.   Chest:      Chest wall: No tenderness.         ROS  Review of Systems   Respiratory:  Positive for cough and shortness of breath. Negative for wheezing and stridor.    Cardiovascular:  Negative for chest pain, palpitations and leg swelling.             Total time spent with patient greater than: 45 Minutes

## 2025-02-04 NOTE — PLAN OF CARE
Problem: Adult Inpatient Plan of Care  Goal: Absence of Hospital-Acquired Illness or Injury  Intervention: Identify and Manage Fall Risk  Recent Flowsheet Documentation  Taken 2/4/2025 0352 by Corazon Cruz RN  Safety Promotion/Fall Prevention:   assistive device/personal items within reach   clutter free environment maintained   fall prevention program maintained   nonskid shoes/slippers when out of bed   room organization consistent   safety round/check completed  Taken 2/4/2025 0001 by Corazon Cruz RN  Safety Promotion/Fall Prevention:   assistive device/personal items within reach   clutter free environment maintained   fall prevention program maintained   nonskid shoes/slippers when out of bed   room organization consistent   safety round/check completed  Taken 2/3/2025 2200 by Corazon Cruz RN  Safety Promotion/Fall Prevention:   assistive device/personal items within reach   clutter free environment maintained   fall prevention program maintained   nonskid shoes/slippers when out of bed   room organization consistent   safety round/check completed  Taken 2/3/2025 2001 by Corazon Cruz RN  Safety Promotion/Fall Prevention:   assistive device/personal items within reach   clutter free environment maintained   fall prevention program maintained   nonskid shoes/slippers when out of bed   room organization consistent   safety round/check completed  Intervention: Prevent Skin Injury  Recent Flowsheet Documentation  Taken 2/3/2025 2001 by Corazon Cruz RN  Skin Protection:   incontinence pads utilized   transparent dressing maintained  Intervention: Prevent and Manage VTE (Venous Thromboembolism) Risk  Recent Flowsheet Documentation  Taken 2/4/2025 0352 by Corazon Cruz RN  VTE Prevention/Management:   patient refused intervention   SCDs (sequential compression devices) off  Taken 2/3/2025 2001 by Corazon Cruz RN  VTE Prevention/Management:   SCDs (sequential compression devices)  off   patient refused intervention  Intervention: Prevent Infection  Recent Flowsheet Documentation  Taken 2/3/2025 2001 by Corazon Cruz RN  Infection Prevention:   environmental surveillance performed   personal protective equipment utilized   single patient room provided  Goal: Optimal Comfort and Wellbeing  Intervention: Monitor Pain and Promote Comfort  Recent Flowsheet Documentation  Taken 2/3/2025 2001 by Corazon Cruz RN  Pain Management Interventions:   care clustered   pillow support provided   quiet environment facilitated   unnecessary movement minimized  Intervention: Provide Person-Centered Care  Recent Flowsheet Documentation  Taken 2/3/2025 2001 by Corazon Cruz RN  Trust Relationship/Rapport:   care explained   choices provided   questions answered   questions encouraged   reassurance provided     Problem: Violence Risk or Actual  Goal: Anger and Impulse Control  Intervention: Minimize Safety Risk  Recent Flowsheet Documentation  Taken 2/4/2025 0352 by Corazon Cruz RN  Enhanced Safety Measures: bed alarm set  Taken 2/4/2025 0001 by Corazon Curz RN  Enhanced Safety Measures: bed alarm set  Taken 2/3/2025 2200 by Corazon Cruz RN  Enhanced Safety Measures: bed alarm set  Taken 2/3/2025 2001 by Corazon Cruz RN  Sensory Stimulation Regulation: television on  Enhanced Safety Measures: bed alarm set  Intervention: Promote Self-Control  Recent Flowsheet Documentation  Taken 2/3/2025 2001 by Corazon Cruz RN  Supportive Measures:   active listening utilized   verbalization of feelings encouraged  Environmental Support:   calm environment promoted   environmental consistency promoted     Problem: Fall Injury Risk  Goal: Absence of Fall and Fall-Related Injury  Intervention: Identify and Manage Contributors  Recent Flowsheet Documentation  Taken 2/3/2025 2001 by Corazon Cruz RN  Medication Review/Management: medications reviewed  Intervention: Promote Injury-Free  Environment  Recent Flowsheet Documentation  Taken 2/4/2025 0352 by Corazon Cruz RN  Safety Promotion/Fall Prevention:   assistive device/personal items within reach   clutter free environment maintained   fall prevention program maintained   nonskid shoes/slippers when out of bed   room organization consistent   safety round/check completed  Taken 2/4/2025 0001 by Corazon Cruz RN  Safety Promotion/Fall Prevention:   assistive device/personal items within reach   clutter free environment maintained   fall prevention program maintained   nonskid shoes/slippers when out of bed   room organization consistent   safety round/check completed  Taken 2/3/2025 2200 by Corazon Cruz RN  Safety Promotion/Fall Prevention:   assistive device/personal items within reach   clutter free environment maintained   fall prevention program maintained   nonskid shoes/slippers when out of bed   room organization consistent   safety round/check completed  Taken 2/3/2025 2001 by Corazon Cruz RN  Safety Promotion/Fall Prevention:   assistive device/personal items within reach   clutter free environment maintained   fall prevention program maintained   nonskid shoes/slippers when out of bed   room organization consistent   safety round/check completed     Problem: Comorbidity Management  Goal: Blood Pressure in Desired Range  Intervention: Maintain Blood Pressure Management  Recent Flowsheet Documentation  Taken 2/3/2025 2001 by Corazon Cruz RN  Medication Review/Management: medications reviewed     Problem: Sepsis/Septic Shock  Goal: Optimal Coping  Intervention: Support Patient and Family Response  Recent Flowsheet Documentation  Taken 2/3/2025 2001 by Corazon Cruz RN  Supportive Measures:   active listening utilized   verbalization of feelings encouraged  Goal: Absence of Infection Signs and Symptoms  Intervention: Initiate Sepsis Management  Recent Flowsheet Documentation  Taken 2/3/2025 2001 by Corazon Crzu  DALE, RN  Infection Prevention:   environmental surveillance performed   personal protective equipment utilized   single patient room provided  Isolation Precautions:   contact   spore   precautions maintained  Intervention: Promote Recovery  Recent Flowsheet Documentation  Taken 2/3/2025 2001 by Corazon Cruz, RN  Activity Management: activity encouraged  Sleep/Rest Enhancement:   awakenings minimized   consistent schedule promoted   regular sleep/rest pattern promoted     Problem: Skin Injury Risk Increased  Goal: Skin Health and Integrity  Intervention: Optimize Skin Protection  Recent Flowsheet Documentation  Taken 2/3/2025 2001 by Corazon Cruz, RN  Activity Management: activity encouraged  Pressure Reduction Techniques:   frequent weight shift encouraged   weight shift assistance provided   pressure points protected  Head of Bed (HOB) Positioning: HOB at 20-30 degrees  Pressure Reduction Devices:   pressure-redistributing mattress utilized   positioning supports utilized  Skin Protection:   incontinence pads utilized   transparent dressing maintained     Problem: Enteral Nutrition  Goal: Absence of Aspiration Signs and Symptoms  Intervention: Minimize Aspiration Risk  Recent Flowsheet Documentation  Taken 2/3/2025 2001 by Corazon Cruz, RN  Head of Bed (HOB) Positioning: HOB at 20-30 degrees   Goal Outcome Evaluation:

## 2025-02-05 VITALS
HEIGHT: 58 IN | TEMPERATURE: 97.7 F | SYSTOLIC BLOOD PRESSURE: 130 MMHG | BODY MASS INDEX: 20.87 KG/M2 | DIASTOLIC BLOOD PRESSURE: 63 MMHG | HEART RATE: 52 BPM | WEIGHT: 99.43 LBS | OXYGEN SATURATION: 99 % | RESPIRATION RATE: 10 BRPM

## 2025-02-05 LAB
ALBUMIN SERPL-MCNC: 3.5 G/DL (ref 3.5–5.2)
ALBUMIN/GLOB SERPL: 1.3 G/DL
ALP SERPL-CCNC: 39 U/L (ref 39–117)
ALT SERPL W P-5'-P-CCNC: 25 U/L (ref 1–33)
ANION GAP SERPL CALCULATED.3IONS-SCNC: 6.7 MMOL/L (ref 5–15)
AST SERPL-CCNC: 24 U/L (ref 1–32)
BILIRUB SERPL-MCNC: 0.4 MG/DL (ref 0–1.2)
BUN SERPL-MCNC: 28 MG/DL (ref 8–23)
BUN/CREAT SERPL: 37.3 (ref 7–25)
CALCIUM SPEC-SCNC: 9.1 MG/DL (ref 8.2–9.6)
CHLORIDE SERPL-SCNC: 96 MMOL/L (ref 98–107)
CO2 SERPL-SCNC: 37.3 MMOL/L (ref 22–29)
CREAT SERPL-MCNC: 0.75 MG/DL (ref 0.57–1)
DEPRECATED RDW RBC AUTO: 51.4 FL (ref 37–54)
EGFRCR SERPLBLD CKD-EPI 2021: 72.1 ML/MIN/1.73
ERYTHROCYTE [DISTWIDTH] IN BLOOD BY AUTOMATED COUNT: 14.4 % (ref 12.3–15.4)
GLOBULIN UR ELPH-MCNC: 2.6 GM/DL
GLUCOSE SERPL-MCNC: 91 MG/DL (ref 65–99)
HCT VFR BLD AUTO: 36.7 % (ref 34–46.6)
HGB BLD-MCNC: 11.8 G/DL (ref 12–15.9)
MCH RBC QN AUTO: 31.3 PG (ref 26.6–33)
MCHC RBC AUTO-ENTMCNC: 32.2 G/DL (ref 31.5–35.7)
MCV RBC AUTO: 97.3 FL (ref 79–97)
PLATELET # BLD AUTO: 335 10*3/MM3 (ref 140–450)
PMV BLD AUTO: 9.2 FL (ref 6–12)
POTASSIUM SERPL-SCNC: 3.8 MMOL/L (ref 3.5–5.2)
PROT SERPL-MCNC: 6.1 G/DL (ref 6–8.5)
RBC # BLD AUTO: 3.77 10*6/MM3 (ref 3.77–5.28)
SODIUM SERPL-SCNC: 140 MMOL/L (ref 136–145)
WBC NRBC COR # BLD AUTO: 8.9 10*3/MM3 (ref 3.4–10.8)

## 2025-02-05 PROCEDURE — 92526 ORAL FUNCTION THERAPY: CPT

## 2025-02-05 PROCEDURE — 94799 UNLISTED PULMONARY SVC/PX: CPT

## 2025-02-05 PROCEDURE — 85027 COMPLETE CBC AUTOMATED: CPT

## 2025-02-05 PROCEDURE — 94664 DEMO&/EVAL PT USE INHALER: CPT

## 2025-02-05 PROCEDURE — 94761 N-INVAS EAR/PLS OXIMETRY MLT: CPT

## 2025-02-05 PROCEDURE — 99232 SBSQ HOSP IP/OBS MODERATE 35: CPT | Performed by: INTERNAL MEDICINE

## 2025-02-05 PROCEDURE — 80053 COMPREHEN METABOLIC PANEL: CPT

## 2025-02-05 RX ORDER — CHOLESTYRAMINE LIGHT 4 G/5.7G
1 POWDER, FOR SUSPENSION ORAL EVERY 12 HOURS SCHEDULED
Qty: 10 PACKET | Refills: 0 | Status: SHIPPED | OUTPATIENT
Start: 2025-02-05 | End: 2025-02-10

## 2025-02-05 RX ORDER — MIDODRINE HYDROCHLORIDE 5 MG/1
15 TABLET ORAL EVERY 8 HOURS SCHEDULED
Qty: 270 TABLET | Refills: 0 | Status: SHIPPED | OUTPATIENT
Start: 2025-02-05 | End: 2025-03-07

## 2025-02-05 RX ORDER — FUROSEMIDE 20 MG/1
20 TABLET ORAL 2 TIMES DAILY
Qty: 60 TABLET | Refills: 0 | Status: SHIPPED | OUTPATIENT
Start: 2025-02-05 | End: 2025-03-07

## 2025-02-05 RX ORDER — HYDROCORTISONE 20 MG/1
TABLET ORAL
Qty: 5 TABLET | Refills: 0 | Status: SHIPPED | OUTPATIENT
Start: 2025-02-05 | End: 2025-02-11

## 2025-02-05 RX ORDER — SILDENAFIL CITRATE 20 MG/1
10 TABLET ORAL 3 TIMES DAILY
Qty: 45 TABLET | Refills: 0 | Status: SHIPPED | OUTPATIENT
Start: 2025-02-05 | End: 2025-03-07

## 2025-02-05 RX ADMIN — SILDENAFIL 10 MG: 20 TABLET ORAL at 08:19

## 2025-02-05 RX ADMIN — BUDESONIDE 0.5 MG: 0.5 INHALANT RESPIRATORY (INHALATION) at 06:33

## 2025-02-05 RX ADMIN — LEVOTHYROXINE SODIUM 75 MCG: 0.07 TABLET ORAL at 05:42

## 2025-02-05 RX ADMIN — CHOLESTYRAMINE 4 G: 4 POWDER, FOR SUSPENSION ORAL at 08:20

## 2025-02-05 RX ADMIN — FUROSEMIDE 20 MG: 20 TABLET ORAL at 08:20

## 2025-02-05 RX ADMIN — Medication 1000 MCG: at 08:20

## 2025-02-05 RX ADMIN — Medication 1 TABLET: at 08:19

## 2025-02-05 RX ADMIN — MIDODRINE HYDROCHLORIDE 15 MG: 5 TABLET ORAL at 05:42

## 2025-02-05 RX ADMIN — Medication 10 ML: at 10:44

## 2025-02-05 NOTE — SIGNIFICANT NOTE
02/05/25 1132   OTHER   Discipline physical therapy assistant   Rehab Time/Intention   Session Not Performed other (see comments)  (Pt discharge in progress.)   Therapy Assessment/Plan (PT)   Criteria for Skilled Interventions Met (PT) yes   Recommendation   PT - Next Appointment 02/06/25

## 2025-02-05 NOTE — CASE MANAGEMENT/SOCIAL WORK
Continued Stay Note  [facility]     Patient Name: Alexey Trinidad  MRN: [id number]  Today's Date: 2/5/2025    Admit Date: 1/24/2025    Plan: [facility] (skilled, accepted). PASRR approved. Precert approved Valid 2/4-2/6   Discharge Plan       Row Name 02/05/25 1044       Plan    Plan Comments Jazmín confirmed that their SNF van will be here between 11-2:30 to pick patient up. Primary nurse updated.      Row Name 02/05/25 0900       Plan    Plan Comments CM confirmed [facility] bed is ready for patient DC today. Awaiting response from liawilliam Noyola to see if they can provide transportation for patient.             Carolina Dixon RN      [facility]  Office: [phone]  Cell: [phone]  Fax # [phone]

## 2025-02-05 NOTE — CONSULTS
Nutrition Services    Patient Name: Alexey Trinidad  YOB: 1926  MRN: [id number]  Admission date: 1/24/2025    Comment:    Moderate chronic disease related malnutrition related to prolonged suboptimal intake and suspected hypermetabolism in the setting of multiple chronic conditions as evidenced by po intake meeting < 75% est energy requirement for > 1 month and evidence of moderate muscle and fat wasting per NFPE.     Continue current po diet and ONS    CLINICAL NUTRITION ASSESSMENT      Reason for Assessment 2/5: Follow-up protocol  1/29: TF consult     H&P      Past Medical History:   Diagnosis Date    Disease of thyroid gland     Hypertension        Past Surgical History:   Procedure Laterality Date    BACK SURGERY      kyphoplasty    BREAST SURGERY      benign cyst removed    HYSTERECTOMY      LAPAROSCOPIC CHOLECYSTECTOMY          Current Problems   Abdominal pain, nausea, vomiting likely due to biliary dilatation/Stone at the ampulla of vater  -GI following      UTI  Leukocytosis likely secondary to above  Altered mental status likely secondary to infection versus chronic  -Uncertain about patient's baseline if confusion is chronic or acute     Physical deconditioning     Mild hypernatremia due to dehydration     Left adrenal nodule     Hypertension     Hypothyroidism     COPD     Dysphagia  -TF consult received     Encounter Information        Trending Narrative     2/5:  Checking in to monitor Nutrition POC. Since last RD review, pt continues to recover. Pt now receiving Pureed diet. EN discontinued and NGT removed yesterday.  Plan to discharge today.     1/29: Pt presented to ED on 1/24 with reports of progressively worsening general weakness and some abdominal discomfort. Family reports that pt is having difficulty swallowing pills but reported that pt does ok tolerating soft food. SLP evaluated pt on 1/27 and recommends pt remain NPO. Pt has been NPO x last 2 days. Pt remains confused and now  "with increased supplemental O2 needs (remains on airvo). RD visited pt at bedside. Pt sitting up in bed at time of visit with 3 family members visiting. Pt has NGT in place with EN infusing at 20 mL/hr per pump. Pt did not respond verbally during visit. Family answered questions on her behalf. Family reports that pt does not eat much on a regular basis. NFPE completed, consistent with nutrition diagnosis of malnutrition using AND/ASPEN criteria. See MSA below.        Anthropometrics        Current Height, Weight Height: 147.3 cm (58\")  Weight: 45.1 kg (99 lb 6.8 oz) (02/05/25 0500)       Usual Body Weight (UBW) Unable to obtain from patient        Trending Weight Hx     This admission: 2/5: 99# (5.7% weight loss in the last week)  1/29: 105#             PTA: 1/29: No weight history documented     Wt Readings from Last 30 Encounters:   02/05/25 0500 45.1 kg (99 lb 6.8 oz)   02/04/25 0521 46.8 kg (103 lb 2.8 oz)   02/02/25 0453 45.3 kg (99 lb 13.9 oz)   02/01/25 0511 45.1 kg (99 lb 6.8 oz)   01/31/25 0534 46.2 kg (101 lb 13.6 oz)   01/30/25 0528 47.7 kg (105 lb 2.6 oz)   01/29/25 0531 48 kg (105 lb 13.1 oz)   01/27/25 1757 47.2 kg (104 lb)   01/24/25 2006 47.4 kg (104 lb 8 oz)   01/24/25 1614 49.4 kg (109 lb)      BMI kg/m2 Body mass index is 20.78 kg/m².       Labs        Pertinent Labs Reviewed. Management per attending.    Results from last 7 days   Lab Units 02/05/25  0542 02/04/25  0228 02/03/25  0952   SODIUM mmol/L 140 137 139   POTASSIUM mmol/L 3.8 3.9 4.1   CHLORIDE mmol/L 96* 93* 92*   CO2 mmol/L 37.3* 34.2* 40.2*   BUN mg/dL 28* 33* 38*   CREATININE mg/dL 0.75 0.73 0.77   CALCIUM mg/dL 9.1 9.3 10.5*   BILIRUBIN mg/dL 0.4 0.4 0.3   ALK PHOS U/L 39 36* 41   ALT (SGPT) U/L 25 30 36*   AST (SGOT) U/L 24 27 28   GLUCOSE mg/dL 91 103* 149*     Results from last 7 days   Lab Units 02/05/25  0542 02/03/25  0952 02/01/25  2325 02/01/25  0548 01/31/25  0300   MAGNESIUM mg/dL  --   --  2.5* 2.4* 2.6*   PHOSPHORUS " "mg/dL  --   --  4.0 5.1* 1.9*   HEMOGLOBIN g/dL 11.8*   < > 13.7 13.7 14.4   HEMATOCRIT % 36.7   < > 43.0 43.2 46.0    < > = values in this interval not displayed.     No results found for: \"HGBA1C\"     Medications    Scheduled Medications balsalazide, 750 mg, Oral, Nightly  [Held by provider] Budesonide, 3 mg, Oral, BID  budesonide, 0.5 mg, Nebulization, BID - RT  cholestyramine light, 1 packet, Oral, Q12H  enoxaparin, 30 mg, Subcutaneous, Daily  furosemide, 20 mg, Oral, BID Diuretics  hydrocortisone, 20 mg, Oral, Daily With Lunch  levothyroxine, 75 mcg, Oral, Q AM  midodrine, 15 mg, Oral, Q8H  multivitamin with minerals, 1 tablet, Oral, Daily  sildenafil, 10 mg, Oral, TID  sodium chloride, 10 mL, Intravenous, Q12H  vitamin B-12, 1,000 mcg, Oral, Daily        Infusions      PRN Medications   acetaminophen **OR** acetaminophen **OR** acetaminophen    senna-docusate sodium **AND** polyethylene glycol **AND** bisacodyl **AND** bisacodyl    Calcium Replacement - Follow Nurse / BPA Driven Protocol    dextrose    dextrose    glucagon (human recombinant)    ipratropium-albuterol    Magnesium Standard Dose Replacement - Follow Nurse / BPA Driven Protocol    ondansetron    Phosphorus Replacement - Follow Nurse / BPA Driven Protocol    Potassium Replacement - Follow Nurse / BPA Driven Protocol    Psyllium    [COMPLETED] Insert Peripheral IV **AND** sodium chloride    sodium chloride    sodium chloride     Physical Findings        Trending Physical   Appearance, NFPE 2/5: Agree with previous assessment  1/29: NFPE completed, consistent with nutrition diagnosis of malnutrition using AND/ASPEN criteria. See MSA below.      --  Edema  No edema documented      Bowel Function Last documented BM 2/3     Tubes No feeding tube in place     Chewing/Swallowing Pureed diet in place      Skin No PI documented        Estimated/Assessed Needs    Estimated 1/29/25 - remains appropriate   Energy Requirements    EST Needs, Method, Wt used " 7400-0933 kcals (25-30 kcals/kg CBW 48 kg)       Protein Requirements    EST Needs, Method, Wt used 58-72 g protein (1.2-1.5 g/kg CBW 48 kg)       Fluid Requirements     Estimated Needs (mL/day) 1 mL/kcal or per hydration status     Fluid Deficit    Current Na Level (mEq/L)    Desired Na Level (mEq/L)    Estimated Fluid Deficit (L)       Current Nutrition Orders & Evaluation of Intake       Oral Nutrition     Food Allergies NKFA   Current PO Diet Diet: Regular/House; No Straw, Feeding Assistance - Nursing; Texture: Pureed (NDD 1); Fluid Consistency: Thin (IDDSI 0)   Supplement None    PO Evaluation     Trending % PO Intake 1/29: NPO     Enteral Nutrition    Enteral Route    Order, Modulars, Flushes    Tolerance    TF Observation         Parenteral Nutrition     TPN Route    Total # Days on TPN    TPN Order, Lipid Details    MVI & Trace Element Freq    TPN Observation       Nutritional Risk Screening        NRS-2002 Score          Nutrition Diagnosis         Nutrition Dx Problem 1 Inadequate po intake related to dysphagia as evidenced by EN needed to meet est needs.       Nutrition Dx Problem 2 Moderate chronic disease related malnutrition related to prolonged suboptimal intake and suspected hypermetabolism in the setting of multiple chronic conditions as evidenced by po intake meeting < 75% est energy requirement for > 1 month and evidence of moderate muscle and fat wasting per NFPE. (See MSA below).        Intervention Goal         Intervention Goal(s) Tolerate po diet   Maintain weight  Accept ONS     Nutrition Intervention        RD Action Continue current po diet and ONS.     Encourage good po and ONS intake.        Nutrition Prescription          Diet Prescription Pureed diet with thin liquids   Supplement Prescription Boost Original BID (Provides 480 kcals, 20 g protein if consumed)        Enteral Prescription Initial Goal:  *initial goal conservative d/t risk of RFS     Nutren 1.5 at 20 mL/hr + 20 mL/hr water  flush      End Goal:    Nutren 1.5 at 40 mL/hr + water flush per clinical picture     Calories  1320 kcals (100%)    Protein  60 g (100%)    Free water  669 mL   Flushes  Will monitor per hydration status     The above end goal rate is for 22 hrs/day to assume interruptions for ADLs. Water flushes adjusted based on clinical picture + Rx flushes/IV fluids          TPN Prescription      Monitor/Evaluation        Monitor Per protocol, I&O, Pertinent labs, EN delivery/tolerance, Weight, Skin status, GI status, Symptoms, Swallow function, Hemodynamic stability     Malnutrition Severity Assessment      Patient meets criteria for : Moderate (non-severe) Malnutrition               Electronically signed by:  Cassandra Taveras RD  02/05/25 08:22 EST

## 2025-02-05 NOTE — THERAPY TREATMENT NOTE
Acute Care - Speech Language Pathology   Swallow Treatment Note  Fred     Patient Name: Alexey Trinidad  : 3/21/1926  MRN: [id number]  Today's Date: 2025               Admit Date: 2025    Visit Dx:     ICD-10-CM ICD-9-CM   1. Urinary tract infection without hematuria, site unspecified  N39.0 599.0   2. General weakness  R53.1 780.79   3. Vomiting, unspecified vomiting type, unspecified whether nausea present  R11.10 787.03     Patient Active Problem List   Diagnosis    UTI (urinary tract infection)    Abdominal pain    Moderate protein-calorie malnutrition     Past Medical History:   Diagnosis Date    Disease of thyroid gland     Hypertension      Past Surgical History:   Procedure Laterality Date    BACK SURGERY      kyphoplasty    BREAST SURGERY      benign cyst removed    HYSTERECTOMY      LAPAROSCOPIC CHOLECYSTECTOMY         SLP Recommendation and Plan             EDUCATION  The patient has been educated in the following areas:   Dysphagia (Swallowing Impairment) Oral Care/Hydration Modified Diet Instruction.        SLP GOALS       Row Name 25 0800       (LTG) Patient will demonstrate functional swallow for    Diet Texture (Demonstrate functional swallow) mechanical ground textures  -PF    Liquid viscosity (Demonstrate functional swallow) thin liquids  -PF    [place] (Demonstrate functional swallow) independently (over 90% accuracy)  -PF    Time Frame (Demonstrate functional swallow) by discharge  -PF    Progress/Outcomes (Demonstrate functional swallow) new goal  -PF       (STG) Patient will tolerate trials of    Consistencies Trialed (Tolerate trials) thin liquids;mechanical ground textures  -PF    Desired Outcome (Tolerate trials) without signs/symptoms of aspiration;without signs of distress;with adequate oral prep/transit/clearance  -PF    [place] (Tolerate trials) independently (over 90% accuracy)  -PF    Time Frame (Tolerate trials) by discharge  -PF    Progress/Outcomes  "(Tolerate trials) new goal  -PF    Comment (Tolerate trials) Pt was seen for skilled DT/meal assessment to ensure tolerance of PO diet. Pt is currently receiving puree w/ thin liquids. She is on 2L  NC. Pt was upright in bed and amenable to PO on breakfast tray. Nsg at bedside. Pt was agreeable to trialing mech soft solid. Extended and slow mastication, but appears functional. Min lingual residual. No pocketing or oral holding. Pt should continue to follow safe swallow strategies during all PO/meds: small bites/sips, slow rate of intake, alternate liquids/solids. Pt stated \"I can't chew very well\" and agreeable/prefer to to remain on puree/thins.  Pt endorses diet tolerance and no further skilled ST indicated at this time. Pt has signed d/c orders. ST will sign off.   -PF              User Key  (r) = Recorded By, (t) = Taken By, (c) = Cosigned By      Initials Name Provider Type    PF Fakto, [place], SLP Speech and Language Pathologist    Joe Aaron SLP Speech and Language Pathologist               CAMRON Biswas  2/5/2025  "

## 2025-02-05 NOTE — PROGRESS NOTES
Referring Provider: Roxanne Lorenz,*    Reason for follow-up: Valvular heart disease     Patient Care Team:  Rui Cole MD as PCP - General (Internal Medicine)  Oscar Wesley MD as Cardiologist (Cardiology)      SUBJECTIVE  Resting comfortably in bed.  Reports improvement in breathing.     ROS  Review of all systems negative except as indicated.    Since I have last seen, the patient has been without any chest discomfort, shortness of breath, palpitations, dizziness or syncope.  Denies having any headache, abdominal pain, nausea, vomiting, diarrhea, constipation, loss of weight or loss of appetite.  Denies having any excessive bruising, hematuria or blood in the stool.        Personal History:    Past Medical History:   Diagnosis Date    Disease of thyroid gland     Hypertension        Past Surgical History:   Procedure Laterality Date    BACK SURGERY      kyphoplasty    BREAST SURGERY      benign cyst removed    HYSTERECTOMY      LAPAROSCOPIC CHOLECYSTECTOMY         History reviewed. No pertinent family history.    Social History     Tobacco Use    Smoking status: Never    Smokeless tobacco: Never   Vaping Use    Vaping status: Never Used   Substance Use Topics    Alcohol use: Never    Drug use: Never        Home meds:  Prior to Admission medications    Medication Sig Start Date End Date Taking? Authorizing Provider   acetaminophen (TYLENOL) 500 MG tablet Take 1 tablet by mouth Every Morning.   Yes Gerald Costa MD   Bacillus Coagulans-Inulin (ALIGN PREBIOTIC-PROBIOTIC PO) Take 1 capsule by mouth Daily.   Yes Gerald Costa MD   balsalazide (COLAZAL) 750 MG capsule Take 1 capsule by mouth Every Night.   Yes Gerald Costa MD   Budesonide (ENTOCORT EC) 3 MG 24 hr capsule Take 1 capsule by mouth 2 (Two) Times a Day.   Yes Gerald Costa MD   Cyanocobalamin (VITAMIN B-12 PO) Take 1 tablet by mouth Daily.   Yes Gerald Costa MD   levothyroxine (SYNTHROID,  LEVOTHROID) 75 MCG tablet Take 1 tablet by mouth Every Morning.   Yes Provider, MD Gerald   losartan (COZAAR) 50 MG tablet Take 1 tablet by mouth Daily.   Yes Provider, MD Gerald   multivitamins-minerals (PRESERVISION AREDS 2) capsule capsule Take 1 capsule by mouth 2 (Two) Times a Day.   Yes Provider, MD Gerald   Wheat Dextrin (BENEFIBER DRINK MIX PO) Take 1 package by mouth Daily.   Yes Provider, MD Gerald       Allergies:  Contrast dye (echo or unknown ct/mr) and Sulfa antibiotics    Scheduled Meds:balsalazide, 750 mg, Oral, Nightly  [Held by provider] Budesonide, 3 mg, Oral, BID  budesonide, 0.5 mg, Nebulization, BID - RT  cholestyramine light, 1 packet, Oral, Q12H  enoxaparin, 30 mg, Subcutaneous, Daily  furosemide, 20 mg, Oral, BID Diuretics  hydrocortisone, 20 mg, Oral, Daily With Lunch  levothyroxine, 75 mcg, Oral, Q AM  midodrine, 15 mg, Oral, Q8H  multivitamin with minerals, 1 tablet, Oral, Daily  sildenafil, 10 mg, Oral, TID  sodium chloride, 10 mL, Intravenous, Q12H  vitamin B-12, 1,000 mcg, Oral, Daily      Continuous Infusions:   PRN Meds:.  acetaminophen **OR** acetaminophen **OR** acetaminophen    senna-docusate sodium **AND** polyethylene glycol **AND** bisacodyl **AND** bisacodyl    Calcium Replacement - Follow Nurse / BPA Driven Protocol    dextrose    dextrose    glucagon (human recombinant)    ipratropium-albuterol    Magnesium Standard Dose Replacement - Follow Nurse / BPA Driven Protocol    ondansetron    Phosphorus Replacement - Follow Nurse / BPA Driven Protocol    Potassium Replacement - Follow Nurse / BPA Driven Protocol    Psyllium    [COMPLETED] Insert Peripheral IV **AND** sodium chloride    sodium chloride    sodium chloride      OBJECTIVE    Vital Signs  Vitals:    02/05/25 0450 02/05/25 0500 02/05/25 0633 02/05/25 0636   BP: 119/67  148/66    BP Location: Left arm      Patient Position: Lying      Pulse: 58  51 (!) 49   Resp: 17  16    Temp: 98 °F (36.7 °C)     "  TempSrc: Oral      SpO2: 100%  100% 99%   Weight:  45.1 kg (99 lb 6.8 oz)     Height:           Flowsheet Rows      Flowsheet Row First Filed Value   Admission Height 147.3 cm (58\") Documented at 01/24/2025 1614   Admission Weight 49.4 kg (109 lb) Documented at 01/24/2025 1614              Intake/Output Summary (Last 24 hours) at 2/5/2025 0800  Last data filed at 2/5/2025 0500  Gross per 24 hour   Intake 120 ml   Output 150 ml   Net -30 ml            Telemetry: Sinus rhythm    Physical Exam:  The patient is alert, oriented and in no distress.  Elderly/frail  Vital signs as noted above.  Head and neck revealed no carotid bruits or jugular venous distention.  No thyromegaly or lymphadenopathy is present  Lungs clear.  No wheezing.  Breath sounds are normal bilaterally.  Heart normal first and second heart sounds.  No murmur. No precordial rub is present.  No gallop is present.  Abdomen soft and nontender.  No organomegaly is present.  Extremities with good peripheral pulses without any pedal edema.  Skin warm and dry.  Musculoskeletal system is grossly normal.  CNS grossly normal.       Results Review:  I have personally reviewed the results from the time of this admission to 2/5/2025 08:00 EST and agree with these findings:  []  Laboratory  []  Microbiology  []  Radiology  []  EKG/Telemetry   []  Cardiology/Vascular   []  Pathology  []  Old records  []  Other:    Most notable findings include:    Lab Results (last 24 hours)       Procedure Component Value Units Date/Time    Comprehensive Metabolic Panel [[id number]]  (Abnormal) Collected: 02/05/25 0542    Specimen: Blood Updated: 02/05/25 0617     Glucose 91 mg/dL      BUN 28 mg/dL      Creatinine 0.75 mg/dL      Sodium 140 mmol/L      Potassium 3.8 mmol/L      Comment: Slight hemolysis detected by analyzer. Result may be falsely elevated.        Chloride 96 mmol/L      CO2 37.3 mmol/L      Calcium 9.1 mg/dL      Total Protein 6.1 g/dL      Albumin 3.5 g/dL      ALT " (SGPT) 25 U/L      AST (SGOT) 24 U/L      Alkaline Phosphatase 39 U/L      Total Bilirubin 0.4 mg/dL      Globulin 2.6 gm/dL      A/G Ratio 1.3 g/dL      BUN/Creatinine Ratio 37.3     Anion Gap 6.7 mmol/L      eGFR 72.1 mL/min/1.73     Narrative:      GFR Categories in Chronic Kidney Disease (CKD)      GFR Category          GFR (mL/min/1.73)    Interpretation  G1                     90 or greater         Normal or high (1)  G2                      60-89                Mild decrease (1)  G3a                   45-59                Mild to moderate decrease  G3b                   30-44                Moderate to severe decrease  G4                    15-29                Severe decrease  G5                    14 or less           Kidney failure          (1)In the absence of evidence of kidney disease, neither GFR category G1 or G2 fulfill the criteria for CKD.    eGFR calculation 2021 CKD-EPI creatinine equation, which does not include race as a factor    CBC (No Diff) [[id number]]  (Abnormal) Collected: 02/05/25 0542    Specimen: Blood Updated: 02/05/25 0551     WBC 8.90 10*3/mm3      RBC 3.77 10*6/mm3      Hemoglobin 11.8 g/dL      Hematocrit 36.7 %      MCV 97.3 fL      MCH 31.3 pg      MCHC 32.2 g/dL      RDW 14.4 %      RDW-SD 51.4 fl      MPV 9.2 fL      Platelets 335 10*3/mm3             Imaging Results (Last 24 Hours)       ** No results found for the last 24 hours. **            LAB RESULTS (LAST 7 DAYS)    CBC  Results from last 7 days   Lab Units 02/05/25  0542 02/04/25  0228 02/03/25  0952 02/01/25  2325 02/01/25  0548 01/31/25  0300 01/30/25  0046   WBC 10*3/mm3 8.90 10.88* 10.62 9.47 8.92 11.50* 9.43   RBC 10*6/mm3 3.77 3.71* 4.26 4.45 4.45 4.70 4.33   HEMOGLOBIN g/dL 11.8* 11.6* 13.3 13.7 13.7 14.4 13.6   HEMATOCRIT % 36.7 36.4 41.4 43.0 43.2 46.0 42.2   MCV fL 97.3* 98.1* 97.2* 96.6 97.1* 97.9* 97.5*   PLATELETS 10*3/mm3 335 343 372 369 321 346 299       BMP  Results from last 7 days   Lab Units  02/05/25 0542 02/04/25 0228 02/03/25 0952 02/02/25  1556 02/01/25 2325 02/01/25 0548 01/31/25 0300 01/30/25 1209 01/30/25  0046   SODIUM mmol/L 140 137 139  --  139 137 138  --  142   POTASSIUM mmol/L 3.8 3.9 4.1 4.7 3.3* 3.7 3.9   < > 3.6   CHLORIDE mmol/L 96* 93* 92*  --  90* 89* 89*  --  90*   CO2 mmol/L 37.3* 34.2* 40.2*  --  40.7* 37.8* 42.0*  --  43.5*   BUN mg/dL 28* 33* 38*  --  29* 33* 33*  --  26*   CREATININE mg/dL 0.75 0.73 0.77  --  0.73 0.63 0.66  --  0.60   GLUCOSE mg/dL 91 103* 149*  --  175* 168* 113*  --  156*   MAGNESIUM mg/dL  --   --   --   --  2.5* 2.4* 2.6*  --  2.1   PHOSPHORUS mg/dL  --   --   --   --  4.0 5.1* 1.9*  --  2.2*    < > = values in this interval not displayed.       CMP   Results from last 7 days   Lab Units 02/05/25 0542 02/04/25 0228 02/03/25 0952 02/02/25 1556 02/01/25 2325 02/01/25 0548 01/31/25 0300 01/30/25 1209 01/30/25  0046   SODIUM mmol/L 140 137 139  --  139 137 138  --  142   POTASSIUM mmol/L 3.8 3.9 4.1 4.7 3.3* 3.7 3.9   < > 3.6   CHLORIDE mmol/L 96* 93* 92*  --  90* 89* 89*  --  90*   CO2 mmol/L 37.3* 34.2* 40.2*  --  40.7* 37.8* 42.0*  --  43.5*   BUN mg/dL 28* 33* 38*  --  29* 33* 33*  --  26*   CREATININE mg/dL 0.75 0.73 0.77  --  0.73 0.63 0.66  --  0.60   GLUCOSE mg/dL 91 103* 149*  --  175* 168* 113*  --  156*   ALBUMIN g/dL 3.5 3.3* 3.8  --   --  3.7  --   --   --    BILIRUBIN mg/dL 0.4 0.4 0.3  --   --  0.4  --   --   --    ALK PHOS U/L 39 36* 41  --   --  38*  --   --   --    AST (SGOT) U/L 24 27 28  --   --  31  --   --   --    ALT (SGPT) U/L 25 30 36*  --   --  36*  --   --   --     < > = values in this interval not displayed.       BNP        TROPONIN        CoAg        Creatinine Clearance  Estimated Creatinine Clearance: 29.8 mL/min (by C-G formula based on SCr of 0.75 mg/dL).    ABG          Radiology  No radiology results for the last day      EKG  I personally viewed and interpreted the patient's EKG/Telemetry data:  Telemetry Scan    Final Result      Telemetry Scan   Final Result      Telemetry Scan   Final Result      Telemetry Scan   Final Result      Telemetry Scan   Final Result      Telemetry Scan   Final Result      Telemetry Scan   Final Result      Telemetry Scan   Final Result      Telemetry Scan   Final Result      Telemetry Scan   Final Result      Telemetry Scan   Final Result      Telemetry Scan   Final Result      Telemetry Scan   Final Result      Telemetry Scan   Final Result      Telemetry Scan   Final Result      Telemetry Scan   Final Result      Telemetry Scan   Final Result      Telemetry Scan   Final Result      Telemetry Scan   Final Result      Telemetry Scan   Final Result      Telemetry Scan   Final Result      Telemetry Scan   Final Result      Telemetry Scan   Final Result      Telemetry Scan   Final Result      Telemetry Scan   Final Result      Telemetry Scan   Final Result      Telemetry Scan   Final Result      Telemetry Scan   Final Result      Telemetry Scan   Final Result      Telemetry Scan   Final Result      Telemetry Scan   Final Result      Telemetry Scan   Final Result      Telemetry Scan   Final Result      Telemetry Scan   Final Result      Telemetry Scan   Final Result      Telemetry Scan   Final Result      Telemetry Scan   Final Result      Telemetry Scan   Final Result      Telemetry Scan   Final Result      Telemetry Scan   Final Result      Telemetry Scan   Final Result      Telemetry Scan   Final Result      Telemetry Scan   Final Result      Telemetry Scan   Final Result      Telemetry Scan   Final Result      Telemetry Scan   Final Result      Telemetry Scan   Final Result      Telemetry Scan   Final Result      Telemetry Scan   Final Result      Telemetry Scan   Final Result      Telemetry Scan   Final Result      Telemetry Scan   Final Result      Telemetry Scan   Final Result      Telemetry Scan   Final Result      Telemetry Scan   Final Result      Telemetry Scan   Final Result             Echocardiogram:    Results for orders placed during the hospital encounter of 01/24/25    Adult Transthoracic Echo Complete W/ Cont if Necessary Per Protocol    Interpretation Summary    Left ventricular systolic function is normal. Calculated left ventricular EF = 70%    Left ventricular wall thickness is consistent with moderate to severe concentric hypertrophy.    Left ventricular diastolic function is consistent with (grade I) impaired relaxation.    The left atrial cavity is mildly dilated.    The right atrial cavity is mildly  dilated.    Moderate aortic valve regurgitation is present.    Moderate to severe aortic valve stenosis is present.    Moderate tricuspid valve regurgitation is present.    Estimated right ventricular systolic pressure from tricuspid regurgitation is markedly elevated (>55 mmHg).    Transthoracic echocardiography reveals moderate to severe LVH with severely calcified aortic valve with moderate aortic regurgitation and moderate to severe aortic stenosis.  No effusion.  Moderate TR with severe pulm hypertension.  No effusion    Electronically signed by Kt Goins MD, 01/28/25, 5:51 PM EST.        Stress Test:         Cardiac Catheterization:  No results found for this or any previous visit.         Other:         ASSESSMENT & PLAN:    Principal Problem:    UTI (urinary tract infection)  Active Problems:    Abdominal pain    Moderate protein-calorie malnutrition    Valvular heart disease  Patient has moderate aortic valve regurgitation and stenosis.  Mean/peak gradient of 19/32 mmHg.  Aortic valve area on planimetry is more than 1 cm² suggestive of moderate aortic stenosis.  Moderate tricuspid valve regurgitation with severe pulmonary hypertension.  Currently does not meet criteria for transcatheter aortic valve replacement or tricuspid valve repair.  Continue diuretics and medical management of valvular heart disease with blood pressure and heart rate control.  Continue  low-dose Lasix  proBNP is normal  Continue sildenafil  Monitor I's and O's and replace electrolytes as needed.  Would like to follow-up as outpatient for progression of disease and to discuss transcatheter options.  Given advanced age and poor functional status: I am inclined towards medical management only with diuretics.    Hypotension  On sildenafil  Continue midodrine     Respiratory failure  Hypoxemic respiratory failure  Severe pulmonary hypertension  Continue sildenafil  Oxygenation improved.  Now on 1-2 L of oxygen  Bronchodilators and steroid per pulmonology     UTI  She will complete a course of antibiotics    Diarrhea  Norovirus  Started on cholestyramine     Elderly/frail  PT OT  Nutrition/dietitian on board  Started puréed diet     CODE STATUS is DNI DNR.  Transition to SNF.      Oscar Wesley MD  02/05/25  08:00 EST

## 2025-02-05 NOTE — DISCHARGE SUMMARY
"[facility] Medicine Services  Discharge Summary    Date of Service: 2025  Patient Name: Alexey Trinidad  : 3/21/1926  MRN: [id number]    Date of Admission: 2025  Discharge Diagnosis: UTI (urinary tract infection)  Date of Discharge: 2025  Primary Care Physician: Rui Cole MD      Presenting Problem:   UTI (urinary tract infection) [N39.0]  General weakness [R53.1]  Urinary tract infection without hematuria, site unspecified [N39.0]  Vomiting, unspecified vomiting type, unspecified whether nausea present [R11.10]  Abdominal pain [R10.9]    Active and Resolved Hospital Problems:  Active Hospital Problems    Diagnosis POA    **UTI (urinary tract infection) [N39.0] Yes    Moderate protein-calorie malnutrition [E44.0] Yes    Abdominal pain [R10.9] Yes      Resolved Hospital Problems   No resolved problems to display.         Hospital Course     HPI:    \"Alexey Trinidad is a 89 y.o. female with a CMH of hypertension, hypothyroidism, COPD, who presented to [facility] on 2025 with generalized weakness.  Patient is alert and oriented x 2 at this time therefore collateral information was obtained from chart checks.  Per chart, family reported patient having generalized weakness and progressively got worse in the past 2 days.  Associated with abdominal pain, chills, nausea vomiting and diarrhea.  No reported fever.  In the ED, sodium 147, WBC 13.5 K, .  LFTs with unremarkable urinalysis suggestive of UTI, CT abdomen pelvis with contrast was notable for a mild intra and extrahepatic biliary ductal dilation, a 0.4 cm calcification of the tip of the ampulla of Vater which may represent a stone.  Recommends ERCP or MRCP.  Stable left adrenal nodule, urinary bladder wall thickening suggestive of cystitis. \"    Hospital Course:  Acute hypoxic respiratory failure  Aspiration pneumonia, unknown organism  Pulmonary edema  Moderate aortic valve regurgitation  Moderate aortic " valve stenosis  Moderate tricuspid valve regurgitation  Severe pulmonary hypertension  Norovirus  Acute urinary tract infection  Abdominal pain with nausea and vomiting  Biliary dilation w/ stone at ampulla of vater  History of collagenous colitis  Acute metabolic encephalopathy, likely multifactorial  Physical deconditioning  Left adrenal nodule, stable  Hypertension  Hypothyroidism     - Pulmonology consulted and started patient on sildenafil for her significant pulmonary arterial hypertension with great response.  Her oxygen requirements have significantly improved.  Will continue on discharge  - po hydrocortisone ordered by pulmonology, will send in patient on a short taper  - Cardiology consulted given valvular heart disease, state patient would not likely be a candidate for surgical intervention at this time and would like to continue medical management with outpatient follow up  -Patient's blood pressure has been doing much better on decreased dose of Lasix and midodrine.  Will continue patient on Lasix 20 mg twice daily and continue midodrine on discharge and started patient on sildenafil for her severe pulmonary  - completed zosyn (end date 02/01/25),  -Was repeatedly evaluated by speech therapy and recommend puree diet and thin liquids  -tested positive for norovirus, will continue cholestyramine for another 5 days.  Her diarrhea has significantly improved             DISCHARGE Follow Up Recommendations for labs and diagnostics: Follow-up with PCP in 1 to 2 weeks        Day of Discharge     Vital Signs:  Temp:  [97.4 °F (36.3 °C)-98.1 °F (36.7 °C)] 98 °F (36.7 °C)  Heart Rate:  [49-58] 49  Resp:  [16-19] 16  BP: ()/(54-67) 148/66  Flow (L/min) (Oxygen Therapy):  [1-2] 2    Physical Exam:  Physical Exam  Constitutional:       Comments: NAD    Cardiovascular:      Comments:  RRR, S1 & S2   Pulmonary:      Comments:  Lungs CTA   Abdominal:      Comments:  ABD soft, NT             Pertinent  and/or Most  Recent Results     LAB RESULTS:      Lab 02/05/25 0542 02/04/25 0228 02/03/25 0952 02/01/25 2325 02/01/25  0548   WBC 8.90 10.88* 10.62 9.47 8.92   HEMOGLOBIN 11.8* 11.6* 13.3 13.7 13.7   HEMATOCRIT 36.7 36.4 41.4 43.0 43.2   PLATELETS 335 343 372 369 321   MCV 97.3* 98.1* 97.2* 96.6 97.1*         Lab 02/05/25  0542 02/04/25 0228 02/03/25 0952 02/02/25  1556 02/01/25  2325 02/01/25  0548 01/31/25  0300 01/30/25  1209 01/30/25  0046   SODIUM 140 137 139  --  139 137 138  --  142   POTASSIUM 3.8 3.9 4.1 4.7 3.3* 3.7 3.9   < > 3.6   CHLORIDE 96* 93* 92*  --  90* 89* 89*  --  90*   CO2 37.3* 34.2* 40.2*  --  40.7* 37.8* 42.0*  --  43.5*   ANION GAP 6.7 9.8 6.8  --  8.3 10.2 7.0  --  8.5   BUN 28* 33* 38*  --  29* 33* 33*  --  26*   CREATININE 0.75 0.73 0.77  --  0.73 0.63 0.66  --  0.60   EGFR 72.1 74.4 69.8  --  74.4 80.3 79.4  --  81.2   GLUCOSE 91 103* 149*  --  175* 168* 113*  --  156*   CALCIUM 9.1 9.3 10.5*  --  9.5 9.6 10.0*  --  9.3   MAGNESIUM  --   --   --   --  2.5* 2.4* 2.6*  --  2.1   PHOSPHORUS  --   --   --   --  4.0 5.1* 1.9*  --  2.2*    < > = values in this interval not displayed.         Lab 02/05/25 0542 02/04/25 0228 02/03/25 0952 02/01/25  0548   TOTAL PROTEIN 6.1 5.5* 6.7 6.5   ALBUMIN 3.5 3.3* 3.8 3.7   GLOBULIN 2.6 2.2 2.9 2.8   ALT (SGPT) 25 30 36* 36*   AST (SGOT) 24 27 28 31   BILIRUBIN 0.4 0.4 0.3 0.4   ALK PHOS 39 36* 41 38*                 Lab 02/02/25  1556   IRON 83   IRON SATURATION (TSAT) 25   TIBC 334   TRANSFERRIN 224         Brief Urine Lab Results  (Last result in the past 365 days)        Color   Clarity   Blood   Leuk Est   Nitrite   Protein   CREAT   Urine HCG        01/24/25 1622 Dark Yellow   Clear   Negative   Small (1+)   Negative   30 mg/dL (1+)                 Microbiology Results (last 10 days)       Procedure Component Value - Date/Time    MRSA Screen, PCR (Inpatient) - Swab, Nares [[id number]]  (Normal) Collected: 01/27/25 2117    Lab Status: Final result  Specimen: Swab from Nares Updated: 01/27/25 2254     MRSA PCR No MRSA Detected    Narrative:      The negative predictive value of this diagnostic test is high and should only be used to consider de-escalating anti-MRSA therapy. A positive result may indicate colonization with MRSA and must be correlated clinically.    Blood Culture - Blood, Arm, Left [[id number]]  (Normal) Collected: 01/27/25 2115    Lab Status: Final result Specimen: Blood from Arm, Left Updated: 02/01/25 2131     Blood Culture No growth at 5 days            XR Abdomen KUB    Result Date: 1/31/2025  Impression: Impression: Nonobstructive bowel gas pattern. Electronically Signed: Barbra Ordaz  1/31/2025 3:54 PM EST  Workstation ID: [id number]    XR Chest 1 View    Result Date: 1/30/2025  Impression: Impression: Significantly improved/resolved pulmonary edema. Decreased, small bilateral pleural effusions. No new or worsening findings. Electronically Signed: Daniel Veras MD  1/30/2025 9:37 AM EST  Workstation ID: [id number]    XR Abdomen KUB    Result Date: 1/29/2025  Impression: Impression: Enteric tube terminates in the left abdomen, with tip likely in the mid stomach. Electronically Signed: Edouard Shaver  1/29/2025 12:24 PM EST  Workstation ID: [id number]    XR Chest 1 View    Result Date: 1/27/2025  Impression: Impression: Prominent cardiac silhouette with pulmonary vascular congestion, bilateral mixed interstitial and airspace opacities and bilateral pleural effusions. Findings are again favored to represent CHF/pulmonary edema. Electronically Signed: Jasmina Armando MD  1/27/2025 1:04 PM EST  Workstation ID: [id number]    XR Chest 1 View    Result Date: 1/26/2025  Impression: Impression: 1. Cardiomegaly and pulmonary vascular congestion. 2. Hazy bilateral airspace disease favored to be secondary to pulmonary edema. Electronically Signed: Mathew Dillard MD  1/26/2025 12:22 PM EST  Workstation ID: [id number]    CT Abdomen Pelvis With Contrast    Result  Date: 1/24/2025  Impression: 1.Mild intra and extrahepatic biliary ductal dilatation. 0.4 cm calcification at the tip of the Ampulla of Vater appears new compared with 2024. This may represent a stone within the ampulla. Consider ERCP or MRCP. 2.Colonic diverticulosis without evidence of diverticulitis. 3.Stable left adrenal nodule. 4.Urinary bladder wall thickening suggesting cystitis. Electronically Signed: Jonnathan Kovacs MD  1/24/2025 5:26 PM EST  Workstation ID: [id number]             Results for orders placed during the hospital encounter of 01/24/25    Adult Transthoracic Echo Complete W/ Cont if Necessary Per Protocol    Interpretation Summary    Left ventricular systolic function is normal. Calculated left ventricular EF = 70%    Left ventricular wall thickness is consistent with moderate to severe concentric hypertrophy.    Left ventricular diastolic function is consistent with (grade I) impaired relaxation.    The left atrial cavity is mildly dilated.    The right atrial cavity is mildly  dilated.    Moderate aortic valve regurgitation is present.    Moderate to severe aortic valve stenosis is present.    Moderate tricuspid valve regurgitation is present.    Estimated right ventricular systolic pressure from tricuspid regurgitation is markedly elevated (>55 mmHg).    Transthoracic echocardiography reveals moderate to severe LVH with severely calcified aortic valve with moderate aortic regurgitation and moderate to severe aortic stenosis.  No effusion.  Moderate TR with severe pulm hypertension.  No effusion    Electronically signed by Kt Goins MD, 01/28/25, 5:51 PM EST.      Labs Pending at Discharge:  Pending Results       None            Procedures Performed           Consults:   Consults       Date and Time Order Name Status Description    1/29/2025  7:29 AM Inpatient Cardiology Consult Completed     1/28/2025  8:48 AM Inpatient Pulmonology Consult Completed     1/24/2025 11:54 PM Inpatient  Gastroenterology Consult Completed     1/24/2025  5:35 PM Hospitalist (on-call MD unless specified)                Discharge Details        Discharge Medications        New Medications        Instructions Start Date   cholestyramine light 4 g packet   4 g, Oral, Every 12 Hours Scheduled      furosemide 20 MG tablet  Commonly known as: LASIX   20 mg, Oral, 2 Times Daily      hydrocortisone 20 MG tablet  Commonly known as: CORTEF   Take 1 tablet by mouth Daily for 3 days, THEN 0.5 tablets Daily for 3 days.   Start Date: February 5, 2025     midodrine 5 MG tablet  Commonly known as: PROAMATINE   15 mg, Oral, Every 8 Hours Scheduled      sildenafil 20 MG tablet  Commonly known as: REVATIO   10 mg, Oral, 3 Times Daily             Continue These Medications        Instructions Start Date   acetaminophen 500 MG tablet  Commonly known as: TYLENOL   500 mg, Every Morning      ALIGN PREBIOTIC-PROBIOTIC PO   1 capsule, Daily      balsalazide 750 MG capsule  Commonly known as: COLAZAL   1 capsule, Oral, Nightly      BENEFIBER DRINK MIX PO   1 package, Daily      levothyroxine 75 MCG tablet  Commonly known as: SYNTHROID, LEVOTHROID   75 mcg, Every Early Morning      multivitamins-minerals capsule capsule   1 capsule, 2 Times Daily      VITAMIN B-12 PO   1 tablet, Daily             Stop These Medications      Budesonide 3 MG 24 hr capsule  Commonly known as: ENTOCORT EC     losartan 50 MG tablet  Commonly known as: COZAAR              Allergies   Allergen Reactions    Contrast Dye (Echo Or Unknown Ct/Mr) Unknown - High Severity    Sulfa Antibiotics Unknown - High Severity         Discharge Disposition:    Skilled Nursing Facility (DC - External)    Diet:  Hospital:  Diet Order   Procedures    Diet: Regular/House; No Straw, Feeding Assistance - Nursing; Texture: Pureed (NDD 1); Fluid Consistency: Thin (IDDSI 0)         Discharge Activity:         CODE STATUS:  Code Status and Medical Interventions: No CPR (Do Not Attempt to  Resuscitate); Limited Support; No intubation (DNI); DNR/DNI   Ordered at: 01/27/25 1918     Medical Intervention Limits:    No intubation (DNI)     Code Status (Patient has no pulse and is not breathing):    No CPR (Do Not Attempt to Resuscitate)     Medical Interventions (Patient has pulse or is breathing):    Limited Support     Comments:    DNR/DNI         No future appointments.        Time spent on Discharge including face to face service:  35 minutes    Signature: Electronically signed by Roxanne Lorenz MD, 02/05/25, 08:21 EST.  [facility] Hospitalist Team

## 2025-02-05 NOTE — PROGRESS NOTES
Daily Progress Note        UTI (urinary tract infection)    Abdominal pain    Moderate protein-calorie malnutrition    Assessment:     Acute hypoxic respiratory failure  ABG on 1/27/2025  pH 7.30/pCO2 78/pO2 60/bicarb 39      positive for norovirus      urine cultures was positive for Proteus Mirabella's    Pulmonary edema  proBNP 1400    Severe pulmonary hypertension most likely group 2   2D echo 1/27/2025    Left ventricular systolic function is normal. Calculated left ventricular EF = 70%    Left ventricular wall thickness is consistent with moderate to severe concentric hypertrophy.    Left ventricular diastolic function is consistent with (grade I) impaired relaxation.  moderate aortic valve regurgitation and stenosis.  Mean/peak gradient of 19/32 mmHg.  Planimetry LAZARO >1cm2.    Estimated right ventricular systolic pressure from tricuspid regurgitation is markedly elevated (>55 mmHg).    TSH 2.0     Recommendations:     Oxygenation improved from 30 L to 1 L nasal cannula    Cont Sidenafil 10 mg po TID for severe PAH  Monitor BP  No Nitrates     Check iron level    Diuresis     Antibiotics completed 5 days of Zosyn    Steroids  Po hydrocortison    Bronchodilators currently on budesonide and DuoNeb    Prognosis guarded     Chest x-ray 1/30/2025      Chest x-ray 1/26/2025       LOS: 12 days     Subjective         Objective     Vital signs for last 24 hours:  Vitals:    02/05/25 0450 02/05/25 0500 02/05/25 0633 02/05/25 0636   BP: 119/67  148/66    BP Location: Left arm      Patient Position: Lying      Pulse: 58  51 (!) 49   Resp: 17  16    Temp: 98 °F (36.7 °C)      TempSrc: Oral      SpO2: 100%  100% 99%   Weight:  45.1 kg (99 lb 6.8 oz)     Height:           Intake/Output last 3 shifts:  I/O last 3 completed shifts:  In: 240 [P.O.:240]  Out: 150 [Urine:150]  Intake/Output this shift:  No intake/output data recorded.      Radiology  Imaging Results (Last 24 Hours)       ** No results found for the last 24 hours.  **            Labs:  Results from last 7 days   Lab Units 02/05/25  0542   WBC 10*3/mm3 8.90   HEMOGLOBIN g/dL 11.8*   HEMATOCRIT % 36.7   PLATELETS 10*3/mm3 335     Results from last 7 days   Lab Units 02/05/25  0542   SODIUM mmol/L 140   POTASSIUM mmol/L 3.8   CHLORIDE mmol/L 96*   CO2 mmol/L 37.3*   BUN mg/dL 28*   CREATININE mg/dL 0.75   CALCIUM mg/dL 9.1   BILIRUBIN mg/dL 0.4   ALK PHOS U/L 39   ALT (SGPT) U/L 25   AST (SGOT) U/L 24   GLUCOSE mg/dL 91           Results from last 7 days   Lab Units 02/05/25  0542 02/04/25  0228 02/03/25  0952   ALBUMIN g/dL 3.5 3.3* 3.8             Results from last 7 days   Lab Units 02/01/25  2325   MAGNESIUM mg/dL 2.5*                     Meds:   SCHEDULE  balsalazide, 750 mg, Oral, Nightly  [Held by provider] Budesonide, 3 mg, Oral, BID  budesonide, 0.5 mg, Nebulization, BID - RT  cholestyramine light, 1 packet, Oral, Q12H  enoxaparin, 30 mg, Subcutaneous, Daily  furosemide, 20 mg, Oral, BID Diuretics  hydrocortisone, 20 mg, Oral, Daily With Lunch  levothyroxine, 75 mcg, Oral, Q AM  midodrine, 15 mg, Oral, Q8H  multivitamin with minerals, 1 tablet, Oral, Daily  sildenafil, 10 mg, Oral, TID  sodium chloride, 10 mL, Intravenous, Q12H  vitamin B-12, 1,000 mcg, Oral, Daily      Infusions     PRNs    acetaminophen **OR** acetaminophen **OR** acetaminophen    senna-docusate sodium **AND** polyethylene glycol **AND** bisacodyl **AND** bisacodyl    Calcium Replacement - Follow Nurse / BPA Driven Protocol    dextrose    dextrose    glucagon (human recombinant)    ipratropium-albuterol    Magnesium Standard Dose Replacement - Follow Nurse / BPA Driven Protocol    ondansetron    Phosphorus Replacement - Follow Nurse / BPA Driven Protocol    Potassium Replacement - Follow Nurse / BPA Driven Protocol    Psyllium    [COMPLETED] Insert Peripheral IV **AND** sodium chloride    sodium chloride    sodium chloride    Physical Exam:  Physical Exam  Cardiovascular:      Heart sounds: Murmur  heard.      No gallop.   Pulmonary:      Effort: No respiratory distress.      Breath sounds: No stridor. Rhonchi and rales present. No wheezing.   Chest:      Chest wall: No tenderness.         ROS  Review of Systems   Respiratory:  Positive for cough and shortness of breath. Negative for wheezing and stridor.    Cardiovascular:  Negative for chest pain, palpitations and leg swelling.             Total time spent with patient greater than: 45 Minutes

## 2025-02-05 NOTE — CASE MANAGEMENT/SOCIAL WORK
Case Management Discharge Note      Final Note: [facility] skilled    Provided Post Acute Provider List?: N/A  Provided Post Acute Provider Quality & Resource List?: N/A    Selected Continued Care - Discharged on 2/5/2025 Admission date: 1/24/2025 - Discharge disposition: Skilled Nursing Facility (DC - External)      Destination Coordination complete.      Service Provider Services Address Phone Fax Patient Preferred    [facility] [address] [address] [phone] --               Transportation Services  W/C Van: Skilled Nursing Facility Van    Final Discharge Disposition Code: 03 - skilled nursing facility (SNF)

## 2025-02-12 ENCOUNTER — TELEPHONE (OUTPATIENT)
Dept: CARDIOLOGY | Facility: CLINIC | Age: OVER 89
End: 2025-02-12
Payer: MEDICARE

## 2025-02-12 NOTE — TELEPHONE ENCOUNTER
Caller: SAMMIE LANG    Relationship to patient: TRANSFER TO A STONE    Best call back number [phone]    New or established patient?  [x] New  [] Established    Date of discharge: 02/05/2025    Facility discharged from:     Diagnosis/Symptoms:     Length of stay (If applicable): 12 DAYS    Specialty Only: Did you see a [facility] health provider?    [x] Yes  [] No  If so, who? DR BULL    Additional Details: NEEDS 1 MONTH HOSPITAL F/U APPT    UNABLE TO TRANSFER

## 2025-02-22 ENCOUNTER — HOSPITAL ENCOUNTER (INPATIENT)
Facility: HOSPITAL | Age: OVER 89
LOS: 5 days | Discharge: NURSING, REHAB OR LONG-TERM CARE | End: 2025-02-27
Attending: EMERGENCY MEDICINE | Admitting: INTERNAL MEDICINE
Payer: MEDICARE

## 2025-02-22 ENCOUNTER — INPATIENT HOSPITAL (OUTPATIENT)
Dept: URBAN - METROPOLITAN AREA HOSPITAL 84 | Facility: HOSPITAL | Age: OVER 89
End: 2025-02-22
Payer: MEDICARE

## 2025-02-22 DIAGNOSIS — D62 ACUTE BLOOD LOSS ANEMIA: ICD-10-CM

## 2025-02-22 DIAGNOSIS — R93.2 ABNORMAL FINDINGS ON DIAGNOSTIC IMAGING OF LIVER AND BILIARY: ICD-10-CM

## 2025-02-22 DIAGNOSIS — K62.5 HEMORRHAGE OF ANUS AND RECTUM: ICD-10-CM

## 2025-02-22 DIAGNOSIS — K83.8 OTHER SPECIFIED DISEASES OF BILIARY TRACT: ICD-10-CM

## 2025-02-22 DIAGNOSIS — K80.50 CALCULUS OF BILE DUCT WITHOUT CHOLANGITIS OR CHOLECYSTITIS W: ICD-10-CM

## 2025-02-22 DIAGNOSIS — K92.1 GASTROINTESTINAL HEMORRHAGE WITH MELENA: Primary | ICD-10-CM

## 2025-02-22 DIAGNOSIS — K92.2 ACUTE GI BLEEDING: ICD-10-CM

## 2025-02-22 DIAGNOSIS — M62.81 MUSCLE WEAKNESS (GENERALIZED): ICD-10-CM

## 2025-02-22 DIAGNOSIS — N39.0 URINARY TRACT INFECTION WITHOUT HEMATURIA, SITE UNSPECIFIED: ICD-10-CM

## 2025-02-22 DIAGNOSIS — Z90.49 ACQUIRED ABSENCE OF OTHER SPECIFIED PARTS OF DIGESTIVE TRACT: ICD-10-CM

## 2025-02-22 DIAGNOSIS — D50.0 IRON DEFICIENCY ANEMIA SECONDARY TO BLOOD LOSS (CHRONIC): ICD-10-CM

## 2025-02-22 LAB
ABO GROUP BLD: NORMAL
ALBUMIN SERPL-MCNC: 3.8 G/DL (ref 3.5–5.2)
ALBUMIN/GLOB SERPL: 1.7 G/DL
ALP SERPL-CCNC: 36 U/L (ref 39–117)
ALT SERPL W P-5'-P-CCNC: 14 U/L (ref 1–33)
ANION GAP SERPL CALCULATED.3IONS-SCNC: 13.3 MMOL/L (ref 5–15)
AST SERPL-CCNC: 20 U/L (ref 1–32)
BACTERIA UR QL AUTO: ABNORMAL /HPF
BASOPHILS # BLD AUTO: 0.04 10*3/MM3 (ref 0–0.2)
BASOPHILS # BLD AUTO: 0.06 10*3/MM3 (ref 0–0.2)
BASOPHILS NFR BLD AUTO: 0.3 % (ref 0–1.5)
BASOPHILS NFR BLD AUTO: 0.6 % (ref 0–1.5)
BILIRUB SERPL-MCNC: <0.2 MG/DL (ref 0–1.2)
BILIRUB UR QL STRIP: NEGATIVE
BLD GP AB SCN SERPL QL: NEGATIVE
BUN SERPL-MCNC: 46 MG/DL (ref 8–23)
BUN/CREAT SERPL: 44.2 (ref 7–25)
CALCIUM SPEC-SCNC: 10 MG/DL (ref 8.2–9.6)
CHLORIDE SERPL-SCNC: 96 MMOL/L (ref 98–107)
CLARITY UR: CLEAR
CO2 SERPL-SCNC: 28.7 MMOL/L (ref 22–29)
COLOR UR: YELLOW
CREAT SERPL-MCNC: 1.04 MG/DL (ref 0.57–1)
DEPRECATED RDW RBC AUTO: 53.8 FL (ref 37–54)
DEPRECATED RDW RBC AUTO: 55 FL (ref 37–54)
EGFRCR SERPLBLD CKD-EPI 2021: 48.7 ML/MIN/1.73
EOSINOPHIL # BLD AUTO: 0 10*3/MM3 (ref 0–0.4)
EOSINOPHIL # BLD AUTO: 0.02 10*3/MM3 (ref 0–0.4)
EOSINOPHIL NFR BLD AUTO: 0 % (ref 0.3–6.2)
EOSINOPHIL NFR BLD AUTO: 0.2 % (ref 0.3–6.2)
ERYTHROCYTE [DISTWIDTH] IN BLOOD BY AUTOMATED COUNT: 15.2 % (ref 12.3–15.4)
ERYTHROCYTE [DISTWIDTH] IN BLOOD BY AUTOMATED COUNT: 16.8 % (ref 12.3–15.4)
GLOBULIN UR ELPH-MCNC: 2.2 GM/DL
GLUCOSE SERPL-MCNC: 135 MG/DL (ref 65–99)
GLUCOSE UR STRIP-MCNC: NEGATIVE MG/DL
HCT VFR BLD AUTO: 18.5 % (ref 34–46.6)
HCT VFR BLD AUTO: 25.7 % (ref 34–46.6)
HGB BLD-MCNC: 5.8 G/DL (ref 12–15.9)
HGB BLD-MCNC: 8.4 G/DL (ref 12–15.9)
HGB UR QL STRIP.AUTO: NEGATIVE
HYALINE CASTS UR QL AUTO: ABNORMAL /LPF
IMM GRANULOCYTES # BLD AUTO: 0.12 10*3/MM3 (ref 0–0.05)
IMM GRANULOCYTES # BLD AUTO: 0.16 10*3/MM3 (ref 0–0.05)
IMM GRANULOCYTES NFR BLD AUTO: 1.1 % (ref 0–0.5)
IMM GRANULOCYTES NFR BLD AUTO: 1.1 % (ref 0–0.5)
INR PPP: 1.17 (ref 0.9–1.1)
KETONES UR QL STRIP: NEGATIVE
LEUKOCYTE ESTERASE UR QL STRIP.AUTO: ABNORMAL
LYMPHOCYTES # BLD AUTO: 2.13 10*3/MM3 (ref 0.7–3.1)
LYMPHOCYTES # BLD AUTO: 2.51 10*3/MM3 (ref 0.7–3.1)
LYMPHOCYTES NFR BLD AUTO: 17.5 % (ref 19.6–45.3)
LYMPHOCYTES NFR BLD AUTO: 19.9 % (ref 19.6–45.3)
MCH RBC QN AUTO: 30 PG (ref 26.6–33)
MCH RBC QN AUTO: 31.9 PG (ref 26.6–33)
MCHC RBC AUTO-ENTMCNC: 31.4 G/DL (ref 31.5–35.7)
MCHC RBC AUTO-ENTMCNC: 32.7 G/DL (ref 31.5–35.7)
MCV RBC AUTO: 101.6 FL (ref 79–97)
MCV RBC AUTO: 91.8 FL (ref 79–97)
MONOCYTES # BLD AUTO: 1.03 10*3/MM3 (ref 0.1–0.9)
MONOCYTES # BLD AUTO: 1.09 10*3/MM3 (ref 0.1–0.9)
MONOCYTES NFR BLD AUTO: 7.6 % (ref 5–12)
MONOCYTES NFR BLD AUTO: 9.6 % (ref 5–12)
NEUTROPHILS NFR BLD AUTO: 10.52 10*3/MM3 (ref 1.7–7)
NEUTROPHILS NFR BLD AUTO: 68.6 % (ref 42.7–76)
NEUTROPHILS NFR BLD AUTO: 7.36 10*3/MM3 (ref 1.7–7)
NEUTROPHILS NFR BLD AUTO: 73.5 % (ref 42.7–76)
NITRITE UR QL STRIP: POSITIVE
NRBC BLD AUTO-RTO: 0.4 /100 WBC (ref 0–0.2)
NRBC BLD AUTO-RTO: 0.5 /100 WBC (ref 0–0.2)
PH UR STRIP.AUTO: 5.5 [PH] (ref 5–8)
PLATELET # BLD AUTO: 211 10*3/MM3 (ref 140–450)
PLATELET # BLD AUTO: 284 10*3/MM3 (ref 140–450)
PMV BLD AUTO: 9.1 FL (ref 6–12)
PMV BLD AUTO: 9.5 FL (ref 6–12)
POTASSIUM SERPL-SCNC: 3.8 MMOL/L (ref 3.5–5.2)
PROT SERPL-MCNC: 6 G/DL (ref 6–8.5)
PROT UR QL STRIP: NEGATIVE
PROTHROMBIN TIME: 14.9 SECONDS (ref 11.7–14.2)
QT INTERVAL: 443 MS
QTC INTERVAL: 453 MS
RBC # BLD AUTO: 1.82 10*6/MM3 (ref 3.77–5.28)
RBC # BLD AUTO: 2.8 10*6/MM3 (ref 3.77–5.28)
RBC # UR STRIP: ABNORMAL /HPF
REF LAB TEST METHOD: ABNORMAL
RH BLD: POSITIVE
SODIUM SERPL-SCNC: 138 MMOL/L (ref 136–145)
SP GR UR STRIP: 1.02 (ref 1–1.03)
SQUAMOUS #/AREA URNS HPF: ABNORMAL /HPF
T&S EXPIRATION DATE: NORMAL
UROBILINOGEN UR QL STRIP: ABNORMAL
WBC # UR STRIP: ABNORMAL /HPF
WBC NRBC COR # BLD AUTO: 10.72 10*3/MM3 (ref 3.4–10.8)
WBC NRBC COR # BLD AUTO: 14.32 10*3/MM3 (ref 3.4–10.8)

## 2025-02-22 PROCEDURE — 85025 COMPLETE CBC W/AUTO DIFF WBC: CPT

## 2025-02-22 PROCEDURE — 85610 PROTHROMBIN TIME: CPT | Performed by: EMERGENCY MEDICINE

## 2025-02-22 PROCEDURE — 87077 CULTURE AEROBIC IDENTIFY: CPT | Performed by: EMERGENCY MEDICINE

## 2025-02-22 PROCEDURE — 93010 ELECTROCARDIOGRAM REPORT: CPT | Performed by: INTERNAL MEDICINE

## 2025-02-22 PROCEDURE — 25810000003 SODIUM CHLORIDE 0.9 % SOLUTION: Performed by: EMERGENCY MEDICINE

## 2025-02-22 PROCEDURE — 85025 COMPLETE CBC W/AUTO DIFF WBC: CPT | Performed by: EMERGENCY MEDICINE

## 2025-02-22 PROCEDURE — 93005 ELECTROCARDIOGRAM TRACING: CPT

## 2025-02-22 PROCEDURE — 86923 COMPATIBILITY TEST ELECTRIC: CPT

## 2025-02-22 PROCEDURE — 80053 COMPREHEN METABOLIC PANEL: CPT | Performed by: EMERGENCY MEDICINE

## 2025-02-22 PROCEDURE — 86850 RBC ANTIBODY SCREEN: CPT | Performed by: EMERGENCY MEDICINE

## 2025-02-22 PROCEDURE — 25010000002 ONDANSETRON PER 1 MG: Performed by: EMERGENCY MEDICINE

## 2025-02-22 PROCEDURE — 25010000002 CEFTRIAXONE PER 250 MG: Performed by: EMERGENCY MEDICINE

## 2025-02-22 PROCEDURE — 36430 TRANSFUSION BLD/BLD COMPNT: CPT

## 2025-02-22 PROCEDURE — 86901 BLOOD TYPING SEROLOGIC RH(D): CPT | Performed by: EMERGENCY MEDICINE

## 2025-02-22 PROCEDURE — 87186 SC STD MICRODIL/AGAR DIL: CPT | Performed by: EMERGENCY MEDICINE

## 2025-02-22 PROCEDURE — 81001 URINALYSIS AUTO W/SCOPE: CPT | Performed by: EMERGENCY MEDICINE

## 2025-02-22 PROCEDURE — 87086 URINE CULTURE/COLONY COUNT: CPT | Performed by: EMERGENCY MEDICINE

## 2025-02-22 PROCEDURE — 99291 CRITICAL CARE FIRST HOUR: CPT

## 2025-02-22 PROCEDURE — 83735 ASSAY OF MAGNESIUM: CPT

## 2025-02-22 PROCEDURE — 86900 BLOOD TYPING SEROLOGIC ABO: CPT | Performed by: EMERGENCY MEDICINE

## 2025-02-22 PROCEDURE — 99222 1ST HOSP IP/OBS MODERATE 55: CPT | Performed by: NURSE PRACTITIONER

## 2025-02-22 PROCEDURE — 86900 BLOOD TYPING SEROLOGIC ABO: CPT

## 2025-02-22 PROCEDURE — P9016 RBC LEUKOCYTES REDUCED: HCPCS

## 2025-02-22 PROCEDURE — 86901 BLOOD TYPING SEROLOGIC RH(D): CPT

## 2025-02-22 PROCEDURE — 84100 ASSAY OF PHOSPHORUS: CPT

## 2025-02-22 RX ORDER — ONDANSETRON 2 MG/ML
4 INJECTION INTRAMUSCULAR; INTRAVENOUS ONCE
Status: COMPLETED | OUTPATIENT
Start: 2025-02-22 | End: 2025-02-22

## 2025-02-22 RX ORDER — IBUPROFEN 600 MG/1
1 TABLET ORAL
Status: DISCONTINUED | OUTPATIENT
Start: 2025-02-22 | End: 2025-02-27 | Stop reason: HOSPADM

## 2025-02-22 RX ORDER — ANORECTAL OINTMENT 15.7; .44; 24; 20.6 G/100G; G/100G; G/100G; G/100G
1 OINTMENT TOPICAL 2 TIMES DAILY
COMMUNITY

## 2025-02-22 RX ORDER — NITROGLYCERIN 0.4 MG/1
0.4 TABLET SUBLINGUAL
Status: DISCONTINUED | OUTPATIENT
Start: 2025-02-22 | End: 2025-02-23

## 2025-02-22 RX ORDER — PANTOPRAZOLE SODIUM 40 MG/10ML
40 INJECTION, POWDER, LYOPHILIZED, FOR SOLUTION INTRAVENOUS ONCE
Status: COMPLETED | OUTPATIENT
Start: 2025-02-22 | End: 2025-02-22

## 2025-02-22 RX ORDER — PANTOPRAZOLE SODIUM 40 MG/10ML
40 INJECTION, POWDER, LYOPHILIZED, FOR SOLUTION INTRAVENOUS EVERY 12 HOURS SCHEDULED
Status: DISCONTINUED | OUTPATIENT
Start: 2025-02-22 | End: 2025-02-27 | Stop reason: HOSPADM

## 2025-02-22 RX ORDER — SORBITOL SOLUTION 70 %
50 SOLUTION, ORAL MISCELLANEOUS
Status: DISPENSED | OUTPATIENT
Start: 2025-02-22 | End: 2025-02-22

## 2025-02-22 RX ORDER — SODIUM CHLORIDE 0.9 % (FLUSH) 0.9 %
10 SYRINGE (ML) INJECTION AS NEEDED
Status: DISCONTINUED | OUTPATIENT
Start: 2025-02-22 | End: 2025-02-27 | Stop reason: HOSPADM

## 2025-02-22 RX ORDER — ONDANSETRON 4 MG/1
4 TABLET, ORALLY DISINTEGRATING ORAL EVERY 6 HOURS PRN
Status: DISCONTINUED | OUTPATIENT
Start: 2025-02-22 | End: 2025-02-27 | Stop reason: HOSPADM

## 2025-02-22 RX ORDER — IPRATROPIUM BROMIDE AND ALBUTEROL SULFATE 2.5; .5 MG/3ML; MG/3ML
3 SOLUTION RESPIRATORY (INHALATION) EVERY 6 HOURS PRN
Status: DISCONTINUED | OUTPATIENT
Start: 2025-02-22 | End: 2025-02-27 | Stop reason: HOSPADM

## 2025-02-22 RX ORDER — SODIUM CHLORIDE 9 MG/ML
40 INJECTION, SOLUTION INTRAVENOUS AS NEEDED
Status: DISCONTINUED | OUTPATIENT
Start: 2025-02-22 | End: 2025-02-27 | Stop reason: HOSPADM

## 2025-02-22 RX ORDER — ACETAMINOPHEN 325 MG/1
650 TABLET ORAL EVERY 4 HOURS PRN
Status: DISCONTINUED | OUTPATIENT
Start: 2025-02-22 | End: 2025-02-27 | Stop reason: HOSPADM

## 2025-02-22 RX ORDER — NICOTINE POLACRILEX 4 MG
15 LOZENGE BUCCAL
Status: DISCONTINUED | OUTPATIENT
Start: 2025-02-22 | End: 2025-02-27 | Stop reason: HOSPADM

## 2025-02-22 RX ORDER — ACETAMINOPHEN 325 MG/1
650 TABLET ORAL EVERY 8 HOURS PRN
COMMUNITY

## 2025-02-22 RX ORDER — ACETAMINOPHEN 650 MG/1
650 SUPPOSITORY RECTAL EVERY 4 HOURS PRN
Status: DISCONTINUED | OUTPATIENT
Start: 2025-02-22 | End: 2025-02-27 | Stop reason: HOSPADM

## 2025-02-22 RX ORDER — SODIUM CHLORIDE 0.9 % (FLUSH) 0.9 %
10 SYRINGE (ML) INJECTION EVERY 12 HOURS SCHEDULED
Status: DISCONTINUED | OUTPATIENT
Start: 2025-02-22 | End: 2025-02-27 | Stop reason: HOSPADM

## 2025-02-22 RX ORDER — DEXTROSE MONOHYDRATE 25 G/50ML
25 INJECTION, SOLUTION INTRAVENOUS
Status: DISCONTINUED | OUTPATIENT
Start: 2025-02-22 | End: 2025-02-27 | Stop reason: HOSPADM

## 2025-02-22 RX ORDER — MIDODRINE HYDROCHLORIDE 5 MG/1
5 TABLET ORAL
COMMUNITY

## 2025-02-22 RX ORDER — CHOLESTYRAMINE LIGHT 4 G/5.7G
4 POWDER, FOR SUSPENSION ORAL 2 TIMES DAILY PRN
COMMUNITY

## 2025-02-22 RX ORDER — ONDANSETRON 4 MG/1
4 TABLET, ORALLY DISINTEGRATING ORAL EVERY 6 HOURS PRN
COMMUNITY

## 2025-02-22 RX ORDER — ONDANSETRON 2 MG/ML
4 INJECTION INTRAMUSCULAR; INTRAVENOUS EVERY 6 HOURS PRN
Status: DISCONTINUED | OUTPATIENT
Start: 2025-02-22 | End: 2025-02-27 | Stop reason: HOSPADM

## 2025-02-22 RX ORDER — SODIUM CHLORIDE 9 MG/ML
100 INJECTION, SOLUTION INTRAVENOUS CONTINUOUS
Status: DISPENSED | OUTPATIENT
Start: 2025-02-22 | End: 2025-02-22

## 2025-02-22 RX ORDER — ACETAMINOPHEN 160 MG/5ML
650 SOLUTION ORAL EVERY 4 HOURS PRN
Status: DISCONTINUED | OUTPATIENT
Start: 2025-02-22 | End: 2025-02-27 | Stop reason: HOSPADM

## 2025-02-22 RX ORDER — MIDODRINE HYDROCHLORIDE 10 MG/1
10 TABLET ORAL
COMMUNITY

## 2025-02-22 RX ADMIN — ONDANSETRON 4 MG: 2 INJECTION, SOLUTION INTRAMUSCULAR; INTRAVENOUS at 12:58

## 2025-02-22 RX ADMIN — Medication 10 ML: at 21:27

## 2025-02-22 RX ADMIN — Medication 10 ML: at 15:18

## 2025-02-22 RX ADMIN — PANTOPRAZOLE SODIUM 40 MG: 40 INJECTION, POWDER, LYOPHILIZED, FOR SOLUTION INTRAVENOUS at 21:27

## 2025-02-22 RX ADMIN — PANTOPRAZOLE SODIUM 40 MG: 40 INJECTION, POWDER, FOR SOLUTION INTRAVENOUS at 12:57

## 2025-02-22 RX ADMIN — CEFTRIAXONE 2 G: 2 INJECTION, POWDER, FOR SOLUTION INTRAMUSCULAR; INTRAVENOUS at 15:17

## 2025-02-22 RX ADMIN — SORBITOL SOLUTION (BULK) 50 ML: 70 SOLUTION at 15:18

## 2025-02-22 RX ADMIN — SODIUM CHLORIDE 100 ML/HR: 9 INJECTION, SOLUTION INTRAVENOUS at 12:58

## 2025-02-22 NOTE — LETTER
EMS Transport Request  For use at [facility], [place], [place], [place], and [place] only   Patient Name: Alexey Trinidad : 3/21/1926   Weight:41.3 kg (91 lb 0.8 oz) Pick-up Location: 2114 BLS/ALS: BLS/ALS: BLS   Insurance: [employer] MEDICARE REPLACEMENT Auth End Date: precert for SNF valid -   Pre-Cert #: D/C Summary complete: yes   Destination: Other [address]   Contact Precautions: None   Equipment (O2, Fluids, etc.): None   Arrive By Date/Time: 25 ready Stretcher/WC: Wheelchair   CM Requesting: Snehal Gil Ext: [phone]   Notes/Medical Necessity: room air, 91lbs, transfers with assist, confusion. Will go to room 208A.     ______________________________________________________________________    *Only 2 patient bags OR 1 carry-on size bag are permitted.  Wheelchairs and walkers CANNOT transported with the patient. Acknowledge: Yes

## 2025-02-22 NOTE — CONSULTS
GI CONSULT  NOTE:    Referring Provider:  Dr Singleton     Chief complaint:  Admission     Subjective   Rectal bleeding    History of present illness: Alexey Trinidad is a 89 y.o. female with history of hypothyroidism, COPD, hysterectomy, cholecystectomy, collagenous colitis   Who was brought in from the nursing facility by the request of the family for rectal bleeding.  Family states that the nursing home said she was passing blood clots.  She is complaining of some abdominal pain and nausea.  She was evaluated in ER and found to have a hemoglobin of 5.8.  Her hemoglobin was 11.8 on 2/5/2025.    Endo History:  5/2010 colonoscopy (Dr. Saul) - diverticulosis, grade 2 internal and external hemorrhoids, random biopsies positive for collagenous colitis    Past Medical History:  Past Medical History:   Diagnosis Date    Disease of thyroid gland     Hypertension        Past Surgical History:  Past Surgical History:   Procedure Laterality Date    BACK SURGERY      kyphoplasty    BREAST SURGERY      benign cyst removed    HYSTERECTOMY      LAPAROSCOPIC CHOLECYSTECTOMY         Social History:  Social History     Tobacco Use    Smoking status: Never    Smokeless tobacco: Never   Vaping Use    Vaping status: Never Used   Substance Use Topics    Alcohol use: Never    Drug use: Never       Family History:  No family history on file.    Medications:  (Not in a hospital admission)      Scheduled Meds:cefTRIAXone, 2 g, Intravenous, Once  pantoprazole, 40 mg, Intravenous, Q12H  sodium chloride, 10 mL, Intravenous, Q12H      Continuous Infusions:sodium chloride, 100 mL/hr, Last Rate: 100 mL/hr (02/22/25 1258)      PRN Meds:.  acetaminophen **OR** acetaminophen **OR** acetaminophen    Calcium Replacement - Follow Nurse / BPA Driven Protocol    dextrose    dextrose    glucagon (human recombinant)    ipratropium-albuterol    Magnesium Standard Dose Replacement - Follow Nurse / BPA Driven Protocol    nitroglycerin    ondansetron ODT  **OR** ondansetron    Phosphorus Replacement - Follow Nurse / BPA Driven Protocol    Potassium Replacement - Follow Nurse / BPA Driven Protocol    [COMPLETED] Insert Peripheral IV **AND** sodium chloride    sodium chloride    ALLERGIES:  Contrast dye (echo or unknown ct/mr) and Sulfa antibiotics    ROS:  The following systems were reviewed and negative;   Constitution:  No fevers, chills, no unintentional weight loss  Skin: no rash, no jaundice  Eyes:  No blurry vision, no eye pain  HENT:  No change in hearing or smell  Resp:  No dyspnea or cough  CV:  No chest pain or palpitations  :  No dysuria, hematuria  Musculoskeletal:  No leg cramps or arthralgias  Neuro:  No tremor, no numbness  Psych:  No depression or confusion    Objective resting in hospital bed.  Pale.  Daughter at bedside.  ER room 1.    Vital Signs:   Vitals:    02/22/25 1347 02/22/25 1357 02/22/25 1411 02/22/25 1427   BP: 96/47 111/47 110/48 106/49   Pulse: 71 63 58 64   Resp:  17 22 16   Temp:  97.5 °F (36.4 °C)  98.5 °F (36.9 °C)   TempSrc:  Oral     SpO2: 99% 96% 97% 97%   Weight:       Height:           Physical Exam:   General Appearance:    Awake and alert, in no acute distress   Head:    Normocephalic, without obvious abnormality, atraumatic   Throat:   No oral lesions, no thrush, oral mucosa moist   Lungs:     Respirations regular, even and unlabored   Chest Wall:    No abnormalities observed   Abdomen:     Soft, mild tenderness on palpation.  Rebound or guarding, non-distended, no hepatosplenomegaly   Rectal:     Deferred   Extremities:   Moves all extremities, no edema, no cyanosis   Pulses:   Pulses palpable and equal bilaterally   Skin:   No rash, no jaundice, normal palpation   Results Review:   I reviewed the patient's labs and imaging.  Results from last 7 days   Lab Units 02/22/25  1231   WBC 10*3/mm3 14.32*   HEMOGLOBIN g/dL 5.8*   PLATELETS 10*3/mm3 284     Results from last 7 days   Lab Units 02/22/25  1231   SODIUM mmol/L 138  "  POTASSIUM mmol/L 3.8   CHLORIDE mmol/L 96*   CO2 mmol/L 28.7   BUN mg/dL 46*   CREATININE mg/dL 1.04*   GLUCOSE mg/dL 135*   ALBUMIN g/dL 3.8   BILIRUBIN mg/dL <0.2   ALK PHOS U/L 36*   AST (SGOT) U/L 20   ALT (SGPT) U/L 14   INR  1.17*     Estimated Creatinine Clearance: 21.5 mL/min (A) (by C-G formula based on SCr of 1.04 mg/dL (H)).  No results found for: \"HGBA1C\"      Infection     UA    Results from last 7 days   Lab Units 02/22/25  1341   NITRITE UA  Positive*   WBC UA /HPF Too Numerous to Count*   BACTERIA UA /HPF 1+*   SQUAM EPITHEL UA /HPF 0-2     Microbiology Results (last 10 days)       ** No results found for the last 240 hours. **          Imaging Results (Last 72 Hours)       ** No results found for the last 72 hours. **            ASSESSMENT:  Rectal bleeding   Macrocytic anemia   -Abnormal CT with biliary dilatation and calcification near the ampulla  -Generalized weakness  -Acute UTI  -COPD  -Hypothyroidism  -History of collagenous colitis  -History of cholecystectomy, appendectomy     Plan  Patient is a 89-year-old female who was passing blood from her rectum at the nursing facility.  Evaluated in the ER found to have a hemoglobin of 5.8.  GI was consulted for GI bleed.    Continue to transfuse to hemoglobin of 7.  Continue IV PPI twice a day.  Will give 50 cc of sorbitol now and repeat in 3 hours.  Okay for clear liquid diet.  N.p.o. at midnight.    I discussed the patients findings and my recommendations with the patient.    We appreciate the referral     Electronically signed by MAXIMINO Ewing, 02/22/25, 2:49 PM EST.               "

## 2025-02-22 NOTE — Clinical Note
Level of Care: Progressive Care [20]   Diagnosis: Acute GI bleeding [579913]   Admitting Physician: KATE ALEJANDRE [[id number]]   Certification: I Certify That Inpatient Hospital Services Are Medically Necessary For Greater Than 2 Midnights

## 2025-02-22 NOTE — ED PROVIDER NOTES
Subjective   History of Present Illness  89-year-old female presents Complaints of rectal bleeding dark stools.  Family reports she has complained of nausea but no vomiting that they are aware of.  She has never had a GI bleed previously.  Patient is noted to have dementia cannot give coherent history.  Review of Systems   Family also endorsed some burning with urination  Past Medical History:   Diagnosis Date    Disease of thyroid gland     Hypertension        Allergies   Allergen Reactions    Contrast Dye (Echo Or Unknown Ct/Mr) Unknown - High Severity    Sulfa Antibiotics Unknown - High Severity       Past Surgical History:   Procedure Laterality Date    BACK SURGERY      kyphoplasty    BREAST SURGERY      benign cyst removed    HYSTERECTOMY      LAPAROSCOPIC CHOLECYSTECTOMY         No family history on file.    Social History     Socioeconomic History    Marital status:    Tobacco Use    Smoking status: Never    Smokeless tobacco: Never   Vaping Use    Vaping status: Never Used   Substance and Sexual Activity    Alcohol use: Never    Drug use: Never    Sexual activity: Never     Prior to Admission medications    Medication Sig Start Date End Date Taking? Authorizing Provider   acetaminophen (TYLENOL) 500 MG tablet Take 1 tablet by mouth Every Morning.    Gerald Costa MD   Bacillus Coagulans-Inulin (ALIGN PREBIOTIC-PROBIOTIC PO) Take 1 capsule by mouth Daily.    Gerald Costa MD   balsalazide (COLAZAL) 750 MG capsule Take 1 capsule by mouth Every Night.    Gerald Costa MD   cholestyramine light 4 g packet Take 1 packet by mouth Every 12 (Twelve) Hours for 5 days. 2/5/25 2/10/25  Roxanne Lorenz MD   Cyanocobalamin (VITAMIN B-12 PO) Take 1 tablet by mouth Daily.    Gerald Costa MD   furosemide (LASIX) 20 MG tablet Take 1 tablet by mouth 2 (Two) Times a Day for 30 days. 2/5/25 3/7/25  Roxanne Lorenz MD   levothyroxine (SYNTHROID, LEVOTHROID) 75 MCG tablet  Take 1 tablet by mouth Every Morning.    ProviderGerald MD   midodrine (PROAMATINE) 5 MG tablet Take 3 tablets by mouth Every 8 (Eight) Hours for 30 days. 2/5/25 3/7/25  Roxanne Lorenz MD   multivitamins-minerals (PRESERVISION AREDS 2) capsule capsule Take 1 capsule by mouth 2 (Two) Times a Day.    ProviderGerald MD   sildenafil (REVATIO) 20 MG tablet Take 0.5 tablets by mouth 3 (Three) Times a Day for 30 days. 2/5/25 3/7/25  Roxanne Lorenz MD   Wheat Dextrin (BENEFIBER DRINK MIX PO) Take 1 package by mouth Daily.    ProviderGerald MD           Objective   Physical Exam  89-year-old female awake alert.  She is pale.  Neck supple chest clear cardiovascular regular rate and rhythm 2/6 systolic murmur abdomen is soft without mass rebound or guarding.  Rectal exam reveals dark maroon stool heme positive.  Extremities without tenderness edema.  Procedures           ED Course      Results for orders placed or performed during the hospital encounter of 02/22/25   Comprehensive Metabolic Panel    Collection Time: 02/22/25 12:31 PM    Specimen: Blood   Result Value Ref Range    Glucose 135 (H) 65 - 99 mg/dL    BUN 46 (H) 8 - 23 mg/dL    Creatinine 1.04 (H) 0.57 - 1.00 mg/dL    Sodium 138 136 - 145 mmol/L    Potassium 3.8 3.5 - 5.2 mmol/L    Chloride 96 (L) 98 - 107 mmol/L    CO2 28.7 22.0 - 29.0 mmol/L    Calcium 10.0 (H) 8.2 - 9.6 mg/dL    Total Protein 6.0 6.0 - 8.5 g/dL    Albumin 3.8 3.5 - 5.2 g/dL    ALT (SGPT) 14 1 - 33 U/L    AST (SGOT) 20 1 - 32 U/L    Alkaline Phosphatase 36 (L) 39 - 117 U/L    Total Bilirubin <0.2 0.0 - 1.2 mg/dL    Globulin 2.2 gm/dL    A/G Ratio 1.7 g/dL    BUN/Creatinine Ratio 44.2 (H) 7.0 - 25.0    Anion Gap 13.3 5.0 - 15.0 mmol/L    eGFR 48.7 (L) >60.0 mL/min/1.73   Protime-INR    Collection Time: 02/22/25 12:31 PM    Specimen: Blood   Result Value Ref Range    Protime 14.9 (H) 11.7 - 14.2 Seconds    INR 1.17 (H) 0.90 - 1.10   CBC Auto Differential     Collection Time: 02/22/25 12:31 PM    Specimen: Blood   Result Value Ref Range    WBC 14.32 (H) 3.40 - 10.80 10*3/mm3    RBC 1.82 (L) 3.77 - 5.28 10*6/mm3    Hemoglobin 5.8 (C) 12.0 - 15.9 g/dL    Hematocrit 18.5 (C) 34.0 - 46.6 %    .6 (H) 79.0 - 97.0 fL    MCH 31.9 26.6 - 33.0 pg    MCHC 31.4 (L) 31.5 - 35.7 g/dL    RDW 15.2 12.3 - 15.4 %    RDW-SD 53.8 37.0 - 54.0 fl    MPV 9.5 6.0 - 12.0 fL    Platelets 284 140 - 450 10*3/mm3    Neutrophil % 73.5 42.7 - 76.0 %    Lymphocyte % 17.5 (L) 19.6 - 45.3 %    Monocyte % 7.6 5.0 - 12.0 %    Eosinophil % 0.0 (L) 0.3 - 6.2 %    Basophil % 0.3 0.0 - 1.5 %    Immature Grans % 1.1 (H) 0.0 - 0.5 %    Neutrophils, Absolute 10.52 (H) 1.70 - 7.00 10*3/mm3    Lymphocytes, Absolute 2.51 0.70 - 3.10 10*3/mm3    Monocytes, Absolute 1.09 (H) 0.10 - 0.90 10*3/mm3    Eosinophils, Absolute 0.00 0.00 - 0.40 10*3/mm3    Basophils, Absolute 0.04 0.00 - 0.20 10*3/mm3    Immature Grans, Absolute 0.16 (H) 0.00 - 0.05 10*3/mm3    nRBC 0.5 (H) 0.0 - 0.2 /100 WBC   Type & Screen    Collection Time: 02/22/25 12:44 PM    Specimen: Blood   Result Value Ref Range    ABO Type A     RH type Positive     Antibody Screen Negative     T&S Expiration Date 2/25/2025 11:59:59 PM    Prepare RBC, 2 Units    Collection Time: 02/22/25  1:37 PM   Result Value Ref Range    Product Code F3293Y80     Unit Number [id number]     UNIT  ABO A     UNIT  RH POS     Crossmatch Interpretation Compatible     Dispense Status XM     Blood Expiration Date 202503212359     Blood Type Barcode 6200     Product Code S5945N18     Unit Number [id number]     UNIT  ABO A     UNIT  RH POS     Crossmatch Interpretation Compatible     Dispense Status XM     Blood Expiration Date 202503212359     Blood Type Barcode 6200    Urinalysis With Culture If Indicated - Urine, Catheter    Collection Time: 02/22/25  1:41 PM    Specimen: Urine, Catheter   Result Value Ref Range    Color, UA Yellow Yellow, Straw    Appearance, UA  Clear Clear    pH, UA 5.5 5.0 - 8.0    Specific Gravity, UA 1.016 1.005 - 1.030    Glucose, UA Negative Negative    Ketones, UA Negative Negative    Bilirubin, UA Negative Negative    Blood, UA Negative Negative    Protein, UA Negative Negative    Leuk Esterase, UA Moderate (2+) (A) Negative    Nitrite, UA Positive (A) Negative    Urobilinogen, UA 0.2 E.U./dL 0.2 - 1.0 E.U./dL   ECG 12 Lead QT Measurement    Collection Time: 02/22/25  1:51 PM   Result Value Ref Range    QT Interval 443 ms    QTC Interval 453 ms     No orders to display     Medications   sodium chloride 0.9 % flush 10 mL (has no administration in time range)   sodium chloride 0.9 % infusion (100 mL/hr Intravenous New Bag 2/22/25 1258)   sodium chloride 0.9 % flush 10 mL (has no administration in time range)   sodium chloride 0.9 % infusion 40 mL (has no administration in time range)   nitroglycerin (NITROSTAT) SL tablet 0.4 mg (has no administration in time range)   Potassium Replacement - Follow Nurse / BPA Driven Protocol (has no administration in time range)   Magnesium Standard Dose Replacement - Follow Nurse / BPA Driven Protocol (has no administration in time range)   Phosphorus Replacement - Follow Nurse / BPA Driven Protocol (has no administration in time range)   Calcium Replacement - Follow Nurse / BPA Driven Protocol (has no administration in time range)   acetaminophen (TYLENOL) tablet 650 mg (has no administration in time range)     Or   acetaminophen (TYLENOL) 160 MG/5ML oral solution 650 mg (has no administration in time range)     Or   acetaminophen (TYLENOL) suppository 650 mg (has no administration in time range)   ondansetron ODT (ZOFRAN-ODT) disintegrating tablet 4 mg (has no administration in time range)     Or   ondansetron (ZOFRAN) injection 4 mg (has no administration in time range)   cefTRIAXone (ROCEPHIN) 2 g in sodium chloride 0.9 % 100 mL MBP (has no administration in time range)   ondansetron (ZOFRAN) injection 4 mg (4 mg  "Intravenous Given 2/22/25 1258)   pantoprazole (PROTONIX) injection 40 mg (40 mg Intravenous Given 2/22/25 1257)     BP 96/47   Pulse 71   Temp 97.8 °F (36.6 °C)   Resp 20   Ht 147.3 cm (58\")   Wt 45.1 kg (99 lb 6.8 oz)   SpO2 99%   BMI 20.78 kg/m²                                                    Medical Decision Making  Problems Addressed:  Acute blood loss anemia: complicated acute illness or injury  Gastrointestinal hemorrhage with melena: complicated acute illness or injury    Amount and/or Complexity of Data Reviewed  Labs: ordered.    Risk  Prescription drug management.  Decision regarding hospitalization.    Chart review: Patient had admission last month for urinary tract infection generalized weakness and vomiting.  Comorbidity: As per past history   Differential: GI bleed upper versus lower  My EKG interpretation: Sinus rhythm with PAC sinus pause.  Nonspecific T wave abnormality.  Rate of 63  Lab: INR 1.17.  CBC white count 14.3 with hemoglobin 5.8 platelet count 284 comprehensive metabolic panel glucose 135 with BUN 46 creatinine 1.04, urinalysis shows moderate leukocytes nitrite positive microscopic still pending.  This was sent for C&S.  My Radiology review and interpretation:  CT considered but not felt to be indicated.  Discussion/treatment: Patient was started IV fluids.  She was given Zofran and Protonix.   Patient was started IV fluids.  She will receive 2 units of packed red blood cells first unit emergent.  Findings were discussed with family.  She has documented DNR status which was confirmed patient was discussed with the nurse practitioner the hospitalist.  Will be admitted to their service with GI consultation.  Family also reported patient had some burning with urination cath urine has been obtained and is pending.  Patient critical care time 30 minutes independent of procedure.  Review of patient's chart shows she had hemoglobin 11.82 weeks ago with BUN 28 creatinine 0.75.  Patient " was given dose of Rocephin for UTI  Patient was evaluated using appropriate PPE      Final diagnoses:   Gastrointestinal hemorrhage with melena   Acute blood loss anemia   Urinary tract infection without hematuria, site unspecified       ED Disposition  ED Disposition       ED Disposition   Decision to Admit    Condition   --    Comment   Level of Care: Progressive Care [20]   Admitting Physician: KATE ALEJANDRE [[id number]]                 No follow-up provider specified.       Medication List      No changes were made to your prescriptions during this visit.            Rui Singleton MD  02/22/25 [id number]       Rui Singleton MD  02/22/25 0542

## 2025-02-22 NOTE — ED NOTES
"Pt admitted to ED via EMS. C/O blood/clots in stool yesterday. Family reports it being a single occurrence and it was a \"large amount\". Family also reports pt given a dose of Zofran at [place] at about 0800.  "

## 2025-02-22 NOTE — ED NOTES
Spoke with blood bank regarding temperature change & notified provider. Continuing to monitor blood, blood bank stated as long as it was less than 2 degree change we are ok to proceed. Pt not exhibiting any signs of reaction at this time. RN will continue to monitor. Daughter at bedside.

## 2025-02-22 NOTE — H&P
[facility] Medicine Services  History & Physical    Patient Name: Alexey Trinidad  : 3/21/1926  MRN: [id number]  Primary Care Physician:  Rui Cole MD  Date of admission: 2025  Date and Time of Service: 2025 at 1330    Subjective      Chief Complaint: Rectal bleeding, dark stools    History of Present Illness: Alexey Trinidad is a 89 y.o. female with a CMH of dementia, hypertension, hypothyroidism, COPD who presented to [facility] on 2025 with bleeding per rectum, passing clots per rectum, dark stools.  Family reported patient complained of nausea but no vomiting.  Reported no history of GI bleed.  Family reported patient has also endorsed burning with urination.  Patient is a poor historian due to dementia.  Of note, [facility] with urinary tract infection, acute hypoxic respiratory failure, norovirus, general weakness, moderate protein calorie malnutrition.    In the ED: pertinent labs include Hb 5.8, HCT 18.5, PT 14.9, INR 1.17, CR 1.04, . EKG showed sinus rhythm with supraventricular bigeminy, prolonged DC interval, rate 63, QTC of 453 ms. Patient received 40 mg Protonix IV, Zofran 4 mg, ceftriaxone 2 g.  PRBC 2 units ordered.  GI consulted.  Hospitalist team to admit for further management.      Review of Systems   Unable to perform ROS: Dementia       Personal History     Past Medical History:   Diagnosis Date    Disease of thyroid gland     Hypertension        Past Surgical History:   Procedure Laterality Date    BACK SURGERY      kyphoplasty    BREAST SURGERY      benign cyst removed    HYSTERECTOMY      LAPAROSCOPIC CHOLECYSTECTOMY         Family History: family history is not on file. Otherwise pertinent FHx was reviewed and not pertinent to current issue.    Social History:  reports that she has never smoked. She has never used smokeless tobacco. She reports that she does not drink alcohol and does not use  drugs.    Home Medications:  Prior to Admission Medications       Prescriptions Last Dose Informant Patient Reported? Taking?    acetaminophen (TYLENOL) 500 MG tablet   Yes No    Take 1 tablet by mouth Every Morning.    Bacillus Coagulans-Inulin (ALIGN PREBIOTIC-PROBIOTIC PO)   Yes No    Take 1 capsule by mouth Daily.    balsalazide (COLAZAL) 750 MG capsule   Yes No    Take 1 capsule by mouth Every Night.    cholestyramine light 4 g packet   No No    Take 1 packet by mouth Every 12 (Twelve) Hours for 5 days.    Cyanocobalamin (VITAMIN B-12 PO)   Yes No    Take 1 tablet by mouth Daily.    furosemide (LASIX) 20 MG tablet   No No    Take 1 tablet by mouth 2 (Two) Times a Day for 30 days.    levothyroxine (SYNTHROID, LEVOTHROID) 75 MCG tablet   Yes No    Take 1 tablet by mouth Every Morning.    midodrine (PROAMATINE) 5 MG tablet   No No    Take 3 tablets by mouth Every 8 (Eight) Hours for 30 days.    multivitamins-minerals (PRESERVISION AREDS 2) capsule capsule   Yes No    Take 1 capsule by mouth 2 (Two) Times a Day.    sildenafil (REVATIO) 20 MG tablet   No No    Take 0.5 tablets by mouth 3 (Three) Times a Day for 30 days.    Wheat Dextrin (BENEFIBER DRINK MIX PO)   Yes No    Take 1 package by mouth Daily.              Allergies:  Allergies   Allergen Reactions    Contrast Dye (Echo Or Unknown Ct/Mr) Unknown - High Severity    Sulfa Antibiotics Unknown - High Severity       Objective      Vitals:   Temp:  [97.5 °F (36.4 °C)-97.8 °F (36.6 °C)] 97.5 °F (36.4 °C)  Heart Rate:  [62-89] 63  Resp:  [17-20] 17  BP: ()/(46-62) 111/47  Body mass index is 20.78 kg/m².  Physical Exam  Constitutional:       General: She is sleeping.      Comments: Patient easily awakens, then falls back to sleep.    HENT:      Head: Normocephalic and atraumatic.      Nose: Nose normal.      Mouth/Throat:      Mouth: Mucous membranes are moist.      Pharynx: Oropharynx is clear.   Eyes:      Extraocular Movements: Extraocular movements  intact.      Conjunctiva/sclera: Conjunctivae normal.      Pupils: Pupils are equal, round, and reactive to light.   Cardiovascular:      Rate and Rhythm: Normal rate and regular rhythm.      Pulses: Normal pulses.      Heart sounds: Normal heart sounds.   Pulmonary:      Effort: Pulmonary effort is normal.      Breath sounds: Normal breath sounds.   Abdominal:      General: Bowel sounds are normal.      Palpations: Abdomen is soft.   Musculoskeletal:      Cervical back: Neck supple.      Right lower leg: No edema.      Left lower leg: No edema.   Skin:     General: Skin is warm and dry.      Coloration: Skin is pale.   Neurological:      Mental Status: She is easily aroused. Mental status is at baseline. She is lethargic.      Motor: Weakness present.         Diagnostic Data:  Lab Results (last 24 hours)       Procedure Component Value Units Date/Time    Urinalysis With Culture If Indicated - Urine, Catheter [[id number]]  (Abnormal) Collected: 02/22/25 1341    Specimen: Urine, Catheter Updated: 02/22/25 1355     Color, UA Yellow     Appearance, UA Clear     pH, UA 5.5     Specific Gravity, UA 1.016     Glucose, UA Negative     Ketones, UA Negative     Bilirubin, UA Negative     Blood, UA Negative     Protein, UA Negative     Leuk Esterase, UA Moderate (2+)     Nitrite, UA Positive     Urobilinogen, UA 0.2 E.U./dL    Narrative:      In absence of clinical symptoms, the presence of pyuria, bacteria, and/or nitrites on the urinalysis result does not correlate with infection.    Urinalysis, Microscopic Only - Urine, Catheter [[id number]] Collected: 02/22/25 1341    Specimen: Urine, Catheter Updated: 02/22/25 1351    Comprehensive Metabolic Panel [[id number]]  (Abnormal) Collected: 02/22/25 1231    Specimen: Blood Updated: 02/22/25 1317     Glucose 135 mg/dL      BUN 46 mg/dL      Creatinine 1.04 mg/dL      Sodium 138 mmol/L      Potassium 3.8 mmol/L      Chloride 96 mmol/L      CO2 28.7 mmol/L      Calcium 10.0 mg/dL       Total Protein 6.0 g/dL      Albumin 3.8 g/dL      ALT (SGPT) 14 U/L      AST (SGOT) 20 U/L      Alkaline Phosphatase 36 U/L      Total Bilirubin <0.2 mg/dL      Globulin 2.2 gm/dL      A/G Ratio 1.7 g/dL      BUN/Creatinine Ratio 44.2     Anion Gap 13.3 mmol/L      eGFR 48.7 mL/min/1.73     Narrative:      GFR Categories in Chronic Kidney Disease (CKD)      GFR Category          GFR (mL/min/1.73)    Interpretation  G1                     90 or greater         Normal or high (1)  G2                      60-89                Mild decrease (1)  G3a                   45-59                Mild to moderate decrease  G3b                   30-44                Moderate to severe decrease  G4                    15-29                Severe decrease  G5                    14 or less           Kidney failure          (1)In the absence of evidence of kidney disease, neither GFR category G1 or G2 fulfill the criteria for CKD.    eGFR calculation 2021 CKD-EPI creatinine equation, which does not include race as a factor    Protime-INR [[id number]]  (Abnormal) Collected: 02/22/25 1231    Specimen: Blood Updated: 02/22/25 1305     Protime 14.9 Seconds      INR 1.17    CBC & Differential [[id number]]  (Abnormal) Collected: 02/22/25 1231    Specimen: Blood Updated: 02/22/25 1257    Narrative:      The following orders were created for panel order CBC & Differential.  Procedure                               Abnormality         Status                     ---------                               -----------         ------                     CBC Auto Differential[[id number]]        Abnormal            Final result                 Please view results for these tests on the individual orders.    CBC Auto Differential [[id number]]  (Abnormal) Collected: 02/22/25 1231    Specimen: Blood Updated: 02/22/25 1257     WBC 14.32 10*3/mm3      RBC 1.82 10*6/mm3      Hemoglobin 5.8 g/dL      Hematocrit 18.5 %      .6 fL      MCH 31.9 pg      MCHC  31.4 g/dL      RDW 15.2 %      RDW-SD 53.8 fl      MPV 9.5 fL      Platelets 284 10*3/mm3      Neutrophil % 73.5 %      Lymphocyte % 17.5 %      Monocyte % 7.6 %      Eosinophil % 0.0 %      Basophil % 0.3 %      Immature Grans % 1.1 %      Neutrophils, Absolute 10.52 10*3/mm3      Lymphocytes, Absolute 2.51 10*3/mm3      Monocytes, Absolute 1.09 10*3/mm3      Eosinophils, Absolute 0.00 10*3/mm3      Basophils, Absolute 0.04 10*3/mm3      Immature Grans, Absolute 0.16 10*3/mm3      nRBC 0.5 /100 WBC              Imaging Results (Last 24 Hours)       ** No results found for the last 24 hours. **              Assessment & Plan        This is a 89 y.o. female with:    Active and Resolved Problems  Active Hospital Problems    Diagnosis  POA    **Acute GI bleeding [K92.2]  Yes      Resolved Hospital Problems   No resolved problems to display.     Melena  BRBPR  Anemia  Acute GI bleed  - Stool heme positive  - Hb 5.8; baseline 11.8 on 2/5/25  - AST, ALT within normal limits  - INR 1.17, PT 14.9  - Hemodynamically stable  - PRBC 2 units ordered   - Transfuse with Hb < 7  - Strict NPO until GI consult  - Hypoglycemia protocol  - IVF: NS at 100 mL/hr  - PPI: protonix 40 mg IV BID  - Hold anticoagulants  - GI consulted     UTI  Leukocytosis likely secondary to above  - Urine culture from 1/24/25 with Proteus mirabilis  - Ceftriaxone started in ED  - NS at 100 mL/hr  - Urine culture pending  - Follow CBC      Weakness  Physical deconditioning  - Fall precautions  - PT/OT consults in AM     Hypertension  - Holding home meds for now due to hypotension     Hypothyroidism  - Continue levothyroxine 75 mcg daily when medically appropriate     COPD   Severe PAH  - Stable, COPD not in exacerbation  - Bronchodilators PRN  - Continue sildenafil when medically appropriate    Dementia  - Continue supportive care    Home medications for chronic conditions will be restarted as appropriate when verified by pharmacy.     VTE  Prophylaxis:  Mechanical VTE prophylaxis orders are present.        The patient desires to be as follows:    CODE STATUS:    Medical Intervention Limits: No intubation (DNI)  Code Status (Patient has no pulse and is not breathing): No CPR (Do Not Attempt to Resuscitate)  Medical Interventions (Patient has pulse or is breathing): Limited Support        Aislinn Rae, daughter, who can be contacted at [phone], is the designated person to make medical decisions on the patient's behalf if She is incapable of doing so. This was clarified with patient and/or next of kin on 2/22/2025 during the course of this H&P.    Admission Status:  I believe this patient meets inpatient status.    Expected Length of Stay: Greater than 2 midnights    PDMP and Medication Dispenses via Sidebar reviewed and consistent with patient reported medications.    I discussed the patient's findings and my recommendations with patient.      Signature:     This document has been electronically signed by MAXIMINO Hair on February 22, 2025 14:10 [facility] Hospitalist Team

## 2025-02-22 NOTE — NURSING NOTE
Report given from nickie jackson in ED. Skin check with Angelo JACKSON. Buttocks, heels red/blachable and moles all over back, a couple of Band-Aid for comfort.

## 2025-02-23 ENCOUNTER — ANESTHESIA (OUTPATIENT)
Dept: GASTROENTEROLOGY | Facility: HOSPITAL | Age: OVER 89
End: 2025-02-23
Payer: MEDICARE

## 2025-02-23 ENCOUNTER — INPATIENT HOSPITAL (OUTPATIENT)
Dept: URBAN - METROPOLITAN AREA HOSPITAL 84 | Facility: HOSPITAL | Age: OVER 89
End: 2025-02-23
Payer: MEDICARE

## 2025-02-23 ENCOUNTER — ANESTHESIA EVENT (OUTPATIENT)
Dept: GASTROENTEROLOGY | Facility: HOSPITAL | Age: OVER 89
End: 2025-02-23
Payer: MEDICARE

## 2025-02-23 DIAGNOSIS — K57.30 DIVERTICULOSIS OF LARGE INTESTINE WITHOUT PERFORATION OR ABS: ICD-10-CM

## 2025-02-23 DIAGNOSIS — K64.8 OTHER HEMORRHOIDS: ICD-10-CM

## 2025-02-23 DIAGNOSIS — D12.0 BENIGN NEOPLASM OF CECUM: ICD-10-CM

## 2025-02-23 DIAGNOSIS — D12.4 BENIGN NEOPLASM OF DESCENDING COLON: ICD-10-CM

## 2025-02-23 DIAGNOSIS — K44.9 DIAPHRAGMATIC HERNIA WITHOUT OBSTRUCTION OR GANGRENE: ICD-10-CM

## 2025-02-23 DIAGNOSIS — K92.2 GASTROINTESTINAL HEMORRHAGE, UNSPECIFIED: ICD-10-CM

## 2025-02-23 DIAGNOSIS — K26.9 DUODENAL ULCER, UNSPECIFIED AS ACUTE OR CHRONIC, WITHOUT HEM: ICD-10-CM

## 2025-02-23 DIAGNOSIS — K31.89 OTHER DISEASES OF STOMACH AND DUODENUM: ICD-10-CM

## 2025-02-23 DIAGNOSIS — D12.2 BENIGN NEOPLASM OF ASCENDING COLON: ICD-10-CM

## 2025-02-23 LAB
ANION GAP SERPL CALCULATED.3IONS-SCNC: 9.4 MMOL/L (ref 5–15)
BH BB BLOOD EXPIRATION DATE: NORMAL
BH BB BLOOD EXPIRATION DATE: NORMAL
BH BB BLOOD TYPE BARCODE: 6200
BH BB BLOOD TYPE BARCODE: 6200
BH BB DISPENSE STATUS: NORMAL
BH BB DISPENSE STATUS: NORMAL
BH BB PRODUCT CODE: NORMAL
BH BB PRODUCT CODE: NORMAL
BH BB UNIT NUMBER: NORMAL
BH BB UNIT NUMBER: NORMAL
BUN SERPL-MCNC: 37 MG/DL (ref 8–23)
BUN/CREAT SERPL: 39.4 (ref 7–25)
CALCIUM SPEC-SCNC: 8.9 MG/DL (ref 8.2–9.6)
CHLORIDE SERPL-SCNC: 104 MMOL/L (ref 98–107)
CO2 SERPL-SCNC: 29.6 MMOL/L (ref 22–29)
CREAT SERPL-MCNC: 0.94 MG/DL (ref 0.57–1)
CROSSMATCH INTERPRETATION: NORMAL
CROSSMATCH INTERPRETATION: NORMAL
EGFRCR SERPLBLD CKD-EPI 2021: 55 ML/MIN/1.73
GLUCOSE SERPL-MCNC: 114 MG/DL (ref 65–99)
MAGNESIUM SERPL-MCNC: 2.2 MG/DL (ref 1.7–2.3)
PHOSPHATE SERPL-MCNC: 3.1 MG/DL (ref 2.5–4.5)
POTASSIUM SERPL-SCNC: 3.4 MMOL/L (ref 3.5–5.2)
POTASSIUM SERPL-SCNC: 3.7 MMOL/L (ref 3.5–5.2)
QT INTERVAL: 470 MS
QTC INTERVAL: 453 MS
SODIUM SERPL-SCNC: 143 MMOL/L (ref 136–145)
UNIT  ABO: NORMAL
UNIT  ABO: NORMAL
UNIT  RH: NORMAL
UNIT  RH: NORMAL

## 2025-02-23 PROCEDURE — 84132 ASSAY OF SERUM POTASSIUM: CPT | Performed by: INTERNAL MEDICINE

## 2025-02-23 PROCEDURE — 0DBK8ZZ EXCISION OF ASCENDING COLON, VIA NATURAL OR ARTIFICIAL OPENING ENDOSCOPIC: ICD-10-PCS | Performed by: INTERNAL MEDICINE

## 2025-02-23 PROCEDURE — 43239 EGD BIOPSY SINGLE/MULTIPLE: CPT | Performed by: INTERNAL MEDICINE

## 2025-02-23 PROCEDURE — 0W3P8ZZ CONTROL BLEEDING IN GASTROINTESTINAL TRACT, VIA NATURAL OR ARTIFICIAL OPENING ENDOSCOPIC: ICD-10-PCS | Performed by: INTERNAL MEDICINE

## 2025-02-23 PROCEDURE — 25010000002 LIDOCAINE PF 2% 2 % SOLUTION

## 2025-02-23 PROCEDURE — 93010 ELECTROCARDIOGRAM REPORT: CPT | Performed by: INTERNAL MEDICINE

## 2025-02-23 PROCEDURE — 0DBH8ZZ EXCISION OF CECUM, VIA NATURAL OR ARTIFICIAL OPENING ENDOSCOPIC: ICD-10-PCS | Performed by: INTERNAL MEDICINE

## 2025-02-23 PROCEDURE — 45385 COLONOSCOPY W/LESION REMOVAL: CPT | Performed by: INTERNAL MEDICINE

## 2025-02-23 PROCEDURE — 88305 TISSUE EXAM BY PATHOLOGIST: CPT | Performed by: INTERNAL MEDICINE

## 2025-02-23 PROCEDURE — 0DB68ZX EXCISION OF STOMACH, VIA NATURAL OR ARTIFICIAL OPENING ENDOSCOPIC, DIAGNOSTIC: ICD-10-PCS | Performed by: INTERNAL MEDICINE

## 2025-02-23 PROCEDURE — 25010000002 CEFTRIAXONE PER 250 MG: Performed by: INTERNAL MEDICINE

## 2025-02-23 PROCEDURE — 25010000002 PROPOFOL 10 MG/ML EMULSION

## 2025-02-23 PROCEDURE — 0DBM8ZZ EXCISION OF DESCENDING COLON, VIA NATURAL OR ARTIFICIAL OPENING ENDOSCOPIC: ICD-10-PCS | Performed by: INTERNAL MEDICINE

## 2025-02-23 PROCEDURE — 93005 ELECTROCARDIOGRAM TRACING: CPT | Performed by: INTERNAL MEDICINE

## 2025-02-23 RX ORDER — IPRATROPIUM BROMIDE AND ALBUTEROL SULFATE 2.5; .5 MG/3ML; MG/3ML
3 SOLUTION RESPIRATORY (INHALATION) ONCE AS NEEDED
Status: DISCONTINUED | OUTPATIENT
Start: 2025-02-23 | End: 2025-02-23 | Stop reason: HOSPADM

## 2025-02-23 RX ORDER — PROPOFOL 10 MG/ML
VIAL (ML) INTRAVENOUS CONTINUOUS PRN
Status: DISCONTINUED | OUTPATIENT
Start: 2025-02-23 | End: 2025-02-23 | Stop reason: SURG

## 2025-02-23 RX ORDER — SILDENAFIL CITRATE 20 MG/1
10 TABLET ORAL 3 TIMES DAILY
Status: DISCONTINUED | OUTPATIENT
Start: 2025-02-23 | End: 2025-02-27 | Stop reason: HOSPADM

## 2025-02-23 RX ORDER — PHENYLEPHRINE HCL IN 0.9% NACL 1 MG/10 ML
SYRINGE (ML) INTRAVENOUS AS NEEDED
Status: DISCONTINUED | OUTPATIENT
Start: 2025-02-23 | End: 2025-02-23 | Stop reason: SURG

## 2025-02-23 RX ORDER — LIDOCAINE HYDROCHLORIDE 20 MG/ML
INJECTION, SOLUTION EPIDURAL; INFILTRATION; INTRACAUDAL; PERINEURAL AS NEEDED
Status: DISCONTINUED | OUTPATIENT
Start: 2025-02-23 | End: 2025-02-23 | Stop reason: SURG

## 2025-02-23 RX ORDER — POTASSIUM CHLORIDE 1.5 G/1.58G
20 POWDER, FOR SOLUTION ORAL ONCE
Status: COMPLETED | OUTPATIENT
Start: 2025-02-23 | End: 2025-02-23

## 2025-02-23 RX ORDER — MIDODRINE HYDROCHLORIDE 5 MG/1
15 TABLET ORAL
Status: DISCONTINUED | OUTPATIENT
Start: 2025-02-23 | End: 2025-02-26

## 2025-02-23 RX ORDER — BALSALAZIDE DISODIUM 750 MG/1
750 CAPSULE ORAL NIGHTLY
Status: DISCONTINUED | OUTPATIENT
Start: 2025-02-23 | End: 2025-02-27 | Stop reason: HOSPADM

## 2025-02-23 RX ORDER — HYDRALAZINE HYDROCHLORIDE 20 MG/ML
5 INJECTION INTRAMUSCULAR; INTRAVENOUS
Status: DISCONTINUED | OUTPATIENT
Start: 2025-02-23 | End: 2025-02-23 | Stop reason: HOSPADM

## 2025-02-23 RX ORDER — EPHEDRINE SULFATE 5 MG/ML
5 INJECTION INTRAVENOUS ONCE AS NEEDED
Status: DISCONTINUED | OUTPATIENT
Start: 2025-02-23 | End: 2025-02-23 | Stop reason: HOSPADM

## 2025-02-23 RX ORDER — LABETALOL HYDROCHLORIDE 5 MG/ML
5 INJECTION, SOLUTION INTRAVENOUS
Status: DISCONTINUED | OUTPATIENT
Start: 2025-02-23 | End: 2025-02-23 | Stop reason: HOSPADM

## 2025-02-23 RX ORDER — POTASSIUM CHLORIDE 1500 MG/1
20 TABLET, EXTENDED RELEASE ORAL ONCE
Status: DISCONTINUED | OUTPATIENT
Start: 2025-02-23 | End: 2025-02-23

## 2025-02-23 RX ORDER — SORBITOL SOLUTION 70 %
100 SOLUTION, ORAL MISCELLANEOUS ONCE
Status: COMPLETED | OUTPATIENT
Start: 2025-02-23 | End: 2025-02-23

## 2025-02-23 RX ORDER — FUROSEMIDE 20 MG/1
20 TABLET ORAL 2 TIMES DAILY
Status: DISCONTINUED | OUTPATIENT
Start: 2025-02-23 | End: 2025-02-27 | Stop reason: HOSPADM

## 2025-02-23 RX ORDER — SODIUM CHLORIDE 9 MG/ML
INJECTION, SOLUTION INTRAVENOUS CONTINUOUS PRN
Status: DISCONTINUED | OUTPATIENT
Start: 2025-02-23 | End: 2025-02-23 | Stop reason: SURG

## 2025-02-23 RX ORDER — ONDANSETRON 2 MG/ML
4 INJECTION INTRAMUSCULAR; INTRAVENOUS ONCE AS NEEDED
Status: DISCONTINUED | OUTPATIENT
Start: 2025-02-23 | End: 2025-02-23 | Stop reason: HOSPADM

## 2025-02-23 RX ORDER — DIPHENHYDRAMINE HYDROCHLORIDE 50 MG/ML
12.5 INJECTION INTRAMUSCULAR; INTRAVENOUS
Status: DISCONTINUED | OUTPATIENT
Start: 2025-02-23 | End: 2025-02-23 | Stop reason: HOSPADM

## 2025-02-23 RX ORDER — EPHEDRINE SULFATE 5 MG/ML
INJECTION INTRAVENOUS AS NEEDED
Status: DISCONTINUED | OUTPATIENT
Start: 2025-02-23 | End: 2025-02-23 | Stop reason: SURG

## 2025-02-23 RX ORDER — MIDODRINE HYDROCHLORIDE 5 MG/1
5 TABLET ORAL
Status: DISCONTINUED | OUTPATIENT
Start: 2025-02-23 | End: 2025-02-23

## 2025-02-23 RX ORDER — LEVOTHYROXINE SODIUM 100 UG/1
100 TABLET ORAL
Status: DISCONTINUED | OUTPATIENT
Start: 2025-02-23 | End: 2025-02-27 | Stop reason: HOSPADM

## 2025-02-23 RX ADMIN — SORBITOL SOLUTION (BULK) 100 ML: 70 SOLUTION at 10:06

## 2025-02-23 RX ADMIN — PROPOFOL 200 MCG/KG/MIN: 10 INJECTION, EMULSION INTRAVENOUS at 14:41

## 2025-02-23 RX ADMIN — LIDOCAINE HYDROCHLORIDE 50 MG: 20 INJECTION, SOLUTION EPIDURAL; INFILTRATION; INTRACAUDAL; PERINEURAL at 14:41

## 2025-02-23 RX ADMIN — FUROSEMIDE 20 MG: 20 TABLET ORAL at 08:30

## 2025-02-23 RX ADMIN — PANTOPRAZOLE SODIUM 40 MG: 40 INJECTION, POWDER, LYOPHILIZED, FOR SOLUTION INTRAVENOUS at 21:33

## 2025-02-23 RX ADMIN — ACETAMINOPHEN 650 MG: 325 TABLET, FILM COATED ORAL at 17:18

## 2025-02-23 RX ADMIN — Medication 10 ML: at 08:30

## 2025-02-23 RX ADMIN — Medication 1 TABLET: at 08:30

## 2025-02-23 RX ADMIN — FUROSEMIDE 20 MG: 20 TABLET ORAL at 21:36

## 2025-02-23 RX ADMIN — MIDODRINE HYDROCHLORIDE 15 MG: 5 TABLET ORAL at 17:27

## 2025-02-23 RX ADMIN — EPHEDRINE SULFATE 10 MG: 5 INJECTION INTRAVENOUS at 14:53

## 2025-02-23 RX ADMIN — LEVOTHYROXINE SODIUM 100 MCG: 100 TABLET ORAL at 08:30

## 2025-02-23 RX ADMIN — SILDENAFIL 10 MG: 20 TABLET ORAL at 08:30

## 2025-02-23 RX ADMIN — EPHEDRINE SULFATE 10 MG: 5 INJECTION INTRAVENOUS at 14:58

## 2025-02-23 RX ADMIN — BALSALAZIDE DISODIUM 750 MG: 750 CAPSULE ORAL at 23:42

## 2025-02-23 RX ADMIN — PANTOPRAZOLE SODIUM 40 MG: 40 INJECTION, POWDER, LYOPHILIZED, FOR SOLUTION INTRAVENOUS at 08:26

## 2025-02-23 RX ADMIN — MIDODRINE HYDROCHLORIDE 15 MG: 5 TABLET ORAL at 08:30

## 2025-02-23 RX ADMIN — Medication 100 MCG: at 14:58

## 2025-02-23 RX ADMIN — Medication 10 ML: at 21:33

## 2025-02-23 RX ADMIN — SILDENAFIL 10 MG: 20 TABLET ORAL at 21:36

## 2025-02-23 RX ADMIN — CEFTRIAXONE SODIUM 1000 MG: 1 INJECTION, POWDER, FOR SOLUTION INTRAMUSCULAR; INTRAVENOUS at 08:26

## 2025-02-23 RX ADMIN — PROPOFOL 20 MG: 10 INJECTION, EMULSION INTRAVENOUS at 14:42

## 2025-02-23 RX ADMIN — POTASSIUM CHLORIDE 20 MEQ: 1.5 FOR SOLUTION ORAL at 05:12

## 2025-02-23 RX ADMIN — Medication 1 TABLET: at 21:32

## 2025-02-23 RX ADMIN — SODIUM CHLORIDE: 9 INJECTION, SOLUTION INTRAVENOUS at 14:37

## 2025-02-23 NOTE — ANESTHESIA POSTPROCEDURE EVALUATION
Patient: Alexey Trinidad    Procedure Summary       Date: 02/23/25 Room / Location: [facility] ENDOSCOPY 1 / [facility] ENDOSCOPY    Anesthesia Start: 1437 Anesthesia Stop: 1514    Procedures:       COLONOSCOPY with POLYPECTOMY (Rectum)      ESOPHAGOGASTRODUODENOSCOPY with GOLD PROBE & BIOPSY Diagnosis:       Acute GI bleeding      (Acute GI bleeding [K92.2])    Surgeons: Kirby Saul MD Provider: Wendy Aguilar CRNA    Anesthesia Type: general, MAC ASA Status: 3            Anesthesia Type: general, MAC    Vitals  No vitals data found for the desired time range.          Post Anesthesia Care and Evaluation    Patient location during evaluation: PACU  Level of consciousness: sleepy but conscious    Airway patency: patent  Anesthetic complications: No anesthetic complications  PONV Status: none  Cardiovascular status: blood pressure returned to baseline  Respiratory status: acceptable  No anesthesia care post op

## 2025-02-23 NOTE — ANESTHESIA PREPROCEDURE EVALUATION
Anesthesia Evaluation     NPO Solid Status: > 8 hours  NPO Liquid Status: > 8 hours           Airway   Mallampati: III  TM distance: >3 FB  Neck ROM: limited  Dental    (+) edentulous    Pulmonary    (+) COPD,  Cardiovascular     (+) hypertension      Neuro/Psych  (+) dementia  GI/Hepatic/Renal/Endo    (+) GI bleeding lower active bleeding, thyroid problem hypothyroidism    ROS Comment: Present with bleeding per rectum, passing clots per rectum, dark stools    Musculoskeletal     Abdominal    Substance History      OB/GYN          Other        ROS/Med Hx Other: Of note, patient was admitted to [facility] from 1/24/2025 to 2/5/2025 with urinary tract infection, acute hypoxic respiratory failure, norovirus, general weakness, moderate protein calorie malnutrition.                     Anesthesia Plan    ASA 3     general and MAC   total IV anesthesia    Anesthetic plan, risks, benefits, and alternatives have been provided, discussed and informed consent has been obtained with: patient.    Plan discussed with Surgeon.    CODE STATUS:    Medical Intervention Limits: No intubation (DNI)  Code Status (Patient has no pulse and is not breathing): No CPR (Do Not Attempt to Resuscitate)  Medical Interventions (Patient has pulse or is breathing): Limited Support

## 2025-02-23 NOTE — OP NOTE
COLONOSCOPY, ESOPHAGOGASTRODUODENOSCOPY Procedure Report    Patient Name:  Alexey Trinidad  YOB: 1926    Date of Surgery:  2/23/2025     Pre-Op Diagnosis:  Acute GI bleeding [K92.2]         Procedure/CPT® Codes:  DE ESOPHAGOGASTRODUODENOSCOPY TRANSORAL DIAGNOSTIC [96349]    Procedure(s):  COLONOSCOPY with POLYPECTOMY  ESOPHAGOGASTRODUODENOSCOPY with GOLD PROBE & BIOPSY    Staff:  Surgeon(s):  Kirby Saul MD      Anesthesia: Monitored Anesthesia Care    Description of Procedure:  A description of the procedure as well as risks, benefits and alternative methods were explained to the patient who voiced understanding and signed the corresponding consent form. A physical exam was performed and vital signs were monitored throughout the procedure.    A rectal exam was performed which was normal. An Olympus colonoscope was placed into the rectum and proceeded under direct visualization through the colon until the cecum and appendiceal orifice were identified. Careful visualization occurred upon slow withdraw of the scope. The scope was then retroflexed and the distal rectum was visualized. The quality of the prep was good. The procedure was not difficult and there were no immediate complications.    Findings:   EGD findings  Large hiatal hernia was noticed without any significant Ang lesions or ulceration.  However in the duodenum there was a 2 large ulcer was seen one of them did have a red dot possible blood vessel which was cauterized.  The ulcer on the posterior wall was clean-based.  Gastric mucosa looks otherwise normal.    Colonoscopy finding  Multiple large polyps were removed from cecum and proximal ascending colon measuring around 1.5 cm, 2 cm eight 9 mm.  All these they were removed with a hot snare.  There was a 1 more polyp removed from the descending colon area around 9 mm with a hot snare.  Extensive left-sided diverticulosis was noticed with a moderate to large internal/external  hemorrhoid.    Impression:  1.  Multiple etiology for her blood loss anemia including duodenal ulcer as well as possible diverticulosis and very large hemorrhoid.  Currently there was no actively bleeding diverticulum and hemorrhoid was noticed.  2.  Large duodenal ulcers x 2 status post of cauterization.  3 changes consistent with a gastritis biopsy performed to rule out H. pylori in the setting of duodenal ulcer.  3 large hiatal hernia    Recommendations:  Follow up with GI clinic as needed  Follow up with PCP as scheduled  Follow up with biopsy report  No recall due to her age  Benefiber 1 scoop 2x/day     Patient is a DNR, her DNR status was rescinded during the procedure after discussing with the family.  His daughter would like it to reverse with the medication and limited CPR and avoid intubation.      Kirby Saul MD     Date: 2/23/2025    Time: 15:14 EST

## 2025-02-23 NOTE — PLAN OF CARE
Problem: Adult Inpatient Plan of Care  Goal: Plan of Care Review  Outcome: Progressing  Goal: Patient-Specific Goal (Individualized)  Outcome: Progressing  Goal: Absence of Hospital-Acquired Illness or Injury  Outcome: Progressing  Intervention: Identify and Manage Fall Risk  Recent Flowsheet Documentation  Taken 2/23/2025 0015 by Riri Clark RN  Safety Promotion/Fall Prevention:   safety round/check completed   room organization consistent   nonskid shoes/slippers when out of bed   fall prevention program maintained   clutter free environment maintained   assistive device/personal items within reach  Taken 2/22/2025 2015 by Riri Clark RN  Safety Promotion/Fall Prevention:   safety round/check completed   room organization consistent   nonskid shoes/slippers when out of bed   fall prevention program maintained   clutter free environment maintained   assistive device/personal items within reach  Intervention: Prevent Skin Injury  Recent Flowsheet Documentation  Taken 2/23/2025 0015 by Riri Clark RN  Body Position: weight shifting  Taken 2/22/2025 2015 by Riri Clark RN  Body Position: right  Skin Protection:   incontinence pads utilized   skin sealant/moisture barrier applied   transparent dressing maintained  Intervention: Prevent and Manage VTE (Venous Thromboembolism) Risk  Recent Flowsheet Documentation  Taken 2/23/2025 0015 by Riri Clark RN  VTE Prevention/Management: SCDs (sequential compression devices) off  Taken 2/22/2025 2015 by Riri Clark RN  VTE Prevention/Management: SCDs (sequential compression devices) off  Intervention: Prevent Infection  Recent Flowsheet Documentation  Taken 2/23/2025 0015 by Riri Clark RN  Infection Prevention:   rest/sleep promoted   personal protective equipment utilized   hand hygiene promoted   equipment surfaces disinfected   environmental surveillance performed  Taken 2/22/2025 2015 by Riri Clark RN  Infection Prevention:   single  patient room provided   rest/sleep promoted   personal protective equipment utilized   hand hygiene promoted   environmental surveillance performed   equipment surfaces disinfected  Goal: Optimal Comfort and Wellbeing  Outcome: Progressing  Intervention: Provide Person-Centered Care  Recent Flowsheet Documentation  Taken 2/23/2025 0015 by Riri Clark RN  Trust Relationship/Rapport:   care explained   choices provided   questions answered  Taken 2/22/2025 2015 by Riri Clark RN  Trust Relationship/Rapport:   care explained   choices provided   questions answered  Goal: Readiness for Transition of Care  Outcome: Progressing  Intervention: Mutually Develop Transition Plan  Recent Flowsheet Documentation  Taken 2/22/2025 1930 by Riri Clark RN  Transportation Anticipated: (depending where she discharges to depends on how she will get there.)   family or friend will provide   agency  Patient/Family Anticipated Services at Transition: rehabilitation services  Patient/Family Anticipates Transition to: home with family  Taken 2/22/2025 1919 by Riri Clark RN  Equipment Currently Used at Home:   bp cuff   grab bar   shower chair   wheelchair   walker, rolling   walker, standard   pulse ox   cane, straight     Problem: Skin Injury Risk Increased  Goal: Skin Health and Integrity  Outcome: Progressing  Intervention: Optimize Skin Protection  Recent Flowsheet Documentation  Taken 2/23/2025 0015 by Riri Clark RN  Head of Bed (HOB) Positioning: HOB elevated  Taken 2/22/2025 2015 by Riri Clark RN  Pressure Reduction Techniques:   heels elevated off bed   positioned off wounds   weight shift assistance provided  Head of Bed (HOB) Positioning: HOB elevated  Pressure Reduction Devices:   pressure-redistributing mattress utilized   positioning supports utilized   heel offloading device utilized  Skin Protection:   incontinence pads utilized   skin sealant/moisture barrier applied   transparent dressing  maintained     Problem: Comorbidity Management  Goal: Blood Pressure in Desired Range  Outcome: Progressing  Intervention: Maintain Blood Pressure Management  Recent Flowsheet Documentation  Taken 2/23/2025 0015 by Riri Clark, RN  Medication Review/Management: medications reviewed  Taken 2/22/2025 2015 by Riri Clark, RN  Medication Review/Management: medications reviewed   Goal Outcome Evaluation:   Pt oriented to self and place. Hgb recheck 8.4. Pt family concerned about pt not getting her sildenafil and midodrine all day yesterday. Oncall provider notified of family concerns, no new orders. AM potassium 3.4, replacement ordered per protocol. While pt was sleeping, O2 saturation dropped to 70. 2L O2 placed on patient. Pt to be NPO at 0600. Call light within reach.

## 2025-02-23 NOTE — PROGRESS NOTES
Hospitalist Service   Daily Progress Note      Patient Name: Alexey Trinidad  : 3/21/1926  MRN: [id number]  Primary Care Physician:  Rui oCle MD  Date of admission: 2025      Subjective      Chief Complaint: Rectal bleeding    Patient seen and examined this morning.  Remains pleasantly confused, at baseline.  Still having some melanotic stools per nursing, GI planning possible EGD pending family decision.  Patient denies any other complaints.    Pertinent positives as noted in HPI/subjective.  All other systems were reviewed and are negative.      Objective      Vitals:   Temp:  [97.5 °F (36.4 °C)-98.5 °F (36.9 °C)] 97.8 °F (36.6 °C)  Heart Rate:  [55-89] 63  Resp:  [11-22] 11  BP: ()/(42-69) 124/62  Flow (L/min) (Oxygen Therapy):  [2] 2    Physical Exam:    General: Awake, pleasantly confused, elderly female, NAD  Eyes: PERRL, EOMI, conjunctivae are clear  Cardiovascular: Regular rate and rhythm, no murmurs  Respiratory: Clear to auscultation bilaterally, no wheezing or rales, unlabored breathing  Abdomen: Soft, nontender, positive bowel sounds, no guarding  Musculoskeletal: Normal range of motion, no other gross deformities  Skin: Warm, dry         Result Review    Result Review:  I have personally reviewed the results from the time of this admission to 2025 09:39 EST and agree with these findings:  [x]  Laboratory  [x]  Microbiology  [x]  Radiology  [x]  EKG/Telemetry   [x]  Cardiology/Vascular   []  Pathology  [x]  Old records  []  Other:          Assessment & Plan      Brief Patient Summary:  Alexey Trinidad is a 89 y.o. female with history of hypothyroidism, COPD, hysterectomy, cholecystectomy, collagenous colitis   Who was brought in from the nursing facility by the request of the family for rectal bleeding.  Family states that the nursing home said she was passing blood clots.  She is complaining of some abdominal pain and nausea.  She was evaluated in ER and found to have a  hemoglobin of 5.8.  Her hemoglobin was 11.8 on 2/5/2025.  Patient was given 2 units of PRBCs with improvement in hemoglobin, GI consulted.  Patient also noted to have UTI, started on IV antibiotics.      balsalazide, 750 mg, Oral, Nightly  cefTRIAXone, 1,000 mg, Intravenous, Q24H  folic acid-pyridoxine-cyanocobalamin, 1 tablet, Oral, BID  furosemide, 20 mg, Oral, BID  levothyroxine, 100 mcg, Oral, Q AM  midodrine, 15 mg, Oral, TID AC  pantoprazole, 40 mg, Intravenous, Q12H  sildenafil, 10 mg, Oral, TID  sodium chloride, 10 mL, Intravenous, Q12H             I have utilized all available, immediate resources to obtain, update, or review the patient's current medications including all prescriptions, over-the-counter products, herbals, cannabis/cannabidiol products, and vitamin.mineral/dietary (nutritional) supplements.    Active Hospital Problems:  Active Hospital Problems    Diagnosis     **Acute GI bleeding        Assessment/Plan:     Rectal bleeding  Acute blood loss anemia  -CT abdomen pelvis shows biliary dilatation and calcification near the ampulla  -Hemoglobin of 5.8 on admission, improved after 2 units  -Patient not on anticoagulation as outpatient  -GI following, possible plan for EGD pending decision from family  -Continue to monitor    Acute UTI  -UA noted, urine culture pending  -Continue IV ceftriaxone    COPD  Pulmonary hypertension  -Not in exacerbation currently   -continue bronchodilators, sildenafil  -Monitor    Chronic hypotension  -Continue midodrine  -Blood pressure stable, monitor    Hypothyroidism  -Continue levothyroxine    Advanced dementia  -Patient appears at baseline, pleasantly confused  -Watch for delirium, monitor    VTE Prophylaxis:  Mechanical VTE prophylaxis orders are present.        CODE STATUS:    Medical Intervention Limits: No intubation (DNI)  Code Status (Patient has no pulse and is not breathing): No CPR (Do Not Attempt to Resuscitate)  Medical Interventions (Patient has pulse  or is breathing): Limited Support      Disposition Planning:     Barriers to Discharge: GI bleed, UTI  Anticipated Date of Discharge: 2/24  Place of Discharge: Home    Electronically signed by Makayla Gibson DO, 02/23/25, 09:39 EST.  [facility] Hospitalist Team      Part of this note may be an electronic transcription/translation of spoken language to printed text using the Dragon Dictation System.

## 2025-02-23 NOTE — PLAN OF CARE
Goal Outcome Evaluation:      Pt went for egd/colonoscopy today. Hemoglobin 8.4 today after receiving 2 units of PRBC yesterday. Family has been at bedside. Continue to monitor                                       Will medicate when meds released by pharmacy.       [address], RN  06/01/21 [id number]

## 2025-02-24 LAB
ALBUMIN SERPL-MCNC: 3.2 G/DL (ref 3.5–5.2)
ALBUMIN/GLOB SERPL: 1.7 G/DL
ALP SERPL-CCNC: 33 U/L (ref 39–117)
ALT SERPL W P-5'-P-CCNC: 15 U/L (ref 1–33)
ANION GAP SERPL CALCULATED.3IONS-SCNC: 11.1 MMOL/L (ref 5–15)
AST SERPL-CCNC: 20 U/L (ref 1–32)
BASOPHILS # BLD AUTO: 0.06 10*3/MM3 (ref 0–0.2)
BASOPHILS NFR BLD AUTO: 0.7 % (ref 0–1.5)
BILIRUB SERPL-MCNC: <0.2 MG/DL (ref 0–1.2)
BUN SERPL-MCNC: 19 MG/DL (ref 8–23)
BUN/CREAT SERPL: 23.5 (ref 7–25)
CALCIUM SPEC-SCNC: 8.3 MG/DL (ref 8.2–9.6)
CHLORIDE SERPL-SCNC: 103 MMOL/L (ref 98–107)
CO2 SERPL-SCNC: 28.9 MMOL/L (ref 22–29)
CREAT SERPL-MCNC: 0.81 MG/DL (ref 0.57–1)
DEPRECATED RDW RBC AUTO: 59.7 FL (ref 37–54)
EGFRCR SERPLBLD CKD-EPI 2021: 65.7 ML/MIN/1.73
EOSINOPHIL # BLD AUTO: 0.08 10*3/MM3 (ref 0–0.4)
EOSINOPHIL NFR BLD AUTO: 0.9 % (ref 0.3–6.2)
ERYTHROCYTE [DISTWIDTH] IN BLOOD BY AUTOMATED COUNT: 17.7 % (ref 12.3–15.4)
GLOBULIN UR ELPH-MCNC: 1.9 GM/DL
GLUCOSE SERPL-MCNC: 94 MG/DL (ref 65–99)
HCT VFR BLD AUTO: 27.4 % (ref 34–46.6)
HGB BLD-MCNC: 8.5 G/DL (ref 12–15.9)
IMM GRANULOCYTES # BLD AUTO: 0.05 10*3/MM3 (ref 0–0.05)
IMM GRANULOCYTES NFR BLD AUTO: 0.6 % (ref 0–0.5)
LYMPHOCYTES # BLD AUTO: 2.16 10*3/MM3 (ref 0.7–3.1)
LYMPHOCYTES NFR BLD AUTO: 24.5 % (ref 19.6–45.3)
MAGNESIUM SERPL-MCNC: 2 MG/DL (ref 1.7–2.3)
MCH RBC QN AUTO: 29.7 PG (ref 26.6–33)
MCHC RBC AUTO-ENTMCNC: 31 G/DL (ref 31.5–35.7)
MCV RBC AUTO: 95.8 FL (ref 79–97)
MONOCYTES # BLD AUTO: 1.18 10*3/MM3 (ref 0.1–0.9)
MONOCYTES NFR BLD AUTO: 13.4 % (ref 5–12)
NEUTROPHILS NFR BLD AUTO: 5.28 10*3/MM3 (ref 1.7–7)
NEUTROPHILS NFR BLD AUTO: 59.9 % (ref 42.7–76)
NRBC BLD AUTO-RTO: 0.3 /100 WBC (ref 0–0.2)
PHOSPHATE SERPL-MCNC: 2.9 MG/DL (ref 2.5–4.5)
PLATELET # BLD AUTO: 227 10*3/MM3 (ref 140–450)
PMV BLD AUTO: 9.2 FL (ref 6–12)
POTASSIUM SERPL-SCNC: 2.8 MMOL/L (ref 3.5–5.2)
POTASSIUM SERPL-SCNC: 4.7 MMOL/L (ref 3.5–5.2)
PROT SERPL-MCNC: 5.1 G/DL (ref 6–8.5)
RBC # BLD AUTO: 2.86 10*6/MM3 (ref 3.77–5.28)
SODIUM SERPL-SCNC: 143 MMOL/L (ref 136–145)
WBC NRBC COR # BLD AUTO: 8.81 10*3/MM3 (ref 3.4–10.8)

## 2025-02-24 PROCEDURE — 80053 COMPREHEN METABOLIC PANEL: CPT | Performed by: NURSE PRACTITIONER

## 2025-02-24 PROCEDURE — 25010000002 CEFTRIAXONE PER 250 MG: Performed by: INTERNAL MEDICINE

## 2025-02-24 PROCEDURE — 83735 ASSAY OF MAGNESIUM: CPT | Performed by: INTERNAL MEDICINE

## 2025-02-24 PROCEDURE — 84132 ASSAY OF SERUM POTASSIUM: CPT | Performed by: INTERNAL MEDICINE

## 2025-02-24 PROCEDURE — 84100 ASSAY OF PHOSPHORUS: CPT | Performed by: INTERNAL MEDICINE

## 2025-02-24 PROCEDURE — 97166 OT EVAL MOD COMPLEX 45 MIN: CPT

## 2025-02-24 PROCEDURE — 97162 PT EVAL MOD COMPLEX 30 MIN: CPT

## 2025-02-24 PROCEDURE — 85025 COMPLETE CBC W/AUTO DIFF WBC: CPT | Performed by: INTERNAL MEDICINE

## 2025-02-24 RX ORDER — POTASSIUM CHLORIDE 1.5 G/1.58G
40 POWDER, FOR SOLUTION ORAL ONCE
Status: COMPLETED | OUTPATIENT
Start: 2025-02-24 | End: 2025-02-24

## 2025-02-24 RX ADMIN — BALSALAZIDE DISODIUM 750 MG: 750 CAPSULE ORAL at 21:02

## 2025-02-24 RX ADMIN — PANTOPRAZOLE SODIUM 40 MG: 40 INJECTION, POWDER, LYOPHILIZED, FOR SOLUTION INTRAVENOUS at 09:13

## 2025-02-24 RX ADMIN — Medication 10 ML: at 21:05

## 2025-02-24 RX ADMIN — FUROSEMIDE 20 MG: 20 TABLET ORAL at 21:02

## 2025-02-24 RX ADMIN — ACETAMINOPHEN 650 MG: 325 TABLET, FILM COATED ORAL at 11:31

## 2025-02-24 RX ADMIN — Medication 10 ML: at 09:13

## 2025-02-24 RX ADMIN — POTASSIUM CHLORIDE 40 MEQ: 1.5 FOR SOLUTION ORAL at 05:29

## 2025-02-24 RX ADMIN — PANTOPRAZOLE SODIUM 40 MG: 40 INJECTION, POWDER, LYOPHILIZED, FOR SOLUTION INTRAVENOUS at 21:02

## 2025-02-24 RX ADMIN — Medication 1 TABLET: at 09:13

## 2025-02-24 RX ADMIN — SILDENAFIL 10 MG: 20 TABLET ORAL at 09:13

## 2025-02-24 RX ADMIN — MIDODRINE HYDROCHLORIDE 15 MG: 5 TABLET ORAL at 09:13

## 2025-02-24 RX ADMIN — FUROSEMIDE 20 MG: 20 TABLET ORAL at 09:13

## 2025-02-24 RX ADMIN — CEFTRIAXONE SODIUM 1000 MG: 1 INJECTION, POWDER, FOR SOLUTION INTRAMUSCULAR; INTRAVENOUS at 09:10

## 2025-02-24 RX ADMIN — Medication 1 TABLET: at 21:02

## 2025-02-24 RX ADMIN — LEVOTHYROXINE SODIUM 100 MCG: 100 TABLET ORAL at 05:29

## 2025-02-24 RX ADMIN — SILDENAFIL 10 MG: 20 TABLET ORAL at 21:02

## 2025-02-24 RX ADMIN — SILDENAFIL 10 MG: 20 TABLET ORAL at 17:27

## 2025-02-24 RX ADMIN — POTASSIUM CHLORIDE 40 MEQ: 1.5 FOR SOLUTION ORAL at 10:03

## 2025-02-24 RX ADMIN — MIDODRINE HYDROCHLORIDE 15 MG: 5 TABLET ORAL at 11:07

## 2025-02-24 RX ADMIN — MIDODRINE HYDROCHLORIDE 15 MG: 5 TABLET ORAL at 17:27

## 2025-02-24 NOTE — DISCHARGE PLACEMENT REQUEST
"Arabella Herrera (89 y.o. Female)       Date of Birth   1926    Social Security Number       Address   [address] [place] [address] [place] [facility]    Home Phone   [phone]    MRN   [id number]       Hinduism   None    Marital Status                               Admission Date   25    Admission Type   Emergency    Admitting Provider   Makayla Gibson DO    Attending Provider   Makayla Gibson DO    Department, Room/Bed   [facility],        Discharge Date       Discharge Disposition       Discharge Destination                                 Attending Provider: Makayla Gibson DO    Allergies: Contrast Dye (Echo Or Unknown Ct/mr), Sulfa Antibiotics    Isolation: None   Infection: None   Code Status: No CPR    Ht: 147.3 cm (58\")   Wt: 41.6 kg (91 lb 11.4 oz)    Admission Cmt: None   Principal Problem: Acute GI bleeding [K92.2]                   Active Insurance as of 2025       Primary Coverage       Payor Plan Insurance Group Employer/Plan Group    HUMANA MEDICARE REPLACEMENT HUMANA MEDICARE ADVANTAGE HMO [id number]       Payor Plan Address Payor Plan Phone Number Payor Plan Fax Number Effective Dates    [address] [address] [phone]  2025 - None Entered    Formerly [facility] [address]         Subscriber Name Subscriber Birth Date Member ID       ARABELLA HERRERA 3/21/1926 [id number]                     Emergency Contacts        (Rel.) Home Phone Work Phone Mobile Phone    ButchAislinn (Daughter) -- -- [phone]    ButchVince (Grandchild) -- -- [phone]                 History & Physical        Meg Hinojosa APRN at 25 1347       Attestation signed by Isa Talbot MD at 25 1623    I have reviewed this documentation and agree.                      [facility]  History & Physical    Patient Name: Arabella Herrera  : 3/21/1926  MRN: [id number]  Primary Care Physician:  Rui Cole MD  Date of " admission: 2/22/2025  Date and Time of Service: 2/22/2025 at 1330    Subjective      Chief Complaint: Rectal bleeding, dark stools    History of Present Illness: Alexey Trinidad is a 89 y.o. female with a CMH of dementia, hypertension, hypothyroidism, COPD who presented to [facility] on 2/22/2025 with bleeding per rectum, passing clots per rectum, dark stools.  Family reported patient complained of nausea but no vomiting.  Reported no history of GI bleed.  Family reported patient has also endorsed burning with urination.  Patient is a poor historian due to dementia.  Of note, patient was admitted to [facility] from 1/24/2025 to 2/5/2025 with urinary tract infection, acute hypoxic respiratory failure, norovirus, general weakness, moderate protein calorie malnutrition.    In the ED: pertinent labs include Hb 5.8, HCT 18.5, PT 14.9, INR 1.17, CR 1.04, . EKG showed sinus rhythm with supraventricular bigeminy, prolonged NJ interval, rate 63, QTC of 453 ms. Patient received 40 mg Protonix IV, Zofran 4 mg, ceftriaxone 2 g.  PRBC 2 units ordered.  GI consulted.  Hospitalist team to admit for further management.      Review of Systems   Unable to perform ROS: Dementia       Personal History     Past Medical History:   Diagnosis Date    Disease of thyroid gland     Hypertension        Past Surgical History:   Procedure Laterality Date    BACK SURGERY      kyphoplasty    BREAST SURGERY      benign cyst removed    HYSTERECTOMY      LAPAROSCOPIC CHOLECYSTECTOMY         Family History: family history is not on file. Otherwise pertinent FHx was reviewed and not pertinent to current issue.    Social History:  reports that she has never smoked. She has never used smokeless tobacco. She reports that she does not drink alcohol and does not use drugs.    Home Medications:  Prior to Admission Medications       Prescriptions Last Dose Informant Patient Reported? Taking?    acetaminophen (TYLENOL) 500 MG tablet   Yes No    Take 1  tablet by mouth Every Morning.    Bacillus Coagulans-Inulin (ALIGN PREBIOTIC-PROBIOTIC PO)   Yes No    Take 1 capsule by mouth Daily.    balsalazide (COLAZAL) 750 MG capsule   Yes No    Take 1 capsule by mouth Every Night.    cholestyramine light 4 g packet   No No    Take 1 packet by mouth Every 12 (Twelve) Hours for 5 days.    Cyanocobalamin (VITAMIN B-12 PO)   Yes No    Take 1 tablet by mouth Daily.    furosemide (LASIX) 20 MG tablet   No No    Take 1 tablet by mouth 2 (Two) Times a Day for 30 days.    levothyroxine (SYNTHROID, LEVOTHROID) 75 MCG tablet   Yes No    Take 1 tablet by mouth Every Morning.    midodrine (PROAMATINE) 5 MG tablet   No No    Take 3 tablets by mouth Every 8 (Eight) Hours for 30 days.    multivitamins-minerals (PRESERVISION AREDS 2) capsule capsule   Yes No    Take 1 capsule by mouth 2 (Two) Times a Day.    sildenafil (REVATIO) 20 MG tablet   No No    Take 0.5 tablets by mouth 3 (Three) Times a Day for 30 days.    Wheat Dextrin (BENEFIBER DRINK MIX PO)   Yes No    Take 1 package by mouth Daily.              Allergies:  Allergies   Allergen Reactions    Contrast Dye (Echo Or Unknown Ct/Mr) Unknown - High Severity    Sulfa Antibiotics Unknown - High Severity       Objective      Vitals:   Temp:  [97.5 °F (36.4 °C)-97.8 °F (36.6 °C)] 97.5 °F (36.4 °C)  Heart Rate:  [62-89] 63  Resp:  [17-20] 17  BP: ()/(46-62) 111/47  Body mass index is 20.78 kg/m².  Physical Exam  Constitutional:       General: She is sleeping.      Comments: Patient easily awakens, then falls back to sleep.    HENT:      Head: Normocephalic and atraumatic.      Nose: Nose normal.      Mouth/Throat:      Mouth: Mucous membranes are moist.      Pharynx: Oropharynx is clear.   Eyes:      Extraocular Movements: Extraocular movements intact.      Conjunctiva/sclera: Conjunctivae normal.      Pupils: Pupils are equal, round, and reactive to light.   Cardiovascular:      Rate and Rhythm: Normal rate and regular rhythm.       Pulses: Normal pulses.      Heart sounds: Normal heart sounds.   Pulmonary:      Effort: Pulmonary effort is normal.      Breath sounds: Normal breath sounds.   Abdominal:      General: Bowel sounds are normal.      Palpations: Abdomen is soft.   Musculoskeletal:      Cervical back: Neck supple.      Right lower leg: No edema.      Left lower leg: No edema.   Skin:     General: Skin is warm and dry.      Coloration: Skin is pale.   Neurological:      Mental Status: She is easily aroused. Mental status is at baseline. She is lethargic.      Motor: Weakness present.         Diagnostic Data:  Lab Results (last 24 hours)       Procedure Component Value Units Date/Time    Urinalysis With Culture If Indicated - Urine, Catheter [[id number]]  (Abnormal) Collected: 02/22/25 1341    Specimen: Urine, Catheter Updated: 02/22/25 1355     Color, UA Yellow     Appearance, UA Clear     pH, UA 5.5     Specific Gravity, UA 1.016     Glucose, UA Negative     Ketones, UA Negative     Bilirubin, UA Negative     Blood, UA Negative     Protein, UA Negative     Leuk Esterase, UA Moderate (2+)     Nitrite, UA Positive     Urobilinogen, UA 0.2 E.U./dL    Narrative:      In absence of clinical symptoms, the presence of pyuria, bacteria, and/or nitrites on the urinalysis result does not correlate with infection.    Urinalysis, Microscopic Only - Urine, Catheter [[id number]] Collected: 02/22/25 1341    Specimen: Urine, Catheter Updated: 02/22/25 1351    Comprehensive Metabolic Panel [[id number]]  (Abnormal) Collected: 02/22/25 1231    Specimen: Blood Updated: 02/22/25 1317     Glucose 135 mg/dL      BUN 46 mg/dL      Creatinine 1.04 mg/dL      Sodium 138 mmol/L      Potassium 3.8 mmol/L      Chloride 96 mmol/L      CO2 28.7 mmol/L      Calcium 10.0 mg/dL      Total Protein 6.0 g/dL      Albumin 3.8 g/dL      ALT (SGPT) 14 U/L      AST (SGOT) 20 U/L      Alkaline Phosphatase 36 U/L      Total Bilirubin <0.2 mg/dL      Globulin 2.2 gm/dL      A/G  Ratio 1.7 g/dL      BUN/Creatinine Ratio 44.2     Anion Gap 13.3 mmol/L      eGFR 48.7 mL/min/1.73     Narrative:      GFR Categories in Chronic Kidney Disease (CKD)      GFR Category          GFR (mL/min/1.73)    Interpretation  G1                     90 or greater         Normal or high (1)  G2                      60-89                Mild decrease (1)  G3a                   45-59                Mild to moderate decrease  G3b                   30-44                Moderate to severe decrease  G4                    15-29                Severe decrease  G5                    14 or less           Kidney failure          (1)In the absence of evidence of kidney disease, neither GFR category G1 or G2 fulfill the criteria for CKD.    eGFR calculation 2021 CKD-EPI creatinine equation, which does not include race as a factor    Protime-INR [[id number]]  (Abnormal) Collected: 02/22/25 1231    Specimen: Blood Updated: 02/22/25 1305     Protime 14.9 Seconds      INR 1.17    CBC & Differential [[id number]]  (Abnormal) Collected: 02/22/25 1231    Specimen: Blood Updated: 02/22/25 1257    Narrative:      The following orders were created for panel order CBC & Differential.  Procedure                               Abnormality         Status                     ---------                               -----------         ------                     CBC Auto Differential[[id number]]        Abnormal            Final result                 Please view results for these tests on the individual orders.    CBC Auto Differential [[id number]]  (Abnormal) Collected: 02/22/25 1231    Specimen: Blood Updated: 02/22/25 1257     WBC 14.32 10*3/mm3      RBC 1.82 10*6/mm3      Hemoglobin 5.8 g/dL      Hematocrit 18.5 %      .6 fL      MCH 31.9 pg      MCHC 31.4 g/dL      RDW 15.2 %      RDW-SD 53.8 fl      MPV 9.5 fL      Platelets 284 10*3/mm3      Neutrophil % 73.5 %      Lymphocyte % 17.5 %      Monocyte % 7.6 %      Eosinophil % 0.0 %       Basophil % 0.3 %      Immature Grans % 1.1 %      Neutrophils, Absolute 10.52 10*3/mm3      Lymphocytes, Absolute 2.51 10*3/mm3      Monocytes, Absolute 1.09 10*3/mm3      Eosinophils, Absolute 0.00 10*3/mm3      Basophils, Absolute 0.04 10*3/mm3      Immature Grans, Absolute 0.16 10*3/mm3      nRBC 0.5 /100 WBC              Imaging Results (Last 24 Hours)       ** No results found for the last 24 hours. **              Assessment & Plan        This is a 89 y.o. female with:    Active and Resolved Problems  Active Hospital Problems    Diagnosis  POA    **Acute GI bleeding [K92.2]  Yes      Resolved Hospital Problems   No resolved problems to display.     Melena  BRBPR  Anemia  Acute GI bleed  - Stool heme positive  - Hb 5.8; baseline 11.8 on 2/5/25  - AST, ALT within normal limits  - INR 1.17, PT 14.9  - Hemodynamically stable  - PRBC 2 units ordered   - Transfuse with Hb < 7  - Strict NPO until GI consult  - Hypoglycemia protocol  - IVF: NS at 100 mL/hr  - PPI: protonix 40 mg IV BID  - Hold anticoagulants  - GI consulted     UTI  Leukocytosis likely secondary to above  - Urine culture from 1/24/25 with Proteus mirabilis  - Ceftriaxone started in ED  - NS at 100 mL/hr  - Urine culture pending  - Follow CBC      Weakness  Physical deconditioning  - Fall precautions  - PT/OT consults in AM     Hypertension  - Holding home meds for now due to hypotension     Hypothyroidism  - Continue levothyroxine 75 mcg daily when medically appropriate     COPD   Severe PAH  - Stable, COPD not in exacerbation  - Bronchodilators PRN  - Continue sildenafil when medically appropriate    Dementia  - Continue supportive care    Home medications for chronic conditions will be restarted as appropriate when verified by pharmacy.     VTE Prophylaxis:  Mechanical VTE prophylaxis orders are present.        The patient desires to be as follows:    CODE STATUS:    Medical Intervention Limits: No intubation (DNI)  Code Status (Patient has no pulse  and is not breathing): No CPR (Do Not Attempt to Resuscitate)  Medical Interventions (Patient has pulse or is breathing): Limited Support        Aislinn Rae, daughter, who can be contacted at [phone], is the designated person to make medical decisions on the patient's behalf if She is incapable of doing so. This was clarified with patient and/or next of kin on 2/22/2025 during the course of this H&P.    Admission Status:  I believe this patient meets inpatient status.    Expected Length of Stay: Greater than 2 midnights    PDMP and Medication Dispenses via Sidebar reviewed and consistent with patient reported medications.    I discussed the patient's findings and my recommendations with patient.      Signature:     This document has been electronically signed by MAXIMINO Hair on February 22, 2025 14:10 [facility] Hospitalist Team     Electronically signed by Isa Talbot MD at 02/22/25 1623          Operative/Procedure Notes (all)        Procedures signed by Kirby Saul MD at 02/23/25 1516   Version 1 of 1       Procedure Orders    1. Colonoscopy [[id number]] ordered by Kirby Saul MD at 02/23/25 1426               [Media Unavailable] Scan on 2/23/2025 1514 by Kirby Saul MD: COLON          Electronically signed by Kirby Saul MD at 02/23/25 1516       Procedures signed by Kirby Saul MD at 02/23/25 1451   Version 1 of 1       Procedure Orders    1. Upper GI Endoscopy [[id number]] ordered by Kirby Saul MD at 02/23/25 1426               [Media Unavailable] Scan on 2/23/2025 1449 by Kirby Saul MD: EGD          Electronically signed by Kirby Saul MD at 02/23/25 1451       Kirby Saul MD at 02/23/25 1443  Version 2 of 2         COLONOSCOPY, ESOPHAGOGASTRODUODENOSCOPY Procedure Report    Patient Name:  Alexey Trinidad  YOB: 1926    Date of Surgery:  2/23/2025     Pre-Op Diagnosis:  Acute GI bleeding [K92.2]         Procedure/CPT® Codes:  NH  ESOPHAGOGASTRODUODENOSCOPY TRANSORAL DIAGNOSTIC [69498]    Procedure(s):  COLONOSCOPY with POLYPECTOMY  ESOPHAGOGASTRODUODENOSCOPY with GOLD PROBE & BIOPSY    Staff:  Surgeon(s):  Kirby Saul MD      Anesthesia: Monitored Anesthesia Care    Description of Procedure:  A description of the procedure as well as risks, benefits and alternative methods were explained to the patient who voiced understanding and signed the corresponding consent form. A physical exam was performed and vital signs were monitored throughout the procedure.    A rectal exam was performed which was normal. An Olympus colonoscope was placed into the rectum and proceeded under direct visualization through the colon until the cecum and appendiceal orifice were identified. Careful visualization occurred upon slow withdraw of the scope. The scope was then retroflexed and the distal rectum was visualized. The quality of the prep was good. The procedure was not difficult and there were no immediate complications.    Findings:   EGD findings  Large hiatal hernia was noticed without any significant Ang lesions or ulceration.  However in the duodenum there was a 2 large ulcer was seen one of them did have a red dot possible blood vessel which was cauterized.  The ulcer on the posterior wall was clean-based.  Gastric mucosa looks otherwise normal.    Colonoscopy finding  Multiple large polyps were removed from cecum and proximal ascending colon measuring around 1.5 cm, 2 cm eight 9 mm.  All these they were removed with a hot snare.  There was a 1 more polyp removed from the descending colon area around 9 mm with a hot snare.  Extensive left-sided diverticulosis was noticed with a moderate to large internal/external hemorrhoid.    Impression:  1.  Multiple etiology for her blood loss anemia including duodenal ulcer as well as possible diverticulosis and very large hemorrhoid.  Currently there was no actively bleeding diverticulum and hemorrhoid was  noticed.  2.  Large duodenal ulcers x 2 status post of cauterization.  3 changes consistent with a gastritis biopsy performed to rule out H. pylori in the setting of duodenal ulcer.  3 large hiatal hernia    Recommendations:  Follow up with GI clinic as needed  Follow up with PCP as scheduled  Follow up with biopsy report  No recall due to her age  Benefiber 1 scoop 2x/day     Patient is a DNR, her DNR status was rescinded during the procedure after discussing with the family.  His daughter would like it to reverse with the medication and limited CPR and avoid intubation.      Kirby Saul MD     Date: 2/23/2025    Time: 15:14 EST        Electronically signed by Kirby Saul MD at 02/23/25 1814       Kirby Saul MD at 02/23/25 1443  Version 1 of 2         COLONOSCOPY, ESOPHAGOGASTRODUODENOSCOPY Procedure Report    Patient Name:  Alexey Trinidad  YOB: 1926    Date of Surgery:  2/23/2025     Pre-Op Diagnosis:  Acute GI bleeding [K92.2]         Procedure/CPT® Codes:  OH ESOPHAGOGASTRODUODENOSCOPY TRANSORAL DIAGNOSTIC [25659]    Procedure(s):  COLONOSCOPY with POLYPECTOMY  ESOPHAGOGASTRODUODENOSCOPY with GOLD PROBE & BIOPSY    Staff:  Surgeon(s):  Kirby Saul MD      Anesthesia: Monitored Anesthesia Care    Description of Procedure:  A description of the procedure as well as risks, benefits and alternative methods were explained to the patient who voiced understanding and signed the corresponding consent form. A physical exam was performed and vital signs were monitored throughout the procedure.    A rectal exam was performed which was normal. An Olympus colonoscope was placed into the rectum and proceeded under direct visualization through the colon until the cecum and appendiceal orifice were identified. Careful visualization occurred upon slow withdraw of the scope. The scope was then retroflexed and the distal rectum was visualized. The quality of the prep was good. The procedure was not  difficult and there were no immediate complications.    Findings:   EGD findings  Large hiatal hernia was noticed without any significant Ang lesions or ulceration.  However in the duodenum there was a 2 large ulcer was seen one of them did have a red dot possible blood vessel which was cauterized.  The ulcer on the posterior wall was clean-based.  Gastric mucosa looks otherwise normal.    Colonoscopy finding  Multiple large polyps were removed from cecum and proximal ascending colon measuring around 1.5 cm, 2 cm eight 9 mm.  All these they were removed with a hot snare.  There was a 1 more polyp removed from the descending colon area around 9 mm with a hot snare.  Extensive left-sided diverticulosis was noticed with a moderate to large internal/external hemorrhoid.    Impression:  1.  Multiple etiology for her blood loss anemia including duodenal ulcer as well as possible diverticulosis and very large hemorrhoid.  Currently there was no actively bleeding diverticulum and hemorrhoid was noticed.  2.  Large duodenal ulcers x 2 status post of cauterization.  3 changes consistent with a gastritis biopsy performed to rule out H. pylori in the setting of duodenal ulcer.  3 large hiatal hernia    Recommendations:  Follow up with GI clinic as needed  Follow up with PCP as scheduled  Follow up with biopsy report  No recall due to her age  Benefiber 1 scoop 2x/day       Kirby Saul MD     Date: 2025    Time: 15:14 EST        Electronically signed by Kirby Saul MD at 25 1515          Physician Progress Notes (last 48 hours)        Makayla Gibson DO at 25 1151          Hospitalist Service   Daily Progress Note      Patient Name: Alexey Trinidad  : 3/21/1926  MRN: [id number]  Primary Care Physician:  Rui Cole MD  Date of admission: 2025      Subjective      Chief Complaint: Rectal bleeding    Patient seen and examined this morning.  Remains pleasantly confused, at baseline.  Doing  well this morning, denies any complaints.  Awaiting urine culture results.    Pertinent positives as noted in HPI/subjective.  All other systems were reviewed and are negative.      Objective      Vitals:   Temp:  [97.3 °F (36.3 °C)-98.2 °F (36.8 °C)] 98 °F (36.7 °C)  Heart Rate:  [49-77] 55  Resp:  [11-21] 20  BP: ()/(36-64) 111/49  Flow (L/min) (Oxygen Therapy):  [6] 6    Physical Exam:    General: Awake, pleasantly confused, elderly female, NAD  Eyes: PERRL, EOMI, conjunctivae are clear  Cardiovascular: Regular rate and rhythm, no murmurs  Respiratory: Clear to auscultation bilaterally, no wheezing or rales, unlabored breathing  Abdomen: Soft, nontender, positive bowel sounds, no guarding  Musculoskeletal: Normal range of motion, no other gross deformities  Skin: Warm, dry         Result Review    Result Review:  I have personally reviewed the results from the time of this admission to 2/24/2025 11:51 EST and agree with these findings:  [x]  Laboratory  [x]  Microbiology  []  Radiology  []  EKG/Telemetry   []  Cardiology/Vascular   []  Pathology  []  Old records  []  Other:          Assessment & Plan      Brief Patient Summary:  Alexey Trinidad is a 89 y.o. female with history of hypothyroidism, COPD, hysterectomy, cholecystectomy, collagenous colitis   Who was brought in from the nursing facility by the request of the family for rectal bleeding.  Family states that the nursing home said she was passing blood clots.  She is complaining of some abdominal pain and nausea.  She was evaluated in ER and found to have a hemoglobin of 5.8.  Her hemoglobin was 11.8 on 2/5/2025.  Patient was given 2 units of PRBCs with improvement in hemoglobin, GI consulted.  Patient also noted to have UTI, started on IV antibiotics.  Details as noted below.  Patient is medically stable to discharge back to facility with follow-up with PCP and GI as an outpatient.      balsalazide, 750 mg, Oral, Nightly  cefTRIAXone, 1,000 mg,  Intravenous, Q24H  folic acid-pyridoxine-cyanocobalamin, 1 tablet, Oral, BID  furosemide, 20 mg, Oral, BID  levothyroxine, 100 mcg, Oral, Q AM  midodrine, 15 mg, Oral, TID AC  pantoprazole, 40 mg, Intravenous, Q12H  sildenafil, 10 mg, Oral, TID  sodium chloride, 10 mL, Intravenous, Q12H             I have utilized all available, immediate resources to obtain, update, or review the patient's current medications including all prescriptions, over-the-counter products, herbals, cannabis/cannabidiol products, and vitamin.mineral/dietary (nutritional) supplements.    Active Hospital Problems:  Active Hospital Problems    Diagnosis     **Acute GI bleeding        Assessment/Plan:     Rectal bleeding, improved  Acute blood loss anemia  -CT abdomen pelvis shows biliary dilatation and calcification near the ampulla  -Hemoglobin of 5.8 on admission, improved after 2 units  -Patient not on anticoagulation as outpatient  -s/p EGD on 2/23 with Multiple etiology for her blood loss anemia including duodenal ulcer as well as possible diverticulosis and very large hemorrhoid. Currently there was no actively bleeding diverticulum and hemorrhoid was noticed. Large duodenal ulcers x 2 status post of cauterization. Changes consistent with a gastritis biopsy performed to rule out H. pylori in the setting of duodenal ulcer.  Large hiatal hernia.  -Continue PPI twice daily  -hemoglobin is stable, okay to discharge per GI with outpatient follow-up as needed     Acute UTI  -UA noted, urine culture with gram-negative bacilli, pending final results  -Continue IV ceftriaxone     COPD  Pulmonary hypertension  -Not in exacerbation currently   -continue bronchodilators, sildenafil  -Monitor     Chronic hypotension  -Continue midodrine  -Blood pressure stable, monitor     Hypothyroidism  -Continue levothyroxine     Advanced dementia  -Patient appears at baseline, pleasantly confused  -Watch for delirium, monitor    VTE Prophylaxis:  Mechanical VTE  prophylaxis orders are present.        CODE STATUS:    Medical Intervention Limits: No intubation (DNI)  Code Status (Patient has no pulse and is not breathing): No CPR (Do Not Attempt to Resuscitate)  Medical Interventions (Patient has pulse or is breathing): Limited Support      Disposition Planning:     Barriers to Discharge: Urine culture  Anticipated Date of Discharge:   Place of Discharge: Back to LTC, pre-CERT required per case management    Electronically signed by Makayla Gibson DO, 25, 11:51 EST.  [facility] Hospitalist Team      Part of this note may be an electronic transcription/translation of spoken language to printed text using the Dragon Dictation System.      Electronically signed by Makayla Gibson DO at 25 1154       Makayla Gibson DO at 25 0939          Hospitalist Service   Daily Progress Note      Patient Name: Alexey Trinidad  : 3/21/1926  MRN: [id number]  Primary Care Physician:  Rui Cole MD  Date of admission: 2025      Subjective      Chief Complaint: Rectal bleeding    Patient seen and examined this morning.  Remains pleasantly confused, at baseline.  Still having some melanotic stools per nursing, GI planning possible EGD pending family decision.  Patient denies any other complaints.    Pertinent positives as noted in HPI/subjective.  All other systems were reviewed and are negative.      Objective      Vitals:   Temp:  [97.5 °F (36.4 °C)-98.5 °F (36.9 °C)] 97.8 °F (36.6 °C)  Heart Rate:  [55-89] 63  Resp:  [11-22] 11  BP: ()/(42-69) 124/62  Flow (L/min) (Oxygen Therapy):  [2] 2    Physical Exam:    General: Awake, pleasantly confused, elderly female, NAD  Eyes: PERRL, EOMI, conjunctivae are clear  Cardiovascular: Regular rate and rhythm, no murmurs  Respiratory: Clear to auscultation bilaterally, no wheezing or rales, unlabored breathing  Abdomen: Soft, nontender, positive bowel sounds, no guarding  Musculoskeletal: Normal range of  motion, no other gross deformities  Skin: Warm, dry         Result Review    Result Review:  I have personally reviewed the results from the time of this admission to 2/23/2025 09:39 EST and agree with these findings:  [x]  Laboratory  [x]  Microbiology  [x]  Radiology  [x]  EKG/Telemetry   [x]  Cardiology/Vascular   []  Pathology  [x]  Old records  []  Other:          Assessment & Plan      Brief Patient Summary:  Alexey Trinidad is a 89 y.o. female with history of hypothyroidism, COPD, hysterectomy, cholecystectomy, collagenous colitis   Who was brought in from the nursing facility by the request of the family for rectal bleeding.  Family states that the nursing home said she was passing blood clots.  She is complaining of some abdominal pain and nausea.  She was evaluated in ER and found to have a hemoglobin of 5.8.  Her hemoglobin was 11.8 on 2/5/2025.  Patient was given 2 units of PRBCs with improvement in hemoglobin, GI consulted.  Patient also noted to have UTI, started on IV antibiotics.      balsalazide, 750 mg, Oral, Nightly  cefTRIAXone, 1,000 mg, Intravenous, Q24H  folic acid-pyridoxine-cyanocobalamin, 1 tablet, Oral, BID  furosemide, 20 mg, Oral, BID  levothyroxine, 100 mcg, Oral, Q AM  midodrine, 15 mg, Oral, TID AC  pantoprazole, 40 mg, Intravenous, Q12H  sildenafil, 10 mg, Oral, TID  sodium chloride, 10 mL, Intravenous, Q12H             I have utilized all available, immediate resources to obtain, update, or review the patient's current medications including all prescriptions, over-the-counter products, herbals, cannabis/cannabidiol products, and vitamin.mineral/dietary (nutritional) supplements.    Active Hospital Problems:  Active Hospital Problems    Diagnosis     **Acute GI bleeding        Assessment/Plan:     Rectal bleeding  Acute blood loss anemia  -CT abdomen pelvis shows biliary dilatation and calcification near the ampulla  -Hemoglobin of 5.8 on admission, improved after 2 units  -Patient not  on anticoagulation as outpatient  -GI following, possible plan for EGD pending decision from family  -Continue to monitor    Acute UTI  -UA noted, urine culture pending  -Continue IV ceftriaxone    COPD  Pulmonary hypertension  -Not in exacerbation currently   -continue bronchodilators, sildenafil  -Monitor    Chronic hypotension  -Continue midodrine  -Blood pressure stable, monitor    Hypothyroidism  -Continue levothyroxine    Advanced dementia  -Patient appears at baseline, pleasantly confused  -Watch for delirium, monitor    VTE Prophylaxis:  Mechanical VTE prophylaxis orders are present.        CODE STATUS:    Medical Intervention Limits: No intubation (DNI)  Code Status (Patient has no pulse and is not breathing): No CPR (Do Not Attempt to Resuscitate)  Medical Interventions (Patient has pulse or is breathing): Limited Support      Disposition Planning:     Barriers to Discharge: GI bleed, UTI  Anticipated Date of Discharge:   Place of Discharge: Home    Electronically signed by Makayla Gibson DO, 25, 09:39 EST.  [facility], [place], [facility]      Part of this note may be an electronic transcription/translation of spoken language to printed text using the Dragon Dictation System.      Electronically signed by aMkayla Gibson DO at 25 0943       Consult Notes (last 48 hours)  Notes from 25 1507 through 25 1507   No notes of this type exist for this encounter.       Nutrition Notes (most recent note)    No notes exist for this encounter.       Speech Language Pathology Notes (most recent note)    No notes exist for this encounter.       Diana Powers   Physical Therapist  Physical Therapy  Therapy Evaluation      Signed  Date of Service:  25 1348  Creation Time:  25 1348     Signed        Expand All Collapse All  Patient Name: Alexey Trinidad             : 3/21/1926                        MRN: [id number]                              Today's Date: 2025             "                       Admit Date: 2/22/2025                        Visit Dx:   Visit Diagnosis       ICD-10-CM ICD-9-CM   1. Gastrointestinal hemorrhage with melena  K92.1 578.1   2. Acute blood loss anemia  D62 285.1   3. Urinary tract infection without hematuria, site unspecified  N39.0 599.0   4. Acute GI bleeding  K92.2 578.9         Problem List       Patient Active Problem List   Diagnosis    UTI (urinary tract infection)    Abdominal pain    Moderate protein-calorie malnutrition    Acute GI bleeding         Medical History        Past Medical History:   Diagnosis Date    Disease of thyroid gland      Hypertension           Surgical History         Past Surgical History:   Procedure Laterality Date    BACK SURGERY         kyphoplasty    BREAST SURGERY         benign cyst removed    HYSTERECTOMY        LAPAROSCOPIC CHOLECYSTECTOMY               General Information         Row Name 02/24/25 1009                 Physical Therapy Time and Intention     Document Type evaluation  -JV       Mode of Treatment physical therapy  -JV          Row Name 02/24/25 1009                 General Information     Patient Profile Reviewed yes  -JV       Prior Level of Function all household mobility;gait  with Rwx and SBA \"sometimes\"  -JV       Existing Precautions/Restrictions fall;orthostatic hypotension  -JV       Barriers to Rehab medically complex;cognitive status;previous functional deficit  -JV          Row Name 02/24/25 1009                 Living Environment     People in Home child(mahnaz), adult  -JV          Row Name 02/24/25 1009                 Home Main Entrance     Number of Stairs, Main Entrance two  -JV       Stair Railings, Main Entrance railing on left side (ascending)  -JV          Row Name 02/24/25 1009                 Stairs Within Home, Primary     Number of Stairs, Within Home, Primary none  -JV          Row Name 02/24/25 1009                 Cognition     Orientation Status (Cognition) oriented " to;person;place;time;verbal cues/prompts needed for orientation;disoriented to;situation  -JV          Row Name 02/24/25 1009                 Safety Issues/Impairments Affecting Functional Mobility     Impairments Affecting Function (Mobility) balance;cognition;endurance/activity tolerance;pain;strength  -JV       Cognitive Impairments, Mobility Safety/Performance awareness, need for assistance;insight into deficits/self-awareness;problem-solving/reasoning  -JV                       User Key  (r) = Recorded By, (t) = Taken By, (c) = Cosigned By        Initials Name Provider Type     Diana Gilbert Physical Therapist                            Mobility         Row Name 02/24/25 1338                 Bed Mobility     Bed Mobility bed mobility (all) activities  -JV       All Activities, [place] (Bed Mobility) minimum assist (75% patient effort);verbal cues;nonverbal cues (demo/gesture)  -JV       Assistive Device (Bed Mobility) bed rails;head of bed elevated  -JV          Row Name 02/24/25 1338                 Bed-Chair Transfer     Bed-Chair [place] (Transfers) verbal cues;nonverbal cues (demo/gesture);minimum assist (75% patient effort)  -JV       Assistive Device (Bed-Chair Transfers) walker, front-wheeled  -JV          Row Name 02/24/25 1338                 Sit-Stand Transfer     Sit-Stand [place] (Transfers) minimum assist (75% patient effort);verbal cues;nonverbal cues (demo/gesture)  -JV       Assistive Device (Sit-Stand Transfers) walker, front-wheeled  -JV          Row Name 02/24/25 1338                 Gait/Stairs (Locomotion)     [place] Level (Gait) unable to assess  -JV       Comment, (Gait/Stairs) gait deferred due to significant shivering throughout transfer; nurse reports pt has stayed very cold  -JV                       User Key  (r) = Recorded By, (t) = Taken By, (c) = Cosigned By        Initials Name Provider Type     Diana Gilbert Physical [place]                             Obj/Interventions         Row Name 02/24/25 1341                 Range of Motion Comprehensive     General Range of Motion bilateral lower extremity ROM WFL  -JV          Row Name 02/24/25 1341                 Strength Comprehensive (MMT)     Comment, General Manual Muscle Testing (MMT) Assessment BLE grossly 3/5  -JV          Row Name 02/24/25 1341                 Balance     Balance Assessment standing static balance;standing dynamic balance  -JV       Static Standing Balance minimal assist  -JV       Dynamic Standing Balance minimal assist  -JV       Position/Device Used, Standing Balance supported;walker, rolling  -JV          Row Name 02/24/25 1341                 Sensory Assessment (Somatosensory)     Sensory Assessment (Somatosensory) LE sensation intact  -JV                       User Key  (r) = Recorded By, (t) = Taken By, (c) = Cosigned By        Initials Name Provider Type     Diana Gilbert Physical Therapist                            Goals/Plan         Row Name 02/24/25 1345                 Bed Mobility Goal 1 (PT)     Activity/Assistive Device (Bed Mobility Goal 1, PT) bed mobility activities, all  -JV       [place] Level/Cues Needed (Bed Mobility Goal 1, PT) modified independence  -JV       Time Frame (Bed Mobility Goal 1, PT) long term goal (LTG);2 weeks  -JV          Row Name 02/24/25 1345                 Transfer Goal 1 (PT)     Activity/Assistive Device (Transfer Goal 1, PT) sit-to-stand/stand-to-sit;bed-to-chair/chair-to-bed;walker, rolling  -JV       [place] Level/Cues Needed (Transfer Goal 1, PT) supervision required  -JV       Time Frame (Transfer Goal 1, PT) long term goal (LTG);2 weeks  -JV          Row Name 02/24/25 1345                 Gait Training Goal 1 (PT)     Activity/Assistive Device (Gait Training Goal 1, PT) gait (walking locomotion);assistive device use  -JV       [place] Level (Gait Training Goal 1, PT) contact guard required  -JV       Distance  (Gait Training Goal 1, PT) 40 ft  -JV       Time Frame (Gait Training Goal 1, PT) long term goal (LTG);2 weeks  -JV          Row Name 02/24/25 8138                 Therapy Assessment/Plan (PT)     Planned Therapy Interventions (PT) balance training;bed mobility training;gait training;home exercise program;strengthening;patient/family education;transfer training  -JV                    User Key  (r) = Recorded By, (t) = Taken By, (c) = Cosigned By        Initials Name Provider Type     Diana Gilbert Physical Therapist                         Clinical Impression         Row Name 02/24/25 1342                 Pain     Additional Documentation Pain Scale: FACES Pre/Post-Treatment (Group)  -JV          Row Name 02/24/25 1342                 Pain Scale: FACES Pre/Post-Treatment     Pain: FACES Scale, Pretreatment 4-->hurts little more  -JV       Posttreatment Pain Rating 4-->hurts little more  -JV       Pre/Posttreatment Pain Comment abdomen  -JV          Row Name 02/24/25 1342                 Plan of Care Review     Outcome Evaluation Pt is a 89 y/o female presenting to [facility] on 2/22/25 from SNF with bleeding per rectum, passing clots per rectum, dark stools. Patient is a poor historian due to dementia.  Of note, patient was admitted to [facility] from 1/24/2025 to 2/5/2025 with urinary tract infection, acute hypoxic respiratory failure, norovirus, general weakness, moderate protein calorie malnutrition.  Pt post EGD 2/23/25 with cauterization x2 large duodenal ulcers.  CMH of dementia, hypertension, hypothyroidism, COPD.  Pt reports PLOF of living with daughter who is a retired nurse and can provide 24/7 sup/assist; living in [facility] with 2 JESU with railing. Pt reports PLOF SBA with household mobility/ambulation with Rwx, assist as needed with ADLs.   SUPINE /57 mmHg,   SITTING /51 mmHg, pulse 64 bpm, SpO2 98% with room air  Pt completes bed mobility and SPS transfers with Rwx with MIN A; gait deferred as pt  shivering cold. Nurse reports shivering ongong, warm blankets applied and shivering improved. When medically stable, return to SNF recommended to address functional decline and progress to safe d/c home.  -JV          Row Name 02/24/25 1342                 Therapy Assessment/Plan (PT)     Criteria for Skilled Interventions Met (PT) yes;meets criteria;skilled treatment is necessary  -JV       Therapy Frequency (PT) 5 times/wk  -JV       Predicted Duration of Therapy Intervention (PT) until d/c  -JV          Row Name 02/24/25 1342                 Vital Signs     Pre Systolic BP Rehab 132  -JV       Pre Treatment Diastolic BP 57  -JV       Intra Systolic BP Rehab 109  -JV       Intra Treatment Diastolic BP 51  -JV       Pretreatment Heart Rate (beats/min) 59  -JV       Intratreatment Heart Rate (beats/min) 64  -JV       Intra SpO2 (%) 98  -JV       O2 Delivery Intra Treatment room air  -JV       Pre Patient Position Supine  -JV       Intra Patient Position Sitting  -JV          Row Name 02/24/25 1342                 Positioning and Restraints     Pre-Treatment Position in bed  -JV       Post Treatment Position chair  -JV       In Chair notified nsg;reclined;call light within reach;encouraged to call for assist;exit alarm on  -JV                       User Key  (r) = Recorded By, (t) = Taken By, (c) = Cosigned By        Initials Name Provider Type     Diana Gilbert Physical Therapist                            Outcome Measures         Row Name 02/24/25 0803                 How much help from another person do you currently need...     Turning from your back to your side while in flat bed without using bedrails? 3  -MS       Moving from lying on back to sitting on the side of a flat bed without bedrails? 3  -MS       Moving to and from a bed to a chair (including a wheelchair)? 2  -MS       Standing up from a chair using your arms (e.g., wheelchair, bedside chair)? 2  -MS       Climbing 3-5 steps with a railing?  2  -MS       To walk in hospital room? 2  -MS       AM-PAC 6 Clicks Score (PT) 14  -MS                       User Key  (r) = Recorded By, (t) = Taken By, (c) = Cosigned By        Initials Name Provider Type     Yenifer Perera, RN Registered Nurse                          Physical Therapy Education            Title: PT OT SLP Therapies (In Progress)         Topic: Physical Therapy (Done)         Point: Mobility training (Done)         Learning Progress Summary             Patient Acceptance, E,TB, VU by  at 2/24/2025 1347                                                User Key         Initials Effective Dates Name Provider Type Discipline      03/30/21 -  Diana Powers Physical Therapist PT                          PT Recommendation and Plan  Planned Therapy Interventions (PT): balance training, bed mobility training, gait training, home exercise program, strengthening, patient/family education, transfer training  Outcome Evaluation: Pt is a 89 y/o female presenting to [facility] on 2/22/25 from SNF with bleeding per rectum, passing clots per rectum, dark stools. Patient is a poor historian due to dementia.  Of note, patient was admitted to [facility] from 1/24/2025 to 2/5/2025 with urinary tract infection, acute hypoxic respiratory failure, norovirus, general weakness, moderate protein calorie malnutrition.  Pt post EGD 2/23/25 with cauterization x2 large duodenal ulcers.  CMH of dementia, hypertension, hypothyroidism, COPD.  Pt reports PLOF of living with daughter who is a retired nurse and can provide 24/7 sup/assist; living in [facility] with 2 JESU with railing. Pt reports PLOF SBA with household mobility/ambulation with Rwx, assist as needed with ADLs.   SUPINE /57 mmHg,   SITTING /51 mmHg, pulse 64 bpm, SpO2 98% with room air  Pt completes bed mobility and SPS transfers with Rwx with MIN A; gait deferred as pt shivering cold. Nurse reports shivering ongong, warm blankets applied and shivering improved. When  medically stable, return to SNF recommended to address functional decline and progress to safe d/c home.      Time Calculation:        PT Charges         Row Name 25 1348                       Time Calculation     Start Time 1000  -JV         Stop Time 1028  -JV         Time Calculation (min) 28 min  -JV         PT Received On 25  -JV         PT - Next Appointment 25  -JV         PT Goal Re-Cert Due Date 03/10/25  -JV                 Time Calculation- PT     Total Timed Code Minutes- PT 0 minute(s)  -JV                      User Key  (r) = Recorded By, (t) = Taken By, (c) = Cosigned By        Initials Name Provider Type     Diana Gilbert Physical Therapist                       Therapy Charges for Today         Code Description Service Date Service Provider Modifiers Qty     [id number] HC PT EVAL MOD COMPLEXITY 4 2025 Diana Powers GP 1                PT G-Codes  AM-PAC 6 Clicks Score (PT): 14  PT Discharge Summary  Anticipated Discharge Disposition (PT): skilled nursing facility     Diana Powers                   2025                                    Ilene Mejias OT   Occupational Therapist  Occupational Therapy  Therapy Evaluation      Signed  Date of Service:  25 1311  Creation Time:  25     Signed        Expand All Collapse All  Patient Name: Alexey Trinidad             : 3/21/1926                        MRN: [id number]                              Today's Date: 2025                                   Admit Date: 2025                        Visit Dx:   Visit Diagnosis       ICD-10-CM ICD-9-CM   1. Gastrointestinal hemorrhage with melena  K92.1 578.1   2. Acute blood loss anemia  D62 285.1   3. Urinary tract infection without hematuria, site unspecified  N39.0 599.0   4. Acute GI bleeding  K92.2 578.9         Problem List       Patient Active Problem List   Diagnosis    UTI (urinary tract infection)    Abdominal pain    Moderate  protein-calorie malnutrition    Acute GI bleeding         Medical History        Past Medical History:   Diagnosis Date    Disease of thyroid gland      Hypertension           Surgical History         Past Surgical History:   Procedure Laterality Date    BACK SURGERY         kyphoplasty    BREAST SURGERY         benign cyst removed    HYSTERECTOMY        LAPAROSCOPIC CHOLECYSTECTOMY               General Information         Row Name 02/24/25 1256                 OT Time and Intention     Subjective Information complains of;weakness;fatigue;pain  ABD cramping  -       Patient Effort excellent  -       Symptoms Noted During/After Treatment fatigue;increased pain;other (see comments)  shaking uncontrolably off & on such as with rigors  -          Row Name 02/24/25 1256                 General Information     Prior Level of Function independent:;all household mobility;ADL's  has been at rehab since last admit for norovirus  -       Existing Precautions/Restrictions fall  -       Barriers to Rehab medically complex;cognitive status  LT & ST memory deficits  -          Row Name 02/24/25 1256                 Living Environment     People in Home child(mahnaz), adult;grandchild(mahnaz)  -          Row Name 02/24/25 1256                 Cognition     Orientation Status (Cognition) oriented to;person;place;disoriented to;situation;time;verbal cues/prompts needed for orientation  -          Row Name 02/24/25 1256                 Safety Issues/Impairments Affecting Functional Mobility     Safety Issues Affecting Function (Mobility) problem-solving;sequencing abilities;judgment;insight into deficits/self-awareness  -                       User Key  (r) = Recorded By, (t) = Taken By, (c) = Cosigned By        Initials Name Provider Type      Ilene Mejias, OT Occupational Therapist                                     Mobility/ADL's         Row Name 02/24/25 1304                 Transfers     Transfers sit-stand  transfer;toilet transfer;stand-sit transfer  -          Row Name 02/24/25 1304                 Sit-Stand Transfer     Sit-Stand [place] (Transfers) minimum assist (75% patient effort)  -          Row Name 02/24/25 1304                 Stand-Sit Transfer     Stand-Sit [place] (Transfers) minimum assist (75% patient effort)  -       Assistive Device (Stand-Sit Transfers) walker, front-wheeled  -[facility] Name 02/24/25 1304                 Toilet Transfer     Type (Toilet Transfer) stand pivot/stand step  -       [place] Level (Toilet Transfer) standby assist  -          Row Name 02/24/25 1304                 Functional Mobility     Functional Mobility-Distance (Feet) 3  -       Patient was able to Ambulate yes  -[facility] Name 02/24/25 1304                 Activities of Daily Living     BADL Assessment/Intervention toileting;grooming  -[facility] Name 02/24/25 1304                 Toileting Assessment/Training     [place] Level (Toileting) toileting skills;dependent (less than 25% patient effort)  -       Comment, (Toileting) continent to get to [facility] but (D) to manage clothing & complete hans care  -[facility] Name 02/24/25 1304                 Grooming Assessment/Training     [place] Level (Grooming) wash face, hands;moderate assist (50% patient effort)  -                       User Key  (r) = Recorded By, (t) = Taken By, (c) = Cosigned By        Initials Name Provider Type      Ilene Mejias OT Occupational Therapist                            Obj/Interventions         [facility] Name 02/24/25 1306                 Range of Motion Comprehensive     General Range of Motion no range of motion deficits identified  -[facility] Name 02/24/25 1306                 Strength Comprehensive (MMT)     Comment, General Manual Muscle Testing (MMT) Assessment global weakness, mild to moderate  -                       User Key  (r) = Recorded By, (t) = Taken By, (c) =  Cosigned By        Initials Name Provider Type      Ilene Mejias, OT Occupational Therapist                            Goals/Plan         Row Name 02/24/25 1303                 Bed Mobility Goal 1 (OT)     Activity/Assistive Device (Bed Mobility Goal 1, OT) bed mobility activities, all  -       [place] Level/Cues Needed (Bed Mobility Goal 1, OT) minimum assist (75% or more patient effort)  -       Time Frame (Bed Mobility Goal 1, OT) 2 weeks  -          Row Name 02/24/25 1309                 Transfer Goal 1 (OT)     Activity/Assistive Device (Transfer Goal 1, OT) transfers, all  -       [place] Level/Cues Needed (Transfer Goal 1, OT) modified independence  -       Time Frame (Transfer Goal 1, OT) 2 weeks  -          Row Name 02/24/25 1303                 Dressing Goal 1 (OT)     Activity/Device (Dressing Goal 1, OT) dressing skills, all  -MH       [place]/Cues Needed (Dressing Goal 1, OT) modified independence  -       Time Frame (Dressing Goal 1, OT) 2 weeks  -          Row Name 02/24/25 1306                 Therapy Assessment/Plan (OT)     Planned Therapy Interventions (OT) activity tolerance training;adaptive equipment training;BADL retraining;cognitive/visual perception retraining;ROM/therapeutic exercise;occupation/activity based interventions;transfer/mobility retraining;patient/caregiver education/training  -                    User Key  (r) = Recorded By, (t) = Taken By, (c) = Cosigned By        Initials Name Provider Type      Ilene Mejias, OT Occupational Therapist                         Clinical Impression         Row Name 02/24/25 1304                 Pain Assessment     Pretreatment Pain Rating 7/10  -       Posttreatment Pain Rating 7/10  -       Pain Location abdomen  -          Row Name 02/24/25 1306                 Plan of Care Review     Plan of Care Reviewed With patient  -       Progress no change  -       Outcome Evaluation Pt admitted from  rehab with GI bleed & Hglb 5.8 which has improved. Pt had EGD & colonoscopy & several areas were cauterized per GI note. Pt continues to have some diarrhea and ABD pain & cramping. Once pt is medically stable, recommend return to rehab as she was (I) at home with family for ADL & household mobility prior to an admission a few weeks ago for norovirus.  -          Row Name 02/24/25 1306                 Therapy Assessment/Plan (OT)     Rehab Potential (OT) good  -       Criteria for Skilled Therapeutic Interventions Met (OT) skilled treatment is necessary  -       Therapy Frequency (OT) 5 times/wk  -       Predicted Duration of Therapy Intervention (OT) until d/c  -          Row Name 02/24/25 1306                 Therapy Plan Review/Discharge Plan (OT)     Anticipated Discharge Disposition (OT) skilled nursing facility  -          Row Name 02/24/25 1306                 Vital Signs     O2 Delivery Pre Treatment room air  -       O2 Delivery Intra Treatment room air  -       O2 Delivery Post Treatment room air  -       Pre Patient Position Sitting  -       Intra Patient Position Standing  -       Post Patient Position Sitting  -          Row Name 02/24/25 1306                 Positioning and Restraints     Pre-Treatment Position sitting in chair/recliner  -       Post Treatment Position chair  -       In Chair reclined;call light within reach;encouraged to call for assist;exit alarm on  -                       User Key  (r) = Recorded By, (t) = Taken By, (c) = Cosigned By        Initials Name Provider Type      Ilene Mejias, OT Occupational Therapist                            Outcome Measures         Row Name 02/24/25 0885                 How much help from another person do you currently need...     Turning from your back to your side while in flat bed without using bedrails? 3  -MS       Moving from lying on back to sitting on the side of a flat bed without bedrails? 3  -MS        Moving to and from a bed to a chair (including a wheelchair)? 2  -MS       Standing up from a chair using your arms (e.g., wheelchair, bedside chair)? 2  -MS       Climbing 3-5 steps with a railing? 2  -MS       To walk in hospital room? 2  -MS       AM-PAC 6 Clicks Score (PT) 14  -MS                       User Key  (r) = Recorded By, (t) = Taken By, (c) = Cosigned By        Initials Name Provider Type     Yenifer Perera RN Registered Nurse                             Occupational Therapy Education            Title: PT OT SLP Therapies (In Progress)         Topic: Occupational Therapy (In Progress)         Point: ADL training (Done)         Description:   Instruct learner(s) on proper safety adaptation and remediation techniques during self care or transfers.   Instruct in proper use of assistive devices.                       Learning Progress Summary             Patient Acceptance, E,TB,D, VU,DU,NR by  at 2/24/2025 1310                            Point: Home exercise program (Not Started)         Description:   Instruct learner(s) on appropriate technique for monitoring, assisting and/or progressing therapeutic exercises/activities.                       Learner Progress:  Not documented in this visit.                  Point: Precautions (Done)         Description:   Instruct learner(s) on prescribed precautions during self-care and functional transfers.                       Learning Progress Summary             Patient Acceptance, E,TB,D, VU,DU,NR by  at 2/24/2025 1310                            Point: Body mechanics (Done)         Description:   Instruct learner(s) on proper positioning and spine alignment during self-care, functional mobility activities and/or exercises.                       Learning Progress Summary             Patient Acceptance, E,TB,D, VU,DU,NR by  at 2/24/2025 1310                                                User Key         Initials Effective Dates Name Provider Type  Discipline      06/16/21 -  Ilene Mejias OT Occupational Therapist OT                          OT Recommendation and Plan  Planned Therapy Interventions (OT): activity tolerance training, adaptive equipment training, BADL retraining, cognitive/visual perception retraining, ROM/therapeutic exercise, occupation/activity based interventions, transfer/mobility retraining, patient/caregiver education/training  Therapy Frequency (OT): 5 times/wk  Plan of Care Review  Plan of Care Reviewed With: patient  Progress: no change  Outcome Evaluation: Pt admitted from rehab with GI bleed & Hglb 5.8 which has improved. Pt had EGD & colonoscopy & several areas were cauterized per GI note. Pt continues to have some diarrhea and ABD pain & cramping. Once pt is medically stable, recommend return to rehab as she was (I) at home with family for ADL & household mobility prior to an admission a few weeks ago for norovirus.      Time Calculation:        Time Calculation- OT         Row Name 02/24/25 1310                       Time Calculation-      OT Start Time 1129  -         OT Stop Time 1145  -         OT Time Calculation (min) 16 min  -         Total Timed Code Minutes- OT 0 minute(s)  -         OT Received On 02/24/25  -         OT - Next Appointment 02/25/25  -         OT Goal Re-Cert Due Date 03/10/25  -                      User Key  (r) = Recorded By, (t) = Taken By, (c) = Cosigned By        Initials Name Provider Type      Ilene Mejias OT Occupational Therapist                       Therapy Charges for Today         Code Description Service Date Service Provider Modifiers Qty     [id number]  OT EVAL MOD COMPLEXITY 3 2/24/2025 Ilene Mejias OT GO 1                   Ilene Mejias OT                 2/24/2025

## 2025-02-24 NOTE — PLAN OF CARE
Goal Outcome Evaluation:              Outcome Evaluation: Pt is a 89 y/o female presenting to [facility] on 2/22/25 from SNF with bleeding per rectum, passing clots per rectum, dark stools. Patient is a poor historian due to dementia.  Of note, patient was admitted to [facility] from 1/24/2025 to 2/5/2025 with urinary tract infection, acute hypoxic respiratory failure, norovirus, general weakness, moderate protein calorie malnutrition.  Pt post EGD 2/23/25 with cauterization x2 large duodenal ulcers.  CMH of dementia, hypertension, hypothyroidism, COPD.  Pt reports PLOF of living with daughter who is a retired nurse and can provide 24/7 sup/assist; living in [facility] with 2 JESU with railing. Pt reports PLOF SBA with household mobility/ambulation with Rwx, assist as needed with ADLs.   SUPINE /57 mmHg,   SITTING /51 mmHg, pulse 64 bpm, SpO2 98% with room air  Pt completes bed mobility and SPS transfers with Rwx with MIN A; gait deferred as pt shivering cold. Nurse reports shivering ongong, warm blankets applied and shivering improved. When medically stable, return to SNF recommended to address functional decline and progress to safe d/c home.    Anticipated Discharge Disposition (PT): skilled nursing facility

## 2025-02-24 NOTE — PLAN OF CARE
Goal Outcome Evaluation:   Patient is cooperative, had dark loose bowel movements during the shift. Vitals closely monitored. H&H monitored. Plan of care ongoing

## 2025-02-24 NOTE — DISCHARGE PLACEMENT REQUEST
"Arabella Trinidad (89 y.o. Female)       Date of Birth   03/21/1926    Social Security Number       Address   [address] [place] [address] [place] [facility]    Home Phone   [phone]    MRN   [id number]       Quaker   None    Marital Status                               Admission Date   2/22/25    Admission Type   Emergency    Admitting Provider   Makayla Gibson DO    Attending Provider   Makayla Gibson DO    Department, Room/Bed   [facility], 2114/1       Discharge Date       Discharge Disposition       Discharge Destination                                 Attending Provider: Makayla Gibson DO    Allergies: Contrast Dye (Echo Or Unknown Ct/mr), Sulfa Antibiotics    Isolation: None   Infection: None   Code Status: No CPR    Ht: 147.3 cm (58\")   Wt: 41.6 kg (91 lb 11.4 oz)    Admission Cmt: None   Principal Problem: Acute GI bleeding [K92.2]                   Active Insurance as of 2/22/2025       Primary Coverage       Payor Plan Insurance Group Employer/Plan Group    HUMANA MEDICARE REPLACEMENT HUMANA MED ADV HMO [id number]       Payor Plan Address Payor Plan Phone Number Payor Plan Fax Number Effective Dates    [address] [address] [phone]  1/1/2025 - None Entered    [facility] [address]         Subscriber Name Subscriber Birth Date Member ID       ARABELLA TRINIDAD 3/21/1926 [id number]                     Emergency Contacts        (Rel.) Home Phone Work Phone Mobile Phone    ButchAislinn (Daughter) -- -- [phone]    Vince Rae (Grandchild) -- -- [phone]            "

## 2025-02-24 NOTE — THERAPY EVALUATION
Patient Name: Alexey Trinidad  : 3/21/1926    MRN: [id number]                              Today's Date: 2025       Admit Date: 2025    Visit Dx:     ICD-10-CM ICD-9-CM   1. Gastrointestinal hemorrhage with melena  K92.1 578.1   2. Acute blood loss anemia  D62 285.1   3. Urinary tract infection without hematuria, site unspecified  N39.0 599.0   4. Acute GI bleeding  K92.2 578.9     Patient Active Problem List   Diagnosis    UTI (urinary tract infection)    Abdominal pain    Moderate protein-calorie malnutrition    Acute GI bleeding     Past Medical History:   Diagnosis Date    Disease of thyroid gland     Hypertension      Past Surgical History:   Procedure Laterality Date    BACK SURGERY      kyphoplasty    BREAST SURGERY      benign cyst removed    HYSTERECTOMY      LAPAROSCOPIC CHOLECYSTECTOMY        General Information       Row Name 25 1256          OT Time and Intention    Subjective Information complains of;weakness;fatigue;pain  ABD cramping  -     Patient Effort excellent  -     Symptoms Noted During/After Treatment fatigue;increased pain;other (see comments)  shaking uncontrolably off & on such as with rigors  -       Row Name 25 1256          General Information    Prior Level of Function independent:;all household mobility;ADL's  has been at rehab since last admit for norovirus  -     Existing Precautions/Restrictions fall  -     Barriers to Rehab medically complex;cognitive status  LT & ST memory deficits  -       Row Name 25 1256          Living Environment    People in Home child(mahnaz), adult;grandchild(mahnaz)  -       Row Name 25 1256          Cognition    Orientation Status (Cognition) oriented to;person;place;disoriented to;situation;time;verbal cues/prompts needed for orientation  -       Row Name 25 1256          Safety Issues/Impairments Affecting Functional Mobility    Safety Issues Affecting Function (Mobility) problem-solving;sequencing  abilities;judgment;insight into deficits/self-awareness  -               User Key  (r) = Recorded By, (t) = Taken By, (c) = Cosigned By      Initials Name Provider Type     Ilene Mejias OT Occupational Therapist                     Mobility/ADL's       Row Name 02/24/25 1304          Transfers    Transfers sit-stand transfer;toilet transfer;stand-sit transfer  -       Row Name 02/24/25 1304          Sit-Stand Transfer    Sit-Stand [place] (Transfers) minimum assist (75% patient effort)  -       Row Name 02/24/25 1304          Stand-Sit Transfer    Stand-Sit [place] (Transfers) minimum assist (75% patient effort)  -     Assistive Device (Stand-Sit Transfers) walker, front-wheeled  -       Row Name 02/24/25 1304          Toilet Transfer    Type (Toilet Transfer) stand pivot/stand step  -     [place] Level (Toilet Transfer) standby assist  -       Row Name 02/24/25 1304          Functional Mobility    Functional Mobility-Distance (Feet) 3  -     Patient was able to Ambulate yes  -       Row Name 02/24/25 1304          Activities of Daily Living    BADL Assessment/Intervention toileting;grooming  -       Row Name 02/24/25 1304          Toileting Assessment/Training    [place] Level (Toileting) toileting skills;dependent (less than 25% patient effort)  -     Comment, (Toileting) continent to get to [facility] but (D) to manage clothing & complete hans care  -       Row Name 02/24/25 1304          Grooming Assessment/Training    [place] Level (Grooming) wash face, hands;moderate assist (50% patient effort)  -               User Key  (r) = Recorded By, (t) = Taken By, (c) = Cosigned By      Initials Name Provider Type     Ilene Mejias OT Occupational Therapist                   Obj/Interventions       Row Name 02/24/25 1306          Range of Motion Comprehensive    General Range of Motion no range of motion deficits identified  -       Row Name 02/24/25 1306           Strength Comprehensive (MMT)    Comment, General Manual Muscle Testing (MMT) Assessment global weakness, mild to moderate  -               User Key  (r) = Recorded By, (t) = Taken By, (c) = Cosigned By      Initials Name Provider Type    Ilene Negro, OT Occupational Therapist                   Goals/Plan       Row Name 02/24/25 1309          Bed Mobility Goal 1 (OT)    Activity/Assistive Device (Bed Mobility Goal 1, OT) bed mobility activities, all  -     [place] Level/Cues Needed (Bed Mobility Goal 1, OT) minimum assist (75% or more patient effort)  -     Time Frame (Bed Mobility Goal 1, OT) 2 weeks  -       Row Name 02/24/25 1309          Transfer Goal 1 (OT)    Activity/Assistive Device (Transfer Goal 1, OT) transfers, all  -     [place] Level/Cues Needed (Transfer Goal 1, OT) modified independence  -     Time Frame (Transfer Goal 1, OT) 2 weeks  -       Row Name 02/24/25 1309          Dressing Goal 1 (OT)    Activity/Device (Dressing Goal 1, OT) dressing skills, all  -     [place]/Cues Needed (Dressing Goal 1, OT) modified independence  -     Time Frame (Dressing Goal 1, OT) 2 weeks  -       Row Name 02/24/25 1309          Therapy Assessment/Plan (OT)    Planned Therapy Interventions (OT) activity tolerance training;adaptive equipment training;BADL retraining;cognitive/visual perception retraining;ROM/therapeutic exercise;occupation/activity based interventions;transfer/mobility retraining;patient/caregiver education/training  -               User Key  (r) = Recorded By, (t) = Taken By, (c) = Cosigned By      Initials Name Provider Type    Ilene Negro, OT Occupational Therapist                   Clinical Impression       Row Name 02/24/25 1306          Pain Assessment    Pretreatment Pain Rating 7/10  -     Posttreatment Pain Rating 7/10  -     Pain Location abdomen  -       Row Name 02/24/25 1306          Plan of Care Review    Plan of Care Reviewed With  patient  -     Progress no change  -     Outcome Evaluation Pt admitted from rehab with GI bleed & Hglb 5.8 which has improved. Pt had EGD & colonoscopy & several areas were cauterized per GI note. Pt continues to have some diarrhea and ABD pain & cramping. Once pt is medically stable, recommend return to rehab as she was (I) at home with family for ADL & household mobility prior to an admission a few weeks ago for norovirus.  -       Row Name 02/24/25 1306          Therapy Assessment/Plan (OT)    Rehab Potential (OT) good  -     Criteria for Skilled Therapeutic Interventions Met (OT) skilled treatment is necessary  -     Therapy Frequency (OT) 5 times/wk  -     Predicted Duration of Therapy Intervention (OT) until d/c  -       Row Name 02/24/25 1306          Therapy Plan Review/Discharge Plan (OT)    Anticipated Discharge Disposition (OT) skilled nursing facility  -       Row Name 02/24/25 1306          Vital Signs    O2 Delivery Pre Treatment room air  -     O2 Delivery Intra Treatment room air  -     O2 Delivery Post Treatment room air  -     Pre Patient Position Sitting  -     Intra Patient Position Standing  -     Post Patient Position Sitting  -       Row Name 02/24/25 1306          Positioning and Restraints    Pre-Treatment Position sitting in chair/recliner  -     Post Treatment Position chair  -     In Chair reclined;call light within reach;encouraged to call for assist;exit alarm on  -               User Key  (r) = Recorded By, (t) = Taken By, (c) = Cosigned By      Initials Name Provider Type     Ilene Mejias, OT Occupational Therapist                   Outcome Measures       Row Name 02/24/25 0830          How much help from another person do you currently need...    Turning from your back to your side while in flat bed without using bedrails? 3  -MS     Moving from lying on back to sitting on the side of a flat bed without bedrails? 3  -MS     Moving to and from a  bed to a chair (including a wheelchair)? 2  -MS     Standing up from a chair using your arms (e.g., wheelchair, bedside chair)? 2  -MS     Climbing 3-5 steps with a railing? 2  -MS     To walk in hospital room? 2  -MS     AM-PAC 6 Clicks Score (PT) 14  -MS               User Key  (r) = Recorded By, (t) = Taken By, (c) = Cosigned By      Initials Name Provider Type    Yenifer Perera, RN Registered Nurse                    Occupational Therapy Education       Title: PT OT SLP Therapies (In Progress)       Topic: Occupational Therapy (In Progress)       Point: ADL training (Done)       Description:   Instruct learner(s) on proper safety adaptation and remediation techniques during self care or transfers.   Instruct in proper use of assistive devices.                  Learning Progress Summary            Patient Acceptance, E,TB,D, VU,DU,NR by  at 2/24/2025 1310                      Point: Home exercise program (Not Started)       Description:   Instruct learner(s) on appropriate technique for monitoring, assisting and/or progressing therapeutic exercises/activities.                  Learner Progress:  Not documented in this visit.              Point: Precautions (Done)       Description:   Instruct learner(s) on prescribed precautions during self-care and functional transfers.                  Learning Progress Summary            Patient Acceptance, E,TB,D, VU,DU,NR by  at 2/24/2025 1310                      Point: Body mechanics (Done)       Description:   Instruct learner(s) on proper positioning and spine alignment during self-care, functional mobility activities and/or exercises.                  Learning Progress Summary            Patient Acceptance, E,TB,D, VU,DU,NR by  at 2/24/2025 1310                                      User Key       Initials Effective Dates Name Provider Type [facility] 06[facility] -  Ilene Mejias OT Occupational Therapist OT                  OT Recommendation and  Plan  Planned Therapy Interventions (OT): activity tolerance training, adaptive equipment training, BADL retraining, cognitive/visual perception retraining, ROM/therapeutic exercise, occupation/activity based interventions, transfer/mobility retraining, patient/caregiver education/training  Therapy Frequency (OT): 5 times/wk  Plan of Care Review  Plan of Care Reviewed With: patient  Progress: no change  Outcome Evaluation: Pt admitted from rehab with GI bleed & Hglb 5.8 which has improved. Pt had EGD & colonoscopy & several areas were cauterized per GI note. Pt continues to have some diarrhea and ABD pain & cramping. Once pt is medically stable, recommend return to rehab as she was (I) at home with family for ADL & household mobility prior to an admission a few weeks ago for norovirus.     Time Calculation:         Time Calculation- OT       Row Name 02/24/25 1310             Time Calculation-     OT Start Time 1129  -      OT Stop Time 1145  -      OT Time Calculation (min) 16 min  -      Total Timed Code Minutes- OT 0 minute(s)  -      OT Received On 02/24/25  -      OT - Next Appointment 02/25/25  -      OT Goal Re-Cert Due Date 03/10/25  -                User Key  (r) = Recorded By, (t) = Taken By, (c) = Cosigned By      Initials Name Provider Type     Ilene Mejias OT Occupational Therapist                  Therapy Charges for Today       Code Description Service Date Service Provider Modifiers Qty    [id number]  OT EVAL MOD COMPLEXITY 3 2/24/2025 Ilene Mejias OT GO 1                 Ilene Mejias OT  2/24/2025

## 2025-02-24 NOTE — THERAPY EVALUATION
"Patient Name: Alexey Trinidad  : 3/21/1926    MRN: [id number]                              Today's Date: 2025       Admit Date: 2025    Visit Dx:     ICD-10-CM ICD-9-CM   1. Gastrointestinal hemorrhage with melena  K92.1 578.1   2. Acute blood loss anemia  D62 285.1   3. Urinary tract infection without hematuria, site unspecified  N39.0 599.0   4. Acute GI bleeding  K92.2 578.9     Patient Active Problem List   Diagnosis    UTI (urinary tract infection)    Abdominal pain    Moderate protein-calorie malnutrition    Acute GI bleeding     Past Medical History:   Diagnosis Date    Disease of thyroid gland     Hypertension      Past Surgical History:   Procedure Laterality Date    BACK SURGERY      kyphoplasty    BREAST SURGERY      benign cyst removed    HYSTERECTOMY      LAPAROSCOPIC CHOLECYSTECTOMY        General Information       Row Name 25 1009          Physical Therapy Time and Intention    Document Type evaluation  -JV     Mode of Treatment physical therapy  -[facility]       Row Name 25 1009          General Information    Patient Profile Reviewed yes  -JV     Prior Level of Function all household mobility;gait  with Rwx and SBA \"sometimes\"  -JV     Existing Precautions/Restrictions fall;orthostatic hypotension  -JV     Barriers to Rehab medically complex;cognitive status;previous functional deficit  -JV       Row Name 25 1009          Living Environment    People in Home child(mahnaz), adult  -JV       Row Name 25 1009          Home Main Entrance    Number of Stairs, Main Entrance two  -JV     Stair Railings, Main Entrance railing on left side (ascending)  -[facility]       Row Name 25 1009          Stairs Within Home, Primary    Number of Stairs, Within Home, Primary none  -JV       Row Name 25 1009          Cognition    Orientation Status (Cognition) oriented to;person;place;time;verbal cues/prompts needed for orientation;disoriented to;situation  -JV       Row Name 25 1009 "          Safety Issues/Impairments Affecting Functional Mobility    Impairments Affecting Function (Mobility) balance;cognition;endurance/activity tolerance;pain;strength  -JV     Cognitive Impairments, Mobility Safety/Performance awareness, need for assistance;insight into deficits/self-awareness;problem-solving/reasoning  -JV               User Key  (r) = Recorded By, (t) = Taken By, (c) = Cosigned By      Initials Name Provider Type    Diana Gilbert Physical Therapist                   Mobility       Row Name 02/24/25 1338          Bed Mobility    Bed Mobility bed mobility (all) activities  -JV     All Activities, [place] (Bed Mobility) minimum assist (75% patient effort);verbal cues;nonverbal cues (demo/gesture)  -JV     Assistive Device (Bed Mobility) bed rails;head of bed elevated  -JV       Row Name 02/24/25 1338          Bed-Chair Transfer    Bed-Chair [place] (Transfers) verbal cues;nonverbal cues (demo/gesture);minimum assist (75% patient effort)  -JV     Assistive Device (Bed-Chair Transfers) walker, front-wheeled  -JV       Row Name 02/24/25 1338          Sit-Stand Transfer    Sit-Stand [place] (Transfers) minimum assist (75% patient effort);verbal cues;nonverbal cues (demo/gesture)  -JV     Assistive Device (Sit-Stand Transfers) walker, front-wheeled  -JV       Row Name 02/24/25 1338          Gait/Stairs (Locomotion)    [place] Level (Gait) unable to assess  -JV     Comment, (Gait/Stairs) gait deferred due to significant shivering throughout transfer; nurse reports pt has stayed very cold  -JV               User Key  (r) = Recorded By, (t) = Taken By, (c) = Cosigned By      Initials Name Provider Type    Diana Gilbert Physical Therapist                   Obj/Interventions       Row Name 02/24/25 1341          Range of Motion Comprehensive    General Range of Motion bilateral lower extremity ROM WFL  -JV       Row Name 02/24/25 1341          Strength Comprehensive  (MMT)    Comment, General Manual Muscle Testing (MMT) Assessment BLE grossly 3/5  -JV       Row Name 02/24/25 1341          Balance    Balance Assessment standing static balance;standing dynamic balance  -JV     Static Standing Balance minimal assist  -JV     Dynamic Standing Balance minimal assist  -JV     Position/Device Used, Standing Balance supported;walker, rolling  -JV       Row Name 02/24/25 1341          Sensory Assessment (Somatosensory)    Sensory Assessment (Somatosensory) LE sensation intact  -JV               User Key  (r) = Recorded By, (t) = Taken By, (c) = Cosigned By      Initials Name Provider Type    Diana Gilbert Physical Therapist                   Goals/Plan       Row Name 02/24/25 1345          Bed Mobility Goal 1 (PT)    Activity/Assistive Device (Bed Mobility Goal 1, PT) bed mobility activities, all  -JV     [place] Level/Cues Needed (Bed Mobility Goal 1, PT) modified independence  -JV     Time Frame (Bed Mobility Goal 1, PT) long term goal (LTG);2 weeks  -JV       Row Name 02/24/25 1346          Transfer Goal 1 (PT)    Activity/Assistive Device (Transfer Goal 1, PT) sit-to-stand/stand-to-sit;bed-to-chair/chair-to-bed;walker, rolling  -JV     [place] Level/Cues Needed (Transfer Goal 1, PT) supervision required  -JV     Time Frame (Transfer Goal 1, PT) long term goal (LTG);2 weeks  -JV       Row Name 02/24/25 1342          Gait Training Goal 1 (PT)    Activity/Assistive Device (Gait Training Goal 1, PT) gait (walking locomotion);assistive device use  -JV     [place] Level (Gait Training Goal 1, PT) contact guard required  -JV     Distance (Gait Training Goal 1, PT) 40 ft  -JV     Time Frame (Gait Training Goal 1, PT) long term goal (LTG);2 weeks  -JV       Row Name 02/24/25 1341          Therapy Assessment/Plan (PT)    Planned Therapy Interventions (PT) balance training;bed mobility training;gait training;home exercise program;strengthening;patient/family  education;transfer training  -JV               User Key  (r) = Recorded By, (t) = Taken By, (c) = Cosigned By      Initials Name Provider Type    Diana Gilbert Physical Therapist                   Clinical Impression       Row Name 02/24/25 1342          Pain    Additional Documentation Pain Scale: FACES Pre/Post-Treatment (Group)  -JV       Row Name 02/24/25 1342          Pain Scale: FACES Pre/Post-Treatment    Pain: FACES Scale, Pretreatment 4-->hurts little more  -JV     Posttreatment Pain Rating 4-->hurts little more  -JV     Pre/Posttreatment Pain Comment abdomen  -JV       Row Name 02/24/25 1342          Plan of Care Review    Outcome Evaluation Pt is a 89 y/o female presenting to [facility] on 2/22/25 from SNF with bleeding per rectum, passing clots per rectum, dark stools. Patient is a poor historian due to dementia.  Of note, patient was admitted to [facility] from 1/24/2025 to 2/5/2025 with urinary tract infection, acute hypoxic respiratory failure, norovirus, general weakness, moderate protein calorie malnutrition.  Pt post EGD 2/23/25 with cauterization x2 large duodenal ulcers.  CMH of dementia, hypertension, hypothyroidism, COPD.  Pt reports PLOF of living with daughter who is a retired nurse and can provide 24/7 sup/assist; living in [facility] with 2 JESU with railing. Pt reports PLOF SBA with household mobility/ambulation with Rwx, assist as needed with ADLs.   SUPINE /57 mmHg,   SITTING /51 mmHg, pulse 64 bpm, SpO2 98% with room air  Pt completes bed mobility and SPS transfers with Rwx with MIN A; gait deferred as pt shivering cold. Nurse reports shivering ongong, warm blankets applied and shivering improved. When medically stable, return to SNF recommended to address functional decline and progress to safe d/c home.  -JV       Row Name 02/24/25 6695          Therapy Assessment/Plan (PT)    Criteria for Skilled Interventions Met (PT) yes;meets criteria;skilled treatment is necessary  -JV     Therapy  Frequency (PT) 5 times/wk  -JV     Predicted Duration of Therapy Intervention (PT) until d/c  -JV       Row Name 02/24/25 1342          Vital Signs    Pre Systolic BP Rehab 132  -JV     Pre Treatment Diastolic BP 57  -JV     Intra Systolic BP Rehab 109  -JV     Intra Treatment Diastolic BP 51  -JV     Pretreatment Heart Rate (beats/min) 59  -JV     Intratreatment Heart Rate (beats/min) 64  -JV     Intra SpO2 (%) 98  -JV     O2 Delivery Intra Treatment room air  -JV     Pre Patient Position Supine  -JV     Intra Patient Position Sitting  -JV       Row Name 02/24/25 1342          Positioning and Restraints    Pre-Treatment Position in bed  -JV     Post Treatment Position chair  -JV     In Chair notified nsg;reclined;call light within reach;encouraged to call for assist;exit alarm on  -JV               User Key  (r) = Recorded By, (t) = Taken By, (c) = Cosigned By      Initials Name Provider Type    Diana Gilbert Physical Therapist                   Outcome Measures       Row Name 02/24/25 0830          How much help from another person do you currently need...    Turning from your back to your side while in flat bed without using bedrails? 3  -MS     Moving from lying on back to sitting on the side of a flat bed without bedrails? 3  -MS     Moving to and from a bed to a chair (including a wheelchair)? 2  -MS     Standing up from a chair using your arms (e.g., wheelchair, bedside chair)? 2  -MS     Climbing 3-5 steps with a railing? 2  -MS     To walk in hospital room? 2  -MS     AM-PAC 6 Clicks Score (PT) 14  -MS               User Key  (r) = Recorded By, (t) = Taken By, (c) = Cosigned By      Initials Name Provider Type    Yenifer Perera RN Registered Nurse                                 Physical Therapy Education       Title: PT OT SLP Therapies (In Progress)       Topic: Physical Therapy (Done)       Point: Mobility training (Done)       Learning Progress Summary            Patient Acceptance, E,TB, VU  by ELISA at 2/24/2025 1347                                      User Key       Initials Effective Dates Name Provider Type Discipline     03/30/21 -  Diana Powers Physical Therapist PT                  PT Recommendation and Plan  Planned Therapy Interventions (PT): balance training, bed mobility training, gait training, home exercise program, strengthening, patient/family education, transfer training  Outcome Evaluation: Pt is a 89 y/o female presenting to [facility] on 2/22/25 from SNF with bleeding per rectum, passing clots per rectum, dark stools. Patient is a poor historian due to dementia.  Of note, patient was admitted to [facility] from 1/24/2025 to 2/5/2025 with urinary tract infection, acute hypoxic respiratory failure, norovirus, general weakness, moderate protein calorie malnutrition.  Pt post EGD 2/23/25 with cauterization x2 large duodenal ulcers.  CMH of dementia, hypertension, hypothyroidism, COPD.  Pt reports PLOF of living with daughter who is a retired nurse and can provide 24/7 sup/assist; living in [facility] with 2 JESU with railing. Pt reports PLOF SBA with household mobility/ambulation with Rwx, assist as needed with ADLs.   SUPINE /57 mmHg,   SITTING /51 mmHg, pulse 64 bpm, SpO2 98% with room air  Pt completes bed mobility and SPS transfers with Rwx with MIN A; gait deferred as pt shivering cold. Nurse reports shivering ongong, warm blankets applied and shivering improved. When medically stable, return to SNF recommended to address functional decline and progress to safe d/c home.     Time Calculation:         PT Charges       Row Name 02/24/25 1348             Time Calculation    Start Time 1000  -JV      Stop Time 1028  -JV      Time Calculation (min) 28 min  -JV      PT Received On 02/24/25  -JV      PT - Next Appointment 02/25/25  -JV      PT Goal Re-Cert Due Date 03/10/25  -JV         Time Calculation- PT    Total Timed Code Minutes- PT 0 minute(s)  -JV                User Key  (r) = Recorded  By, (t) = Taken By, (c) = Cosigned By      Initials Name Provider Type    Diana Gilbert Physical Therapist                  Therapy Charges for Today       Code Description Service Date Service Provider Modifiers Qty    57564095785 HC PT EVAL MOD COMPLEXITY 4 2/24/2025 Diana Powers GP 1            PT G-Codes  AM-PAC 6 Clicks Score (PT): 14  PT Discharge Summary  Anticipated Discharge Disposition (PT): skilled nursing facility    Diana Powers  2/24/2025

## 2025-02-24 NOTE — CONSULTS
Patient appears to be more comfortable than when seeing her this morning. Patient mostly responded with yes or no. Patient reports to be Yazidi/Worship. Supported by prayer. Spoke with daughter at bedside, she gave me more details about patient.    General follow up.    Chaplain Jonnathan Case

## 2025-02-24 NOTE — ACP (ADVANCE CARE PLANNING)
Patient has already completed a POST form, but was not on file. Faxed to HIM.    Patient elected to be a DNR, Limited Interventions, Limited use of artifical food and water, use of antibiotics. Daughter Aislinn is HCR.     Gave patient/daughter a copy of Out of Hospital DNR. Daughter reports patient would want that document completed. Will message doctor about signing document.    Chaplain Jonnathan Case

## 2025-02-24 NOTE — NURSING NOTE
Collected labs form pt's iv and left tourniquet on for an hour. Took off and wrapped in warm blanket. Rt arm was red.Daughter was at bedside and is aware. Continue to monitor

## 2025-02-24 NOTE — PROGRESS NOTES
Hospitalist Service   Daily Progress Note      Patient Name: Alexey Trinidad  : 3/21/1926  MRN: [id number]  Primary Care Physician:  Rui Cole MD  Date of admission: 2025      Subjective      Chief Complaint: Rectal bleeding    Patient seen and examined this morning.  Remains pleasantly confused, at baseline.  Doing well this morning, denies any complaints.  Awaiting urine culture results.    Pertinent positives as noted in HPI/subjective.  All other systems were reviewed and are negative.      Objective      Vitals:   Temp:  [97.3 °F (36.3 °C)-98.2 °F (36.8 °C)] 98 °F (36.7 °C)  Heart Rate:  [49-77] 55  Resp:  [11-21] 20  BP: ()/(36-64) 111/49  Flow (L/min) (Oxygen Therapy):  [6] 6    Physical Exam:    General: Awake, pleasantly confused, elderly female, NAD  Eyes: PERRL, EOMI, conjunctivae are clear  Cardiovascular: Regular rate and rhythm, no murmurs  Respiratory: Clear to auscultation bilaterally, no wheezing or rales, unlabored breathing  Abdomen: Soft, nontender, positive bowel sounds, no guarding  Musculoskeletal: Normal range of motion, no other gross deformities  Skin: Warm, dry         Result Review    Result Review:  I have personally reviewed the results from the time of this admission to 2025 11:51 EST and agree with these findings:  [x]  Laboratory  [x]  Microbiology  []  Radiology  []  EKG/Telemetry   []  Cardiology/Vascular   []  Pathology  []  Old records  []  Other:          Assessment & Plan      Brief Patient Summary:  Alexey Trinidad is a 89 y.o. female with history of hypothyroidism, COPD, hysterectomy, cholecystectomy, collagenous colitis   Who was brought in from the nursing facility by the request of the family for rectal bleeding.  Family states that the nursing home said she was passing blood clots.  She is complaining of some abdominal pain and nausea.  She was evaluated in ER and found to have a hemoglobin of 5.8.  Her hemoglobin was 11.8 on 2025.  Patient  was given 2 units of PRBCs with improvement in hemoglobin, GI consulted.  Patient also noted to have UTI, started on IV antibiotics.  Details as noted below.  Patient is medically stable to discharge back to facility with follow-up with PCP and GI as an outpatient.      balsalazide, 750 mg, Oral, Nightly  cefTRIAXone, 1,000 mg, Intravenous, Q24H  folic acid-pyridoxine-cyanocobalamin, 1 tablet, Oral, BID  furosemide, 20 mg, Oral, BID  levothyroxine, 100 mcg, Oral, Q AM  midodrine, 15 mg, Oral, TID AC  pantoprazole, 40 mg, Intravenous, Q12H  sildenafil, 10 mg, Oral, TID  sodium chloride, 10 mL, Intravenous, Q12H             I have utilized all available, immediate resources to obtain, update, or review the patient's current medications including all prescriptions, over-the-counter products, herbals, cannabis/cannabidiol products, and vitamin.mineral/dietary (nutritional) supplements.    Active Hospital Problems:  Active Hospital Problems    Diagnosis     **Acute GI bleeding        Assessment/Plan:     Rectal bleeding, improved  Acute blood loss anemia  -CT abdomen pelvis shows biliary dilatation and calcification near the ampulla  -Hemoglobin of 5.8 on admission, improved after 2 units  -Patient not on anticoagulation as outpatient  -s/p EGD on 2/23 with Multiple etiology for her blood loss anemia including duodenal ulcer as well as possible diverticulosis and very large hemorrhoid. Currently there was no actively bleeding diverticulum and hemorrhoid was noticed. Large duodenal ulcers x 2 status post of cauterization. Changes consistent with a gastritis biopsy performed to rule out H. pylori in the setting of duodenal ulcer.  Large hiatal hernia.  -Continue PPI twice daily  -hemoglobin is stable, okay to discharge per GI with outpatient follow-up as needed     Acute UTI  -UA noted, urine culture with gram-negative bacilli, pending final results  -Continue IV ceftriaxone     COPD  Pulmonary hypertension  -Not in  exacerbation currently   -continue bronchodilators, sildenafil  -Monitor     Chronic hypotension  -Continue midodrine  -Blood pressure stable, monitor     Hypothyroidism  -Continue levothyroxine     Advanced dementia  -Patient appears at baseline, pleasantly confused  -Watch for delirium, monitor    VTE Prophylaxis:  Mechanical VTE prophylaxis orders are present.        CODE STATUS:    Medical Intervention Limits: No intubation (DNI)  Code Status (Patient has no pulse and is not breathing): No CPR (Do Not Attempt to Resuscitate)  Medical Interventions (Patient has pulse or is breathing): Limited Support      Disposition Planning:     Barriers to Discharge: Urine culture  Anticipated Date of Discharge: 2/24  Place of Discharge: Back to LTC, pre-CERT required per case management    Electronically signed by Makayla Gibson DO, 02/24/25, 11:51 EST.  [facility] Hospitalist Team      Part of this note may be an electronic transcription/translation of spoken language to printed text using the Dragon Dictation System.

## 2025-02-24 NOTE — CASE MANAGEMENT/SOCIAL WORK
Discharge Planning Assessment   Fred     Patient Name: Alexey Trinidad  MRN: [id number]  Today's Date: 2/24/2025    Admit Date: 2/22/2025    Plan: Anticipate return to [facility] pending precert. Precert started 2/24, no PASRR needed for return.   Discharge Needs Assessment       Row Name 02/24/25 1524       Living Environment    People in Home facility resident  Was at [facility], [facility]    Current Living Arrangements other (see comments)  SNF    Potentially Unsafe Housing Conditions none    In the past 12 months has the electric, gas, oil, or water company threatened to shut off services in your home? No    Primary Care Provided by other (see comments)    Provides Primary Care For no one    Family Caregiver if Needed child(mahnaz), adult    Family Caregiver Names Daughter- Aislinn    Quality of Family Relationships helpful;involved;supportive    Able to Return to Prior Arrangements yes       Resource/Environmental Concerns    Resource/Environmental Concerns none    Transportation Concerns none       Transportation Needs    In the past 12 months, has lack of transportation kept you from medical appointments or from getting medications? no    In the past 12 months, has lack of transportation kept you from meetings, work, or from getting things needed for daily living? No       Food Insecurity    Within the past 12 months, you worried that your food would run out before you got the money to buy more. Never true    Within the past 12 months, the food you bought just didn't last and you didn't have money to get more. Never true       Transition Planning    Patient/Family Anticipates Transition to other (see comments)  SNF    Patient/Family Anticipated Services at Transition skilled nursing    Transportation Anticipated health plan transportation;family or friend will provide       Discharge Needs Assessment    Readmission Within the Last 30 Days other (see comments)  See readmission assessment     "Current Outpatient/Agency/Support Group skilled nursing facility    Equipment Currently Used at Home cane, straight    Concerns to be Addressed care coordination/care conferences;discharge planning    Anticipated Changes Related to Illness none    Equipment Needed After Discharge none    Outpatient/Agency/Support Group Needs skilled nursing facility    Discharge Facility/Level of Care Needs nursing facility, skilled    Provided Post Acute Provider List? N/A                   Discharge Plan       Row Name 02/24/25 1531       Plan    Plan Anticipate return to [facility] pending precert. Precert started 2/24, no PASRR needed for return.    Patient/Family in Agreement with Plan yes    Plan Comments CM met with patient's daughter, Aislinn at bedside to discuss dc planning and readmission assessment. PCP and pharmacy confirmed. Confirmed patient was at [facility] after previous hospitalization and that is where they want patient to return at d/c. Reported that insurance was about to \"cut her\" from SNF so they were planning to discharge patient home from  on 2/22 but instead patient had to come to the hospital for the reported blood clots she was passing. CM added in [facility] basket and contacted liaison Jazmín for insight into return to hospital. CM contacted [facility] to request precert be started.                  Continued Care and Services - Admitted Since 2/22/2025       Destination Coordination complete.      Service Provider Request Status Services Address Phone Fax Patient Preferred    [facility]  Selected Skilled Nursing [address] [place] [address] [place] [address] [phone]                    Demographic Summary       Row Name 02/24/25 1527       General Information    Admission Type inpatient    Arrived From emergency department    Required Notices Provided Important Message from Medicare    Referral Source admission list    Reason for Consult " discharge planning    Preferred Language English       Contact Information    Permission Granted to Share Info With                    Functional Status       Row Name 02/24/25 1523       Functional Status    Usual Activity Tolerance fair    Current Activity Tolerance fair       Functional Status, IADL    Medications assistive person    Meal Preparation completely dependent    Housekeeping completely dependent    Laundry completely dependent    Shopping completely dependent                Case Management Readmission Assessment Note    Case Management Readmission Assessment (all recorded)       Readmission Interview       Row Name 02/24/25 1535             Readmission Indications    Is the patient and/or family able to complete the readmission assessment questions? Yes  Patient noted to have dementia, information obtained from daughter at bedside, Aislinn.      Is this hospitalization related to the prior hospital diagnosis? No        Row Name 02/24/25 1535             Recommendation for rehospitalization    Did you speak with your physician prior to coming to the hospital No        Row Name 02/24/25 1535             Follow-up Appointments    Do you have a PCP? Yes  Rui Cole      Did you have an appointment with PCP after your hospitalization? No      Did you have an appointment with a Specialist? Yes      When was your appointment scheduled? 03/13/25  Cardiology appt      Did you go to appointment? No      Are you current with the Pulmonary Clinic? No      Are you current with the CHF Clinic? No        Row Name 02/24/25 1531             Medications    Did you have newly prescribed medications at discharge? Yes      Did you understand the reasons for your medications at discharge and how to take them? No      Did you understand the side effects of your medications? No      Are you taking all of you prescribed medications? Yes  Daughter reported that to her knowledge, nursing staff is giving her  her medications as ordered.      What pharmacy was used to fill prescription(s)? [facility]      Were medications picked up? Yes        Row Name 02/24/25 7838             Discharge Instructions    Did you understand your discharge instructions? --  Daughter cannot recall seeing the discharge instructions      Did your family/caregiver hear your instructions? --  Daughter reported she cannot recall if she was present or not at time of d/c      Were you told to eat a special diet? --  Daughter is not sure      Did you adhere to the diet? --  Unsure      Were you given a number of someone to call if you had questions or concerns? Yes        Row Name 02/24/25 0076             Index discharge location/services    Where did you go upon discharge? Skilled Nursing Facility      Do you have supportive family or friends in the home? Yes  Aislinn reported that patient was living with Aislinn's son and daughter-in-law prior to previous hospitalization because she was in the hospital having hip replacement. Reported that at d/c from SNF, patient was going to be living with her.      What services were arranged at discharge? Transportation  SNF w/c van transportation      Was the provider seen at the facility? Yes  Seen by provider on 2/12.      What actions were taken to avoid a readmit? Per facility liaison, patient was sent out due to blood clots when toileting. Insurance was cutting her, family appealed and lost. Patient was set up with [facility] for anticipated discharge from SNF on day she admitted to hospital on 2/22.      Which Skilled Nursing Facility were your admitted from? [facility]        Row Name 02/24/25 9933             Discharge Readiness    On a scale of 1-5 (5 being well prepared), how ready were you for discharge 3      Recommendation based on interview Education on diagnosis/self management;Additional caregiver support;Transportation assistance;Goals of care  discussion/advanced care planning;Financial assistance        Row Name 02/24/25 1535             Palliative Care/Hospice    Are you current with Palliative Care? No      Are you current with Hospice Care? No        Row Name 02/24/25 1535 02/22/25 1917          Advance Directives (For Healthcare)    Pre-existing AND/MOST/POLST Order No No     Advance Directive Status Patient has advance directive, copy in chart Patient has advance directive, copy requested     Type of Advance Directive Other (Comment) Health care directive/Living will;Durable power of  for health care     Have you reviewed your Advance Directive and is it valid for this stay? Not applicable Yes     Literature Provided on Advance Directives No No     Patient Requests Assistance on Advance Directives Other (Comment)  Per , POST form faxed to HIM on 2/24. Patient Declined       Row Name 02/24/25 1535             Readmission Assessment Final Comments    Final Comments Previous (1/24-2/5): UTI, hypoxia, resp failure, 40L airvo O2, IV Lasix, IV Zosyn, positive norovirus, malnutrition, ST. MI to [facility]. Current (2/22-present): GI bleed, passing clots per rectum, dark stools reported, HGB 5.8, PRBCs, GI consult, s/p EGD/colonoscopy with polypectomy 2/23. LACE: 7.                  Yenifer Danielle RN     Office Phone: [phone]  Office Cell: [phone]

## 2025-02-24 NOTE — PLAN OF CARE
Goal Outcome Evaluation:  Plan of Care Reviewed With: patient        Progress: no change  Outcome Evaluation: Pt admitted from rehab with GI bleed & Hglb 5.8 which has improved. Pt had EGD & colonoscopy & several areas were cauterized per GI note. Pt continues to have some diarrhea and ABD pain & cramping. Once pt is medically stable, recommend return to rehab as she was (I) at home with family for ADL & household mobility prior to an admission a few weeks ago for norovirus.    Anticipated Discharge Disposition (OT): skilled nursing facility

## 2025-02-25 LAB
ANION GAP SERPL CALCULATED.3IONS-SCNC: 10.8 MMOL/L (ref 5–15)
BACTERIA SPEC AEROBE CULT: ABNORMAL
BASOPHILS # BLD AUTO: 0.06 10*3/MM3 (ref 0–0.2)
BASOPHILS NFR BLD AUTO: 0.6 % (ref 0–1.5)
BUN SERPL-MCNC: 12 MG/DL (ref 8–23)
BUN/CREAT SERPL: 13.2 (ref 7–25)
CALCIUM SPEC-SCNC: 8.2 MG/DL (ref 8.2–9.6)
CHLORIDE SERPL-SCNC: 97 MMOL/L (ref 98–107)
CO2 SERPL-SCNC: 30.2 MMOL/L (ref 22–29)
CREAT SERPL-MCNC: 0.91 MG/DL (ref 0.57–1)
DEPRECATED RDW RBC AUTO: 57.4 FL (ref 37–54)
EGFRCR SERPLBLD CKD-EPI 2021: 57.1 ML/MIN/1.73
EOSINOPHIL # BLD AUTO: 0.13 10*3/MM3 (ref 0–0.4)
EOSINOPHIL NFR BLD AUTO: 1.3 % (ref 0.3–6.2)
ERYTHROCYTE [DISTWIDTH] IN BLOOD BY AUTOMATED COUNT: 17.9 % (ref 12.3–15.4)
GLUCOSE SERPL-MCNC: 97 MG/DL (ref 65–99)
HCT VFR BLD AUTO: 27.2 % (ref 34–46.6)
HGB BLD-MCNC: 8.5 G/DL (ref 12–15.9)
IMM GRANULOCYTES # BLD AUTO: 0.07 10*3/MM3 (ref 0–0.05)
IMM GRANULOCYTES NFR BLD AUTO: 0.7 % (ref 0–0.5)
LAB AP CASE REPORT: NORMAL
LYMPHOCYTES # BLD AUTO: 2.42 10*3/MM3 (ref 0.7–3.1)
LYMPHOCYTES NFR BLD AUTO: 25 % (ref 19.6–45.3)
MAGNESIUM SERPL-MCNC: 1.9 MG/DL (ref 1.7–2.3)
MCH RBC QN AUTO: 29.8 PG (ref 26.6–33)
MCHC RBC AUTO-ENTMCNC: 31.3 G/DL (ref 31.5–35.7)
MCV RBC AUTO: 95.4 FL (ref 79–97)
MONOCYTES # BLD AUTO: 1.09 10*3/MM3 (ref 0.1–0.9)
MONOCYTES NFR BLD AUTO: 11.3 % (ref 5–12)
NEUTROPHILS NFR BLD AUTO: 5.9 10*3/MM3 (ref 1.7–7)
NEUTROPHILS NFR BLD AUTO: 61.1 % (ref 42.7–76)
NRBC BLD AUTO-RTO: 0.2 /100 WBC (ref 0–0.2)
PATH REPORT.FINAL DX SPEC: NORMAL
PATH REPORT.GROSS SPEC: NORMAL
PHOSPHATE SERPL-MCNC: 2.5 MG/DL (ref 2.5–4.5)
PLATELET # BLD AUTO: 251 10*3/MM3 (ref 140–450)
PMV BLD AUTO: 8.9 FL (ref 6–12)
POTASSIUM SERPL-SCNC: 3.5 MMOL/L (ref 3.5–5.2)
POTASSIUM SERPL-SCNC: 4 MMOL/L (ref 3.5–5.2)
RBC # BLD AUTO: 2.85 10*6/MM3 (ref 3.77–5.28)
SODIUM SERPL-SCNC: 138 MMOL/L (ref 136–145)
WBC NRBC COR # BLD AUTO: 9.67 10*3/MM3 (ref 3.4–10.8)

## 2025-02-25 PROCEDURE — 25010000002 CEFTRIAXONE PER 250 MG: Performed by: INTERNAL MEDICINE

## 2025-02-25 PROCEDURE — 83735 ASSAY OF MAGNESIUM: CPT | Performed by: INTERNAL MEDICINE

## 2025-02-25 PROCEDURE — 80048 BASIC METABOLIC PNL TOTAL CA: CPT | Performed by: INTERNAL MEDICINE

## 2025-02-25 PROCEDURE — 25010000002 TOBRAMYCIN PER 80 MG: Performed by: INTERNAL MEDICINE

## 2025-02-25 PROCEDURE — 84132 ASSAY OF SERUM POTASSIUM: CPT | Performed by: INTERNAL MEDICINE

## 2025-02-25 PROCEDURE — 84100 ASSAY OF PHOSPHORUS: CPT | Performed by: INTERNAL MEDICINE

## 2025-02-25 PROCEDURE — 25010000002 CEFEPIME PER 500 MG: Performed by: INTERNAL MEDICINE

## 2025-02-25 PROCEDURE — 85025 COMPLETE CBC W/AUTO DIFF WBC: CPT | Performed by: INTERNAL MEDICINE

## 2025-02-25 PROCEDURE — 25810000003 SODIUM CHLORIDE 0.9 % SOLUTION: Performed by: STUDENT IN AN ORGANIZED HEALTH CARE EDUCATION/TRAINING PROGRAM

## 2025-02-25 RX ORDER — POTASSIUM CHLORIDE 1500 MG/1
20 TABLET, EXTENDED RELEASE ORAL ONCE
Status: COMPLETED | OUTPATIENT
Start: 2025-02-25 | End: 2025-02-25

## 2025-02-25 RX ADMIN — SODIUM CHLORIDE 500 ML: 0.9 INJECTION, SOLUTION INTRAVENOUS at 02:36

## 2025-02-25 RX ADMIN — Medication 1 TABLET: at 08:43

## 2025-02-25 RX ADMIN — SILDENAFIL 10 MG: 20 TABLET ORAL at 20:50

## 2025-02-25 RX ADMIN — TOBRAMYCIN 165 MG: 40 INJECTION INTRAMUSCULAR; INTRAVENOUS at 13:42

## 2025-02-25 RX ADMIN — POTASSIUM CHLORIDE 20 MEQ: 1500 TABLET, EXTENDED RELEASE ORAL at 05:19

## 2025-02-25 RX ADMIN — MIDODRINE HYDROCHLORIDE 15 MG: 5 TABLET ORAL at 07:30

## 2025-02-25 RX ADMIN — MIDODRINE HYDROCHLORIDE 15 MG: 5 TABLET ORAL at 11:43

## 2025-02-25 RX ADMIN — PANTOPRAZOLE SODIUM 40 MG: 40 INJECTION, POWDER, LYOPHILIZED, FOR SOLUTION INTRAVENOUS at 20:50

## 2025-02-25 RX ADMIN — Medication 10 ML: at 08:47

## 2025-02-25 RX ADMIN — PANTOPRAZOLE SODIUM 40 MG: 40 INJECTION, POWDER, LYOPHILIZED, FOR SOLUTION INTRAVENOUS at 08:44

## 2025-02-25 RX ADMIN — CEFTRIAXONE SODIUM 1000 MG: 1 INJECTION, POWDER, FOR SOLUTION INTRAMUSCULAR; INTRAVENOUS at 08:43

## 2025-02-25 RX ADMIN — MIDODRINE HYDROCHLORIDE 15 MG: 5 TABLET ORAL at 15:05

## 2025-02-25 RX ADMIN — BALSALAZIDE DISODIUM 750 MG: 750 CAPSULE ORAL at 20:50

## 2025-02-25 RX ADMIN — SILDENAFIL 10 MG: 20 TABLET ORAL at 08:43

## 2025-02-25 RX ADMIN — Medication 10 ML: at 20:51

## 2025-02-25 RX ADMIN — CEFEPIME 2000 MG: 2 INJECTION, POWDER, FOR SOLUTION INTRAVENOUS at 11:43

## 2025-02-25 RX ADMIN — SODIUM CHLORIDE 250 ML: 9 INJECTION, SOLUTION INTRAVENOUS at 23:16

## 2025-02-25 RX ADMIN — Medication 1 TABLET: at 20:52

## 2025-02-25 RX ADMIN — LEVOTHYROXINE SODIUM 100 MCG: 100 TABLET ORAL at 05:19

## 2025-02-25 NOTE — PROGRESS NOTES
Hospitalist Service   Daily Progress Note      Patient Name: Alexey Trinidad  : 3/21/1926  MRN: [id number]  Primary Care Physician:  Rui Cole MD  Date of admission: 2025      Subjective      Chief Complaint: Rectal bleeding    Patient seen and examined this morning.  Remains pleasantly confused, at baseline.  Doing well this morning, denies any complaints.  Not eating much, discussed with nursing and family yesterday.    Pertinent positives as noted in HPI/subjective.  All other systems were reviewed and are negative.      Objective      Vitals:   Temp:  [97.4 °F (36.3 °C)-98.5 °F (36.9 °C)] 97.4 °F (36.3 °C)  Heart Rate:  [51-69] 51  Resp:  [13-20] 13  BP: ()/(40-58) 132/56    Physical Exam:    General: Awake, pleasantly confused, elderly female, NAD  Eyes: PERRL, EOMI, conjunctivae are clear  Cardiovascular: Regular rate and rhythm, no murmurs  Respiratory: Clear to auscultation bilaterally, no wheezing or rales, unlabored breathing  Abdomen: Soft, nontender, positive bowel sounds, no guarding  Musculoskeletal: Normal range of motion, no other gross deformities  Skin: Warm, dry         Result Review    Result Review:  I have personally reviewed the results from the time of this admission to 2025 09:49 EST and agree with these findings:  [x]  Laboratory  [x]  Microbiology  []  Radiology  []  EKG/Telemetry   []  Cardiology/Vascular   []  Pathology  []  Old records  []  Other:          Assessment & Plan      Brief Patient Summary:  Alexey Trinidad is a 89 y.o. female with history of hypothyroidism, COPD, hysterectomy, cholecystectomy, collagenous colitis   Who was brought in from the nursing facility by the request of the family for rectal bleeding.  Family states that the nursing home said she was passing blood clots.  She is complaining of some abdominal pain and nausea.  She was evaluated in ER and found to have a hemoglobin of 5.8.  Her hemoglobin was 11.8 on 2025.  Patient was  given 2 units of PRBCs with improvement in hemoglobin, GI consulted.  Patient also noted to have UTI, started on IV antibiotics.  Details as noted below.  Patient is medically stable to discharge back to facility with follow-up with PCP and GI as an outpatient.      balsalazide, 750 mg, Oral, Nightly  cefepime, 2,000 mg, Intravenous, Q24H  folic acid-pyridoxine-cyanocobalamin, 1 tablet, Oral, BID  furosemide, 20 mg, Oral, BID  levothyroxine, 100 mcg, Oral, Q AM  midodrine, 15 mg, Oral, TID AC  pantoprazole, 40 mg, Intravenous, Q12H  sildenafil, 10 mg, Oral, TID  sodium chloride, 10 mL, Intravenous, Q12H       Pharmacy To Dose:,          I have utilized all available, immediate resources to obtain, update, or review the patient's current medications including all prescriptions, over-the-counter products, herbals, cannabis/cannabidiol products, and vitamin.mineral/dietary (nutritional) supplements.    Active Hospital Problems:  Active Hospital Problems    Diagnosis     **Acute GI bleeding        Assessment/Plan:     Rectal bleeding, improved  Acute blood loss anemia  -CT abdomen pelvis shows biliary dilatation and calcification near the ampulla  -Hemoglobin of 5.8 on admission, improved after 2 units  -Patient not on anticoagulation as outpatient  -s/p EGD on 2/23 with Multiple etiology for her blood loss anemia including duodenal ulcer as well as possible diverticulosis and very large hemorrhoid. Currently there was no actively bleeding diverticulum and hemorrhoid was noticed. Large duodenal ulcers x 2 status post of cauterization. Changes consistent with a gastritis biopsy performed to rule out H. pylori in the setting of duodenal ulcer.  Large hiatal hernia.  -Continue PPI twice daily  -hemoglobin is stable, okay to discharge per GI with outpatient follow-up as needed     Acute UTI  -UA noted, urine culture shows Pseudomonas, sensitivities and  -Antibiotics switched to cefepime to finish of the course, monitor  mental status with cefepime if becomes confused then can switch to quinolone based on sensitivities    Decreased appetite  -Multifactorial in setting of advanced dementia, advanced age, and PUD  -Discussed with daughter  on 2/24, daughter wants patient to remain comfortable, would not recommend any tube feeds due to advanced age  -Palliative care consult for possible hospice referral     COPD  Pulmonary hypertension  -Not in exacerbation currently   -continue bronchodilators, sildenafil  -Monitor     Chronic hypotension  -Continue midodrine  -Blood pressure stable, monitor     Hypothyroidism  -Continue levothyroxine     Advanced dementia  -Patient appears at baseline, pleasantly confused  -Watch for delirium, monitor    VTE Prophylaxis:  Mechanical VTE prophylaxis orders are present.        CODE STATUS:    Medical Intervention Limits: No intubation (DNI)  Code Status (Patient has no pulse and is not breathing): No CPR (Do Not Attempt to Resuscitate)  Medical Interventions (Patient has pulse or is breathing): Limited Support      Disposition Planning:     Barriers to Discharge: Urine culture  Anticipated Date of Discharge: 2/24  Place of Discharge: Back to LTC, pre-CERT required per case management    Electronically signed by Makayla Gibson DO, 02/25/25, 09:49 EST.  [facility]ist Team      Part of this note may be an electronic transcription/translation of spoken language to printed text using the Dragon Dictation System.

## 2025-02-25 NOTE — CASE MANAGEMENT/SOCIAL WORK
Continued Stay Note  [facility]     Patient Name: Alexey Trinidad  MRN: [id number]  Today's Date: 2/25/2025    Admit Date: 2/22/2025    Plan: Anticipate return to [facility], [facility] pending precert. Precert started 2/24, no PASRR needed for return. [facility] referral.   Discharge Plan       Row Name 02/25/25 1413       Plan    Plan Anticipate return to [facility], [facility] pending precert. Precert started 2/24, no PASRR needed for return. [facility] referral.    Plan Comments Received update from [facility] that additional PT notes were needed for precert. CM sent request to PT. [facility] referral per palliative care team.                  Yenifer Danielle RN     Office Phone: [phone]  Office Cell: [phone]

## 2025-02-25 NOTE — CONSULTS
Palliative Care Social Work Progress Note    Code Status:do not resuscitate    Goals of Care: Limited Additonal Intervention     Narrative: Palliative care  contacted patients daughter Aislinn to discuss goals of care. No family present at bedside at time of visit. Aislinn states she would like to focus on comfort and is not interested in aggressive treatment due to patients advanced age. [facility] referral placed to assist in comfort care. Daughter states she plans to visit at 1pm. Will notify Melinda with [facility] about her bedside availability to coordinate meeting. RN and CM updated. Patient already a DNR/DNI.     Plan: [facility] referral          Bernardalis Marchal

## 2025-02-25 NOTE — PLAN OF CARE
Goal Outcome Evaluation:   Pt A/O to self on room air. Pt currently hypotensive (MD aware), HOB flat, 1730 dose of midodrine given early per MD. I asked nursing assistant to hold off on bath due to low BP at this time. Daughter at bedside. Call light within reach.

## 2025-02-25 NOTE — SIGNIFICANT NOTE
02/25/25 1629   OTHER   Discipline physical therapist   Rehab Time/Intention   Session Not Performed other (see comments)  (Pt lethargic, just began to take a nap. BP low upon arrival, re-took and it read 89/36. Lowered pt to supine and notified RN.)   Therapy Assessment/Plan (PT)   Criteria for Skilled Interventions Met (PT) yes;meets criteria;skilled treatment is necessary   Recommendation   PT - Next Appointment 02/26/25

## 2025-02-25 NOTE — PLAN OF CARE
Outcome Evaluation: A&Ox1 (disoriented to time, place, situation). Remains on RA. No c/o pain or discomfort. No c/o SOA with unlabored breath sounds noted. Lung sounds diminished. NSR. SCDs for VTE prophylaxis. Mobility moderately impaired d/t overall generalized weakness. Q2 hour turns per order. 500 mL NS bolus given d/t hypotensiveNeeds anticipated by staff. VSS. Plan of care ongoing at this time.      Problem: Adult Inpatient Plan of Care  Goal: Plan of Care Review  Outcome: Progressing  Flowsheets (Taken 2/25/2025 0308)  Outcome Evaluation: A&Ox1 (disoriented to time, place, situation). Remains on RA. No c/o pain or discomfort. No c/o SOA with unlabored breath sounds noted. Lung sounds diminished. NSR. SCDs for VTE prophylaxis. Mobility moderately impaired d/t overall generalized weakness. Q2 hour turns per order. 500 mL NS bolus given d/t hypotensiveNeeds anticipated by staff. VSS. Plan of care ongoing at this time.  Goal: Patient-Specific Goal (Individualized)  Outcome: Progressing  Goal: Absence of Hospital-Acquired Illness or Injury  Outcome: Progressing  Intervention: Identify and Manage Fall Risk  Recent Flowsheet Documentation  Taken 2/25/2025 0200 by Christian, Ruth, LPN  Safety Promotion/Fall Prevention:   safety round/check completed   room organization consistent   nonskid shoes/slippers when out of bed   mobility aid in reach   fall prevention program maintained   clutter free environment maintained   assistive device/personal items within reach   activity supervised  Taken 2/25/2025 0000 by Christian, Ruth, LPN  Safety Promotion/Fall Prevention:   safety round/check completed   room organization consistent   nonskid shoes/slippers when out of bed   mobility aid in reach   fall prevention program maintained   gait belt   clutter free environment maintained   assistive device/personal items within reach   activity supervised  Taken 2/24/2025 2200 by Ruth Gonzales LPN  Safety  Promotion/Fall Prevention:   safety round/check completed   room organization consistent   nonskid shoes/slippers when out of bed   mobility aid in reach   gait belt   fall prevention program maintained   assistive device/personal items within reach   activity supervised   clutter free environment maintained  Taken 2/24/2025 2000 by Christian, Ruth, LPN  Safety Promotion/Fall Prevention:   safety round/check completed   room organization consistent   nonskid shoes/slippers when out of bed   mobility aid in reach   gait belt   fall prevention program maintained   clutter free environment maintained   activity supervised   assistive device/personal items within reach  Taken 2/24/2025 1800 by Christian, Ruth, LPN  Safety Promotion/Fall Prevention:   safety round/check completed   room organization consistent   nonskid shoes/slippers when out of bed   mobility aid in reach   gait belt   fall prevention program maintained   clutter free environment maintained   assistive device/personal items within reach   activity supervised  Taken 2/24/2025 1600 by Christian, Ruth, LPN  Safety Promotion/Fall Prevention:   safety round/check completed   room organization consistent   nonskid shoes/slippers when out of bed   mobility aid in reach   gait belt   fall prevention program maintained   clutter free environment maintained   assistive device/personal items within reach   activity supervised  Intervention: Prevent Skin Injury  Recent Flowsheet Documentation  Taken 2/25/2025 0000 by Ruth Gonzales LPN  Skin Protection:   incontinence pads utilized   transparent dressing maintained  Taken 2/24/2025 2102 by Ruth Gonzales LPN  Skin Protection:   incontinence pads utilized   transparent dressing maintained  Taken 2/24/2025 1600 by Ruth Gonzales LPN  Body Position: position changed independently  Skin Protection:   incontinence pads utilized   transparent dressing maintained  Intervention: Prevent and Manage  VTE (Venous Thromboembolism) Risk  Recent Flowsheet Documentation  Taken 2/25/2025 0000 by Ruth Gonzales LPN  VTE Prevention/Management:   bilateral   SCDs (sequential compression devices) on  Taken 2/24/2025 2102 by Ruth Gonzales LPN  VTE Prevention/Management:   bilateral   SCDs (sequential compression devices) on  Taken 2/24/2025 1600 by Ruth Gonzales LPN  VTE Prevention/Management:   bilateral   SCDs (sequential compression devices) on  Intervention: Prevent Infection  Recent Flowsheet Documentation  Taken 2/25/2025 0200 by uRth Gonzales LPN  Infection Prevention:   single patient room provided   rest/sleep promoted   hand hygiene promoted   equipment surfaces disinfected  Taken 2/25/2025 0000 by Ruth Gonzales LPN  Infection Prevention:   single patient room provided   rest/sleep promoted   hand hygiene promoted   equipment surfaces disinfected  Taken 2/24/2025 2200 by Ruth Gonzales LPN  Infection Prevention:   single patient room provided   rest/sleep promoted   hand hygiene promoted   equipment surfaces disinfected  Taken 2/24/2025 2000 by Ruth Gonzales LPN  Infection Prevention:   single patient room provided   rest/sleep promoted   hand hygiene promoted   equipment surfaces disinfected  Taken 2/24/2025 1800 by Ruth Gonzales LPN  Infection Prevention:   single patient room provided   rest/sleep promoted   hand hygiene promoted   equipment surfaces disinfected  Taken 2/24/2025 1600 by Ruth Gonzales LPN  Infection Prevention:   single patient room provided   rest/sleep promoted   hand hygiene promoted   equipment surfaces disinfected  Goal: Optimal Comfort and Wellbeing  Outcome: Progressing  Intervention: Provide Person-Centered Care  Recent Flowsheet Documentation  Taken 2/24/2025 2102 by Ruth Gonzales LPN  Trust Relationship/Rapport:   care explained   choices provided   questions answered   questions encouraged   reassurance provided  Goal:  Readiness for Transition of Care  Outcome: Progressing     Problem: Skin Injury Risk Increased  Goal: Skin Health and Integrity  Outcome: Progressing  Intervention: Optimize Skin Protection  Recent Flowsheet Documentation  Taken 2/25/2025 0200 by Ruth Gonzales LPN  Activity Management: activity encouraged  Taken 2/25/2025 0000 by Ruth Gonzales LPN  Activity Management: activity encouraged  Pressure Reduction Techniques: frequent weight shift encouraged  Pressure Reduction Devices:   pressure-redistributing mattress utilized   positioning supports utilized  Skin Protection:   incontinence pads utilized   transparent dressing maintained  Taken 2/24/2025 2200 by Ruth Gonzales LPN  Activity Management: activity encouraged  Taken 2/24/2025 2102 by Ruth Gonzales LPN  Activity Management: activity encouraged  Pressure Reduction Techniques: frequent weight shift encouraged  Pressure Reduction Devices:   pressure-redistributing mattress utilized   positioning supports utilized  Skin Protection:   incontinence pads utilized   transparent dressing maintained  Taken 2/24/2025 2000 by Ruth Gonzales LPN  Activity Management: activity encouraged  Taken 2/24/2025 1600 by Ruth Gonzales LPN  Activity Management: activity encouraged  Pressure Reduction Techniques: frequent weight shift encouraged  Pressure Reduction Devices: pressure-redistributing mattress utilized  Skin Protection:   incontinence pads utilized   transparent dressing maintained  Intervention: Promote and Optimize Oral Intake  Recent Flowsheet Documentation  Taken 2/25/2025 0000 by Ruth Gonzales LPN  Oral Nutrition Promotion: rest periods promoted  Taken 2/24/2025 2102 by Ruth Gonzales LPN  Oral Nutrition Promotion: rest periods promoted  Taken 2/24/2025 1600 by Ruth Gonzales LPN  Oral Nutrition Promotion: rest periods promoted     Problem: Comorbidity Management  Goal: Blood Pressure in Desired Range  Outcome:  Progressing     Problem: Fall Injury Risk  Goal: Absence of Fall and Fall-Related Injury  Outcome: Progressing  Intervention: Promote Injury-Free Environment  Recent Flowsheet Documentation  Taken 2/25/2025 0200 by Christian, Ruth, LPN  Safety Promotion/Fall Prevention:   safety round/check completed   room organization consistent   nonskid shoes/slippers when out of bed   mobility aid in reach   fall prevention program maintained   clutter free environment maintained   assistive device/personal items within reach   activity supervised  Taken 2/25/2025 0000 by Christian, Ruth, LPN  Safety Promotion/Fall Prevention:   safety round/check completed   room organization consistent   nonskid shoes/slippers when out of bed   mobility aid in reach   fall prevention program maintained   gait belt   clutter free environment maintained   assistive device/personal items within reach   activity supervised  Taken 2/24/2025 2200 by Christian, Ruth, LPN  Safety Promotion/Fall Prevention:   safety round/check completed   room organization consistent   nonskid shoes/slippers when out of bed   mobility aid in reach   gait belt   fall prevention program maintained   assistive device/personal items within reach   activity supervised   clutter free environment maintained  Taken 2/24/2025 2000 by Christian, Ruth, LPN  Safety Promotion/Fall Prevention:   safety round/check completed   room organization consistent   nonskid shoes/slippers when out of bed   mobility aid in reach   gait belt   fall prevention program maintained   clutter free environment maintained   activity supervised   assistive device/personal items within reach  Taken 2/24/2025 1800 by Christian, Ruth, LPN  Safety Promotion/Fall Prevention:   safety round/check completed   room organization consistent   nonskid shoes/slippers when out of bed   mobility aid in reach   gait belt   fall prevention program maintained   clutter free environment maintained    assistive device/personal items within reach   activity supervised  Taken 2/24/2025 1600 by Christian, Ruth, LPN  Safety Promotion/Fall Prevention:   safety round/check completed   room organization consistent   nonskid shoes/slippers when out of bed   mobility aid in reach   gait belt   fall prevention program maintained   clutter free environment maintained   assistive device/personal items within reach   activity supervised  Goal: Absence of Fall and Fall-Related Injury  Outcome: Progressing  Intervention: Promote Injury-Free Environment  Recent Flowsheet Documentation  Taken 2/25/2025 0200 by Christian, Ruth, LPN  Safety Promotion/Fall Prevention:   safety round/check completed   room organization consistent   nonskid shoes/slippers when out of bed   mobility aid in reach   fall prevention program maintained   clutter free environment maintained   assistive device/personal items within reach   activity supervised  Taken 2/25/2025 0000 by Christian, Ruth, LPN  Safety Promotion/Fall Prevention:   safety round/check completed   room organization consistent   nonskid shoes/slippers when out of bed   mobility aid in reach   fall prevention program maintained   gait belt   clutter free environment maintained   assistive device/personal items within reach   activity supervised  Taken 2/24/2025 2200 by Christian, Ruth, LPN  Safety Promotion/Fall Prevention:   safety round/check completed   room organization consistent   nonskid shoes/slippers when out of bed   mobility aid in reach   gait belt   fall prevention program maintained   assistive device/personal items within reach   activity supervised   clutter free environment maintained  Taken 2/24/2025 2000 by Christian, Ruth, LPN  Safety Promotion/Fall Prevention:   safety round/check completed   room organization consistent   nonskid shoes/slippers when out of bed   mobility aid in reach   gait belt   fall prevention program maintained   clutter free  environment maintained   activity supervised   assistive device/personal items within reach  Taken 2/24/2025 1800 by Christian, Ruth, LPN  Safety Promotion/Fall Prevention:   safety round/check completed   room organization consistent   nonskid shoes/slippers when out of bed   mobility aid in reach   gait belt   fall prevention program maintained   clutter free environment maintained   assistive device/personal items within reach   activity supervised  Taken 2/24/2025 1600 by Christian, Ruth, LPN  Safety Promotion/Fall Prevention:   safety round/check completed   room organization consistent   nonskid shoes/slippers when out of bed   mobility aid in reach   gait belt   fall prevention program maintained   clutter free environment maintained   assistive device/personal items within reach   activity supervised

## 2025-02-26 PROCEDURE — 25810000003 SODIUM CHLORIDE 0.9 % SOLUTION: Performed by: STUDENT IN AN ORGANIZED HEALTH CARE EDUCATION/TRAINING PROGRAM

## 2025-02-26 PROCEDURE — 97530 THERAPEUTIC ACTIVITIES: CPT

## 2025-02-26 RX ORDER — MIDODRINE HYDROCHLORIDE 5 MG/1
15 TABLET ORAL EVERY 8 HOURS SCHEDULED
Status: DISCONTINUED | OUTPATIENT
Start: 2025-02-26 | End: 2025-02-27 | Stop reason: HOSPADM

## 2025-02-26 RX ADMIN — Medication 10 ML: at 08:23

## 2025-02-26 RX ADMIN — PANTOPRAZOLE SODIUM 40 MG: 40 INJECTION, POWDER, LYOPHILIZED, FOR SOLUTION INTRAVENOUS at 20:19

## 2025-02-26 RX ADMIN — BENZOCAINE: 0.1 GEL TOPICAL at 18:44

## 2025-02-26 RX ADMIN — MIDODRINE HYDROCHLORIDE 15 MG: 5 TABLET ORAL at 11:13

## 2025-02-26 RX ADMIN — MIDODRINE HYDROCHLORIDE 15 MG: 5 TABLET ORAL at 08:22

## 2025-02-26 RX ADMIN — PANTOPRAZOLE SODIUM 40 MG: 40 INJECTION, POWDER, LYOPHILIZED, FOR SOLUTION INTRAVENOUS at 08:22

## 2025-02-26 RX ADMIN — BALSALAZIDE DISODIUM 750 MG: 750 CAPSULE ORAL at 20:19

## 2025-02-26 RX ADMIN — Medication 1 TABLET: at 20:19

## 2025-02-26 RX ADMIN — SILDENAFIL 10 MG: 20 TABLET ORAL at 17:37

## 2025-02-26 RX ADMIN — SODIUM CHLORIDE 500 ML: 0.9 INJECTION, SOLUTION INTRAVENOUS at 00:52

## 2025-02-26 RX ADMIN — SILDENAFIL 10 MG: 20 TABLET ORAL at 20:19

## 2025-02-26 RX ADMIN — MIDODRINE HYDROCHLORIDE 15 MG: 5 TABLET ORAL at 22:34

## 2025-02-26 RX ADMIN — Medication 10 ML: at 22:48

## 2025-02-26 RX ADMIN — LEVOTHYROXINE SODIUM 100 MCG: 100 TABLET ORAL at 05:12

## 2025-02-26 RX ADMIN — MIDODRINE HYDROCHLORIDE 15 MG: 5 TABLET ORAL at 17:36

## 2025-02-26 RX ADMIN — SILDENAFIL 10 MG: 20 TABLET ORAL at 08:22

## 2025-02-26 RX ADMIN — Medication 1 TABLET: at 08:22

## 2025-02-26 NOTE — PROGRESS NOTES
Hospitalist Service   Daily Progress Note      Patient Name: Alexey Trinidad  : 3/21/1926  MRN: [id number]  Primary Care Physician:  Rui Cole MD  Date of admission: 2025      Subjective      Chief Complaint: Rectal bleeding    Patient seen and examined this morning.  Remains pleasantly confused, at baseline.  Denies any complaints.  Awaiting pre-CERT for SNF placement.    Pertinent positives as noted in HPI/subjective.  All other systems were reviewed and are negative.      Objective      Vitals:   Temp:  [96.7 °F (35.9 °C)-98.3 °F (36.8 °C)] 97.4 °F (36.3 °C)  Heart Rate:  [49-68] 62  Resp:  [12-19] 14  BP: ()/(30-57) 104/47  Flow (L/min) (Oxygen Therapy):  [1] 1    Physical Exam:    General: Awake, pleasantly confused, elderly female, NAD  Eyes: PERRL, EOMI, conjunctivae are clear  Cardiovascular: Regular rate and rhythm, no murmurs  Respiratory: Clear to auscultation bilaterally, no wheezing or rales, unlabored breathing  Abdomen: Soft, nontender, positive bowel sounds, no guarding  Musculoskeletal: Normal range of motion, no other gross deformities  Skin: Warm, dry         Result Review    Result Review:  I have personally reviewed the results from the time of this admission to 2025 10:51 EST and agree with these findings:  [x]  Laboratory  [x]  Microbiology  []  Radiology  []  EKG/Telemetry   []  Cardiology/Vascular   []  Pathology  []  Old records  []  Other:          Assessment & Plan      Brief Patient Summary:  Alexey Trinidad is a 89 y.o. female with history of hypothyroidism, COPD, hysterectomy, cholecystectomy, collagenous colitis   Who was brought in from the nursing facility by the request of the family for rectal bleeding.  Family states that the nursing home said she was passing blood clots.  She is complaining of some abdominal pain and nausea.  She was evaluated in ER and found to have a hemoglobin of 5.8.  Her hemoglobin was 11.8 on 2025.  Patient was given 2 units  of PRBCs with improvement in hemoglobin, GI consulted.  Patient also noted to have UTI, started on IV antibiotics.  Details as noted below.  Patient is medically stable to discharge back to facility with follow-up with PCP and GI as an outpatient.      balsalazide, 750 mg, Oral, Nightly  folic acid-pyridoxine-cyanocobalamin, 1 tablet, Oral, BID  [Held by provider] furosemide, 20 mg, Oral, BID  levothyroxine, 100 mcg, Oral, Q AM  midodrine, 15 mg, Oral, TID AC  pantoprazole, 40 mg, Intravenous, Q12H  sildenafil, 10 mg, Oral, TID  sodium chloride, 10 mL, Intravenous, Q12H               I have utilized all available, immediate resources to obtain, update, or review the patient's current medications including all prescriptions, over-the-counter products, herbals, cannabis/cannabidiol products, and vitamin.mineral/dietary (nutritional) supplements.    Active Hospital Problems:  Active Hospital Problems    Diagnosis     **Acute GI bleeding        Assessment/Plan:     Rectal bleeding, improved  Acute blood loss anemia  -CT abdomen pelvis shows biliary dilatation and calcification near the ampulla  -Hemoglobin of 5.8 on admission, improved after 2 units  -Patient not on anticoagulation as outpatient  -s/p EGD on 2/23 with Multiple etiology for her blood loss anemia including duodenal ulcer as well as possible diverticulosis and very large hemorrhoid. Currently there was no actively bleeding diverticulum and hemorrhoid was noticed. Large duodenal ulcers x 2 status post of cauterization. Changes consistent with a gastritis biopsy performed to rule out H. pylori in the setting of duodenal ulcer.  Large hiatal hernia.  -Continue PPI twice daily  -hemoglobin is stable, okay to discharge per GI with outpatient follow-up as needed     Acute UTI  -UA noted, urine culture shows Pseudomonas, sensitivities noted  -Initially was on IV ceftriaxone, given 1 dose of tobramycin to finish of the course on 2/25    Decreased  appetite  -Multifactorial in setting of advanced dementia, advanced age, and PUD  -Discussed with daughter  on 2/24, daughter wants patient to remain comfortable, would not recommend any tube feeds due to advanced age  -Palliative care following, hospice referral made     COPD  Pulmonary hypertension  -Not in exacerbation currently   -continue bronchodilators, sildenafil  -Monitor     Chronic hypotension  -Continue midodrine  -Monitor     Hypothyroidism  -Continue levothyroxine     Advanced dementia  -Patient appears at baseline, pleasantly confused  -Watch for delirium, monitor    VTE Prophylaxis:  Mechanical VTE prophylaxis orders are present.        CODE STATUS:    Medical Intervention Limits: No intubation (DNI)  Code Status (Patient has no pulse and is not breathing): No CPR (Do Not Attempt to Resuscitate)  Medical Interventions (Patient has pulse or is breathing): Limited Support      Disposition Planning:     Barriers to Discharge: Urine culture  Anticipated Date of Discharge: 2/24  Place of Discharge: Back to LTC, pre-CERT required per case management    Electronically signed by Makayla Gibson DO, 02/26/25, 10:51 EST.  [facility] Hospitalist Team      Part of this note may be an electronic transcription/translation of spoken language to printed text using the Dragon Dictation System.

## 2025-02-26 NOTE — SIGNIFICANT NOTE
02/26/25 1051   [facility] Status Return  (return to [facility], skilled)

## 2025-02-26 NOTE — PLAN OF CARE
Problem: Adult Inpatient Plan of Care  Goal: Plan of Care Review  Outcome: Progressing  Goal: Patient-Specific Goal (Individualized)  Outcome: Progressing  Goal: Absence of Hospital-Acquired Illness or Injury  Outcome: Progressing  Intervention: Identify and Manage Fall Risk  Recent Flowsheet Documentation  Taken 2/26/2025 0200 by Christian, Ruth, LPN  Safety Promotion/Fall Prevention:   safety round/check completed   room organization consistent   nonskid shoes/slippers when out of bed   mobility aid in reach   fall prevention program maintained   clutter free environment maintained   activity supervised   assistive device/personal items within reach  Taken 2/26/2025 0000 by Christian, Ruth, LPN  Safety Promotion/Fall Prevention:   safety round/check completed   room organization consistent   nonskid shoes/slippers when out of bed   mobility aid in reach   fall prevention program maintained   clutter free environment maintained   assistive device/personal items within reach   activity supervised  Taken 2/25/2025 2200 by Christian, Ruth, LPN  Safety Promotion/Fall Prevention:   safety round/check completed   room organization consistent   nonskid shoes/slippers when out of bed   mobility aid in reach   fall prevention program maintained   clutter free environment maintained   gait belt   assistive device/personal items within reach   activity supervised  Taken 2/25/2025 2052 by Christian, Ruth, LPN  Safety Promotion/Fall Prevention:   safety round/check completed   room organization consistent   nonskid shoes/slippers when out of bed   mobility aid in reach   fall prevention program maintained   clutter free environment maintained   assistive device/personal items within reach   activity supervised  Intervention: Prevent Skin Injury  Recent Flowsheet Documentation  Taken 2/26/2025 0000 by Ruth Gonzales LPN  Skin Protection:   transparent dressing maintained   incontinence pads utilized  Taken  2/25/2025 2052 by Ruth Gonzales LPN  Skin Protection:   incontinence pads utilized   transparent dressing maintained  Intervention: Prevent and Manage VTE (Venous Thromboembolism) Risk  Recent Flowsheet Documentation  Taken 2/26/2025 0000 by Ruth Gonzales LPN  VTE Prevention/Management: SCDs (sequential compression devices) off  Taken 2/25/2025 2052 by Ruth Gonzales LPN  VTE Prevention/Management:   SCDs (sequential compression devices) off   patient refused intervention  Intervention: Prevent Infection  Recent Flowsheet Documentation  Taken 2/26/2025 0200 by Ruth Gonzales LPN  Infection Prevention:   single patient room provided   rest/sleep promoted   hand hygiene promoted   equipment surfaces disinfected  Taken 2/26/2025 0000 by Ruth Gonzales LPN  Infection Prevention:   single patient room provided   rest/sleep promoted   hand hygiene promoted   equipment surfaces disinfected  Taken 2/25/2025 2200 by Ruth Gonzales LPN  Infection Prevention:   single patient room provided   rest/sleep promoted   hand hygiene promoted   equipment surfaces disinfected  Taken 2/25/2025 2052 by Ruth Gonzales LPN  Infection Prevention:   single patient room provided   rest/sleep promoted   hand hygiene promoted   equipment surfaces disinfected  Goal: Optimal Comfort and Wellbeing  Outcome: Progressing  Goal: Readiness for Transition of Care  Outcome: Progressing     Problem: Skin Injury Risk Increased  Goal: Skin Health and Integrity  Outcome: Progressing  Intervention: Optimize Skin Protection  Recent Flowsheet Documentation  Taken 2/26/2025 0000 by Ruth Gonzales LPN  Activity Management: activity minimized  Pressure Reduction Techniques: frequent weight shift encouraged  Pressure Reduction Devices: pressure-redistributing mattress utilized  Skin Protection:   transparent dressing maintained   incontinence pads utilized  Taken 2/25/2025 2052 by Ruth Gonzales LPN  Activity  Management: activity minimized  Pressure Reduction Techniques: frequent weight shift encouraged  Pressure Reduction Devices:   pressure-redistributing mattress utilized   positioning supports utilized   foam padding utilized  Skin Protection:   incontinence pads utilized   transparent dressing maintained  Intervention: Promote and Optimize Oral Intake  Recent Flowsheet Documentation  Taken 2/26/2025 0000 by Ruth Gonzales LPN  Oral Nutrition Promotion: rest periods promoted  Taken 2/25/2025 2052 by Ruth Gonzales LPN  Oral Nutrition Promotion: rest periods promoted     Problem: Comorbidity Management  Goal: Blood Pressure in Desired Range  Outcome: Progressing  Intervention: Maintain Blood Pressure Management  Recent Flowsheet Documentation  Taken 2/25/2025 2052 by Ruth Gonzales LPN  Medication Review/Management: medications reviewed     Problem: Fall Injury Risk  Goal: Absence of Fall and Fall-Related Injury  Outcome: Progressing  Intervention: Identify and Manage Contributors  Recent Flowsheet Documentation  Taken 2/25/2025 2052 by Ruth Gonzales LPN  Medication Review/Management: medications reviewed  Intervention: Promote Injury-Free Environment  Recent Flowsheet Documentation  Taken 2/26/2025 0200 by Christian, Ruth, LPN  Safety Promotion/Fall Prevention:   safety round/check completed   room organization consistent   nonskid shoes/slippers when out of bed   mobility aid in reach   fall prevention program maintained   clutter free environment maintained   activity supervised   assistive device/personal items within reach  Taken 2/26/2025 0000 by Christian, Ruth, LPN  Safety Promotion/Fall Prevention:   safety round/check completed   room organization consistent   nonskid shoes/slippers when out of bed   mobility aid in reach   fall prevention program maintained   clutter free environment maintained   assistive device/personal items within reach   activity supervised  Taken 2/25/2025  2200 by Christian, Ruth, LPN  Safety Promotion/Fall Prevention:   safety round/check completed   room organization consistent   nonskid shoes/slippers when out of bed   mobility aid in [facility]   fall prevention program maintained   clutter free environment maintained   gait belt   assistive device/personal items within reach   activity supervised  Taken 2/25/2025 2052 by Christian, Ruth, LPN  Safety Promotion/Fall Prevention:   safety round/check completed   room organization consistent   nonskid shoes/slippers when out of bed   mobility aid in [facility]   fall prevention program maintained   clutter free environment maintained   assistive device/personal items within reach   activity supervised  Goal: Absence of Fall and Fall-Related Injury  Outcome: Progressing  Intervention: Identify and Manage Contributors  Recent Flowsheet Documentation  Taken 2/25/2025 2052 by Ruth Gonzales LPN  Medication Review/Management: medications reviewed  Intervention: Promote Injury-Free Environment  Recent Flowsheet Documentation  Taken 2/26/2025 0200 by Christian, Ruth, LPN  Safety Promotion/Fall Prevention:   safety round/check completed   room organization consistent   nonskid shoes/slippers when out of bed   mobility aid in [facility]   fall prevention program maintained   clutter free environment maintained   activity supervised   assistive device/personal items within reach  Taken 2/26/2025 0000 by Christian, Ruth, LPN  Safety Promotion/Fall Prevention:   safety round/check completed   room organization consistent   nonskid shoes/slippers when out of bed   mobility aid in [facility]   fall prevention program maintained   clutter free environment maintained   assistive device/personal items within reach   activity supervised  Taken 2/25/2025 2200 by Christian, Ruth, LPN  Safety Promotion/Fall Prevention:   safety round/check completed   room organization consistent   nonskid shoes/slippers when out of bed   mobility aid in  reach   fall prevention program maintained   clutter free environment maintained   gait belt   assistive device/personal items within reach   activity supervised  Taken 2/25/2025 2052 by Christian, Ruth, LPN  Safety Promotion/Fall Prevention:   safety round/check completed   room organization consistent   nonskid shoes/slippers when out of bed   mobility aid in reach   fall prevention program maintained   clutter free environment maintained   assistive device/personal items within reach   activity supervised

## 2025-02-26 NOTE — PLAN OF CARE
Goal Outcome Evaluation:               Pt HR dropped to the 30s multiple times today while patient was sleeping, Did not sustain. Dr. Gibson made aware. Pt family at bedside, expressing concern about HR, abdominal pressure and Canker sore, RN reached out to Dr. Gibson. Patient bladder scanned after complaining of abdominal pressure, showing 330 ml. Up to beside commode and peed over 200 ml and had medium soft brown bowel movement. PT worked with patient for eval for precert.

## 2025-02-26 NOTE — THERAPY TREATMENT NOTE
"Subjective: Pt agreeable to therapeutic plan of care. Pt still eating breakfast and reporting she wants to get up.    Objective:     Precautions - Hypotensive, confusion at baseline, falls risk    Bed mobility - CGA-Min A to sit EOB    Transfers - Pt stood from EOB with RW and CGA to transfer to chair. However, immediately after standing pt began urinating and having BM. Pt returned to sitting EOB and PT placed bedside commode next to pt. Pt then transferred to commode with CGA using stand-pivot method. After voiding and nursing assisting with hygiene, pt stood again with RW.    Ambulation - 2-3 feet CGA and with rolling walker to bedside chair.    Vitals: Hypotensive (104/47)    Pain: 0 VAS Location:   Pt just reporting \"freezing\" throughout tx    Education: Provided education on the importance of mobility in the acute care setting, Transfer Training, Gait Training, and Energy conservation strategies    Assessment: Alexey Trinidad presents with functional mobility impairments which indicate the need for skilled intervention. Pt needing decreased assist today for transfers (CGA with RW) and able to amb 2-3 ft to bedside chair with CGA. Pt with low BP but asymptomatic. Tolerating session today without incident. Will continue to follow and progress as tolerated.     Plan/Recommendations:   If medically appropriate, Moderate Intensity Therapy recommended post-acute care. This is recommended as therapy feels the patient would require 3-4 days per week and wouldn't tolerate \"3 hour daily\" rehab intensity. SNF would be the preferred choice. If the patient does not agree to SNF, arrange HH or OP depending on home bound status. If patient is medically complex, consider LTACH. Pt requires no DME at discharge.     Pt desires Skilled Rehab placement at discharge. Pt cooperative; agreeable to therapeutic recommendations and plan of care.         Basic Mobility 6-click:  Rollin = Total, A lot = 2, A little = 3; 4 = " None  Supine>Sit:   1 = Total, A lot = 2, A little = 3; 4 = None   Sit>Stand with arms:  1 = Total, A lot = 2, A little = 3; 4 = None  Bed>Chair:   1 = Total, A lot = 2, A little = 3; 4 = None  Ambulate in room:  1 = Total, A lot = 2, A little = 3; 4 = None  3-5 Steps with railin = Total, A lot = 2, A little = 3; 4 = None  Score: 16    Modified Kim: N/A = No pre-op stroke/TIA    Post-Tx Position: Up in Chair, Alarms activated, and Call light and personal items within reach  PPE: gloves    Therapy Charges for Today       Code Description Service Date Service Provider Modifiers Qty    [id number]  PT THERAPEUTIC ACT EA 15 MIN 2025 Khushbu Menjivar, PT GP 1           PT Charges       Row Name 25 1355             Time Calculation    Start Time 1111  -ADAL      Stop Time 1132  -ADAL      Time Calculation (min) 21 min  -ADAL      PT Received On 25  -ADAL      PT - Next Appointment 25  -ADAL      PT Goal Re-Cert Due Date 03/10/25  -ADAL         Time Calculation- PT    Total Timed Code Minutes- PT 21 minute(s)  -ADAL                User Key  (r) = Recorded By, (t) = Taken By, (c) = Cosigned By      Initials Name Provider Type    Khushbu Wells, PT Physical Therapist

## 2025-02-26 NOTE — CONSULTS
Nutrition Services    Patient Name: Alexey Trinidad  YOB: 1926  MRN: [id number]  Admission date: 2/22/2025    Comment:    Moderate chronic disease related malnutrition related to prolonged suboptimal intake and suspected hypermetabolism in the setting of multiple chronic conditions as evidenced by po intake meeting < 75% est energy requirement for > 1 month, > 5% weight loss in the last 1 month and evidence of moderate muscle and fat wasting per NFPE. (See MSA below).     RD to add Boost Original BID (Provides 480 kcals, 20 g protein if consumed)       CLINICAL NUTRITION ASSESSMENT      Reason for Assessment 2/26: Malnutrition screening tool score of 2.      H&P      Past Medical History:   Diagnosis Date    Disease of thyroid gland     Hypertension        Past Surgical History:   Procedure Laterality Date    BACK SURGERY      kyphoplasty    BREAST SURGERY      benign cyst removed    COLONOSCOPY N/A 2/23/2025    Procedure: COLONOSCOPY with POLYPECTOMY;  Surgeon: Kirby Saul MD;  Location: [facility] ENDOSCOPY;  Service: Gastroenterology;  Laterality: N/A;  POST: MULTIPLE COLON POLYPS, EXTENSIVE DIVERTICULOSIS,HEMORRHOIDS    ENDOSCOPY N/A 2/23/2025    Procedure: ESOPHAGOGASTRODUODENOSCOPY with GOLD PROBE & BIOPSY;  Surgeon: Kirby Saul MD;  Location: [facility] ENDOSCOPY;  Service: Gastroenterology;  Laterality: N/A;  POST:HIATAL HERNIA,GASTRITIS    HYSTERECTOMY      LAPAROSCOPIC CHOLECYSTECTOMY          Current Problems     Melena  BRBPR  Anemia  Acute GI bleed  - Stool heme positive  - GI following      UTI  Leukocytosis likely secondary to above  - Urine culture from 1/24/25 with Proteus mirabilis  - Ceftriaxone started in ED     Weakness  Physical deconditioning  - Fall precautions     Hypertension     Hypothyroidism     COPD   Severe PAH  - Stable, COPD not in exacerbation     Dementia       Encounter Information        Trending Narrative     2/26: Pt presented to ED on 2/22 with complaints of nausea  "and rectal bleeding with dark stools. Pt recently admitted 1/24/25 to 2/5/25 and treated for UTI, acute hypoxic respiratory failure, norovirus, general weakness and moderate malnutrition. RD visited pt at bedside. Pt remains confused at baseline. NFPE completed, consistent with nutrition diagnosis of malnutrition using AND/ASPEN criteria. See MSA below.   Noted family plans to gear GOC towards comfort at this time per palliative note.      Anthropometrics        Current Height, Weight Height: 147.3 cm (58\")  Weight: 41.3 kg (91 lb 0.8 oz) (02/25/25 0410)       Usual Body Weight (UBW) Unable to obtain from patient        Trending Weight Hx     This admission: 2/26: (last weight 2/25) 91#             PTA: 2/26: 12.8% weight loss in the last 1 month    Wt Readings from Last 30 Encounters:   02/25/25 0410 41.3 kg (91 lb 0.8 oz)   02/24/25 0400 41.6 kg (91 lb 11.4 oz)   02/23/25 0426 45 kg (99 lb 3.3 oz)   02/22/25 1149 45.1 kg (99 lb 6.8 oz)   02/05/25 0500 45.1 kg (99 lb 6.8 oz)   02/04/25 0521 46.8 kg (103 lb 2.8 oz)   02/02/25 0453 45.3 kg (99 lb 13.9 oz)   02/01/25 0511 45.1 kg (99 lb 6.8 oz)   01/31/25 0534 46.2 kg (101 lb 13.6 oz)   01/30/25 0528 47.7 kg (105 lb 2.6 oz)   01/29/25 0531 48 kg (105 lb 13.1 oz)   01/27/25 1757 47.2 kg (104 lb)   01/24/25 2006 47.4 kg (104 lb 8 oz)   01/24/25 1614 49.4 kg (109 lb)      BMI kg/m2 Body mass index is 19.03 kg/m².       Labs        Pertinent Labs Reviewed. Management per attending.    Results from last 7 days   Lab Units 02/25/25  1046 02/25/25  0233 02/24/25  1247 02/24/25  0234 02/23/25  0940 02/22/25  2311 02/22/25  1231   SODIUM mmol/L  --  138  --  143  --  143 138   POTASSIUM mmol/L 4.0 3.5 4.7 2.8*   < > 3.4* 3.8   CHLORIDE mmol/L  --  97*  --  103  --  104 96*   CO2 mmol/L  --  30.2*  --  28.9  --  29.6* 28.7   BUN mg/dL  --  12  --  19  --  37* 46*   CREATININE mg/dL  --  0.91  --  0.81  --  0.94 1.04*   CALCIUM mg/dL  --  8.2  --  8.3  --  8.9 10.0*   BILIRUBIN " "mg/dL  --   --   --  <0.2  --   --  <0.2   ALK PHOS U/L  --   --   --  33*  --   --  36*   ALT (SGPT) U/L  --   --   --  15  --   --  14   AST (SGOT) U/L  --   --   --  20  --   --  20   GLUCOSE mg/dL  --  97  --  94  --  114* 135*    < > = values in this interval not displayed.     Results from last 7 days   Lab Units 02/25/25  0411 02/25/25  0233 02/24/25  0234 02/22/25  2311   MAGNESIUM mg/dL  --  1.9 2.0 2.2   PHOSPHORUS mg/dL  --  2.5 2.9 3.1   HEMOGLOBIN g/dL 8.5*  --  8.5* 8.4*   HEMATOCRIT % 27.2*  --  27.4* 25.7*     No results found for: \"HGBA1C\"     Medications    Scheduled Medications balsalazide, 750 mg, Oral, Nightly  folic acid-pyridoxine-cyanocobalamin, 1 tablet, Oral, BID  [Held by provider] furosemide, 20 mg, Oral, BID  levothyroxine, 100 mcg, Oral, Q AM  midodrine, 15 mg, Oral, TID AC  pantoprazole, 40 mg, Intravenous, Q12H  sildenafil, 10 mg, Oral, TID  sodium chloride, 10 mL, Intravenous, Q12H        Infusions      PRN Medications   acetaminophen **OR** acetaminophen **OR** acetaminophen    Calcium Replacement - Follow Nurse / BPA Driven Protocol    dextrose    dextrose    glucagon (human recombinant)    ipratropium-albuterol    Magnesium Standard Dose Replacement - Follow Nurse / BPA Driven Protocol    ondansetron ODT **OR** ondansetron    Phosphorus Replacement - Follow Nurse / BPA Driven Protocol    Potassium Replacement - Follow Nurse / BPA Driven Protocol    [COMPLETED] Insert Peripheral IV **AND** sodium chloride    sodium chloride     Physical Findings        Trending Physical   Appearance, NFPE 2/26: NFPE completed, consistent with nutrition diagnosis of malnutrition using AND/ASPEN criteria. See MSA below.      --  Edema  No edema documented      Bowel Function Last documented BM 2/24     Tubes No feeding tube in place      Chewing/Swallowing STC diet with chopped meats r/t confusion     Skin No PI documented      --  Current Nutrition Orders & Evaluation of Intake       Oral Nutrition  "    Food Allergies NKFA   Current PO Diet Diet: Regular/House; Texture: Soft to Chew (NDD 3); Soft to Chew: Chopped Meat; Fluid Consistency: Thin (IDDSI 0)   Supplement None    PO Evaluation     Trending % PO Intake 2/26: 0-50% intake    --  Nutritional Risk Screening        NRS-2002 Score          Nutrition Diagnosis         Nutrition Dx Problem 1 Moderate chronic disease related malnutrition related to prolonged suboptimal intake and suspected hypermetabolism in the setting of multiple chronic conditions as evidenced by po intake meeting < 75% est energy requirement for > 1 month, > 5% weight loss in the last 1 month and evidence of moderate muscle and fat wasting per NFPE. (See MSA below).       Nutrition Dx Problem 2        Intervention Goal         Intervention Goal(s) Tolerate po diet  PO intake > 75%  Accept ONS     Nutrition Intervention        RD Action RD to add Boost Original BID (Provides 480 kcals, 20 g protein if consumed)     NFPE completed     Continue to encourage good po intake.      Nutrition Prescription          Diet Prescription STC diet with thin liquids   Supplement Prescription Boost Original BID (Provides 480 kcals, 20 g protein if consumed)      --  Monitor/Evaluation        Monitor Per protocol, I&O, PO intake, Supplement intake, Pertinent labs, Weight, Skin status, GI status, Symptoms, POC/GOC, Swallow function, Hemodynamic stability     Malnutrition Severity Assessment      Patient meets criteria for : Moderate (non-severe) Malnutrition  Malnutrition Type (Last 8 Hours)       Malnutrition Severity Assessment       Row Name 02/26/25 1401       Malnutrition Severity Assessment    Malnutrition Type Chronic Disease - Related Malnutrition      Row Name 02/26/25 1401       Insufficient Energy Intake     Insufficient Energy Intake Findings Moderate    Insufficient Energy Intake  <75% of est. energy requirement for > or equal to 1 month      Row Name 02/26/25 1401       Unintentional Weight Loss      Unintentional Weight Loss Findings Severe    Unintentional Weight Loss  Weight loss greater than 5% in one month      Row Name 02/26/25 1401       Muscle Loss    Loss of Muscle Mass Findings Moderate    Clavicle Bone Region Moderate - some protrusion in females, visible in males    Acromion Bone Region Moderate - acromion may slightly protrude    Scapular Bone Region Moderate - mild depression, bones may show slightly    Dorsal Hand Region Moderate - slight depression    Posterior Calf Region Moderate - some roundness, slight firmness      Row Name 02/26/25 1401       Fat Loss    Subcutaneous Fat Loss Findings Moderate    Orbital Region  Moderate -  somewhat hollowness, slightly dark circles    Upper Arm Region Moderate - some fat tissue, not ample      Row Name 02/26/25 1401       Criteria Met (Must meet criteria for severity in at least 2 of these categories: M Wasting, Fat Loss, Fluid, Secondary Signs, Wt. Status, Intake)    Patient meets criteria for  Moderate (non-severe) Malnutrition                           Electronically signed by:  Cassandra Taveras RD  02/26/25 13:51 EST

## 2025-02-26 NOTE — CASE MANAGEMENT/SOCIAL WORK
Continued Stay Note  [facility]     Patient Name: Alexey Trinidad  MRN: [id number]  Today's Date: 2/26/2025    Admit Date: 2/22/2025    Plan: Anticipate return to [facility], skilled pending precert. Precert started 2/24, no PASRR needed for return. [facility] following. EOS provided 2/25.   Discharge Plan       Row Name 02/26/25 1407       Plan    Plan Anticipate return to [facility], [facility] pending precert. Precert started 2/24, no PASRR needed for return. [facility] following. EOS provided 2/25.    Plan Comments Per [facility], updated PT notes needed for precert. CM provided update to CMA once updated PT notes available. CM provided update to MD/RN in unit rounds that [facility] referral was sent for explanation of services and that precert for return to [place] remains pending.                  Yenifer Danielle RN     Office Phone: [phone]  Office Cell: [phone]

## 2025-02-26 NOTE — PLAN OF CARE
"Goal Outcome Evaluation:      Assessment: Alexey Trinidad presents with functional mobility impairments which indicate the need for skilled intervention. Pt needing decreased assist today for transfers (CGA with RW) and able to amb 2-3 ft to bedside chair with CGA. Pt with low BP but asymptomatic. Tolerating session today without incident. Will continue to follow and progress as tolerated.     Plan/Recommendations:   If medically appropriate, Moderate Intensity Therapy recommended post-acute care. This is recommended as therapy feels the patient would require 3-4 days per week and wouldn't tolerate \"3 hour daily\" rehab intensity. SNF would be the preferred choice. If the patient does not agree to SNF, arrange HH or OP depending on home bound status. If patient is medically complex, consider LTACH. Pt requires no DME at discharge.     Pt desires Skilled Rehab placement at discharge. Pt cooperative; agreeable to therapeutic recommendations and plan of care.                                       "

## 2025-02-26 NOTE — PLAN OF CARE
Goal Outcome Evaluation:      Pt. Recently returned to bed and now eating lunch this PM, will follow up as appropriate.

## 2025-02-27 VITALS
BODY MASS INDEX: 19.11 KG/M2 | HEIGHT: 58 IN | DIASTOLIC BLOOD PRESSURE: 50 MMHG | OXYGEN SATURATION: 95 % | HEART RATE: 62 BPM | SYSTOLIC BLOOD PRESSURE: 98 MMHG | RESPIRATION RATE: 13 BRPM | TEMPERATURE: 97.7 F | WEIGHT: 91.05 LBS

## 2025-02-27 PROCEDURE — 97110 THERAPEUTIC EXERCISES: CPT

## 2025-02-27 PROCEDURE — 97112 NEUROMUSCULAR REEDUCATION: CPT

## 2025-02-27 RX ORDER — PANTOPRAZOLE SODIUM 40 MG/1
40 TABLET, DELAYED RELEASE ORAL 2 TIMES DAILY
Qty: 60 TABLET | Refills: 0 | Status: SHIPPED | OUTPATIENT
Start: 2025-02-27

## 2025-02-27 RX ADMIN — MIDODRINE HYDROCHLORIDE 15 MG: 5 TABLET ORAL at 05:10

## 2025-02-27 RX ADMIN — BENZOCAINE: 0.1 GEL TOPICAL at 13:15

## 2025-02-27 RX ADMIN — Medication 10 ML: at 08:45

## 2025-02-27 RX ADMIN — MIDODRINE HYDROCHLORIDE 15 MG: 5 TABLET ORAL at 14:33

## 2025-02-27 RX ADMIN — Medication 1 TABLET: at 08:44

## 2025-02-27 RX ADMIN — LEVOTHYROXINE SODIUM 100 MCG: 100 TABLET ORAL at 05:10

## 2025-02-27 RX ADMIN — PANTOPRAZOLE SODIUM 40 MG: 40 INJECTION, POWDER, LYOPHILIZED, FOR SOLUTION INTRAVENOUS at 08:44

## 2025-02-27 RX ADMIN — SILDENAFIL 10 MG: 20 TABLET ORAL at 08:47

## 2025-02-27 NOTE — PLAN OF CARE
Assessment: Alexey Trinidad presents with functional mobility impairments which indicate the need for skilled intervention. Tolerating session today without incident. Encouraged pt to let us know how she's feeling with mobility changes. Wasn't able to walk due to feeling dizzy with stance and did not get better.Plans on rehab at [place].Will continue to follow and progress as tolerated.                          Obdulia Smart is a 73 year old female presenting with   Chief Complaint   Patient presents with   • Office Visit   • Hip Pain     Right    • Follow-up         Latex allergy or symptoms of Latex sensitivity reviewed on allergy list  Medications verified  REFILLS NEEDED:    PAIN MED:   Tylenol   Referring provider:   Christiano Irene DO  Body mass index is 18.41 kg/m².      Patient Reports:  S/p Right hip open reduction internal fixation with dynamic hip screw.  6/11/19    Reports pain to bilateral hip   Weakness to bilateral legs

## 2025-02-27 NOTE — CASE MANAGEMENT/SOCIAL WORK
Case Management Discharge Note      Final Note: [facility], skilled    Provided Post Acute Provider List?: N/A    Selected Continued Care - Discharged on 2/27/2025 Admission date: 2/22/2025 - Discharge disposition: Skilled Nursing Facility (DC - External)      Destination Coordination complete.      Service Provider Services Address Phone Fax Patient Preferred    [facility] [address] [phone]                  Transportation Services  W/C [place]: Yo Palomo    Final Discharge Disposition Code: 03 - skilled nursing facility (SNF)

## 2025-02-27 NOTE — SIGNIFICANT NOTE
Case Management/Social Work    Patient Name:  Alexey Trinidad  YOB: 1926  MRN: [id number]  Admit Date:  2/22/2025 02/27/25 0850   Post Acute Pre-Cert Documentation   Date Post Acute Pre-Cert Completed 02/27/25   Response Received from Insurance? Approval   Post Acute Pre-Cert Initiated Comment JORGE verified SNF precert approval via [facility] portal. Portal auth ID [id number], plan auth ID [id number]. Valid 2/26-2/28. CM made aware.           Electronically signed by:  Joe Begum CMA  02/27/25 08:51 EST    Joe Begum  Case Management Associate  [address] [address]  P: [phone]  F: [phone]

## 2025-02-27 NOTE — THERAPY TREATMENT NOTE
"Subjective: Pt agreeable to therapeutic plan of care. Pt stated she was cold all over. C/o feeling dizzy when standing and lasting after sitting to having probs with focusing with eyes    Objective:     Precautions - falls, monitor BP, orthostatic    Bed mobility - N/A or Not attempted.  Transfers - CGA and with rolling walker to CTS from chair  Ambulation -  feet N/A or Not attempted.    Therapeutic Exercise - 10 Reps B LE AROM supported sitting / chair    Vitals: Orthostatic BP sitting in chair upon arrival-83/63 and in stance-93/46    Pain: 0 VAS     Education: Provided education on the importance of mobility in the acute care setting, Verbal/Tactile Cues, and Transfer Training    Assessment: Alexey Trinidad presents with functional mobility impairments which indicate the need for skilled intervention. Tolerating session today without incident. Encouraged pt to let us know how she's feeling with mobility changes. Wasn't able to walk due to feeling dizzy with stance and did not get better.Plans on rehab at [place].Will continue to follow and progress as tolerated.     Plan/Recommendations:   If medically appropriate, Moderate Intensity Therapy recommended post-acute care. This is recommended as therapy feels the patient would require 3-4 days per week and wouldn't tolerate \"3 hour daily\" rehab intensity. SNF would be the preferred choice. If the patient does not agree to SNF, arrange HH or OP depending on home bound status. If patient is medically complex, consider LTACH. Pt requires no DME at discharge.     Pt desires Skilled Rehab placement at discharge. Pt cooperative; agreeable to therapeutic recommendations and plan of care.         Basic Mobility 6-click:  Rollin = Total, A lot = 2, A little = 3; 4 = None  Supine>Sit:   1 = Total, A lot = 2, A little = 3; 4 = None   Sit>Stand with arms:  1 = Total, A lot = 2, A little = 3; 4 = None  Bed>Chair:   1 = Total, A lot = 2, A little = 3; 4 = None  Ambulate in " room:  1 = Total, A lot = 2, A little = 3; 4 = None  3-5 Steps with railin = Total, A lot = 2, A little = 3; 4 = None  Score: 14        Post-Tx Position: Up in Chair, Alarms activated, and Call light and personal items within reach  PPE: gloves    Therapy Charges for Today       Code Description Service Date Service Provider Modifiers Qty    95587790101 HC PT NEUROMUSC RE EDUCATION EA 15 MIN 2025 Mei Anand PTA GP 1    62567503754 HC PT THER PROC EA 15 MIN 2025 Mei Anand PTA GP 1           PT Charges       Row Name 25 1321             Time Calculation    Start Time 1033  -      Stop Time 1051  -      Time Calculation (min) 18 min  -      PT Received On 25  -      PT - Next Appointment 25  -         Time Calculation- PT    Total Timed Code Minutes- PT 18 minute(s)  -                User Key  (r) = Recorded By, (t) = Taken By, (c) = Cosigned By      Initials Name Provider Type    Mei Ross PTA Physical Therapist Assistant

## 2025-02-27 NOTE — CASE MANAGEMENT/SOCIAL WORK
Discharge Planning Assessment  [facility]     Patient Name: Alexey Trinidad  MRN: [id number]  Today's Date: 2/27/2025    Admit Date: 2/22/2025    Plan: Return to [facility], [facility]. Precert approved (valid 2/26-2/28). No PASRR needed for return. [facility] following, EOS provided 2/25.       Discharge Plan       Row Name 02/27/25 1009       Plan    Plan Return to [facility], [facility]. Precert approved (valid 2/26-2/28). No PASRR needed for return. [facility] following, EOS provided 2/25.    Patient/Family in Agreement with Plan yes    Plan Comments Per [facility], precert approved, valid 2/26-2/28. CM confirmed with [facility] liaison that bed available today (room 208-A), [facility] unable to transport at this time. Per RN, daughter unable to transport. CM submitted [facility] w/c van request, updated RN. CM contacted patient's daughter, Aislinn, by phone, updated daughter on pending w/c van transport. [facility] letter reviewed with daughter, verbal consent obtained and copy left in room per daughter request.                      Expected Discharge Date and Time       Expected Discharge Date Expected Discharge Time    Feb 27, 2025                Patient Forms       Row Name 02/27/25 1011       Patient Forms    Important Message from Medicare (IMM) Delivered  2/27/25    Delivered to Support person  Aislinn partida    Method of delivery Telephone                  Phone communication or documentation only - no physical contact with patient or family.     CHARI RaderN, [place] [address]    Office: [phone]  Fax: [phone]

## 2025-02-27 NOTE — DISCHARGE SUMMARY
Hospitalist Service   DISCHARGE SUMMARY    Patient Name: Alexey Trinidad  : 3/21/1926  MRN: [id number]    Date of Admission: 2025  Date of Discharge:  25    Primary Care Physician: Rui Cole MD      Presenting Problem:   Acute blood loss anemia [D62]  Acute GI bleeding [K92.2]  Gastrointestinal hemorrhage with melena [K92.1]  Urinary tract infection without hematuria, site unspecified [N39.0]    Active and Resolved Hospital Problems:  Active Hospital Problems    Diagnosis POA    **Acute GI bleeding [K92.2] Yes      Resolved Hospital Problems   No resolved problems to display.         Hospital Course     Hospital Course:  Alexey Trinidad is a 89 y.o. female with history of hypothyroidism, COPD, hysterectomy, cholecystectomy, collagenous colitis whoo was brought in from the nursing facility by the request of the family for rectal bleeding.  Family states that the nursing home said she was passing blood clots.  She is complaining of some abdominal pain and nausea.  She was evaluated in ER and found to have a hemoglobin of 5.8.  Her hemoglobin was 11.8 on 2025.  Patient was given 2 units of PRBCs with improvement in hemoglobin, GI consulted.  Patient also noted to have UTI, started on IV antibiotics.  Details as noted below.  Patient is medically stable to discharge back to facility with follow-up with PCP and GI as an outpatient.    Assessment/Plan:      Rectal bleeding, improved  Acute blood loss anemia  -CT abdomen pelvis shows biliary dilatation and calcification near the ampulla  -Hemoglobin of 5.8 on admission, improved after 2 units  -Patient not on anticoagulation as outpatient  -s/p EGD on  with Multiple etiology for her blood loss anemia including duodenal ulcer as well as possible diverticulosis and very large hemorrhoid. Currently there was no actively bleeding diverticulum and hemorrhoid was noticed. Large duodenal ulcers x 2 status post of cauterization. Changes  consistent with a gastritis biopsy performed to rule out H. pylori in the setting of duodenal ulcer.  Large hiatal hernia.  -Continue PPI twice daily  -hemoglobin is stable, okay to discharge per GI with outpatient follow-up as needed     Acute UTI  -UA noted, urine culture shows Pseudomonas, sensitivities noted  -Initially was on IV ceftriaxone, given 1 dose of tobramycin to finish of the course on 2/25     Decreased appetite  -Multifactorial in setting of advanced dementia, advanced age, and PUD  -Discussed with daughter  on 2/24, daughter wants patient to remain comfortable, would not recommend any tube feeds due to advanced age  -Palliative care following, hospice referral made     COPD  Pulmonary hypertension  -Not in exacerbation currently   -continue bronchodilators, sildenafil  -Monitor     Chronic hypotension  -Continue midodrine  -Monitor     Hypothyroidism  -Continue levothyroxine     Advanced dementia  -Patient appears at baseline, pleasantly confused  -Watch for delirium, monitor    DISCHARGE Follow Up Recommendations for labs and diagnostics:   Follow-up with PCP and GI as needed    Day of Discharge     Vital Signs:  Temp:  [97.3 °F (36.3 °C)-98 °F (36.7 °C)] 98 °F (36.7 °C)  Heart Rate:  [49-71] 54  Resp:  [13-20] 16  BP: ()/(40-61) 116/57  Flow (L/min) (Oxygen Therapy):  [2] 2    Physical Exam:  General: Awake, pleasantly confused, elderly female, NAD  Eyes: PERRL, EOMI, conjunctivae are clear  Cardiovascular: Regular rate and rhythm, no murmurs  Respiratory: Clear to auscultation bilaterally, no wheezing or rales, unlabored breathing  Abdomen: Soft, nontender, positive bowel sounds, no guarding  Musculoskeletal: Normal range of motion, no other gross deformities  Skin: Warm, dry        Pertinent  and/or Most Recent Results     LAB RESULTS:      Lab 02/25/25  0411 02/24/25  0234 02/22/25  2311 02/22/25  1231   WBC 9.67 8.81 10.72 14.32*   HEMOGLOBIN 8.5* 8.5* 8.4* 5.8*   HEMATOCRIT 27.2* 27.4*  25.7* 18.5*   PLATELETS 251 227 211 284   NEUTROS ABS 5.90 5.28 7.36* 10.52*   IMMATURE GRANS (ABS) 0.07* 0.05 0.12* 0.16*   LYMPHS ABS 2.42 2.16 2.13 2.51   MONOS ABS 1.09* 1.18* 1.03* 1.09*   EOS ABS 0.13 0.08 0.02 0.00   MCV 95.4 95.8 91.8 101.6*   PROTIME  --   --   --  14.9*         Lab 02/25/25  1046 02/25/25  0233 02/24/25  1247 02/24/25  0234 02/23/25  0940 02/22/25  2311 02/22/25  1231   SODIUM  --  138  --  143  --  143 138   POTASSIUM 4.0 3.5 4.7 2.8* 3.7 3.4* 3.8   CHLORIDE  --  97*  --  103  --  104 96*   CO2  --  30.2*  --  28.9  --  29.6* 28.7   ANION GAP  --  10.8  --  11.1  --  9.4 13.3   BUN  --  12  --  19  --  37* 46*   CREATININE  --  0.91  --  0.81  --  0.94 1.04*   EGFR  --  57.1*  --  65.7  --  55.0* 48.7*   GLUCOSE  --  97  --  94  --  114* 135*   CALCIUM  --  8.2  --  8.3  --  8.9 10.0*   MAGNESIUM  --  1.9  --  2.0  --  2.2  --    PHOSPHORUS  --  2.5  --  2.9  --  3.1  --          Lab 02/24/25  0234 02/22/25  1231   TOTAL PROTEIN 5.1* 6.0   ALBUMIN 3.2* 3.8   GLOBULIN 1.9 2.2   ALT (SGPT) 15 14   AST (SGOT) 20 20   BILIRUBIN <0.2 <0.2   ALK PHOS 33* 36*         Lab 02/22/25  1231   PROTIME 14.9*   INR 1.17*             Lab 02/22/25  1244   ABO TYPING A   RH TYPING Positive   ANTIBODY SCREEN Negative         Brief Urine Lab Results  (Last result in the past 365 days)        Color   Clarity   Blood   Leuk Est   Nitrite   Protein   CREAT   Urine HCG        02/22/25 1341 Yellow   Clear   Negative   Moderate (2+)   Positive   Negative                 Microbiology Results (last 10 days)       Procedure Component Value - Date/Time    Urine Culture - Urine, Urine, Catheter [[id number]]  (Abnormal)  (Susceptibility) Collected: 02/22/25 1341    Lab Status: Final result Specimen: Urine, Catheter Updated: 02/25/25 0910     Urine Culture >100,000 CFU/mL Pseudomonas aeruginosa    Narrative:      Colonization of the urinary tract without infection is common. Treatment is discouraged unless the patient is  symptomatic, pregnant, or undergoing an invasive urologic procedure.    Susceptibility        Pseudomonas aeruginosa      NAILA      Cefepime Susceptible      Ceftazidime Susceptible      Ciprofloxacin Susceptible      Levofloxacin Susceptible      Piperacillin + Tazobactam Susceptible      Tobramycin Susceptible                                   XR Abdomen KUB    Result Date: 1/31/2025  Impression: Impression: Nonobstructive bowel gas pattern. Electronically Signed: Barbra Ordaz  1/31/2025 3:54 PM EST  Workstation ID: [id number]    XR Chest 1 View    Result Date: 1/30/2025  Impression: Impression: Significantly improved/resolved pulmonary edema. Decreased, small bilateral pleural effusions. No new or worsening findings. Electronically Signed: Daniel Veras MD  1/30/2025 9:37 AM EST  Workstation ID: [id number]    XR Abdomen KUB    Result Date: 1/29/2025  Impression: Impression: Enteric tube terminates in the left abdomen, with tip likely in the mid stomach. Electronically Signed: Edouard Shaver  1/29/2025 12:24 PM EST  Workstation ID: [id number]             Results for orders placed during the hospital encounter of 01/24/25    Adult Transthoracic Echo Complete W/ Cont if Necessary Per Protocol    Interpretation Summary    Left ventricular systolic function is normal. Calculated left ventricular EF = 70%    Left ventricular wall thickness is consistent with moderate to severe concentric hypertrophy.    Left ventricular diastolic function is consistent with (grade I) impaired relaxation.    The left atrial cavity is mildly dilated.    The right atrial cavity is mildly  dilated.    Moderate aortic valve regurgitation is present.    Moderate to severe aortic valve stenosis is present.    Moderate tricuspid valve regurgitation is present.    Estimated right ventricular systolic pressure from tricuspid regurgitation is markedly elevated (>55 mmHg).    Transthoracic echocardiography reveals moderate to severe LVH with severely  calcified aortic valve with moderate aortic regurgitation and moderate to severe aortic stenosis.  No effusion.  Moderate TR with severe pulm hypertension.  No effusion    Electronically signed by Kt Goins MD, 01/28/25, 5:51 PM EST.      Labs Pending at Discharge:      Procedures Performed  Procedure(s):  COLONOSCOPY with POLYPECTOMY  ESOPHAGOGASTRODUODENOSCOPY with GOLD PROBE & BIOPSY         Consults:   Consults       Date and Time Order Name Status Description    2/22/2025  1:28 PM Hospitalist (on-call MD unless specified)      2/22/2025  1:28 PM Gastroenterology (on-call MD unless specified) Completed     2/22/2025  1:03 PM Hospitalist (on-call MD unless specified)      1/29/2025  7:29 AM Inpatient Cardiology Consult Completed     1/28/2025  8:48 AM Inpatient Pulmonology Consult Completed     1/24/2025 11:54 PM Inpatient Gastroenterology Consult Completed               Discharge Details        Discharge Medications        New Medications        Instructions Start Date   pantoprazole 40 MG EC tablet  Commonly known as: PROTONIX   40 mg, Oral, 2 Times Daily             Continue These Medications        Instructions Start Date   acetaminophen 500 MG tablet  Commonly known as: TYLENOL   500 mg, Every Morning      acetaminophen 325 MG tablet  Commonly known as: TYLENOL   650 mg, Every 8 Hours PRN      ALIGN PREBIOTIC-PROBIOTIC PO   1 capsule, Daily      balsalazide 750 MG capsule  Commonly known as: COLAZAL   1 capsule, Nightly      BENEFIBER DRINK MIX PO   1 package, Daily      Calmoseptine 0.44-20.6 % ointment  Generic drug: Menthol-Zinc Oxide   1 Application, 2 Times Daily      cholestyramine light 4 g packet   4 g, 2 Times Daily PRN      folic acid-pyridoxine-cyanocobalamin 2.5-25-2 MG tablet tablet  Commonly known as: FOLBIC   1 tablet, 2 Times Daily      levothyroxine 100 MCG tablet  Commonly known as: SYNTHROID, LEVOTHROID   100 mcg, Every Early Morning      midodrine 5 MG tablet  Commonly known as:  PROAMATINE   5 mg, 3 Times Daily Before Meals      midodrine 10 MG tablet  Commonly known as: PROAMATINE   10 mg, 3 Times Daily Before Meals      ondansetron ODT 4 MG disintegrating tablet  Commonly known as: ZOFRAN-ODT   4 mg, Every 6 Hours PRN      phenylephrine-mineral oil-petrolatum 0.25-14-74.9 % ointment hemorrhoidal ointment  Commonly known as: PREPARATION H   1 Application, 2 Times Daily      sildenafil 20 MG tablet  Commonly known as: REVATIO   10 mg, Oral, 3 Times Daily             Stop These Medications      furosemide 20 MG tablet  Commonly known as: LASIX              Allergies   Allergen Reactions    Contrast Dye (Echo Or Unknown Ct/Mr) Unknown - High Severity    Sulfa Antibiotics Unknown - High Severity         Discharge Disposition:  Skilled Nursing Facility (DC - External)    Diet:  Hospital:  Diet Order   Procedures    Diet: Regular/House; Texture: Soft to Chew (NDD 3); Soft to Chew: Chopped Meat; Fluid Consistency: Thin (IDDSI 0)         Discharge Activity:         CODE STATUS:  Code Status and Medical Interventions: No CPR (Do Not Attempt to Resuscitate); Limited Support; No intubation (DNI)   Ordered at: 02/22/25 1410     Medical Intervention Limits:    No intubation (DNI)     Code Status (Patient has no pulse and is not breathing):    No CPR (Do Not Attempt to Resuscitate)     Medical Interventions (Patient has pulse or is breathing):    Limited Support         Future Appointments   Date Time Provider Department Center   3/13/2025 10:00 AM Diana Arreguin APRN [facility]           Time spent on Discharge including face to face service:  44 minutes    Signature:    Electronically signed by Makayla Gibson DO, 02/27/25, 9:45 AM EST.      Part of this note may be an electronic transcription/translation of spoken language to printed text using the Dragon Dictation System.

## 2025-02-27 NOTE — PLAN OF CARE
Goal Outcome Evaluation:  Plan of Care Reviewed With: patient           Outcome Evaluation: Possible discharge to rehab soon. BP and HR stable overnight. Continue to monitor

## 2025-02-27 NOTE — PLAN OF CARE
Goal Outcome Evaluation:                 Pt discharging back to [place], Report called to Mary. Transport has picked up patient. Discharge packet sent with transport.

## 2025-03-10 PROBLEM — G31.84 MCI (MILD COGNITIVE IMPAIRMENT): Status: ACTIVE | Noted: 2021-11-03

## 2025-03-10 PROBLEM — R63.4 WEIGHT LOSS, UNINTENTIONAL: Status: ACTIVE | Noted: 2023-08-28

## 2025-03-10 PROBLEM — K92.2 ACUTE GI BLEEDING: Status: RESOLVED | Noted: 2025-02-22 | Resolved: 2025-03-10

## 2025-03-10 PROBLEM — I38 VALVULAR HEART DISEASE: Chronic | Status: ACTIVE | Noted: 2025-03-10

## 2025-03-10 PROBLEM — H40.1130 PRIMARY OPEN ANGLE GLAUCOMA (POAG) OF BOTH EYES: Status: ACTIVE | Noted: 2021-07-07

## 2025-03-10 PROBLEM — K29.61 OTHER GASTRITIS WITH BLEEDING: Status: ACTIVE | Noted: 2025-02-23

## 2025-03-10 PROBLEM — I27.20 SEVERE PULMONARY HYPERTENSION: Chronic | Status: ACTIVE | Noted: 2025-03-10

## 2025-03-10 PROBLEM — N39.0 UTI (URINARY TRACT INFECTION): Status: RESOLVED | Noted: 2025-01-24 | Resolved: 2025-03-10

## 2025-03-10 PROBLEM — D62 ACUTE BLOOD LOSS ANEMIA: Status: ACTIVE | Noted: 2025-02-23

## 2025-03-10 PROBLEM — L71.9 ROSACEA: Status: ACTIVE | Noted: 2021-01-28

## 2025-03-10 PROBLEM — K26.9 MULTIPLE DUODENAL ULCERS: Status: ACTIVE | Noted: 2025-02-23

## 2025-03-10 PROBLEM — F32.A DEPRESSION: Status: ACTIVE | Noted: 2024-03-28

## 2025-03-10 PROBLEM — E03.9 HYPOTHYROIDISM: Chronic | Status: ACTIVE | Noted: 2020-06-11

## 2025-03-10 PROBLEM — K44.9 LARGE HIATAL HERNIA: Status: ACTIVE | Noted: 2025-02-23

## 2025-03-10 PROBLEM — G25.0 ESSENTIAL TREMOR: Status: ACTIVE | Noted: 2023-02-21

## 2025-03-10 PROBLEM — E03.9 HYPOTHYROIDISM: Status: ACTIVE | Noted: 2020-06-11

## 2025-03-10 PROBLEM — I95.89 CHRONIC HYPOTENSION: Chronic | Status: ACTIVE | Noted: 2025-03-10

## 2025-03-10 PROBLEM — K52.831 COLLAGENOUS COLITIS: Status: ACTIVE | Noted: 2022-06-08

## 2025-03-10 PROBLEM — Z96.1 BILATERAL PSEUDOPHAKIA: Status: ACTIVE | Noted: 2019-04-10

## 2025-03-10 PROBLEM — H35.372 EPIRETINAL MEMBRANE (ERM) OF LEFT EYE: Status: ACTIVE | Noted: 2021-07-07

## 2025-03-12 ENCOUNTER — TELEPHONE (OUTPATIENT)
Dept: CARDIOLOGY | Facility: CLINIC | Age: OVER 89
End: 2025-03-12
Payer: MEDICARE

## 2025-03-12 NOTE — TELEPHONE ENCOUNTER
Hub staff attempted to follow warm transfer process and was unsuccessful     Caller: Yenny Rae    Relationship to patient: Emergency Contact    Best call back number: [phone]     Patient is needing: PT DAUGHTER YENNY CALLING TO [facility] TO ANOTHER DAY AS SHE IS HER TRANSPORTATION AND IS VERY SICK - SHE IS REQUESTING AN AFTERNOON APPT - PLEASE ADVISE -

## 2025-03-19 ENCOUNTER — HOSPITAL ENCOUNTER (EMERGENCY)
Facility: HOSPITAL | Age: OVER 89
Discharge: HOME | End: 2025-03-19
Attending: EMERGENCY MEDICINE
Payer: MEDICARE

## 2025-03-19 VITALS
OXYGEN SATURATION: 93 % | WEIGHT: 95.1 LBS | DIASTOLIC BLOOD PRESSURE: 60 MMHG | HEART RATE: 84 BPM | TEMPERATURE: 99.6 F | HEIGHT: 58 IN | RESPIRATION RATE: 18 BRPM | BODY MASS INDEX: 19.96 KG/M2 | SYSTOLIC BLOOD PRESSURE: 126 MMHG

## 2025-03-19 DIAGNOSIS — J20.5 RSV BRONCHITIS: Primary | ICD-10-CM

## 2025-03-19 DIAGNOSIS — R50.9 FEVER, UNSPECIFIED FEVER CAUSE: ICD-10-CM

## 2025-03-19 LAB
ANION GAP SERPL CALCULATED.3IONS-SCNC: 12.5 MMOL/L (ref 5–15)
B PARAPERT DNA SPEC QL NAA+PROBE: NOT DETECTED
B PERT DNA SPEC QL NAA+PROBE: NOT DETECTED
BACTERIA UR QL AUTO: ABNORMAL /HPF
BASOPHILS # BLD AUTO: 0.11 10*3/MM3 (ref 0–0.2)
BASOPHILS NFR BLD AUTO: 1.1 % (ref 0–1.5)
BILIRUB UR QL STRIP: NEGATIVE
BUN SERPL-MCNC: 19 MG/DL (ref 8–23)
BUN/CREAT SERPL: 23.5 (ref 7–25)
C PNEUM DNA NPH QL NAA+NON-PROBE: NOT DETECTED
CALCIUM SPEC-SCNC: 9.2 MG/DL (ref 8.2–9.6)
CHLORIDE SERPL-SCNC: 99 MMOL/L (ref 98–107)
CLARITY UR: CLEAR
CO2 SERPL-SCNC: 30.5 MMOL/L (ref 22–29)
COLOR UR: YELLOW
CREAT SERPL-MCNC: 0.81 MG/DL (ref 0.57–1)
DEPRECATED RDW RBC AUTO: 54.7 FL (ref 37–54)
EGFRCR SERPLBLD CKD-EPI 2021: 65.7 ML/MIN/1.73
EOSINOPHIL # BLD AUTO: 0.12 10*3/MM3 (ref 0–0.4)
EOSINOPHIL NFR BLD AUTO: 1.2 % (ref 0.3–6.2)
ERYTHROCYTE [DISTWIDTH] IN BLOOD BY AUTOMATED COUNT: 16.2 % (ref 12.3–15.4)
FLUAV SUBTYP SPEC NAA+PROBE: NOT DETECTED
FLUBV RNA ISLT QL NAA+PROBE: NOT DETECTED
GLUCOSE SERPL-MCNC: 125 MG/DL (ref 65–99)
GLUCOSE UR STRIP-MCNC: NEGATIVE MG/DL
HADV DNA SPEC NAA+PROBE: NOT DETECTED
HCOV 229E RNA SPEC QL NAA+PROBE: NOT DETECTED
HCOV HKU1 RNA SPEC QL NAA+PROBE: NOT DETECTED
HCOV NL63 RNA SPEC QL NAA+PROBE: NOT DETECTED
HCOV OC43 RNA SPEC QL NAA+PROBE: NOT DETECTED
HCT VFR BLD AUTO: 33.2 % (ref 34–46.6)
HGB BLD-MCNC: 9.7 G/DL (ref 12–15.9)
HGB UR QL STRIP.AUTO: NEGATIVE
HMPV RNA NPH QL NAA+NON-PROBE: NOT DETECTED
HPIV1 RNA ISLT QL NAA+PROBE: NOT DETECTED
HPIV2 RNA SPEC QL NAA+PROBE: NOT DETECTED
HPIV3 RNA NPH QL NAA+PROBE: NOT DETECTED
HPIV4 P GENE NPH QL NAA+PROBE: NOT DETECTED
HYALINE CASTS UR QL AUTO: ABNORMAL /LPF
IMM GRANULOCYTES # BLD AUTO: 0.05 10*3/MM3 (ref 0–0.05)
IMM GRANULOCYTES NFR BLD AUTO: 0.5 % (ref 0–0.5)
KETONES UR QL STRIP: NEGATIVE
LEUKOCYTE ESTERASE UR QL STRIP.AUTO: ABNORMAL
LYMPHOCYTES # BLD AUTO: 1.05 10*3/MM3 (ref 0.7–3.1)
LYMPHOCYTES NFR BLD AUTO: 10.1 % (ref 19.6–45.3)
M PNEUMO IGG SER IA-ACNC: NOT DETECTED
MCH RBC QN AUTO: 27.3 PG (ref 26.6–33)
MCHC RBC AUTO-ENTMCNC: 29.2 G/DL (ref 31.5–35.7)
MCV RBC AUTO: 93.5 FL (ref 79–97)
MONOCYTES # BLD AUTO: 1.27 10*3/MM3 (ref 0.1–0.9)
MONOCYTES NFR BLD AUTO: 12.2 % (ref 5–12)
NEUTROPHILS NFR BLD AUTO: 7.83 10*3/MM3 (ref 1.7–7)
NEUTROPHILS NFR BLD AUTO: 74.9 % (ref 42.7–76)
NITRITE UR QL STRIP: NEGATIVE
NRBC BLD AUTO-RTO: 0 /100 WBC (ref 0–0.2)
PH UR STRIP.AUTO: 5.5 [PH] (ref 5–8)
PLATELET # BLD AUTO: 490 10*3/MM3 (ref 140–450)
PMV BLD AUTO: 8.2 FL (ref 6–12)
POTASSIUM SERPL-SCNC: 3.2 MMOL/L (ref 3.5–5.2)
PROT UR QL STRIP: NEGATIVE
RBC # BLD AUTO: 3.55 10*6/MM3 (ref 3.77–5.28)
RBC # UR STRIP: ABNORMAL /HPF
REF LAB TEST METHOD: ABNORMAL
RHINOVIRUS RNA SPEC NAA+PROBE: NOT DETECTED
RSV RNA NPH QL NAA+NON-PROBE: DETECTED
SARS-COV-2 RNA RESP QL NAA+PROBE: NOT DETECTED
SODIUM SERPL-SCNC: 142 MMOL/L (ref 136–145)
SP GR UR STRIP: 1.02 (ref 1–1.03)
SQUAMOUS #/AREA URNS HPF: ABNORMAL /HPF
UROBILINOGEN UR QL STRIP: ABNORMAL
WBC # UR STRIP: ABNORMAL /HPF
WBC NRBC COR # BLD AUTO: 10.43 10*3/MM3 (ref 3.4–10.8)

## 2025-03-19 PROCEDURE — 80048 BASIC METABOLIC PNL TOTAL CA: CPT | Performed by: EMERGENCY MEDICINE

## 2025-03-19 PROCEDURE — 99283 EMERGENCY DEPT VISIT LOW MDM: CPT

## 2025-03-19 PROCEDURE — 85025 COMPLETE CBC W/AUTO DIFF WBC: CPT | Performed by: EMERGENCY MEDICINE

## 2025-03-19 PROCEDURE — 0202U NFCT DS 22 TRGT SARS-COV-2: CPT | Performed by: EMERGENCY MEDICINE

## 2025-03-19 PROCEDURE — P9612 CATHETERIZE FOR URINE SPEC: HCPCS

## 2025-03-19 PROCEDURE — 81001 URINALYSIS AUTO W/SCOPE: CPT | Performed by: EMERGENCY MEDICINE

## 2025-03-19 RX ORDER — SODIUM CHLORIDE 0.9 % (FLUSH) 0.9 %
10 SYRINGE (ML) INJECTION AS NEEDED
Status: DISCONTINUED | OUTPATIENT
Start: 2025-03-19 | End: 2025-03-20 | Stop reason: HOSPADM

## (undated) DEVICE — SNAR POLYP HOTSNARE/BRAIDED OVL/MINI 7F 2.8X10MM 230CM 1P/U

## (undated) DEVICE — TRAP WIDEEYE POLYP

## (undated) DEVICE — BITEBLOCK ENDO W/STRAP 60F A/ LF DISP

## (undated) DEVICE — PROB ELECTROHEMO GLD STD/PLG 7F 300

## (undated) DEVICE — SUREFIT, DUAL DISPERSIVE ELECTRODE, CONTACT QUALITY MONITOR: Brand: SUREFIT

## (undated) DEVICE — PK ENDO GI 50

## (undated) DEVICE — SINGLE-USE BIOPSY FORCEPS: Brand: RADIAL JAW 4